# Patient Record
Sex: MALE | Race: WHITE | Employment: OTHER | URBAN - METROPOLITAN AREA
[De-identification: names, ages, dates, MRNs, and addresses within clinical notes are randomized per-mention and may not be internally consistent; named-entity substitution may affect disease eponyms.]

---

## 2017-01-09 ENCOUNTER — CARE COORDINATION (OUTPATIENT)
Dept: FAMILY MEDICINE CLINIC | Age: 72
End: 2017-01-09

## 2017-01-09 RX ORDER — SIMVASTATIN 40 MG
20 TABLET ORAL NIGHTLY
Qty: 45 TABLET | Refills: 3 | Status: SHIPPED | OUTPATIENT
Start: 2017-01-09 | End: 2018-01-04 | Stop reason: SDUPTHER

## 2017-01-16 ENCOUNTER — CARE COORDINATION (OUTPATIENT)
Dept: FAMILY MEDICINE CLINIC | Age: 72
End: 2017-01-16

## 2017-01-26 ENCOUNTER — CARE COORDINATION (OUTPATIENT)
Dept: FAMILY MEDICINE CLINIC | Age: 72
End: 2017-01-26

## 2017-02-01 ENCOUNTER — CARE COORDINATION (OUTPATIENT)
Dept: FAMILY MEDICINE CLINIC | Age: 72
End: 2017-02-01

## 2017-02-08 PROBLEM — I73.9 PAD (PERIPHERAL ARTERY DISEASE) (HCC): Status: ACTIVE | Noted: 2017-02-08

## 2017-05-22 DIAGNOSIS — Z87.39 HISTORY OF GOUT: ICD-10-CM

## 2017-05-22 DIAGNOSIS — I25.119 CORONARY ARTERY DISEASE WITH ANGINA PECTORIS, UNSPECIFIED VESSEL OR LESION TYPE, UNSPECIFIED WHETHER NATIVE OR TRANSPLANTED HEART (HCC): ICD-10-CM

## 2017-05-22 RX ORDER — ALLOPURINOL 300 MG/1
300 TABLET ORAL DAILY
Qty: 90 TABLET | Refills: 3 | Status: SHIPPED | OUTPATIENT
Start: 2017-05-22 | End: 2018-01-04 | Stop reason: SDUPTHER

## 2017-05-22 RX ORDER — METOPROLOL TARTRATE 50 MG/1
TABLET, FILM COATED ORAL
Qty: 90 TABLET | Refills: 3 | Status: SHIPPED | OUTPATIENT
Start: 2017-05-22 | End: 2018-01-04 | Stop reason: SDUPTHER

## 2017-07-11 RX ORDER — LISINOPRIL 10 MG/1
TABLET ORAL
Qty: 90 TABLET | Refills: 0 | Status: SHIPPED | OUTPATIENT
Start: 2017-07-11 | End: 2017-10-19 | Stop reason: SDUPTHER

## 2017-08-15 ENCOUNTER — HOSPITAL ENCOUNTER (OUTPATIENT)
Age: 72
Discharge: HOME | End: 2017-08-15
Payer: MEDICARE

## 2017-08-15 LAB
ALBUMIN SERPL-MCNC: 4.3 G/DL (ref 3.5–5.1)
ALP BLD-CCNC: 76 U/L (ref 38–126)
ALT SERPL-CCNC: 19 U/L (ref 11–66)
ANION GAP SERPL CALCULATED.3IONS-SCNC: 13 MEQ/L (ref 8–16)
AST SERPL-CCNC: 23 U/L (ref 5–40)
BILIRUB SERPL-MCNC: 2.4 MG/DL (ref 0.3–1.2)
BILIRUBIN DIRECT: 0.4 MG/DL (ref 0–0.3)
BUN BLDV-MCNC: 19 MG/DL (ref 7–22)
CALCIUM SERPL-MCNC: 9.2 MG/DL (ref 8.5–10.5)
CHLORIDE BLD-SCNC: 100 MEQ/L (ref 98–111)
CHOLESTEROL, TOTAL: 133 MG/DL (ref 100–199)
CO2: 26 MEQ/L (ref 23–33)
CREAT SERPL-MCNC: 1 MG/DL (ref 0.4–1.2)
GFR SERPL CREATININE-BSD FRML MDRD: 74 ML/MIN/1.73M2
GLUCOSE BLD-MCNC: 96 MG/DL (ref 70–108)
HDLC SERPL-MCNC: 34 MG/DL
LDL CHOLESTEROL CALCULATED: 76 MG/DL
POTASSIUM SERPL-SCNC: 4.5 MEQ/L (ref 3.5–5.2)
SODIUM BLD-SCNC: 139 MEQ/L (ref 135–145)
TOTAL PROTEIN: 6.9 G/DL (ref 6.1–8)
TRIGL SERPL-MCNC: 114 MG/DL (ref 0–199)

## 2017-08-15 PROCEDURE — 36415 COLL VENOUS BLD VENIPUNCTURE: CPT

## 2017-08-15 PROCEDURE — 80053 COMPREHEN METABOLIC PANEL: CPT

## 2017-08-15 PROCEDURE — 80061 LIPID PANEL: CPT

## 2017-08-15 PROCEDURE — 82248 BILIRUBIN DIRECT: CPT

## 2017-08-22 RX ORDER — PANTOPRAZOLE SODIUM 40 MG/1
40 TABLET, DELAYED RELEASE ORAL
Qty: 60 TABLET | Refills: 1 | Status: ON HOLD | OUTPATIENT
Start: 2017-08-22 | End: 2017-11-01 | Stop reason: ALTCHOICE

## 2017-09-18 ENCOUNTER — HOSPITAL ENCOUNTER (OUTPATIENT)
Dept: INTERVENTIONAL RADIOLOGY/VASCULAR | Age: 72
Discharge: HOME | End: 2017-09-18
Payer: MEDICARE

## 2017-09-18 DIAGNOSIS — I73.9 PVD (PERIPHERAL VASCULAR DISEASE) (HCC): ICD-10-CM

## 2017-09-18 PROCEDURE — 93922 UPR/L XTREMITY ART 2 LEVELS: CPT

## 2017-10-11 ENCOUNTER — OFFICE VISIT (OUTPATIENT)
Dept: FAMILY MEDICINE CLINIC | Age: 72
End: 2017-10-11
Payer: MEDICARE

## 2017-10-11 VITALS
RESPIRATION RATE: 12 BRPM | DIASTOLIC BLOOD PRESSURE: 58 MMHG | HEIGHT: 69 IN | SYSTOLIC BLOOD PRESSURE: 122 MMHG | HEART RATE: 52 BPM | WEIGHT: 189.3 LBS | TEMPERATURE: 98.2 F | BODY MASS INDEX: 28.04 KG/M2

## 2017-10-11 DIAGNOSIS — K76.0 HEPATIC STEATOSIS: ICD-10-CM

## 2017-10-11 DIAGNOSIS — J44.9 CHRONIC OBSTRUCTIVE PULMONARY DISEASE, UNSPECIFIED COPD TYPE (HCC): ICD-10-CM

## 2017-10-11 DIAGNOSIS — E78.6 LOW HDL (UNDER 40): ICD-10-CM

## 2017-10-11 DIAGNOSIS — N52.9 VASCULOGENIC ERECTILE DYSFUNCTION, UNSPECIFIED VASCULOGENIC ERECTILE DYSFUNCTION TYPE: ICD-10-CM

## 2017-10-11 DIAGNOSIS — R17 ELEVATED BILIRUBIN: ICD-10-CM

## 2017-10-11 DIAGNOSIS — I10 ESSENTIAL HYPERTENSION: Primary | ICD-10-CM

## 2017-10-11 PROCEDURE — G8419 CALC BMI OUT NRM PARAM NOF/U: HCPCS | Performed by: NURSE PRACTITIONER

## 2017-10-11 PROCEDURE — 3023F SPIROM DOC REV: CPT | Performed by: NURSE PRACTITIONER

## 2017-10-11 PROCEDURE — 4040F PNEUMOC VAC/ADMIN/RCVD: CPT | Performed by: NURSE PRACTITIONER

## 2017-10-11 PROCEDURE — G8926 SPIRO NO PERF OR DOC: HCPCS | Performed by: NURSE PRACTITIONER

## 2017-10-11 PROCEDURE — G8598 ASA/ANTIPLAT THER USED: HCPCS | Performed by: NURSE PRACTITIONER

## 2017-10-11 PROCEDURE — 3017F COLORECTAL CA SCREEN DOC REV: CPT | Performed by: NURSE PRACTITIONER

## 2017-10-11 PROCEDURE — 1036F TOBACCO NON-USER: CPT | Performed by: NURSE PRACTITIONER

## 2017-10-11 PROCEDURE — 99214 OFFICE O/P EST MOD 30 MIN: CPT | Performed by: NURSE PRACTITIONER

## 2017-10-11 PROCEDURE — 1123F ACP DISCUSS/DSCN MKR DOCD: CPT | Performed by: NURSE PRACTITIONER

## 2017-10-11 PROCEDURE — G8484 FLU IMMUNIZE NO ADMIN: HCPCS | Performed by: NURSE PRACTITIONER

## 2017-10-11 PROCEDURE — G8427 DOCREV CUR MEDS BY ELIG CLIN: HCPCS | Performed by: NURSE PRACTITIONER

## 2017-10-11 RX ORDER — SILDENAFIL 50 MG/1
TABLET, FILM COATED ORAL
Qty: 4 TABLET | Refills: 0 | COMMUNITY
Start: 2017-10-11 | End: 2020-12-09

## 2017-10-11 ASSESSMENT — ENCOUNTER SYMPTOMS
BLOOD IN STOOL: 0
ABDOMINAL DISTENTION: 0
COUGH: 0
VOMITING: 0
VOICE CHANGE: 0
NAUSEA: 0
ABDOMINAL PAIN: 0
APNEA: 0
TROUBLE SWALLOWING: 0
CONSTIPATION: 0
BACK PAIN: 1
WHEEZING: 0
ANAL BLEEDING: 0
PHOTOPHOBIA: 0
DIARRHEA: 1
SHORTNESS OF BREATH: 1

## 2017-10-11 NOTE — PROGRESS NOTES
of water daily. Use sunscreen when outdoors. Vaccines provided in the office today include: None. He declines a referral to Pulmonology. Call with any concerns. Return in about 6 months (around 4/11/2018).        Electronically signed by Renetta Alonso CNP on 10/11/2017 at 10:38 AM

## 2017-10-11 NOTE — PATIENT INSTRUCTIONS
You may receive a survey about your visit with us today. The feedback from our patients helps us identify what is working well and where the service to all patients can be enhanced. Thank you! Orders Placed:  Orders Placed This Encounter   Procedures    Bilirubin, Total     Standing Status:   Future     Standing Expiration Date:   10/11/2018    Bilirubin, Direct     Standing Status:   Future     Standing Expiration Date:   10/11/2018    HDL Cholesterol     Standing Status:   Future     Standing Expiration Date:   10/11/2018     Order Specific Question:   Is Patient Fasting? Answer:   Yes     Order Specific Question:   No of Hours? Answer:   12     Medications Prescribed:  Orders Placed This Encounter   Medications    sildenafil (VIAGRA) 50 MG tablet     Sig: Take 1 tablet 30 minutes-4 hours prior. FTM#:[id number]  Exp:10/2018     Dispense:  4 tablet     Refill:  0         Larayne Scientology Jeanne received counseling on the following healthy behaviors: nutrition, exercise, safety issues and immunizations. The patient has been advised to obtain lab tests as ordered. The patient was made aware of the need to fast for 12 hours prior to the blood draw. Next colonoscopy due in 2018 as advised by Dr. Marc Cervantes. Obtain at least 45 minutes of moderate exercise at least 3 times per week. Follow a low fat and low sodium diet. Take medications as prescribed. Wear eye protection (sunglasses) as needed to avoid unnecessary exposure to sunlight. Obtain annual eye exams as advised. An influenza vaccine was declined. Weight management is encouraged. Maintain safe habits, including wearing a seat belt, avoiding excessive speed and avoiding the use of a cell phone while driving. Avoid tobacco smoke, including second-hand smoke. Follow a well-balanced diet. \"Strive for [employer]" servings of fruits and vegetables daily. Obtain at least 2 servings of calcium-rich food daily.       Stay well have given you a prescription for antibiotics, which you can fill if you need it. ¨ Talk to your doctor if you have any problems with your medicine. And call your doctor if you have to use your antibiotic or steroid pills. Preventing an exacerbation  · Do not smoke. This is the most important step you can take to prevent more damage to your lungs and prevent problems. If you already smoke, it is never too late to stop. If you need help quitting, talk to your doctor about stop-smoking programs and medicines. These can increase your chances of quitting for good. · Take your daily medicines as prescribed. · Avoid colds and flu. ¨ Get a pneumococcal vaccine. ¨ Get a flu vaccine each year, as soon as it is available. Ask those you live or work with to do the same, so they will not get the flu and infect you. ¨ Try to stay away from people with colds or the flu. ¨ Wash your hands often. · Avoid secondhand smoke; air pollution; cold, dry air; hot, humid air; and high altitudes. Stay at home with your windows closed when air pollution is bad. · Learn breathing techniques for COPD, such as breathing through pursed lips. These techniques can help you breathe easier during an exacerbation. When should you call for help? Call 911 anytime you think you may need emergency care. For example, call if:  · You have severe trouble breathing. · You have severe chest pain. Call your doctor now or seek immediate medical care if:  · You have new or worse shortness of breath. · You develop new chest pain. · You are coughing more deeply or more often, especially if you notice more mucus or a change in the color of your mucus. · You cough up blood. · You have new or increased swelling in your legs or belly. · You have a fever. Watch closely for changes in your health, and be sure to contact your doctor if:  · You need to use your antibiotic or steroid pills. · Your symptoms are getting worse.   Where can you learn

## 2017-10-18 ENCOUNTER — HOSPITAL ENCOUNTER (OUTPATIENT)
Age: 72
Discharge: HOME | End: 2017-10-18
Payer: MEDICARE

## 2017-10-18 ENCOUNTER — TELEPHONE (OUTPATIENT)
Dept: FAMILY MEDICINE CLINIC | Age: 72
End: 2017-10-18

## 2017-10-18 DIAGNOSIS — E78.6 LOW HDL (UNDER 40): ICD-10-CM

## 2017-10-18 DIAGNOSIS — R17 ELEVATED BILIRUBIN: ICD-10-CM

## 2017-10-18 LAB
BILIRUB SERPL-MCNC: 2 MG/DL (ref 0.3–1.2)
BILIRUBIN DIRECT: 0.3 MG/DL (ref 0–0.3)
HDLC SERPL-MCNC: 33 MG/DL

## 2017-10-18 PROCEDURE — 83718 ASSAY OF LIPOPROTEIN: CPT

## 2017-10-18 PROCEDURE — 82247 BILIRUBIN TOTAL: CPT

## 2017-10-18 PROCEDURE — 82248 BILIRUBIN DIRECT: CPT

## 2017-10-18 PROCEDURE — 36415 COLL VENOUS BLD VENIPUNCTURE: CPT

## 2017-10-19 RX ORDER — LISINOPRIL 10 MG/1
TABLET ORAL
Qty: 90 TABLET | Refills: 3 | Status: SHIPPED | OUTPATIENT
Start: 2017-10-19 | End: 2018-01-04 | Stop reason: SDUPTHER

## 2017-10-19 NOTE — TELEPHONE ENCOUNTER
EP request received from [facility] for lisinopril 10mg  Last seen 10/11/17 and next appt 5/1/18  Order pending

## 2017-11-01 ENCOUNTER — HOSPITAL ENCOUNTER (OUTPATIENT)
Dept: INPATIENT UNIT | Age: 72
Discharge: HOME | End: 2017-11-01
Attending: INTERNAL MEDICINE | Admitting: INTERNAL MEDICINE
Payer: MEDICARE

## 2017-11-01 VITALS
RESPIRATION RATE: 16 BRPM | DIASTOLIC BLOOD PRESSURE: 64 MMHG | OXYGEN SATURATION: 100 % | SYSTOLIC BLOOD PRESSURE: 125 MMHG | HEART RATE: 50 BPM | HEIGHT: 70 IN | BODY MASS INDEX: 27.06 KG/M2 | WEIGHT: 189 LBS | TEMPERATURE: 98.5 F

## 2017-11-01 PROBLEM — I20.1 ANGINA PECTORIS, VARIANT (HCC): Status: ACTIVE | Noted: 2017-11-01

## 2017-11-01 PROBLEM — R94.39 ABNORMAL NUCLEAR STRESS TEST: Status: ACTIVE | Noted: 2017-11-01

## 2017-11-01 PROBLEM — I73.9 PVD (PERIPHERAL VASCULAR DISEASE) WITH CLAUDICATION (HCC): Status: ACTIVE | Noted: 2017-11-01

## 2017-11-01 LAB
ABO: NORMAL
ALBUMIN SERPL-MCNC: 4.4 G/DL (ref 3.5–5.1)
ALP BLD-CCNC: 87 U/L (ref 38–126)
ALT SERPL-CCNC: 18 U/L (ref 11–66)
ANION GAP SERPL CALCULATED.3IONS-SCNC: 10 MEQ/L (ref 8–16)
ANTIBODY SCREEN: NORMAL
AST SERPL-CCNC: 22 U/L (ref 5–40)
BILIRUB SERPL-MCNC: 2.1 MG/DL (ref 0.3–1.2)
BUN BLDV-MCNC: 16 MG/DL (ref 7–22)
CALCIUM SERPL-MCNC: 9.2 MG/DL (ref 8.5–10.5)
CHLORIDE BLD-SCNC: 101 MEQ/L (ref 98–111)
CHOLESTEROL, TOTAL: 126 MG/DL (ref 100–199)
CO2: 29 MEQ/L (ref 23–33)
CREAT SERPL-MCNC: 1 MG/DL (ref 0.4–1.2)
GFR SERPL CREATININE-BSD FRML MDRD: 74 ML/MIN/1.73M2
GLUCOSE BLD-MCNC: 103 MG/DL (ref 70–108)
HCT VFR BLD CALC: 41 % (ref 42–52)
HDLC SERPL-MCNC: 34 MG/DL
HEMOGLOBIN: 14.7 GM/DL (ref 14–18)
LDL CHOLESTEROL CALCULATED: 69 MG/DL
MCH RBC QN AUTO: 35 PG (ref 27–31)
MCHC RBC AUTO-ENTMCNC: 35.8 GM/DL (ref 33–37)
MCV RBC AUTO: 97.8 FL (ref 80–94)
PDW BLD-RTO: 13.2 % (ref 11.5–14.5)
PLATELET # BLD: 163 THOU/MM3 (ref 130–400)
PMV BLD AUTO: 8.2 MCM (ref 7.4–10.4)
POTASSIUM SERPL-SCNC: 4.8 MEQ/L (ref 3.5–5.2)
RBC # BLD: 4.2 MILL/MM3 (ref 4.7–6.1)
RH FACTOR: NORMAL
SODIUM BLD-SCNC: 140 MEQ/L (ref 135–145)
TOTAL PROTEIN: 6.8 G/DL (ref 6.1–8)
TRIGL SERPL-MCNC: 116 MG/DL (ref 0–199)
WBC # BLD: 5.9 THOU/MM3 (ref 4.8–10.8)

## 2017-11-01 PROCEDURE — 2720000010 HC SURG SUPPLY STERILE

## 2017-11-01 PROCEDURE — 80053 COMPREHEN METABOLIC PANEL: CPT

## 2017-11-01 PROCEDURE — C1894 INTRO/SHEATH, NON-LASER: HCPCS

## 2017-11-01 PROCEDURE — 93005 ELECTROCARDIOGRAM TRACING: CPT

## 2017-11-01 PROCEDURE — 36415 COLL VENOUS BLD VENIPUNCTURE: CPT

## 2017-11-01 PROCEDURE — C1769 GUIDE WIRE: HCPCS

## 2017-11-01 PROCEDURE — 93459 L HRT ART/GRFT ANGIO: CPT | Performed by: INTERNAL MEDICINE

## 2017-11-01 PROCEDURE — 2780000010 HC IMPLANT OTHER

## 2017-11-01 PROCEDURE — 75630 X-RAY AORTA LEG ARTERIES: CPT | Performed by: INTERNAL MEDICINE

## 2017-11-01 PROCEDURE — 80061 LIPID PANEL: CPT

## 2017-11-01 PROCEDURE — 6360000002 HC RX W HCPCS

## 2017-11-01 PROCEDURE — 86900 BLOOD TYPING SEROLOGIC ABO: CPT

## 2017-11-01 PROCEDURE — A6258 TRANSPARENT FILM >16<=48 IN: HCPCS

## 2017-11-01 PROCEDURE — 86901 BLOOD TYPING SEROLOGIC RH(D): CPT

## 2017-11-01 PROCEDURE — 2580000003 HC RX 258: Performed by: INTERNAL MEDICINE

## 2017-11-01 PROCEDURE — 86850 RBC ANTIBODY SCREEN: CPT

## 2017-11-01 PROCEDURE — 85027 COMPLETE CBC AUTOMATED: CPT

## 2017-11-01 PROCEDURE — 93567 NJX CAR CTH SPRVLV AORTGRPHY: CPT | Performed by: INTERNAL MEDICINE

## 2017-11-01 PROCEDURE — 2500000003 HC RX 250 WO HCPCS

## 2017-11-01 RX ORDER — SODIUM CHLORIDE 9 MG/ML
INJECTION, SOLUTION INTRAVENOUS CONTINUOUS
Status: DISCONTINUED | OUTPATIENT
Start: 2017-11-01 | End: 2017-11-01 | Stop reason: HOSPADM

## 2017-11-01 RX ORDER — ACETAMINOPHEN 325 MG/1
650 TABLET ORAL EVERY 4 HOURS PRN
Status: DISCONTINUED | OUTPATIENT
Start: 2017-11-01 | End: 2017-11-01 | Stop reason: HOSPADM

## 2017-11-01 RX ORDER — ATROPINE SULFATE 0.4 MG/ML
0.5 AMPUL (ML) INJECTION
Status: DISCONTINUED | OUTPATIENT
Start: 2017-11-01 | End: 2017-11-01 | Stop reason: HOSPADM

## 2017-11-01 RX ORDER — ASPIRIN 325 MG
325 TABLET ORAL ONCE
Status: DISCONTINUED | OUTPATIENT
Start: 2017-11-01 | End: 2017-11-01 | Stop reason: HOSPADM

## 2017-11-01 RX ORDER — SODIUM CHLORIDE 0.9 % (FLUSH) 0.9 %
10 SYRINGE (ML) INJECTION EVERY 12 HOURS SCHEDULED
Status: DISCONTINUED | OUTPATIENT
Start: 2017-11-01 | End: 2017-11-01 | Stop reason: HOSPADM

## 2017-11-01 RX ORDER — SODIUM CHLORIDE 9 MG/ML
100 INJECTION, SOLUTION INTRAVENOUS CONTINUOUS
Status: ACTIVE | OUTPATIENT
Start: 2017-11-01 | End: 2017-11-01

## 2017-11-01 RX ORDER — SODIUM CHLORIDE 0.9 % (FLUSH) 0.9 %
10 SYRINGE (ML) INJECTION PRN
Status: DISCONTINUED | OUTPATIENT
Start: 2017-11-01 | End: 2017-11-01 | Stop reason: HOSPADM

## 2017-11-01 RX ORDER — ONDANSETRON 2 MG/ML
4 INJECTION INTRAMUSCULAR; INTRAVENOUS EVERY 6 HOURS PRN
Status: DISCONTINUED | OUTPATIENT
Start: 2017-11-01 | End: 2017-11-01 | Stop reason: HOSPADM

## 2017-11-01 RX ADMIN — SODIUM CHLORIDE: 9 INJECTION, SOLUTION INTRAVENOUS at 05:53

## 2017-11-01 NOTE — H&P
[address] [address]   Sedation/Analgesia History and Physical    Pt Name: Efren Mo  MRN: [id number]  YOB: 1945  Provider Performing Procedure: Sanchez Berger MD  Primary Care Physician: Mela Calderón MD      Pre-Procedure: Chest pain, possible coronary artery disease, abnormal stress test    Consent: I have discussed with the patient and/or the patient representative the indication, alternatives, and the possible risks and/or complications of the planned procedure and the anesthesia methods. The patient and/or representative appear to understand and agree to proceed. Medical History:   has a past medical history of AAA (abdominal aortic aneurysm) ([facility] [address] COPD (chronic obstructive pulmonary disease) ([facility] [address] Coronary artery disease; Emphysema of lung ([facility] [address] Erectile dysfunction; GERD (gastroesophageal reflux disease); Gout; History of blood transfusion; Hyperlipidemia; Hypertension; Liver disease; and Osteoarthritis. .    Surgical History:     has a past surgical history that includes Foot surgery (2011); Coronary artery bypass graft (2006); Cardiac surgery (2006); Colonoscopy (12/18/13); Appendectomy (1955); Cardiac catheterization; Inguinal hernia repair (Right, 12/1/2014); Upper gastrointestinal endoscopy; Endoscopy, colon, diagnostic; and thromboendarterectomy (Right, 12/22/2016). Allergies:    Allergies as of 11/01/2017 - Review Complete 11/01/2017   Allergen Reaction Noted    Crestor [rosuvastatin calcium]  09/12/2011    Lipitor  09/12/2011    Tramadol Nausea Only 09/12/2011    Vicodin [hydrocodone-acetaminophen]  09/12/2011    Codeine Nausea And Vomiting 09/12/2011    Percocet [oxycodone-acetaminophen] Nausea And Vomiting 09/12/2011       Medications:   Coumadin use last 5 days:  No  Antiplatelet drug therapy use last 5 days:  Yes  Other anticoagulant use last 5 days:  No   sodium chloride flush  10 mL Intravenous 2 times per day    aspirin  325 mg Oral Once Prior to Admission medications    Medication Sig Start Date End Date Taking? Authorizing Provider   Pantoprazole Sodium (PROTONIX PO) Take 40 mg by mouth daily   Yes Historical Provider, MD   lisinopril (PRINIVIL;ZESTRIL) 10 MG tablet TAKE ONE TABLET BY MOUTH ONCE DAILY 10/19/17  Yes Lokesh Montgomery MD   sildenafil (VIAGRA) 50 MG tablet Take 1 tablet 30 minutes-4 hours prior. SIY#:[id number]  Exp:10/2018 10/11/17  Yes [place] ERIK Chi   clopidogrel (PLAVIX) 75 MG tablet TAKE 1 TABLET BY MOUTH DAILY 8/29/17  Yes Varun Hooper MD   metoprolol tartrate (LOPRESSOR) 50 MG tablet TAKE ONE-HALF TABLET BY MOUTH TWICE DAILY 5/22/17  Yes [place] ERIK Chi   allopurinol (ZYLOPRIM) 300 MG tablet Take 1 tablet by mouth daily 5/22/17  [facility]   simvastatin (ZOCOR) 40 MG tablet Take 0.5 tablets by mouth nightly 1/9/17  Yes Lokesh Montgomery MD   nitroGLYCERIN (NITROSTAT) 0.4 MG SL tablet Place 1 tablet under the tongue every 5 minutes as needed for Chest pain 5/7/16  Yes Keyonna Wilder MD   ferrous sulfate 325 (65 FE) MG tablet Take 325 mg by mouth daily    Yes Historical Provider, MD   acetaminophen (TYLENOL) 500 MG tablet Take 1,000 mg by mouth as needed for Pain. Yes Historical Provider, MD   aspirin 325 MG EC tablet Take 325 mg by mouth daily.      Yes Historical Provider, MD       Vital Signs  Vitals:    11/01/17 0515   BP: 127/64   Pulse: (!) 46   Resp: 12   Temp: 97.6 °F (36.4 °C)   SpO2: 100%       Physical:  Heart:  regular rate and rhythm  Lungs:  Clear  Abdomen:  Soft  Mental Status:  Alert and Oriented    Planned Procedure:  Left Heart Cath and Possible Percutaneous Coronary Intervention    Sedation/ Anesthesia Plan: Midazolam and Sublimaze    ASA Classification: Class 3 - A patient with severe systemic disease that limits activity but is not incapacitating    Mallampati Airway Classification: II (soft palate, uvula, fauces visible)    · Pre-procedure diagnostic studies complete and results available. · Previous sedation/anesthesia experiences assessed. · The patient is an appropriate candidate to undergo the planned procedure sedation and anesthesia. (Refer to nursing sedation/analgesia documentation record)  · Formulation and discussion of sedation/procedure plan, risks, and expectations with patient and/or responsible adult completed. · Patient examined immediately prior to the procedure.  (Refer to nursing sedation/analgesia documentation record)    Tanya Velasquez MD  Electronically signed 11/1/2017 at 7:03 AM  Patient Name: Nikunj Bishop Record Number: [id number]  Date: 11/1/2017   Time: 7:03 AM   Room/Bed: 2E-05/005-A

## 2017-11-01 NOTE — FLOWSHEET NOTE
Pt admitted to  2E ambulatory for cardiac cath. Pt NPO. Patient accompanied by his wife. Vital signs obtained. Assessment and data collection initiated. Oriented to room. Policies and procedures for 2E explained   All questions answered with no further questions at this time. Fall prevention and safety precautions discussed with patient.

## 2017-11-01 NOTE — PROCEDURES
occluded in its mid-course, with diffuse disease  proximally. The saphenous vein graft to the obtuse marginal was patent with about  50% narrowing at the area of the anastomosis, does not seem to be  flow-limiting at this point. The LIMA to the LAD was patent with a good distal runoff. Some haziness of the proximal portion of the LIMA was seen and it does  not seem to be flow-limiting. The saphenous vein graft to the RCA was  patent with a good distal runoff. The RCA is a dominant artery. Posterolateral were patent. The saphenous vein graft to the diagonal artery was patent with about  50% narrowing proximally and the diagonal artery is small caliber  artery. The left ventriculogram showed a preserved systolic function of the  left ventricle and ejection fraction around 55%. No mitral regurg and  no gradient across the aortic valve. The abdominal aortogram showed a  small abdominal aortic aneurysm just before the point of the  bifurcation. Diffuse disease of both iliac arteries. The SFA was patent with diffuse nonobstructive disease and distally  there is faint visualization of the distal runoff with reconstitution  at the foot area. The distal one-third of the leg has practically  very little main arteries visualization. Peroneal arteries are  faintly visualized. Tibial arteries are faintly visualized. The left  iliac artery was patent with diffuse nonobstructive disease of the  iliac artery. There is at least two-vessel runoff distally to the  foot. IMPRESSION:  1. Preserved systolic function. The left ventricular ejection  fraction was 55%. No mitral regurg. No gradient across the aortic  valve. 2.  Total occlusion of the PDA of the circumflex. 3.  Competitive flow of the LAD. 4.  LIMA to the LAD was patent with a good distal runoff. 5.  Saphenous vein graft to the diagonal artery was patent with about  50% narrowing proximally, small caliber diagonal artery.   6. Saphenous vein graft to the RCA was patent with a good distal  runoff. 7.  Total occlusion of the mid RCA. 8.  LIMA to the LAD was patent with a good distal runoff. 9.  A small abdominal aortic aneurysm above the point of bifurcation. 10.  Nonobstructive disease of the SFA and iliac artery on both sides. 11.  Two-vessel distal runoff on the left side. 12.  In the distal one-third of the right leg, no major arteries were  seen but reconstitution at the foot level. The patient has no acute complication from the procedure. At this  point, would continue with the aggressive medical treatment, risk  factor modification, periodic followup. The patient has no acute  complication from the procedure.         Lily Garcia M.D.    D: 11/01/2017 9:26:19       T: 11/01/2017 9:35:19     [email or web address]  Job#: [id number]     Doc#: [id number]

## 2017-11-01 NOTE — PROGRESS NOTES
Inpatient Cardiac Rehabilitation Consult    Received consult for Phase II Cardiac Rehabilitation. Post cath note states LHC, svg and lima visualization, abd aortogram and distal run off. Not a candidate for CR with these procedures.

## 2017-11-02 LAB
EKG ATRIAL RATE: 46 BPM
EKG P AXIS: 72 DEGREES
EKG P-R INTERVAL: 190 MS
EKG Q-T INTERVAL: 450 MS
EKG QRS DURATION: 96 MS
EKG QTC CALCULATION (BAZETT): 393 MS
EKG R AXIS: 20 DEGREES
EKG T AXIS: 22 DEGREES
EKG VENTRICULAR RATE: 46 BPM

## 2017-11-23 ENCOUNTER — HOSPITAL ENCOUNTER (EMERGENCY)
Age: 72
Discharge: HOME | End: 2017-11-23
Payer: MEDICARE

## 2017-11-23 ENCOUNTER — TELEPHONE (OUTPATIENT)
Dept: FAMILY MEDICINE CLINIC | Age: 72
End: 2017-11-23

## 2017-11-23 ENCOUNTER — APPOINTMENT (OUTPATIENT)
Dept: CT IMAGING | Age: 72
End: 2017-11-23
Payer: MEDICARE

## 2017-11-23 ENCOUNTER — APPOINTMENT (OUTPATIENT)
Dept: GENERAL RADIOLOGY | Age: 72
End: 2017-11-23
Payer: MEDICARE

## 2017-11-23 VITALS
HEIGHT: 71 IN | DIASTOLIC BLOOD PRESSURE: 67 MMHG | HEART RATE: 64 BPM | TEMPERATURE: 97.8 F | BODY MASS INDEX: 26.04 KG/M2 | OXYGEN SATURATION: 100 % | WEIGHT: 186 LBS | SYSTOLIC BLOOD PRESSURE: 102 MMHG | RESPIRATION RATE: 17 BRPM

## 2017-11-23 DIAGNOSIS — J44.1 COPD EXACERBATION (HCC): ICD-10-CM

## 2017-11-23 DIAGNOSIS — R91.1 PULMONARY NODULE, RIGHT: Primary | ICD-10-CM

## 2017-11-23 DIAGNOSIS — Z01.812 PRE-PROCEDURE LAB EXAM: ICD-10-CM

## 2017-11-23 DIAGNOSIS — J18.9 PNEUMONIA DUE TO ORGANISM: Primary | ICD-10-CM

## 2017-11-23 DIAGNOSIS — R91.1 PULMONARY NODULE: ICD-10-CM

## 2017-11-23 DIAGNOSIS — R59.0 MEDIASTINAL LYMPHADENOPATHY: ICD-10-CM

## 2017-11-23 LAB
ANION GAP SERPL CALCULATED.3IONS-SCNC: 14 MEQ/L (ref 8–16)
BASOPHILS # BLD: 0.4 %
BASOPHILS ABSOLUTE: 0 THOU/MM3 (ref 0–0.1)
BUN BLDV-MCNC: 16 MG/DL (ref 7–22)
CALCIUM SERPL-MCNC: 9.2 MG/DL (ref 8.5–10.5)
CHLORIDE BLD-SCNC: 95 MEQ/L (ref 98–111)
CO2: 24 MEQ/L (ref 23–33)
CREAT SERPL-MCNC: 1 MG/DL (ref 0.4–1.2)
EKG ATRIAL RATE: 55 BPM
EKG P AXIS: 71 DEGREES
EKG P-R INTERVAL: 172 MS
EKG Q-T INTERVAL: 438 MS
EKG QRS DURATION: 96 MS
EKG QTC CALCULATION (BAZETT): 419 MS
EKG R AXIS: 16 DEGREES
EKG T AXIS: 10 DEGREES
EKG VENTRICULAR RATE: 55 BPM
EOSINOPHIL # BLD: 1.7 %
EOSINOPHILS ABSOLUTE: 0.2 THOU/MM3 (ref 0–0.4)
GFR SERPL CREATININE-BSD FRML MDRD: 74 ML/MIN/1.73M2
GLUCOSE BLD-MCNC: 112 MG/DL (ref 70–108)
HCT VFR BLD CALC: 37.6 % (ref 42–52)
HEMOGLOBIN: 13.1 GM/DL (ref 14–18)
LYMPHOCYTES # BLD: 6.2 %
LYMPHOCYTES ABSOLUTE: 0.7 THOU/MM3 (ref 1–4.8)
MCH RBC QN AUTO: 33.4 PG (ref 27–31)
MCHC RBC AUTO-ENTMCNC: 34.8 GM/DL (ref 33–37)
MCV RBC AUTO: 96.1 FL (ref 80–94)
MONOCYTES # BLD: 6.3 %
MONOCYTES ABSOLUTE: 0.7 THOU/MM3 (ref 0.4–1.3)
NUCLEATED RED BLOOD CELLS: 0 /100 WBC
OSMOLALITY CALCULATION: 268.3 MOSMOL/KG (ref 275–300)
PDW BLD-RTO: 12.7 % (ref 11.5–14.5)
PLATELET # BLD: 253 THOU/MM3 (ref 130–400)
PMV BLD AUTO: 8 MCM (ref 7.4–10.4)
POTASSIUM SERPL-SCNC: 4.4 MEQ/L (ref 3.5–5.2)
RBC # BLD: 3.91 MILL/MM3 (ref 4.7–6.1)
SEG NEUTROPHILS: 85.4 %
SEGMENTED NEUTROPHILS ABSOLUTE COUNT: 9.6 THOU/MM3 (ref 1.8–7.7)
SODIUM BLD-SCNC: 133 MEQ/L (ref 135–145)
TROPONIN T: < 0.01 NG/ML
WBC # BLD: 11.2 THOU/MM3 (ref 4.8–10.8)

## 2017-11-23 PROCEDURE — 85025 COMPLETE CBC W/AUTO DIFF WBC: CPT

## 2017-11-23 PROCEDURE — 80048 BASIC METABOLIC PNL TOTAL CA: CPT

## 2017-11-23 PROCEDURE — 36415 COLL VENOUS BLD VENIPUNCTURE: CPT

## 2017-11-23 PROCEDURE — 84484 ASSAY OF TROPONIN QUANT: CPT

## 2017-11-23 PROCEDURE — 99284 EMERGENCY DEPT VISIT MOD MDM: CPT

## 2017-11-23 PROCEDURE — 71260 CT THORAX DX C+: CPT

## 2017-11-23 PROCEDURE — 93005 ELECTROCARDIOGRAM TRACING: CPT

## 2017-11-23 PROCEDURE — 6370000000 HC RX 637 (ALT 250 FOR IP): Performed by: NURSE PRACTITIONER

## 2017-11-23 PROCEDURE — 71020 XR CHEST STANDARD TWO VW: CPT

## 2017-11-23 PROCEDURE — 6360000004 HC RX CONTRAST MEDICATION: Performed by: NURSE PRACTITIONER

## 2017-11-23 RX ORDER — ALBUTEROL SULFATE 90 UG/1
2 AEROSOL, METERED RESPIRATORY (INHALATION) EVERY 6 HOURS PRN
Qty: 1 INHALER | Refills: 3 | Status: SHIPPED | OUTPATIENT
Start: 2017-11-23 | End: 2020-12-09

## 2017-11-23 RX ORDER — CETIRIZINE HYDROCHLORIDE 10 MG/1
10 TABLET ORAL DAILY
Qty: 30 TABLET | Refills: 0 | Status: SHIPPED | OUTPATIENT
Start: 2017-11-23 | End: 2017-12-06

## 2017-11-23 RX ORDER — BENZONATATE 100 MG/1
100 CAPSULE ORAL ONCE
Status: COMPLETED | OUTPATIENT
Start: 2017-11-23 | End: 2017-11-23

## 2017-11-23 RX ORDER — LEVOFLOXACIN 500 MG/1
750 TABLET, FILM COATED ORAL DAILY
Status: DISCONTINUED | OUTPATIENT
Start: 2017-11-23 | End: 2017-11-23 | Stop reason: HOSPADM

## 2017-11-23 RX ORDER — BENZONATATE 100 MG/1
100 CAPSULE ORAL 3 TIMES DAILY PRN
Qty: 30 CAPSULE | Refills: 0 | Status: SHIPPED | OUTPATIENT
Start: 2017-11-23 | End: 2017-11-30

## 2017-11-23 RX ORDER — CETIRIZINE HYDROCHLORIDE 10 MG/1
10 TABLET ORAL ONCE
Status: COMPLETED | OUTPATIENT
Start: 2017-11-23 | End: 2017-11-23

## 2017-11-23 RX ORDER — ALBUTEROL SULFATE 90 UG/1
2 AEROSOL, METERED RESPIRATORY (INHALATION) EVERY 6 HOURS PRN
Status: DISCONTINUED | OUTPATIENT
Start: 2017-11-23 | End: 2017-11-23 | Stop reason: HOSPADM

## 2017-11-23 RX ORDER — LEVOFLOXACIN 750 MG/1
750 TABLET ORAL DAILY
Qty: 7 TABLET | Refills: 0 | Status: SHIPPED | OUTPATIENT
Start: 2017-11-23 | End: 2017-11-30

## 2017-11-23 RX ADMIN — ALBUTEROL SULFATE 2 PUFF: 90 AEROSOL, METERED RESPIRATORY (INHALATION) at 09:15

## 2017-11-23 RX ADMIN — CETIRIZINE HYDROCHLORIDE 10 MG: 10 TABLET ORAL at 09:15

## 2017-11-23 RX ADMIN — Medication 5 ML: at 09:15

## 2017-11-23 RX ADMIN — LEVOFLOXACIN 750 MG: 500 TABLET, FILM COATED ORAL at 11:25

## 2017-11-23 RX ADMIN — IOPAMIDOL 85 ML: 755 INJECTION, SOLUTION INTRAVENOUS at 10:04

## 2017-11-23 RX ADMIN — BENZONATATE 100 MG: 100 CAPSULE ORAL at 09:15

## 2017-11-23 ASSESSMENT — ENCOUNTER SYMPTOMS
VOMITING: 0
NAUSEA: 0
CONSTIPATION: 0
SINUS PRESSURE: 0
PHOTOPHOBIA: 0
BACK PAIN: 1
COUGH: 1
RHINORRHEA: 1
VOICE CHANGE: 0
WHEEZING: 1
SHORTNESS OF BREATH: 0
BLOOD IN STOOL: 0
DIARRHEA: 0
EYE REDNESS: 0
COLOR CHANGE: 0
CHEST TIGHTNESS: 0
SORE THROAT: 0
ABDOMINAL DISTENTION: 0
ABDOMINAL PAIN: 0

## 2017-11-23 ASSESSMENT — PAIN DESCRIPTION - LOCATION: LOCATION: GENERALIZED

## 2017-11-23 ASSESSMENT — PAIN DESCRIPTION - DIRECTION: RADIATING_TOWARDS: ACHINESS

## 2017-11-23 NOTE — ED NOTES
Updated patient and wife on plan of care and that he will be having a CT of the chest to get a better look at his lungs.       Homero Mckeon RN  11/23/17 [id number]

## 2017-11-23 NOTE — TELEPHONE ENCOUNTER
Impression   1. 6 mm pulmonary nodule within the right upper lobe. This likely corresponds with chest radiograph findings from earlier same day. This is indeterminate. Recommend 3 month follow-up CT evaluation to document stability. Lung RADS Category 4A. 2. There are patchy ill-defined groundglass and confluent nodular opacities scattered throughout the right lower lobe which may represent an inflammatory process or infectious pneumonia. Recommend short-term follow-up CT evaluation following appropriate    antibiotic therapy to document resolution. 3. Focal areas of opacity abutting the pleura within the right upper lung as well as biapically. This may represent pleural parenchymal scarring. 4. Mild nonspecific mediastinal lymphadenopathy as detailed above. These may be reactive in nature. However, recommend continued follow-up to document stability.                       Final report electronically signed by Dr. Kassidy Brice on 11/23/2017 10:28 AM          Pt seen in ED for Pneumonia, COPD exacerbation    Recheck CT Chest with Contrast in 3 months as recommended per Radiology.   ES

## 2017-11-23 NOTE — ED NOTES
Patient presents to the emergency department with c/o cough and cold like symptoms that have been ongoing for the past week. Patient states that he has been taking OTC medications but has not been improving.       Madan Vallecillo RN  11/23/17 [id number]

## 2017-11-23 NOTE — ED PROVIDER NOTES
[facility] Emergency Department    CHIEF COMPLAINT       Chief Complaint   Patient presents with    Cough    URI       Nurses Notes reviewed and I agree except as noted in the HPI. HISTORY OF PRESENT ILLNESS    Blake West is a 81 y.o. male who presents to the ED for evaluation of cough. The patient states he has had this for a week, but has no pain. He has a productive cough. He reports associated rib and back pain with the cough, fevers, rhinorrhea, wheezing, and dizziness. He denies chills, sore throat, leg swelling, headache, or trouble urinating. The patient has COPD, but does not use inhalers or medications. He stopped smoking when he was diagnosed with CAD. The patient had open heart surgery in 2006. Pain description:  Onset: week ago  Location: cough  Duration: constant   Aggravating factors: denies  Radiation: rib and back pain with cough  Severity: moderate   Experienced previously: Yes    HPI was provided by the patient. REVIEW OF SYSTEMS     Review of Systems   Constitutional: Positive for fever. Negative for appetite change, chills, diaphoresis, fatigue and unexpected weight change. HENT: Positive for rhinorrhea. Negative for congestion, hearing loss, postnasal drip, sinus pressure, sore throat and voice change. Eyes: Negative for photophobia, redness and visual disturbance. Respiratory: Positive for cough (productive) and wheezing. Negative for chest tightness and shortness of breath. Cardiovascular: Negative for chest pain and palpitations. Gastrointestinal: Negative for abdominal distention, abdominal pain, blood in stool, constipation, diarrhea, nausea and vomiting. Endocrine: Negative for cold intolerance, heat intolerance, polydipsia, polyphagia and polyuria. Genitourinary: Negative for decreased urine volume, difficulty urinating, dysuria, flank pain and frequency. Musculoskeletal: Positive for arthralgias (rib pain with cough) and back pain (with cough). Soft. Normal appearance and bowel sounds are normal. He exhibits no distension. There is no tenderness. Musculoskeletal: Normal range of motion. Neurological: He is alert and oriented to person, place, and time. Skin: Skin is warm and dry. No rash noted. No erythema. No pallor. Psychiatric: His behavior is normal. Judgment and thought content normal.   Nursing note and vitals reviewed. DIFFERENTIAL DIAGNOSIS:   COPD exacerbation, bronchitis, pneumonia     DIAGNOSTIC RESULTS     EKG: All EKG's are interpreted by the Emergency Department Physician who either signs or Co-signs this chart in the absence of a cardiologist.    EKG read and interpreted by myself with comparison to 01-NOV-2017 gives impression of sinus bradycardia, incomplete right BBB with heart rate of 55; interval 172; QRS 96;QTc 419; axis 16. No STEMI. RADIOLOGY: non-plain film images(s) such as CT, Ultrasound and MRI are read by the radiologist.  Plain radiographic images are visualized and preliminarily interpreted by the emergency physician unless otherwise stated below. CT Chest W Contrast   Final Result   1. 6 mm pulmonary nodule within the right upper lobe. This likely corresponds with chest radiograph findings from earlier same day. This is indeterminate. Recommend 3 month follow-up CT evaluation to document stability. Lung RADS Category 4A. 2. There are patchy ill-defined groundglass and confluent nodular opacities scattered throughout the right lower lobe which may represent an inflammatory process or infectious pneumonia. Recommend short-term follow-up CT evaluation following appropriate    antibiotic therapy to document resolution. 3. Focal areas of opacity abutting the pleura within the right upper lung as well as biapically. This may represent pleural parenchymal scarring. 4. Mild nonspecific mediastinal lymphadenopathy as detailed above. These may be reactive in nature.  However, recommend continued follow-up to evaluation. There was wheezes in lower bases. Heart rate was regular no murmurs clicks rubs. Labs are obtained, there is a slight leukocytosis, slight hyponatremia, this appears to be chronic, x-ray of the chest reveals small nodular density overlying the anterolateral right fifth rib. CT scan was recommended, this was completed and shows a right lower lobe groundglass opacity consistent with pneumonia or inflammation. Also shows a small pulmonary nodule that is recommended for follow-up, also multiple mediastinal lymph nodes that are enlarged. Likely reactive associated with the pneumonia. He was treated with medications below, reassessment shows improved shortness of breath, and decreased cough. I discussed my findings my plan of care the patient was amenable with this plan of care, he was given instructions in regards to the CT images, he is given instructions to follow-up with his primary care provider. He is given a first dose of antibiotic while here in the emergency department. Medications   albuterol sulfate  (90 Base) MCG/ACT inhaler 2 puff (2 puffs Inhalation Given 11/23/17 0915)   levofloxacin (LEVAQUIN) tablet 750 mg (750 mg Oral Given 11/23/17 1125)   benzonatate (TESSALON) capsule 100 mg (100 mg Oral Given 11/23/17 0915)   Magic Mouthwash (Miracle Mouthwash) 5 mL (5 mLs Swish & Swallow Given 11/23/17 0915)   cetirizine (ZYRTEC) tablet 10 mg (10 mg Oral Given 11/23/17 0915)   iopamidol (ISOVUE-370) 76 % injection 85 mL (85 mLs Intravenous Given 11/23/17 1004)       Patient was seen independently by myself. The patient's final impression and disposition and plan was determined by myself. CRITICAL CARE:   None    CONSULTS:  None    PROCEDURES:  None    FINAL IMPRESSION      1. Pneumonia due to organism    2. COPD exacerbation (HCC)    3. Pulmonary nodule    4.  Mediastinal lymphadenopathy          DISPOSITION/PLAN   Patient discharged in stable condition    PATIENT REFERRED TO:  Manolo Vu

## 2017-11-24 ENCOUNTER — CARE COORDINATION (OUTPATIENT)
Dept: CARE COORDINATION | Age: 72
End: 2017-11-24

## 2017-11-24 NOTE — CARE COORDINATION
Ambulatory Care Coordination ED Follow up Call     Reason for ED Visit:  Pneumonia  Care Management Risk Score: CMRS 2  How are you feeling? :     not changed  Patient Reports the following:  none             Contact RNCC regarding any worsening symptoms from above. Did you call your PCP prior to going to the ED? No          Post Discharge Status:  What health concerns since you left the Emergency Room?  none    Do you have wounds that you are caring for at home? No    Do you have a follow up appt scheduled? yes    Review of Instructions:                                 Do you have any questions regarding your discharge instructions?:  No  Medications:    What questions do you have about your medications? N/A  Are you taking your medications as directed? If not - why? Yes   Can you afford your medications? yes  ADLS:  Do you need assistance of any kind at home? No   What assistance is needed? N/a    I spoke with wife Keyno Harrington re: ed visit. Patient was seen and treated for pneumonia. Patient admits to a productive cough, fever, rhinorrhea, wheezing, and dizziness. Instructed patient on importance of early symptom recognition to prevent hospitalization and ED visits. Patient is taking medication as prescribed with minimal relief at this time. Follow up appointment has been scheduled. I advised patient and Keyon Harrington to contact PCP office if needed. No further needs at this time. FU appts/Provider:    Future Appointments  Date Time Provider Eliot Preciado   11/30/2017 10:30 AM [facility] VASCULAR LAB ROOM 1 [facility] VAS LAB STR Radiolog   12/1/2017 7:45 AM Sachin Hannah CNP [facility] Medicine P - [place]   12/6/2017 12:00 PM Cary Trejo MD [facility]   5/1/2018 8:15 AM Marcella Gosselin, MD [facility] Maintenance Due   Topic Date Due    Diabetes screen  11/[birth year]1987     Patient advised to contact PCP office to have HM items/records faxed to PCP Office directly?   yes

## 2017-11-27 NOTE — TELEPHONE ENCOUNTER
Patient notified and is ok to proceed with f/u CT in 3 months. Scheduled @ [facility] on 2/26/18 @ 1100. Appt details mailed to patient along with order for pre-procedure creatinine.

## 2017-11-30 ENCOUNTER — HOSPITAL ENCOUNTER (OUTPATIENT)
Dept: INTERVENTIONAL RADIOLOGY/VASCULAR | Age: 72
Discharge: HOME | End: 2017-11-30
Payer: MEDICARE

## 2017-11-30 DIAGNOSIS — I73.9 PVD (PERIPHERAL VASCULAR DISEASE) (HCC): ICD-10-CM

## 2017-11-30 PROCEDURE — 93925 LOWER EXTREMITY STUDY: CPT

## 2017-12-05 ENCOUNTER — OFFICE VISIT (OUTPATIENT)
Dept: FAMILY MEDICINE CLINIC | Age: 72
End: 2017-12-05
Payer: MEDICARE

## 2017-12-05 VITALS
WEIGHT: 185.4 LBS | RESPIRATION RATE: 16 BRPM | HEART RATE: 52 BPM | TEMPERATURE: 97.9 F | DIASTOLIC BLOOD PRESSURE: 64 MMHG | SYSTOLIC BLOOD PRESSURE: 112 MMHG | BODY MASS INDEX: 25.86 KG/M2

## 2017-12-05 DIAGNOSIS — E87.1 HYPONATREMIA: ICD-10-CM

## 2017-12-05 DIAGNOSIS — J18.9 PNEUMONIA OF RIGHT LOWER LOBE DUE TO INFECTIOUS ORGANISM: Primary | ICD-10-CM

## 2017-12-05 DIAGNOSIS — R73.01 IFG (IMPAIRED FASTING GLUCOSE): ICD-10-CM

## 2017-12-05 DIAGNOSIS — D72.829 LEUKOCYTOSIS, UNSPECIFIED TYPE: ICD-10-CM

## 2017-12-05 PROCEDURE — 1123F ACP DISCUSS/DSCN MKR DOCD: CPT | Performed by: FAMILY MEDICINE

## 2017-12-05 PROCEDURE — 4040F PNEUMOC VAC/ADMIN/RCVD: CPT | Performed by: FAMILY MEDICINE

## 2017-12-05 PROCEDURE — G8484 FLU IMMUNIZE NO ADMIN: HCPCS | Performed by: FAMILY MEDICINE

## 2017-12-05 PROCEDURE — 1036F TOBACCO NON-USER: CPT | Performed by: FAMILY MEDICINE

## 2017-12-05 PROCEDURE — G8419 CALC BMI OUT NRM PARAM NOF/U: HCPCS | Performed by: FAMILY MEDICINE

## 2017-12-05 PROCEDURE — G8598 ASA/ANTIPLAT THER USED: HCPCS | Performed by: FAMILY MEDICINE

## 2017-12-05 PROCEDURE — 99214 OFFICE O/P EST MOD 30 MIN: CPT | Performed by: FAMILY MEDICINE

## 2017-12-05 PROCEDURE — G8427 DOCREV CUR MEDS BY ELIG CLIN: HCPCS | Performed by: FAMILY MEDICINE

## 2017-12-05 PROCEDURE — 3017F COLORECTAL CA SCREEN DOC REV: CPT | Performed by: FAMILY MEDICINE

## 2017-12-05 ASSESSMENT — ENCOUNTER SYMPTOMS
ABDOMINAL PAIN: 0
CONSTIPATION: 0
BLOOD IN STOOL: 0
VOMITING: 0
NAUSEA: 0
DIARRHEA: 0
ANAL BLEEDING: 0
SHORTNESS OF BREATH: 1
CHEST TIGHTNESS: 0

## 2017-12-05 ASSESSMENT — PATIENT HEALTH QUESTIONNAIRE - PHQ9
SUM OF ALL RESPONSES TO PHQ QUESTIONS 1-9: 0
SUM OF ALL RESPONSES TO PHQ9 QUESTIONS 1 & 2: 0
2. FEELING DOWN, DEPRESSED OR HOPELESS: 0
1. LITTLE INTEREST OR PLEASURE IN DOING THINGS: 0

## 2017-12-05 NOTE — PROGRESS NOTES
[facility]  [facility]  Dept: [phone]  Dept Fax: [phone]  MARILYN Gomez is a 81 y.o.male      Pt presents for follow up of recent [facility] Emergency Room visit-    Date Seen: 11/23/2017    Presenting complaint:     Cough    URI         Discharge Diagnosis:      1. Pneumonia due to organism    2. COPD exacerbation (HCC)    3. Pulmonary nodule    4. Mediastinal lymphadenopathy        Patient was seen and evaluated in the emergency department, patient appeared to be in no acute distress upon my initial evaluation. There was wheezes in lower bases. Heart rate was regular no murmurs clicks rubs. Labs are obtained, there is a slight leukocytosis, slight hyponatremia, this appears to be chronic, x-ray of the chest reveals small nodular density overlying the anterolateral right fifth rib. CT scan was recommended, this was completed and shows a right lower lobe groundglass opacity consistent with pneumonia or inflammation. Also shows a small pulmonary nodule that is recommended for follow-up, also multiple mediastinal lymph nodes that are enlarged. Likely reactive associated with the pneumonia. He was treated with medications below, reassessment shows improved shortness of breath, and decreased cough. I discussed my findings my plan of care the patient was amenable with this plan of care, he was given instructions in regards to the CT images, he is given instructions to follow-up with his primary care provider.   He is given a first dose of antibiotic while here in the emergency department    New Medications/Treatments:     New Prescriptions     ALBUTEROL SULFATE HFA (PROAIR HFA) 108 (90 BASE) MCG/ACT INHALER    Inhale 2 puffs into the lungs every 6 hours as needed for Wheezing     BENZONATATE (TESSALON PERLES) 100 MG CAPSULE    Take 1 capsule by mouth 3 times daily as needed for Cough     CETIRIZINE (ZYRTEC ALLERGY) 10 MG TABLET    Take 1 tablet by mouth daily     LEVOFLOXACIN (LEVAQUIN) 750 MG TABLET      Pt states he is currently feeling pretty good- pt complains of congestion in \"nose, ears, and head\" over past several days. Cough is \"a whole lot better\" pt completed both Levaquin and Tessalon Perles. Pt no longer needing Albuterol inhaler at this time. Review of Systems   Constitutional: Negative for chills, diaphoresis, fatigue, fever and unexpected weight change. Eyes: Negative for visual disturbance. Respiratory: Positive for shortness of breath (with exertion- chronic). Negative for chest tightness. Cardiovascular: Negative for chest pain, palpitations and leg swelling. Gastrointestinal: Negative for abdominal pain, anal bleeding, blood in stool, constipation, diarrhea, nausea and vomiting. Genitourinary: Negative for dysuria and hematuria. Musculoskeletal: Negative for neck pain. Neurological: Positive for light-headedness. Negative for dizziness and headaches. OBJECTIVE     /64 (Site: Left Arm, Position: Sitting, Cuff Size: Medium Adult)   Pulse 52   Temp 97.9 °F (36.6 °C) (Oral)   Resp 16   Wt 185 lb 6.4 oz (84.1 kg)   BMI 25.86 kg/m²         Physical Exam   Constitutional: He is oriented to person, place, and time. He appears well-developed and well-nourished. HENT:   Head: Normocephalic and atraumatic. Right Ear: External ear normal.   Left Ear: External ear normal.   Nose: Nose normal.   Mouth/Throat: Oropharynx is clear and moist.   Eyes: Conjunctivae and EOM are normal. Pupils are equal, round, and reactive to light. Neck: Normal range of motion. Neck supple. Cardiovascular: Normal rate, regular rhythm and intact distal pulses. Pulmonary/Chest: Effort normal and breath sounds normal.   Abdominal: Soft. Bowel sounds are normal.   Musculoskeletal: Normal range of motion. Neurological: He is alert and oriented to person, place, and time. He has normal reflexes. Skin: Skin is warm and dry. BUN 16 11/23/2017    CREATININE 1.0 11/23/2017    GLUCOSE 112 (H) 11/23/2017    CALCIUM 9.2 11/23/2017    PROT 6.8 11/01/2017    LABALBU 4.4 11/01/2017    BILITOT 2.1 (H) 11/01/2017    ALKPHOS 87 11/01/2017    AST 22 11/01/2017    ALT 18 11/01/2017    LABGLOM 74 (A) 11/23/2017       Future Appointments  Date Time Provider Eliot Preciado   12/6/2017 12:00 PM Geno Zarate MD [facility]   2/26/2018 11:00 AM STR CT IMAGING RM1  [facility] OUT EXP STR Radiolog   5/1/2018 8:15 AM Denia Zhou MD [facility]         ASSESSMENT      1. Pneumonia of right lower lobe due to infectious organism ([address]     2. IFG (impaired fasting glucose)  Hemoglobin A1C   3. Leukocytosis, unspecified type  CBC Auto Differential   4. Hyponatremia  Basic Metabolic Panel       PLAN      Pt declines FLU SHOT today. Follow up with Dr. Jules Byers re recommendations on recent Arterial Studies.    Recheck CT Chest  2/26/2018 as scheduled   Recheck HGA1C, CBC, and BMP in 3 months  Continue current medicines otherwise  Follow up in 3 months         Electronically signed by Denia Zhou MD on 12/5/2017 at 3:03 PM

## 2018-01-02 ENCOUNTER — TELEPHONE (OUTPATIENT)
Dept: FAMILY MEDICINE CLINIC | Age: 73
End: 2018-01-02

## 2018-01-04 DIAGNOSIS — I25.119 CORONARY ARTERY DISEASE WITH ANGINA PECTORIS, UNSPECIFIED VESSEL OR LESION TYPE, UNSPECIFIED WHETHER NATIVE OR TRANSPLANTED HEART (HCC): ICD-10-CM

## 2018-01-04 DIAGNOSIS — Z87.39 HISTORY OF GOUT: ICD-10-CM

## 2018-01-04 RX ORDER — SIMVASTATIN 40 MG
20 TABLET ORAL NIGHTLY
Qty: 45 TABLET | Refills: 3 | Status: SHIPPED | OUTPATIENT
Start: 2018-01-04 | End: 2018-10-16 | Stop reason: SDUPTHER

## 2018-01-04 RX ORDER — LISINOPRIL 10 MG/1
TABLET ORAL
Qty: 90 TABLET | Refills: 3 | Status: SHIPPED | OUTPATIENT
Start: 2018-01-04 | End: 2019-02-07 | Stop reason: SDUPTHER

## 2018-01-04 RX ORDER — ALLOPURINOL 300 MG/1
300 TABLET ORAL DAILY
Qty: 90 TABLET | Refills: 3 | Status: SHIPPED | OUTPATIENT
Start: 2018-01-04 | End: 2021-07-14 | Stop reason: SDUPTHER

## 2018-01-04 RX ORDER — METOPROLOL TARTRATE 50 MG/1
TABLET, FILM COATED ORAL
Qty: 90 TABLET | Refills: 3 | Status: SHIPPED | OUTPATIENT
Start: 2018-01-04 | End: 2019-05-20 | Stop reason: SDUPTHER

## 2018-01-04 RX ORDER — PANTOPRAZOLE SODIUM 40 MG/1
40 TABLET, DELAYED RELEASE ORAL DAILY
Qty: 90 TABLET | Refills: 3 | Status: SHIPPED | OUTPATIENT
Start: 2018-01-04 | End: 2018-10-16 | Stop reason: SDUPTHER

## 2018-01-06 ENCOUNTER — HOSPITAL ENCOUNTER (OUTPATIENT)
Age: 73
Discharge: HOME | End: 2018-01-06
Payer: MEDICARE

## 2018-01-06 LAB
ANION GAP SERPL CALCULATED.3IONS-SCNC: 9 MEQ/L (ref 8–16)
BUN BLDV-MCNC: 15 MG/DL (ref 7–22)
CALCIUM SERPL-MCNC: 9.1 MG/DL (ref 8.5–10.5)
CHLORIDE BLD-SCNC: 100 MEQ/L (ref 98–111)
CO2: 30 MEQ/L (ref 23–33)
CREAT SERPL-MCNC: 1 MG/DL (ref 0.4–1.2)
GFR SERPL CREATININE-BSD FRML MDRD: 74 ML/MIN/1.73M2
GLUCOSE BLD-MCNC: 107 MG/DL (ref 70–108)
POTASSIUM SERPL-SCNC: 4.4 MEQ/L (ref 3.5–5.2)
SODIUM BLD-SCNC: 139 MEQ/L (ref 135–145)
TSH SERPL DL<=0.05 MIU/L-ACNC: 3.81 UIU/ML (ref 0.4–4.2)
VITAMIN D 25-HYDROXY: 44 NG/ML (ref 30–100)

## 2018-01-06 PROCEDURE — 36415 COLL VENOUS BLD VENIPUNCTURE: CPT

## 2018-01-06 PROCEDURE — 84443 ASSAY THYROID STIM HORMONE: CPT

## 2018-01-06 PROCEDURE — 80048 BASIC METABOLIC PNL TOTAL CA: CPT

## 2018-01-06 PROCEDURE — 82306 VITAMIN D 25 HYDROXY: CPT

## 2018-02-26 ENCOUNTER — HOSPITAL ENCOUNTER (OUTPATIENT)
Age: 73
Discharge: HOME | End: 2018-02-26
Payer: MEDICARE

## 2018-02-26 ENCOUNTER — HOSPITAL ENCOUNTER (OUTPATIENT)
Dept: CT IMAGING | Age: 73
Discharge: HOME | End: 2018-02-26
Payer: MEDICARE

## 2018-02-26 DIAGNOSIS — R91.1 PULMONARY NODULE, RIGHT: ICD-10-CM

## 2018-02-26 LAB
CREAT SERPL-MCNC: 0.8 MG/DL (ref 0.4–1.2)
GFR SERPL CREATININE-BSD FRML MDRD: > 90 ML/MIN/1.73M2
POC CREATININE WHOLE BLOOD: 0.9 MG/DL (ref 0.5–1.2)

## 2018-02-26 PROCEDURE — 6360000004 HC RX CONTRAST MEDICATION: Performed by: FAMILY MEDICINE

## 2018-02-26 PROCEDURE — 82565 ASSAY OF CREATININE: CPT

## 2018-02-26 PROCEDURE — 71260 CT THORAX DX C+: CPT

## 2018-02-26 PROCEDURE — 36415 COLL VENOUS BLD VENIPUNCTURE: CPT

## 2018-02-26 RX ADMIN — IOPAMIDOL 85 ML: 755 INJECTION, SOLUTION INTRAVENOUS at 11:12

## 2018-03-16 ENCOUNTER — HOSPITAL ENCOUNTER (OUTPATIENT)
Age: 73
Discharge: HOME | End: 2018-03-16
Payer: MEDICARE

## 2018-03-16 DIAGNOSIS — D72.829 LEUKOCYTOSIS, UNSPECIFIED TYPE: ICD-10-CM

## 2018-03-16 DIAGNOSIS — E87.1 HYPONATREMIA: ICD-10-CM

## 2018-03-16 DIAGNOSIS — R73.01 IFG (IMPAIRED FASTING GLUCOSE): ICD-10-CM

## 2018-03-16 LAB
ANION GAP SERPL CALCULATED.3IONS-SCNC: 16 MEQ/L (ref 8–16)
AVERAGE GLUCOSE: 81 MG/DL (ref 70–126)
BASOPHILS # BLD: 0.6 %
BASOPHILS ABSOLUTE: 0 THOU/MM3 (ref 0–0.1)
BUN BLDV-MCNC: 18 MG/DL (ref 7–22)
CALCIUM SERPL-MCNC: 9.4 MG/DL (ref 8.5–10.5)
CHLORIDE BLD-SCNC: 100 MEQ/L (ref 98–111)
CO2: 26 MEQ/L (ref 23–33)
CREAT SERPL-MCNC: 0.9 MG/DL (ref 0.4–1.2)
EOSINOPHIL # BLD: 3.2 %
EOSINOPHILS ABSOLUTE: 0.2 THOU/MM3 (ref 0–0.4)
GFR SERPL CREATININE-BSD FRML MDRD: 83 ML/MIN/1.73M2
GLUCOSE BLD-MCNC: 104 MG/DL (ref 70–108)
HBA1C MFR BLD: 4.7 % (ref 4.4–6.4)
HCT VFR BLD CALC: 40.9 % (ref 42–52)
HEMOGLOBIN: 14.5 GM/DL (ref 14–18)
LYMPHOCYTES # BLD: 25.8 %
LYMPHOCYTES ABSOLUTE: 1.2 THOU/MM3 (ref 1–4.8)
MCH RBC QN AUTO: 33.9 PG (ref 27–31)
MCHC RBC AUTO-ENTMCNC: 35.3 GM/DL (ref 33–37)
MCV RBC AUTO: 96.1 FL (ref 80–94)
MONOCYTES # BLD: 7 %
MONOCYTES ABSOLUTE: 0.3 THOU/MM3 (ref 0.4–1.3)
NUCLEATED RED BLOOD CELLS: 0 /100 WBC
PDW BLD-RTO: 14 % (ref 11.5–14.5)
PLATELET # BLD: 163 THOU/MM3 (ref 130–400)
PMV BLD AUTO: 8.7 FL (ref 7.4–10.4)
POTASSIUM SERPL-SCNC: 4.7 MEQ/L (ref 3.5–5.2)
RBC # BLD: 4.26 MILL/MM3 (ref 4.7–6.1)
SEG NEUTROPHILS: 63.4 %
SEGMENTED NEUTROPHILS ABSOLUTE COUNT: 3 THOU/MM3 (ref 1.8–7.7)
SODIUM BLD-SCNC: 142 MEQ/L (ref 135–145)
WBC # BLD: 4.8 THOU/MM3 (ref 4.8–10.8)

## 2018-03-16 PROCEDURE — 83036 HEMOGLOBIN GLYCOSYLATED A1C: CPT

## 2018-03-16 PROCEDURE — 36415 COLL VENOUS BLD VENIPUNCTURE: CPT

## 2018-03-16 PROCEDURE — 80048 BASIC METABOLIC PNL TOTAL CA: CPT

## 2018-03-16 PROCEDURE — 85025 COMPLETE CBC W/AUTO DIFF WBC: CPT

## 2018-03-19 ENCOUNTER — OFFICE VISIT (OUTPATIENT)
Dept: FAMILY MEDICINE CLINIC | Age: 73
End: 2018-03-19
Payer: MEDICARE

## 2018-03-19 VITALS
DIASTOLIC BLOOD PRESSURE: 68 MMHG | SYSTOLIC BLOOD PRESSURE: 100 MMHG | RESPIRATION RATE: 16 BRPM | TEMPERATURE: 97.7 F | HEART RATE: 48 BPM | BODY MASS INDEX: 26.08 KG/M2 | WEIGHT: 187 LBS

## 2018-03-19 DIAGNOSIS — I10 ESSENTIAL HYPERTENSION: ICD-10-CM

## 2018-03-19 DIAGNOSIS — J44.9 CHRONIC OBSTRUCTIVE PULMONARY DISEASE, UNSPECIFIED COPD TYPE (HCC): ICD-10-CM

## 2018-03-19 DIAGNOSIS — Z12.5 SCREENING PSA (PROSTATE SPECIFIC ANTIGEN): Primary | ICD-10-CM

## 2018-03-19 DIAGNOSIS — Z01.812 PRE-PROCEDURE LAB EXAM: ICD-10-CM

## 2018-03-19 DIAGNOSIS — M10.9 GOUT OF LEFT FOOT, UNSPECIFIED CAUSE, UNSPECIFIED CHRONICITY: ICD-10-CM

## 2018-03-19 DIAGNOSIS — I25.810 CORONARY ARTERY DISEASE INVOLVING CORONARY BYPASS GRAFT OF NATIVE HEART WITHOUT ANGINA PECTORIS: ICD-10-CM

## 2018-03-19 DIAGNOSIS — E78.5 HYPERLIPIDEMIA, UNSPECIFIED HYPERLIPIDEMIA TYPE: ICD-10-CM

## 2018-03-19 DIAGNOSIS — R91.1 PULMONARY NODULE: ICD-10-CM

## 2018-03-19 DIAGNOSIS — E78.6 LOW HDL (UNDER 40): ICD-10-CM

## 2018-03-19 PROCEDURE — 99214 OFFICE O/P EST MOD 30 MIN: CPT | Performed by: FAMILY MEDICINE

## 2018-03-19 PROCEDURE — 3023F SPIROM DOC REV: CPT | Performed by: FAMILY MEDICINE

## 2018-03-19 PROCEDURE — G8427 DOCREV CUR MEDS BY ELIG CLIN: HCPCS | Performed by: FAMILY MEDICINE

## 2018-03-19 PROCEDURE — G8484 FLU IMMUNIZE NO ADMIN: HCPCS | Performed by: FAMILY MEDICINE

## 2018-03-19 PROCEDURE — 1123F ACP DISCUSS/DSCN MKR DOCD: CPT | Performed by: FAMILY MEDICINE

## 2018-03-19 PROCEDURE — 1036F TOBACCO NON-USER: CPT | Performed by: FAMILY MEDICINE

## 2018-03-19 PROCEDURE — G8419 CALC BMI OUT NRM PARAM NOF/U: HCPCS | Performed by: FAMILY MEDICINE

## 2018-03-19 PROCEDURE — G8926 SPIRO NO PERF OR DOC: HCPCS | Performed by: FAMILY MEDICINE

## 2018-03-19 PROCEDURE — G8598 ASA/ANTIPLAT THER USED: HCPCS | Performed by: FAMILY MEDICINE

## 2018-03-19 PROCEDURE — 4040F PNEUMOC VAC/ADMIN/RCVD: CPT | Performed by: FAMILY MEDICINE

## 2018-03-19 PROCEDURE — 3017F COLORECTAL CA SCREEN DOC REV: CPT | Performed by: FAMILY MEDICINE

## 2018-03-19 ASSESSMENT — ENCOUNTER SYMPTOMS
DIARRHEA: 0
CHEST TIGHTNESS: 0
BLOOD IN STOOL: 0
ANAL BLEEDING: 0
SHORTNESS OF BREATH: 1
ABDOMINAL PAIN: 0
CONSTIPATION: 0
NAUSEA: 0
VOMITING: 0

## 2018-03-19 NOTE — PROGRESS NOTES
thou/mm3 1.2    Monocytes #      0.4 - 1.3 thou/mm3 0.3 (L)    Eosinophils #      0.0 - 0.4 thou/mm3 0.2    Basophils #      0.0 - 0.1 thou/mm3 0.0    Sodium      135 - 145 meq/L 142    Potassium      3.5 - 5.2 meq/L 4.7    Chloride      98 - 111 meq/L 100    CO2      23 - 33 meq/L 26    Glucose      70 - 108 mg/dL 104    BUN      7 - 22 mg/dL 18    Creatinine      0.4 - 1.2 mg/dL 0.9    Calcium      8.5 - 10.5 mg/dL 9.4    Hemoglobin A1C      4.4 - 6.4 % 4.7    AVERAGE GLUCOSE      70 - 126 mg/dL 81    Vit D, 25-Hydroxy      30 - 100 ng/ml  44   Anion Gap      8.0 - 16.0 meq/L 16.0    Est, Glom Filt Rate      ml/min/1.73m2 83 (A)        Lab Results   Component Value Date    LABA1C 4.7 03/16/2018       Lab Results   Component Value Date    CHOL 126 11/01/2017    TRIG 116 11/01/2017    HDL 34 11/01/2017    LDLCALC 69 11/01/2017         Lab Results   Component Value Date     03/16/2018    K 4.7 03/16/2018     03/16/2018    CO2 26 03/16/2018    BUN 18 03/16/2018    CREATININE 0.9 03/16/2018    GLUCOSE 104 03/16/2018    CALCIUM 9.4 03/16/2018    PROT 6.8 11/01/2017    LABALBU 4.4 11/01/2017    BILITOT 2.1 (H) 11/01/2017    ALKPHOS 87 11/01/2017    AST 22 11/01/2017    ALT 18 11/01/2017    LABGLOM 83 (A) 03/16/2018       Lab Results   Component Value Date    TSH 3.810 01/06/2018       Lab Results   Component Value Date    WBC 4.8 03/16/2018    HGB 14.5 03/16/2018    HCT 40.9 (L) 03/16/2018    MCV 96.1 (H) 03/16/2018     03/16/2018       Lab Results   Component Value Date    PSA 0.53 10/26/2015    PSA 0.56 10/04/2013    PSA 0.49 03/27/2012       Immunization History   Administered Date(s) Administered    Influenza Virus Vaccine 11/01/2011, 10/24/2012, 10/01/2013, 10/06/2014, 09/28/2015    Pneumococcal 13-valent Conjugate ([id number]) 04/13/2015    Pneumococcal Polysaccharide ([id number]) 08/01/2005, 04/14/2014    Tdap (Boostrix, Adacel) 12/04/2013       Health Maintenance   Topic Date Due   

## 2018-03-19 NOTE — PATIENT INSTRUCTIONS
Pt missed [id number] FLU SHOT. Encouraged NEW SHINGLES SHOT [facility]) - check with insurance re coverage and location of administration. COLONOSCOPY done 12/18/201 per Dr. Pérez President- to do in 12/2018  OK to try Claritin 10 mg- 1 pill daily as needed for rhinorrhea. OK to use Gas-X as needed for flatus. Encouraged pt to start B Complex Multivitamin daily. Check PSA at this time  Check FLP, CMP, and CBC after 11/1/2018  Follow up with Dr. Bailee Saul re recommendations on Arterial Studies.    Recheck CT Chest  8/2018 as recommended per Radiology  Continue current medicines otherwise  No refills needed today  Follow up in 8 months

## 2018-03-22 ENCOUNTER — HOSPITAL ENCOUNTER (OUTPATIENT)
Age: 73
Discharge: HOME | End: 2018-03-22
Payer: MEDICARE

## 2018-03-22 DIAGNOSIS — Z12.5 SCREENING PSA (PROSTATE SPECIFIC ANTIGEN): ICD-10-CM

## 2018-03-22 LAB — PROSTATE SPECIFIC ANTIGEN: 0.48 NG/ML (ref 0–1)

## 2018-03-22 PROCEDURE — 36415 COLL VENOUS BLD VENIPUNCTURE: CPT

## 2018-03-22 PROCEDURE — G0103 PSA SCREENING: HCPCS

## 2018-08-27 ENCOUNTER — HOSPITAL ENCOUNTER (OUTPATIENT)
Dept: CT IMAGING | Age: 73
Discharge: HOME | End: 2018-08-27
Payer: MEDICARE

## 2018-08-27 DIAGNOSIS — R91.1 PULMONARY NODULE: ICD-10-CM

## 2018-08-27 LAB — POC CREATININE WHOLE BLOOD: 1 MG/DL (ref 0.5–1.2)

## 2018-08-27 PROCEDURE — 6360000004 HC RX CONTRAST MEDICATION: Performed by: FAMILY MEDICINE

## 2018-08-27 PROCEDURE — 71260 CT THORAX DX C+: CPT

## 2018-08-27 PROCEDURE — 82565 ASSAY OF CREATININE: CPT

## 2018-08-27 RX ADMIN — IOPAMIDOL 85 ML: 755 INJECTION, SOLUTION INTRAVENOUS at 08:40

## 2018-08-28 ENCOUNTER — TELEPHONE (OUTPATIENT)
Dept: FAMILY MEDICINE CLINIC | Age: 73
End: 2018-08-28

## 2018-08-28 DIAGNOSIS — R91.1 LUNG NODULE: Primary | ICD-10-CM

## 2018-08-28 NOTE — TELEPHONE ENCOUNTER
Discussed with patient. He is willing to proceed with PET scan. Prefers 9/10/18 or 9/11/18 if possible, otherwise, wants scheduled on any Monday or Tuesday. ES--would this be a whole body PET scan? Also, insurance may not cover a PET scan w/o a cancer diagnosis.

## 2018-08-28 NOTE — TELEPHONE ENCOUNTER
----- Message from Suzy Macario MD sent at 8/27/2018  5:05 PM EDT -----  Call pt- CT stable overall. Please check PET CT as recommended per radiology at this time. Will plan continued follow up of other areas as well. Follow up as scheduled.  ES

## 2018-09-08 ENCOUNTER — HOSPITAL ENCOUNTER (OUTPATIENT)
Age: 73
Discharge: HOME | End: 2018-09-08
Payer: MEDICARE

## 2018-09-08 LAB
ALBUMIN SERPL-MCNC: 4.7 G/DL (ref 3.5–5.1)
ALP BLD-CCNC: 69 U/L (ref 38–126)
ALT SERPL-CCNC: 17 U/L (ref 11–66)
ANION GAP SERPL CALCULATED.3IONS-SCNC: 9 MEQ/L (ref 8–16)
AST SERPL-CCNC: 21 U/L (ref 5–40)
BASOPHILS # BLD: 0.4 %
BASOPHILS ABSOLUTE: 0 THOU/MM3 (ref 0–0.1)
BILIRUB SERPL-MCNC: 1.8 MG/DL (ref 0.3–1.2)
BILIRUBIN DIRECT: 0.3 MG/DL (ref 0–0.3)
BUN BLDV-MCNC: 14 MG/DL (ref 7–22)
CALCIUM SERPL-MCNC: 9.2 MG/DL (ref 8.5–10.5)
CHLORIDE BLD-SCNC: 103 MEQ/L (ref 98–111)
CHOLESTEROL, TOTAL: 137 MG/DL (ref 100–199)
CO2: 28 MEQ/L (ref 23–33)
CREAT SERPL-MCNC: 0.9 MG/DL (ref 0.4–1.2)
EOSINOPHIL # BLD: 3.7 %
EOSINOPHILS ABSOLUTE: 0.2 THOU/MM3 (ref 0–0.4)
ERYTHROCYTE [DISTWIDTH] IN BLOOD BY AUTOMATED COUNT: 12.9 % (ref 11.5–14.5)
ERYTHROCYTE [DISTWIDTH] IN BLOOD BY AUTOMATED COUNT: 45.1 FL (ref 35–45)
GFR SERPL CREATININE-BSD FRML MDRD: 83 ML/MIN/1.73M2
GLUCOSE BLD-MCNC: 94 MG/DL (ref 70–108)
HCT VFR BLD CALC: 41.2 % (ref 42–52)
HDLC SERPL-MCNC: 37 MG/DL
HEMOGLOBIN: 14.1 GM/DL (ref 14–18)
IMMATURE GRANS (ABS): 0.01 THOU/MM3 (ref 0–0.07)
IMMATURE GRANULOCYTES: 0.2 %
LDL CHOLESTEROL CALCULATED: 74 MG/DL
LYMPHOCYTES # BLD: 24.4 %
LYMPHOCYTES ABSOLUTE: 1.2 THOU/MM3 (ref 1–4.8)
MCH RBC QN AUTO: 33.2 PG (ref 26–33)
MCHC RBC AUTO-ENTMCNC: 34.2 GM/DL (ref 32.2–35.5)
MCV RBC AUTO: 96.9 FL (ref 80–94)
MONOCYTES # BLD: 6.9 %
MONOCYTES ABSOLUTE: 0.3 THOU/MM3 (ref 0.4–1.3)
NUCLEATED RED BLOOD CELLS: 0 /100 WBC
PLATELET # BLD: 162 THOU/MM3 (ref 130–400)
PMV BLD AUTO: 10.5 FL (ref 9.4–12.4)
POTASSIUM SERPL-SCNC: 4.8 MEQ/L (ref 3.5–5.2)
RBC # BLD: 4.25 MILL/MM3 (ref 4.7–6.1)
SEG NEUTROPHILS: 64.4 %
SEGMENTED NEUTROPHILS ABSOLUTE COUNT: 3.2 THOU/MM3 (ref 1.8–7.7)
SODIUM BLD-SCNC: 140 MEQ/L (ref 135–145)
TOTAL PROTEIN: 7 G/DL (ref 6.1–8)
TRIGL SERPL-MCNC: 129 MG/DL (ref 0–199)
WBC # BLD: 4.9 THOU/MM3 (ref 4.8–10.8)

## 2018-09-08 PROCEDURE — 36415 COLL VENOUS BLD VENIPUNCTURE: CPT

## 2018-09-08 PROCEDURE — 80061 LIPID PANEL: CPT

## 2018-09-08 PROCEDURE — 82248 BILIRUBIN DIRECT: CPT

## 2018-09-08 PROCEDURE — 85025 COMPLETE CBC W/AUTO DIFF WBC: CPT

## 2018-09-08 PROCEDURE — 80053 COMPREHEN METABOLIC PANEL: CPT

## 2018-10-16 RX ORDER — PANTOPRAZOLE SODIUM 40 MG/1
TABLET, DELAYED RELEASE ORAL
Qty: 90 TABLET | Refills: 3 | Status: SHIPPED | OUTPATIENT
Start: 2018-10-16 | End: 2019-11-22

## 2018-10-16 RX ORDER — SIMVASTATIN 40 MG
TABLET ORAL
Qty: 45 TABLET | Refills: 3 | Status: SHIPPED | OUTPATIENT
Start: 2018-10-16 | End: 2019-11-22

## 2018-10-16 NOTE — TELEPHONE ENCOUNTER
Requests sent from [address] [place] [address]. Last seen 3/19/18, next appt 11/26/18. Lipids done 9/8/18. Medications verified. Orders pended and set to escribe.

## 2018-11-19 ENCOUNTER — HOSPITAL ENCOUNTER (OUTPATIENT)
Age: 73
Discharge: HOME | End: 2018-11-19
Payer: MEDICARE

## 2018-11-19 ENCOUNTER — TELEPHONE (OUTPATIENT)
Dept: FAMILY MEDICINE CLINIC | Age: 73
End: 2018-11-19

## 2018-11-19 DIAGNOSIS — Z02.6 ENCOUNTER FOR INSURANCE EXAMINATION: Primary | ICD-10-CM

## 2018-11-19 DIAGNOSIS — I10 ESSENTIAL HYPERTENSION: ICD-10-CM

## 2018-11-19 DIAGNOSIS — Z02.83 ENCOUNTER FOR DRUG SCREENING: ICD-10-CM

## 2018-11-19 DIAGNOSIS — E78.5 HYPERLIPIDEMIA, UNSPECIFIED HYPERLIPIDEMIA TYPE: ICD-10-CM

## 2018-11-19 DIAGNOSIS — Z02.6 ENCOUNTER FOR INSURANCE EXAMINATION: ICD-10-CM

## 2018-11-19 DIAGNOSIS — J44.9 CHRONIC OBSTRUCTIVE PULMONARY DISEASE, UNSPECIFIED COPD TYPE (HCC): ICD-10-CM

## 2018-11-19 LAB
ALBUMIN SERPL-MCNC: 4.4 G/DL (ref 3.5–5.1)
ALP BLD-CCNC: 74 U/L (ref 38–126)
ALT SERPL-CCNC: 17 U/L (ref 11–66)
ANION GAP SERPL CALCULATED.3IONS-SCNC: 12 MEQ/L (ref 8–16)
AST SERPL-CCNC: 21 U/L (ref 5–40)
BACTERIA: NORMAL
BASOPHILS # BLD: 0.6 %
BASOPHILS ABSOLUTE: 0 THOU/MM3 (ref 0–0.1)
BILIRUB SERPL-MCNC: 1.6 MG/DL (ref 0.3–1.2)
BILIRUBIN URINE: NEGATIVE
BLOOD, URINE: NEGATIVE
BUN BLDV-MCNC: 12 MG/DL (ref 7–22)
CALCIUM SERPL-MCNC: 9.2 MG/DL (ref 8.5–10.5)
CASTS: NORMAL /LPF
CASTS: NORMAL /LPF
CHARACTER, URINE: CLEAR
CHLORIDE BLD-SCNC: 100 MEQ/L (ref 98–111)
CHOLESTEROL, TOTAL: 123 MG/DL (ref 100–199)
CO2: 27 MEQ/L (ref 23–33)
COLOR: YELLOW
CREAT SERPL-MCNC: 0.8 MG/DL (ref 0.4–1.2)
CRYSTALS: NORMAL
EOSINOPHIL # BLD: 3.7 %
EOSINOPHILS ABSOLUTE: 0.2 THOU/MM3 (ref 0–0.4)
EPITHELIAL CELLS, UA: NORMAL /HPF
ERYTHROCYTE [DISTWIDTH] IN BLOOD BY AUTOMATED COUNT: 13.1 % (ref 11.5–14.5)
ERYTHROCYTE [DISTWIDTH] IN BLOOD BY AUTOMATED COUNT: 46.6 FL (ref 35–45)
GFR SERPL CREATININE-BSD FRML MDRD: > 90 ML/MIN/1.73M2
GLUCOSE BLD-MCNC: 96 MG/DL (ref 70–108)
GLUCOSE, URINE: NEGATIVE MG/DL
HCT VFR BLD CALC: 39 % (ref 42–52)
HDLC SERPL-MCNC: 36 MG/DL
HEMOGLOBIN: 13.4 GM/DL (ref 14–18)
IMMATURE GRANS (ABS): 0.02 THOU/MM3 (ref 0–0.07)
IMMATURE GRANULOCYTES: 0.4 %
KETONES, URINE: NEGATIVE
LDL CHOLESTEROL CALCULATED: 64 MG/DL
LEUKOCYTE ESTERASE, URINE: NEGATIVE
LYMPHOCYTES # BLD: 25.5 %
LYMPHOCYTES ABSOLUTE: 1.2 THOU/MM3 (ref 1–4.8)
MCH RBC QN AUTO: 33.5 PG (ref 26–33)
MCHC RBC AUTO-ENTMCNC: 34.4 GM/DL (ref 32.2–35.5)
MCV RBC AUTO: 97.5 FL (ref 80–94)
MISCELLANEOUS LAB TEST RESULT: NORMAL
MONOCYTES # BLD: 5.9 %
MONOCYTES ABSOLUTE: 0.3 THOU/MM3 (ref 0.4–1.3)
NITRITE, URINE: NEGATIVE
NUCLEATED RED BLOOD CELLS: 0 /100 WBC
PH UA: 7.5
PLATELET # BLD: 173 THOU/MM3 (ref 130–400)
PMV BLD AUTO: 10.1 FL (ref 9.4–12.4)
POTASSIUM SERPL-SCNC: 4.5 MEQ/L (ref 3.5–5.2)
PROTEIN UA: NEGATIVE MG/DL
RBC # BLD: 4 MILL/MM3 (ref 4.7–6.1)
RBC URINE: NORMAL /HPF
RENAL EPITHELIAL, UA: NORMAL
SEG NEUTROPHILS: 63.9 %
SEGMENTED NEUTROPHILS ABSOLUTE COUNT: 3.1 THOU/MM3 (ref 1.8–7.7)
SODIUM BLD-SCNC: 139 MEQ/L (ref 135–145)
SPECIFIC GRAVITY UA: 1.01 (ref 1–1.03)
TOTAL PROTEIN: 6.3 G/DL (ref 6.1–8)
TRIGL SERPL-MCNC: 117 MG/DL (ref 0–199)
UROBILINOGEN, URINE: 0.2 EU/DL
WBC # BLD: 4.9 THOU/MM3 (ref 4.8–10.8)
WBC UA: NORMAL /HPF
YEAST: NORMAL

## 2018-11-19 PROCEDURE — 80061 LIPID PANEL: CPT

## 2018-11-19 PROCEDURE — 81001 URINALYSIS AUTO W/SCOPE: CPT

## 2018-11-19 PROCEDURE — 36415 COLL VENOUS BLD VENIPUNCTURE: CPT

## 2018-11-19 PROCEDURE — 85025 COMPLETE CBC W/AUTO DIFF WBC: CPT

## 2018-11-19 PROCEDURE — G0480 DRUG TEST DEF 1-7 CLASSES: HCPCS

## 2018-11-19 PROCEDURE — 80053 COMPREHEN METABOLIC PANEL: CPT

## 2018-11-26 ENCOUNTER — OFFICE VISIT (OUTPATIENT)
Dept: FAMILY MEDICINE CLINIC | Age: 73
End: 2018-11-26
Payer: MEDICARE

## 2018-11-26 VITALS
HEART RATE: 56 BPM | TEMPERATURE: 97.9 F | DIASTOLIC BLOOD PRESSURE: 70 MMHG | RESPIRATION RATE: 16 BRPM | SYSTOLIC BLOOD PRESSURE: 116 MMHG | WEIGHT: 189.6 LBS | HEIGHT: 71 IN | BODY MASS INDEX: 26.54 KG/M2

## 2018-11-26 DIAGNOSIS — I10 ESSENTIAL HYPERTENSION: ICD-10-CM

## 2018-11-26 DIAGNOSIS — S03.00XA DISLOCATION OF TEMPOROMANDIBULAR JOINT, INITIAL ENCOUNTER: ICD-10-CM

## 2018-11-26 DIAGNOSIS — Z12.5 SCREENING PSA (PROSTATE SPECIFIC ANTIGEN): ICD-10-CM

## 2018-11-26 DIAGNOSIS — I73.9 PVD (PERIPHERAL VASCULAR DISEASE) WITH CLAUDICATION (HCC): Primary | ICD-10-CM

## 2018-11-26 DIAGNOSIS — K21.9 GASTROESOPHAGEAL REFLUX DISEASE, ESOPHAGITIS PRESENCE NOT SPECIFIED: ICD-10-CM

## 2018-11-26 LAB
COCAINE METABOLITE QUANTITATIVE URINE: NORMAL
NICOTINE AND METABOLITES: NORMAL

## 2018-11-26 PROCEDURE — 99214 OFFICE O/P EST MOD 30 MIN: CPT | Performed by: NURSE PRACTITIONER

## 2018-11-26 PROCEDURE — 1036F TOBACCO NON-USER: CPT | Performed by: NURSE PRACTITIONER

## 2018-11-26 PROCEDURE — G8598 ASA/ANTIPLAT THER USED: HCPCS | Performed by: NURSE PRACTITIONER

## 2018-11-26 PROCEDURE — 1101F PT FALLS ASSESS-DOCD LE1/YR: CPT | Performed by: NURSE PRACTITIONER

## 2018-11-26 PROCEDURE — G8482 FLU IMMUNIZE ORDER/ADMIN: HCPCS | Performed by: NURSE PRACTITIONER

## 2018-11-26 PROCEDURE — 3017F COLORECTAL CA SCREEN DOC REV: CPT | Performed by: NURSE PRACTITIONER

## 2018-11-26 PROCEDURE — G8419 CALC BMI OUT NRM PARAM NOF/U: HCPCS | Performed by: NURSE PRACTITIONER

## 2018-11-26 PROCEDURE — G8427 DOCREV CUR MEDS BY ELIG CLIN: HCPCS | Performed by: NURSE PRACTITIONER

## 2018-11-26 PROCEDURE — 1123F ACP DISCUSS/DSCN MKR DOCD: CPT | Performed by: NURSE PRACTITIONER

## 2018-11-26 PROCEDURE — 4040F PNEUMOC VAC/ADMIN/RCVD: CPT | Performed by: NURSE PRACTITIONER

## 2018-11-26 ASSESSMENT — ENCOUNTER SYMPTOMS
ABDOMINAL PAIN: 0
BACK PAIN: 0
BLOOD IN STOOL: 0
TROUBLE SWALLOWING: 0
FACIAL SWELLING: 0
SINUS PAIN: 0
EYE PAIN: 0
WHEEZING: 0
SORE THROAT: 0
COUGH: 0
COLOR CHANGE: 0
SHORTNESS OF BREATH: 0

## 2018-11-26 ASSESSMENT — PATIENT HEALTH QUESTIONNAIRE - PHQ9
1. LITTLE INTEREST OR PLEASURE IN DOING THINGS: 0
SUM OF ALL RESPONSES TO PHQ QUESTIONS 1-9: 0
SUM OF ALL RESPONSES TO PHQ QUESTIONS 1-9: 0
2. FEELING DOWN, DEPRESSED OR HOPELESS: 0
SUM OF ALL RESPONSES TO PHQ9 QUESTIONS 1 & 2: 0

## 2018-11-26 NOTE — PROGRESS NOTES
[address] [id number]  Dept: [phone]  Dept Fax: [phone]    MARILYN Amador is a 72 y.o.male    Chief Complaint   Patient presents with    Hyperlipidemia     Check up for htn and hyperlipids. Labs done, results in epic.  Jaw Pain     Right side jaw pain and popping. Pt presents for follow up of HTN, hyperlipidemia. Pt checks BP at home occasionally. Doing well. Diet- trying to maintain a low sodium. Trying to eat less and maintain weight    Wt Readings from Last 3 Encounters:   11/26/18 189 lb 9.6 oz (86 kg)   03/19/18 187 lb (84.8 kg)   12/06/17 183 lb 12.8 oz (83.4 kg)     Right jaw pain- Pt reports that this started about 1 month ago. Denies any trauma or hit to area. Pt does not grind his teeth. Pt reports that when he eat and yawns the pain is worse. Rest helps the pain. He has not tried anything for the pain. He has a \"popping\" when opening and closing his jaw.       Pt feeling ok since last visit- no new complaints, issues, or problems, except as noted below:     Patient Active Problem List   Diagnosis    Erectile dysfunction    Hepatic steatosis    Low HDL (under 40)    Essential hypertension    Gastroesophageal reflux disease    Hyperlipidemia    Primary osteoarthritis of left knee    Coronary artery disease involving coronary bypass graft of native heart without angina pectoris- Dr. Rob Adhikari    Chronic obstructive pulmonary disease [facility]    Gout of left foot    Abdominal aortic aneurysm without rupture ([facility]- Dr. Nikole Mcginnis PAD (peripheral artery disease) ([address]    Angina pectoris, variant ([address]    Abnormal nuclear stress test    PVD (peripheral vascular disease) with claudication (HCC)       Current Outpatient Prescriptions   Medication Sig Dispense Refill    simvastatin (ZOCOR) 40 MG tablet TAKE 1/2 TABLET BY MOUTH NIGHTLY 45 tablet 3    pantoprazole (PROTONIX) 40 MG tablet TAKE 1 TABLET EVERY DAY 90 tablet 3  lisinopril (PRINIVIL;ZESTRIL) 10 MG tablet TAKE ONE TABLET BY MOUTH ONCE DAILY 90 tablet 3    metoprolol tartrate (LOPRESSOR) 50 MG tablet TAKE ONE-HALF TABLET BY MOUTH TWICE DAILY (Patient taking differently: TAKE ONE-HALF TABLET BY MOUTH ONCE DAILY) 90 tablet 3    allopurinol (ZYLOPRIM) 300 MG tablet Take 1 tablet by mouth daily 90 tablet 3    clopidogrel (PLAVIX) 75 MG tablet Take 1 tablet by mouth daily 30 tablet 5    albuterol sulfate HFA (PROAIR HFA) 108 (90 Base) MCG/ACT inhaler Inhale 2 puffs into the lungs every 6 hours as needed for Wheezing 1 Inhaler 3    sildenafil (VIAGRA) 50 MG tablet Take 1 tablet 30 minutes-4 hours prior. Lot#:[id number]  Exp:10/2018 4 tablet 0    nitroGLYCERIN (NITROSTAT) 0.4 MG SL tablet Place 1 tablet under the tongue every 5 minutes as needed for Chest pain 25 tablet 3    ferrous sulfate 325 (65 FE) MG tablet Take 325 mg by mouth daily       acetaminophen (TYLENOL) 500 MG tablet Take 1,000 mg by mouth as needed for Pain.  aspirin 325 MG EC tablet Take 325 mg by mouth daily. No current facility-administered medications for this visit. Review of Systems   Constitutional: Negative for chills, fatigue and fever. HENT: Negative for congestion, facial swelling, sinus pain, sore throat and trouble swallowing. Right jaw pain   Eyes: Negative for pain and visual disturbance. Respiratory: Negative for cough, shortness of breath and wheezing. Cardiovascular: Negative for chest pain and palpitations. Gastrointestinal: Negative for abdominal pain and blood in stool. Genitourinary: Negative for difficulty urinating, dysuria, hematuria and urgency. Musculoskeletal: Positive for joint swelling (arthritis). Negative for back pain, gait problem and neck pain. Skin: Negative for color change and rash. Neurological: Negative for dizziness, weakness and headaches. Psychiatric/Behavioral: Negative for agitation and sleep disturbance.  The patient AAA ultrasound (Male, 65-75, smoked ever) indicated at this time? Done 2017  CT Lung Screen (55-80, 30 pk-yrs, smoking or quit <15 years) indicated at this time? Done- Quit smoking in 2005. Future Appointments  Date Time Provider Eliot Ilana   12/4/2018 10:30 AM STR VASCULAR LAB ROOM 1 STRZ VAS LAB STR Radiolog   12/4/2018 11:00 AM STR ULTRASOUND RM 2 STRZ US STR Radiolog   2/6/2019 12:40 PM STR CT IMAGING RM1  [facility] OUT EXP STR Radiolog   5/20/2019 8:45 AM Ayan Medina MD [facility] [address]       ASSESSMENT Crawford Filter was seen today for hyperlipidemia and jaw pain. Diagnoses and all orders for this visit:    PVD (peripheral vascular disease) with claudication ([address]    Essential hypertension    Gastroesophageal reflux disease, esophagitis presence not specified    Dislocation of temporomandibular joint, initial encounter    Screening PSA (prostate specific antigen)  -     PSA Screening; Future      Encouraged annual FLU VACCINE- Done 9/30/2018  Encouraged NEW SHINGLES SHOT [facility]) - check with insurance re coverage and location of administration. Colonoscopy due Dec 2018- to be scheduled per patient he has paperwork at home  Cardiology Dr Joseph Gonzales- Follow up as planned in 2019  Ibuprofen 600 mg every 6-8 hours as needed for TMJ pain. Call office in 1 month if not improving. PSA lab for next appointment  No refills needed today  Follow up in 6 months  Call office with any questions or concerns, or if symptoms are getting worse or changing    Patient given educational materials - see patient instructions. Discussed use, benefit, and side effects of prescribed medications. All patient questions answered. Pt voiced understanding. Reviewed healthmaintenance.

## 2018-12-04 ENCOUNTER — HOSPITAL ENCOUNTER (OUTPATIENT)
Dept: ULTRASOUND IMAGING | Age: 73
Discharge: HOME | End: 2018-12-04
Payer: MEDICARE

## 2018-12-04 ENCOUNTER — HOSPITAL ENCOUNTER (OUTPATIENT)
Dept: INTERVENTIONAL RADIOLOGY/VASCULAR | Age: 73
Discharge: HOME | End: 2018-12-04
Payer: MEDICARE

## 2018-12-04 DIAGNOSIS — I73.9 PVD (PERIPHERAL VASCULAR DISEASE) (HCC): ICD-10-CM

## 2018-12-04 DIAGNOSIS — I71.40 ABDOMINAL AORTIC ANEURYSM (AAA) WITHOUT RUPTURE: ICD-10-CM

## 2018-12-04 PROCEDURE — 76775 US EXAM ABDO BACK WALL LIM: CPT

## 2018-12-04 PROCEDURE — 93923 UPR/LXTR ART STDY 3+ LVLS: CPT

## 2019-02-06 ENCOUNTER — HOSPITAL ENCOUNTER (OUTPATIENT)
Dept: CT IMAGING | Age: 74
Discharge: HOME | End: 2019-02-06
Payer: MEDICARE

## 2019-02-06 ENCOUNTER — HOSPITAL ENCOUNTER (OUTPATIENT)
Age: 74
Discharge: HOME | End: 2019-02-06
Payer: MEDICARE

## 2019-02-06 DIAGNOSIS — R91.1 LUNG NODULE: ICD-10-CM

## 2019-02-06 DIAGNOSIS — Z12.5 SCREENING PSA (PROSTATE SPECIFIC ANTIGEN): ICD-10-CM

## 2019-02-06 LAB
POC CREATININE WHOLE BLOOD: 1 MG/DL (ref 0.5–1.2)
PROSTATE SPECIFIC ANTIGEN: 0.84 NG/ML (ref 0–1)

## 2019-02-06 PROCEDURE — 36415 COLL VENOUS BLD VENIPUNCTURE: CPT

## 2019-02-06 PROCEDURE — 82565 ASSAY OF CREATININE: CPT

## 2019-02-06 PROCEDURE — 6360000004 HC RX CONTRAST MEDICATION: Performed by: FAMILY MEDICINE

## 2019-02-06 PROCEDURE — G0103 PSA SCREENING: HCPCS

## 2019-02-06 PROCEDURE — 71260 CT THORAX DX C+: CPT

## 2019-02-06 RX ADMIN — IOPAMIDOL 85 ML: 755 INJECTION, SOLUTION INTRAVENOUS at 12:41

## 2019-02-07 RX ORDER — LISINOPRIL 10 MG/1
TABLET ORAL
Qty: 90 TABLET | Refills: 3 | Status: SHIPPED | OUTPATIENT
Start: 2019-02-07 | End: 2019-12-11 | Stop reason: SDUPTHER

## 2019-05-20 ENCOUNTER — OFFICE VISIT (OUTPATIENT)
Dept: FAMILY MEDICINE CLINIC | Age: 74
End: 2019-05-20
Payer: MEDICARE

## 2019-05-20 VITALS
BODY MASS INDEX: 27.35 KG/M2 | TEMPERATURE: 97.9 F | WEIGHT: 191 LBS | SYSTOLIC BLOOD PRESSURE: 130 MMHG | HEIGHT: 70 IN | RESPIRATION RATE: 16 BRPM | DIASTOLIC BLOOD PRESSURE: 66 MMHG | HEART RATE: 44 BPM

## 2019-05-20 DIAGNOSIS — K21.9 GASTROESOPHAGEAL REFLUX DISEASE, ESOPHAGITIS PRESENCE NOT SPECIFIED: ICD-10-CM

## 2019-05-20 DIAGNOSIS — D50.9 IRON DEFICIENCY ANEMIA, UNSPECIFIED IRON DEFICIENCY ANEMIA TYPE: ICD-10-CM

## 2019-05-20 DIAGNOSIS — J44.9 CHRONIC OBSTRUCTIVE PULMONARY DISEASE, UNSPECIFIED COPD TYPE (HCC): ICD-10-CM

## 2019-05-20 DIAGNOSIS — Z00.00 ROUTINE GENERAL MEDICAL EXAMINATION AT A HEALTH CARE FACILITY: ICD-10-CM

## 2019-05-20 DIAGNOSIS — E78.5 HYPERLIPIDEMIA, UNSPECIFIED HYPERLIPIDEMIA TYPE: ICD-10-CM

## 2019-05-20 DIAGNOSIS — I95.1 ORTHOSTASIS: ICD-10-CM

## 2019-05-20 DIAGNOSIS — M10.9 GOUT OF LEFT FOOT, UNSPECIFIED CAUSE, UNSPECIFIED CHRONICITY: ICD-10-CM

## 2019-05-20 DIAGNOSIS — N52.9 ERECTILE DYSFUNCTION, UNSPECIFIED ERECTILE DYSFUNCTION TYPE: ICD-10-CM

## 2019-05-20 DIAGNOSIS — I25.810 CORONARY ARTERY DISEASE INVOLVING CORONARY BYPASS GRAFT OF NATIVE HEART WITHOUT ANGINA PECTORIS: ICD-10-CM

## 2019-05-20 DIAGNOSIS — I10 ESSENTIAL HYPERTENSION: Primary | ICD-10-CM

## 2019-05-20 PROCEDURE — 1123F ACP DISCUSS/DSCN MKR DOCD: CPT | Performed by: FAMILY MEDICINE

## 2019-05-20 PROCEDURE — 1036F TOBACCO NON-USER: CPT | Performed by: FAMILY MEDICINE

## 2019-05-20 PROCEDURE — G8419 CALC BMI OUT NRM PARAM NOF/U: HCPCS | Performed by: FAMILY MEDICINE

## 2019-05-20 PROCEDURE — G0438 PPPS, INITIAL VISIT: HCPCS | Performed by: FAMILY MEDICINE

## 2019-05-20 PROCEDURE — G8427 DOCREV CUR MEDS BY ELIG CLIN: HCPCS | Performed by: FAMILY MEDICINE

## 2019-05-20 PROCEDURE — G8926 SPIRO NO PERF OR DOC: HCPCS | Performed by: FAMILY MEDICINE

## 2019-05-20 PROCEDURE — 99213 OFFICE O/P EST LOW 20 MIN: CPT | Performed by: FAMILY MEDICINE

## 2019-05-20 PROCEDURE — 4040F PNEUMOC VAC/ADMIN/RCVD: CPT | Performed by: FAMILY MEDICINE

## 2019-05-20 PROCEDURE — 3023F SPIROM DOC REV: CPT | Performed by: FAMILY MEDICINE

## 2019-05-20 PROCEDURE — 3017F COLORECTAL CA SCREEN DOC REV: CPT | Performed by: FAMILY MEDICINE

## 2019-05-20 PROCEDURE — G8598 ASA/ANTIPLAT THER USED: HCPCS | Performed by: FAMILY MEDICINE

## 2019-05-20 RX ORDER — NITROGLYCERIN 0.4 MG/1
0.4 TABLET SUBLINGUAL EVERY 5 MIN PRN
Qty: 25 TABLET | Refills: 3 | Status: SHIPPED | OUTPATIENT
Start: 2019-05-20

## 2019-05-20 RX ORDER — METOPROLOL TARTRATE 50 MG/1
TABLET, FILM COATED ORAL
Qty: 45 TABLET | Refills: 3 | Status: SHIPPED | OUTPATIENT
Start: 2019-05-20 | End: 2020-04-11

## 2019-05-20 ASSESSMENT — ENCOUNTER SYMPTOMS
BLOOD IN STOOL: 1
VOMITING: 0
CHEST TIGHTNESS: 0
ANAL BLEEDING: 0
ABDOMINAL PAIN: 0
CONSTIPATION: 0
DIARRHEA: 0
SHORTNESS OF BREATH: 1
NAUSEA: 0

## 2019-05-20 ASSESSMENT — LIFESTYLE VARIABLES: HOW OFTEN DO YOU HAVE A DRINK CONTAINING ALCOHOL: 0

## 2019-05-20 ASSESSMENT — PATIENT HEALTH QUESTIONNAIRE - PHQ9
SUM OF ALL RESPONSES TO PHQ QUESTIONS 1-9: 0
SUM OF ALL RESPONSES TO PHQ QUESTIONS 1-9: 0

## 2019-05-20 ASSESSMENT — ANXIETY QUESTIONNAIRES: GAD7 TOTAL SCORE: 0

## 2019-05-20 NOTE — PATIENT INSTRUCTIONS
recommended every 6 months. · Try to get at least 150 minutes of exercise per week or 10,000 steps per day on a pedometer . · Order or download the FREE \"Exercise & Physical Activity: Your Everyday Guide\" from [employer]. Call 2-410.122.5843 or search [employer] online. · You need 6552-6399 mg of calcium and 5274-0730 IU of vitamin D per day. It is possible to meet your calcium requirement with diet alone, but a vitamin D supplement is usually necessary to meet this goal.  · When exposed to the sun, use a sunscreen that protects against both UVA and UVB radiation with an SPF of 30 or greater. Reapply every 2 to 3 hours or after sweating, drying off with a towel, or swimming. · Always wear a seat belt when traveling in a car. Always wear a helmet when riding a bicycle or motorcycle.

## 2019-05-20 NOTE — PROGRESS NOTES
191 lb (86.6 kg)   11/26/18 189 lb 9.6 oz (86 kg)   03/19/18 187 lb (84.8 kg)     Vitals:    05/20/19 0910 05/20/19 0915   BP: (!) 140/60 130/66   Site: Left Upper Arm Right Upper Arm   Pulse: (!) 44    Resp: 16    Temp: 97.9 °F (36.6 °C)    TempSrc: Oral    Weight: 191 lb (86.6 kg)    Height: 5' 10\" (1.778 m)      Body mass index is 27.41 kg/m². Based upon direct observation of the patient, evaluation of cognition reveals recent and remote memory intact. General Appearance: alert and oriented to person, place and time, well developed and well- nourished, in no acute distress  Skin: warm and dry, no rash or erythema  Head: normocephalic and atraumatic  Eyes: pupils equal, round, and reactive to light, extraocular eye movements intact, conjunctivae normal  ENT: tympanic membrane, external ear and ear canal normal bilaterally, nose without deformity, nasal mucosa and turbinates normal without polyps  Neck: supple and non-tender without mass, no thyromegaly or thyroid nodules, no cervical lymphadenopathy  Pulmonary/Chest: clear to auscultation bilaterally- no wheezes, rales or rhonchi, normal air movement, no respiratory distress  Cardiovascular: normal rate, regular rhythm, normal S1 and S2, no murmurs, rubs, clicks, or gallops, distal pulses intact, no carotid bruits  Abdomen: soft, non-tender, non-distended, normal bowel sounds, no masses or organomegaly  Extremities: no cyanosis, clubbing or edema  Musculoskeletal: normal range of motion, no joint swelling, deformity or tenderness  Neurologic: reflexes normal and symmetric, no cranial nerve deficit, gait, coordination and speech normal    Patient's complete Health Risk Assessment and screening values have been reviewed and are found in Flowsheets. The following problems were reviewed today and where indicated follow up appointments were made and/or referrals ordered.     Positive Risk Factor Screenings with Interventions:     General Health:  General  In general, how would you say your health is?: Good  In the past 7 days, have you experienced any of the following? New or Increased Pain, New or Increased Fatigue, Loneliness, Social Isolation, Stress or Anger?: None of These  Do you get the social and emotional support that you need?: Yes  Do you have a Living Will?: (!) No  General Health Risk Interventions:  · No Living Will: pt would like to talk to wife and let us know. Health Habits/Nutrition:  Health Habits/Nutrition  Do you exercise for at least 20 minutes 2-3 times per week?: Yes  Have you lost any weight without trying in the past 3 months?: No  Do you eat fewer than 2 meals per day?: No  Have you seen a dentist within the past year?: (!) No  Body mass index is 27.41 kg/m².   Health Habits/Nutrition Interventions:  · Dental exam overdue:  patient declines dental evaluation   · Pt edentulous     Hearing/Vision:  Hearing/Vision  Do you or your family notice any trouble with your hearing?: No  Do you have difficulty driving, watching TV, or doing any of your daily activities because of your eyesight?: (!) Yes  Have you had an eye exam within the past year?: (!) No  Hearing/Vision Interventions:  · Vision concerns:  patient encouraged to make appointment with his/her eye specialist    Safety:  Safety  Do you have working smoke detectors?: Yes  Have all throw rugs been removed or fastened?: (!) No  Do you have non-slip mats in all bathtubs?: (!) No  Do all of your stairways have a railing or banister?: Yes  Are your doorways, halls and stairs free of clutter?: Yes  Do you always fasten your seatbelt when you are in a car?: Yes  Safety Interventions:  · Patient declines any further evaluation/treatment for this issue    Personalized Preventive Plan   Current Health Maintenance Status  Immunization History   Administered Date(s) Administered    Influenza Virus Vaccine 11/01/2011, 10/24/2012, 10/01/2013, 10/06/2014, 09/28/2015    Influenza, High Dose (Fluzone 72 yrs and older) 09/30/2018    Pneumococcal 13-valent Conjugate ([id number]) 04/13/2015    Pneumococcal Polysaccharide ([id number]) 08/01/2005, 04/14/2014    Tdap (Boostrix, Adacel) 12/04/2013        Health Maintenance   Topic Date Due    Shingles Vaccine (1 of 2) 11/16/1997    Potassium monitoring  11/19/2019    Creatinine monitoring  11/19/2019    Lipid screen  11/19/2023    DTaP/Tdap/Td vaccine (2 - Td) 12/04/2023    Colon cancer screen colonoscopy  02/13/2024    Flu vaccine  Completed    Pneumococcal 65+ years Vaccine  Completed    Hepatitis C screen  Addressed     Recommendations for Preventive Services Due: see orders and patient instructions/AVS.  .   Recommended screening schedule for the next 5-10 years is provided to the patient in written form: see Patient Instructions/AVS.

## 2019-05-20 NOTE — PROGRESS NOTES
allopurinol (ZYLOPRIM) 300 MG tablet Take 1 tablet by mouth daily 90 tablet 3    clopidogrel (PLAVIX) 75 MG tablet Take 1 tablet by mouth daily 30 tablet 5    albuterol sulfate HFA (PROAIR HFA) 108 (90 Base) MCG/ACT inhaler Inhale 2 puffs into the lungs every 6 hours as needed for Wheezing 1 Inhaler 3    sildenafil (VIAGRA) 50 MG tablet Take 1 tablet 30 minutes-4 hours prior. Lot#:[id number]  Exp:10/2018 4 tablet 0    ferrous sulfate 325 (65 FE) MG tablet Take 325 mg by mouth daily       acetaminophen (TYLENOL) 500 MG tablet Take 1,000 mg by mouth as needed for Pain.  aspirin 325 MG EC tablet Take 325 mg by mouth daily. No current facility-administered medications for this visit. Review of Systems   Constitutional: Negative for chills, diaphoresis, fatigue, fever and unexpected weight change. Eyes: Negative for visual disturbance. Respiratory: Positive for shortness of breath (with carrying items for a distance- chronic, stable ). Negative for chest tightness. Cardiovascular: Negative for chest pain, palpitations and leg swelling. Gastrointestinal: Positive for blood in stool (having Hemorrhoid treatments per Dr. Mare Nicolas. ). Negative for abdominal pain, anal bleeding, constipation, diarrhea, nausea and vomiting. Genitourinary: Negative for dysuria and hematuria. Musculoskeletal: Negative for neck pain. Neurological: Positive for light-headedness (with rapid position changes. ). Negative for dizziness and headaches. OBJECTIVE     /66 (Site: Right Upper Arm)   Pulse (!) 44   Temp 97.9 °F (36.6 °C) (Oral)   Resp 16   Ht 5' 10\" (1.778 m)   Wt 191 lb (86.6 kg)   BMI 27.41 kg/m²     Wt Readings from Last 3 Encounters:   05/20/19 191 lb (86.6 kg)   11/26/18 189 lb 9.6 oz (86 kg)   03/19/18 187 lb (84.8 kg)     Body mass index is 27.41 kg/m². Physical Exam   Constitutional: He is oriented to person, place, and time. He appears well-developed and well-nourished. HENT:   Head: Normocephalic and atraumatic. Right Ear: External ear normal.   Left Ear: External ear normal.   Nose: Nose normal.   Mouth/Throat: Oropharynx is clear and moist.   Eyes: Pupils are equal, round, and reactive to light. Conjunctivae and EOM are normal.   Neck: Normal range of motion. Neck supple. Cardiovascular: Normal rate, regular rhythm and intact distal pulses. Pulmonary/Chest: Effort normal and breath sounds normal.   Abdominal: Soft. Bowel sounds are normal.   Genitourinary:   Genitourinary Comments: HUGO done 2/13/2019 per Dr. Isai Fleming- no abnormality appreciated. ES   Musculoskeletal: Normal range of motion. Neurological: He is alert and oriented to person, place, and time. He has normal reflexes. Skin: Skin is warm and dry. Psychiatric: He has a normal mood and affect. His behavior is normal. Judgment and thought content normal.   Nursing note and vitals reviewed.     Lab Results   Component Value Date    CHOL 123 11/19/2018    TRIG 117 11/19/2018    HDL 36 11/19/2018    LDLCALC 64 11/19/2018         Lab Results   Component Value Date     11/19/2018    K 4.5 11/19/2018     11/19/2018    CO2 27 11/19/2018    BUN 12 11/19/2018    CREATININE 0.8 11/19/2018    GLUCOSE 96 11/19/2018    CALCIUM 9.2 11/19/2018    PROT 6.3 11/19/2018    LABALBU 4.4 11/19/2018    BILITOT 1.6 (H) 11/19/2018    ALKPHOS 74 11/19/2018    AST 21 11/19/2018    ALT 17 11/19/2018    LABGLOM >90 11/19/2018       Lab Results   Component Value Date    TSH 3.810 01/06/2018    T4FREE 1.22 09/12/2011       Lab Results   Component Value Date    WBC 4.9 11/19/2018    HGB 13.4 (L) 11/19/2018    HCT 39.0 (L) 11/19/2018    MCV 97.5 (H) 11/19/2018     11/19/2018     Component      Latest Ref Rng & Units 2/6/2019 3/22/2018 10/26/2015 10/4/2013          12:28 PM  8:15 AM  8:00 AM  8:23 AM   Prostatic Specific Ag      0.00 - 1.00 ng/ml 0.84 0.48 0.53 0.56     Component      Latest Ref Rng & Units 3/27/2012 deficiency anemia type  CBC Auto Differential    Ferritin    Iron and TIBC   6. Orthostasis     7. Gout of left foot, unspecified cause, unspecified chronicity     8. Gastroesophageal reflux disease, esophagitis presence not specified     9. Erectile dysfunction, unspecified erectile dysfunction type     10. Routine general medical examination at a health care facility         PLAN      Encouraged NEW SHINGLES [facility]) - check with your local pharmacy. COLONOSCOPY done 2/13/2019 per Dr. Montana Luke- to do in 2/2024 (updated 5/20/2019)  Encouraged increased water intake at > 80 ounces daily. Transition slowly when going from lying or seated position to standing up. If still lightheaded after above changes, please discuss with Dr. Daniel Ward as he may want to change your Metoprolol dose. Check FLP, CMP, CBC, Iron & TIBC, and FERRITIN after 11/19/2019  Plan recheck CT Chest with Contrast in 2/2020 for follow up of Pulmonary Nodules.    Surveillance of known AAA per Dr. Tyree Whelan- last completed 12/4/2018   Continue current medicines otherwise  Refills    Follow up in 6 months      Electronically signed by Kiko Marc MD on 5/20/2019 at 10:07 AM

## 2019-09-03 ENCOUNTER — HOSPITAL ENCOUNTER (OUTPATIENT)
Age: 74
Discharge: HOME | End: 2019-09-03
Payer: MEDICARE

## 2019-09-03 LAB
ALBUMIN SERPL-MCNC: 4.6 G/DL (ref 3.5–5.1)
ALP BLD-CCNC: 78 U/L (ref 38–126)
ALT SERPL-CCNC: 16 U/L (ref 11–66)
ANION GAP SERPL CALCULATED.3IONS-SCNC: 12 MEQ/L (ref 8–16)
AST SERPL-CCNC: 21 U/L (ref 5–40)
BASOPHILS # BLD: 0.5 %
BASOPHILS ABSOLUTE: 0 THOU/MM3 (ref 0–0.1)
BILIRUB SERPL-MCNC: 1.8 MG/DL (ref 0.3–1.2)
BILIRUBIN DIRECT: 0.3 MG/DL (ref 0–0.3)
BUN BLDV-MCNC: 11 MG/DL (ref 7–22)
CALCIUM SERPL-MCNC: 9.5 MG/DL (ref 8.5–10.5)
CHLORIDE BLD-SCNC: 99 MEQ/L (ref 98–111)
CHOLESTEROL, TOTAL: 132 MG/DL (ref 100–199)
CO2: 29 MEQ/L (ref 23–33)
CREAT SERPL-MCNC: 0.9 MG/DL (ref 0.4–1.2)
EOSINOPHIL # BLD: 3.4 %
EOSINOPHILS ABSOLUTE: 0.2 THOU/MM3 (ref 0–0.4)
ERYTHROCYTE [DISTWIDTH] IN BLOOD BY AUTOMATED COUNT: 13.2 % (ref 11.5–14.5)
ERYTHROCYTE [DISTWIDTH] IN BLOOD BY AUTOMATED COUNT: 48.1 FL (ref 35–45)
GFR SERPL CREATININE-BSD FRML MDRD: 83 ML/MIN/1.73M2
GLUCOSE BLD-MCNC: 99 MG/DL (ref 70–108)
HCT VFR BLD CALC: 40.8 % (ref 42–52)
HDLC SERPL-MCNC: 35 MG/DL
HEMOGLOBIN: 13.6 GM/DL (ref 14–18)
IMMATURE GRANS (ABS): 0.01 THOU/MM3 (ref 0–0.07)
IMMATURE GRANULOCYTES: 0 %
LDL CHOLESTEROL CALCULATED: 59 MG/DL
LYMPHOCYTES # BLD: 22.6 %
LYMPHOCYTES ABSOLUTE: 1.2 THOU/MM3 (ref 1–4.8)
MCH RBC QN AUTO: 33.3 PG (ref 26–33)
MCHC RBC AUTO-ENTMCNC: 33.3 GM/DL (ref 32.2–35.5)
MCV RBC AUTO: 99.8 FL (ref 80–94)
MONOCYTES # BLD: 7.1 %
MONOCYTES ABSOLUTE: 0.4 THOU/MM3 (ref 0.4–1.3)
NUCLEATED RED BLOOD CELLS: 0 /100 WBC
PLATELET # BLD: 168 THOU/MM3 (ref 130–400)
PMV BLD AUTO: 10.1 FL (ref 9.4–12.4)
POTASSIUM SERPL-SCNC: 4.2 MEQ/L (ref 3.5–5.2)
RBC # BLD: 4.09 MILL/MM3 (ref 4.7–6.1)
SEG NEUTROPHILS: 66.2 %
SEGMENTED NEUTROPHILS ABSOLUTE COUNT: 3.6 THOU/MM3 (ref 1.8–7.7)
SODIUM BLD-SCNC: 140 MEQ/L (ref 135–145)
TOTAL PROTEIN: 6.8 G/DL (ref 6.1–8)
TRIGL SERPL-MCNC: 189 MG/DL (ref 0–199)
WBC # BLD: 5.5 THOU/MM3 (ref 4.8–10.8)

## 2019-09-03 PROCEDURE — 36415 COLL VENOUS BLD VENIPUNCTURE: CPT

## 2019-09-03 PROCEDURE — 82248 BILIRUBIN DIRECT: CPT

## 2019-09-03 PROCEDURE — 85025 COMPLETE CBC W/AUTO DIFF WBC: CPT

## 2019-09-03 PROCEDURE — 80053 COMPREHEN METABOLIC PANEL: CPT

## 2019-09-03 PROCEDURE — 80061 LIPID PANEL: CPT

## 2019-09-23 ENCOUNTER — HOSPITAL ENCOUNTER (OUTPATIENT)
Dept: ULTRASOUND IMAGING | Age: 74
Discharge: HOME | End: 2019-09-23
Payer: MEDICARE

## 2019-09-23 DIAGNOSIS — R07.9 CHEST PAIN, UNSPECIFIED TYPE: ICD-10-CM

## 2019-09-23 DIAGNOSIS — I71.40 ABDOMINAL AORTIC ANEURYSM (AAA) WITHOUT RUPTURE: ICD-10-CM

## 2019-09-23 PROCEDURE — 76705 ECHO EXAM OF ABDOMEN: CPT

## 2019-09-23 PROCEDURE — 76775 US EXAM ABDO BACK WALL LIM: CPT

## 2019-11-22 ENCOUNTER — OFFICE VISIT (OUTPATIENT)
Dept: FAMILY MEDICINE CLINIC | Age: 74
End: 2019-11-22
Payer: MEDICARE

## 2019-11-22 ENCOUNTER — HOSPITAL ENCOUNTER (OUTPATIENT)
Age: 74
Discharge: HOME | End: 2019-11-22
Payer: MEDICARE

## 2019-11-22 VITALS
RESPIRATION RATE: 16 BRPM | HEART RATE: 74 BPM | SYSTOLIC BLOOD PRESSURE: 116 MMHG | DIASTOLIC BLOOD PRESSURE: 74 MMHG | BODY MASS INDEX: 27.32 KG/M2 | WEIGHT: 190.4 LBS | TEMPERATURE: 97.9 F

## 2019-11-22 DIAGNOSIS — I10 ESSENTIAL HYPERTENSION: ICD-10-CM

## 2019-11-22 DIAGNOSIS — E78.5 HYPERLIPIDEMIA, UNSPECIFIED HYPERLIPIDEMIA TYPE: ICD-10-CM

## 2019-11-22 DIAGNOSIS — Z12.5 SCREENING PSA (PROSTATE SPECIFIC ANTIGEN): ICD-10-CM

## 2019-11-22 DIAGNOSIS — I25.810 CORONARY ARTERY DISEASE INVOLVING CORONARY BYPASS GRAFT OF NATIVE HEART WITHOUT ANGINA PECTORIS: ICD-10-CM

## 2019-11-22 DIAGNOSIS — I10 ESSENTIAL HYPERTENSION: Primary | ICD-10-CM

## 2019-11-22 DIAGNOSIS — D50.9 IRON DEFICIENCY ANEMIA, UNSPECIFIED IRON DEFICIENCY ANEMIA TYPE: ICD-10-CM

## 2019-11-22 DIAGNOSIS — I71.40 ABDOMINAL AORTIC ANEURYSM WITHOUT RUPTURE: ICD-10-CM

## 2019-11-22 LAB
ALBUMIN SERPL-MCNC: 4.5 G/DL (ref 3.5–5.1)
ALP BLD-CCNC: 83 U/L (ref 38–126)
ALT SERPL-CCNC: 21 U/L (ref 11–66)
ANION GAP SERPL CALCULATED.3IONS-SCNC: 13 MEQ/L (ref 8–16)
AST SERPL-CCNC: 23 U/L (ref 5–40)
BASOPHILS # BLD: 0.4 %
BASOPHILS ABSOLUTE: 0 THOU/MM3 (ref 0–0.1)
BILIRUB SERPL-MCNC: 1.8 MG/DL (ref 0.3–1.2)
BUN BLDV-MCNC: 14 MG/DL (ref 7–22)
CALCIUM SERPL-MCNC: 9.6 MG/DL (ref 8.5–10.5)
CHLORIDE BLD-SCNC: 101 MEQ/L (ref 98–111)
CHOLESTEROL, TOTAL: 142 MG/DL (ref 100–199)
CO2: 27 MEQ/L (ref 23–33)
CREAT SERPL-MCNC: 1 MG/DL (ref 0.4–1.2)
EOSINOPHIL # BLD: 3.1 %
EOSINOPHILS ABSOLUTE: 0.2 THOU/MM3 (ref 0–0.4)
ERYTHROCYTE [DISTWIDTH] IN BLOOD BY AUTOMATED COUNT: 12.8 % (ref 11.5–14.5)
ERYTHROCYTE [DISTWIDTH] IN BLOOD BY AUTOMATED COUNT: 45.1 FL (ref 35–45)
FERRITIN: 73 NG/ML (ref 22–322)
GFR SERPL CREATININE-BSD FRML MDRD: 73 ML/MIN/1.73M2
GLUCOSE BLD-MCNC: 108 MG/DL (ref 70–108)
HCT VFR BLD CALC: 41.5 % (ref 42–52)
HDLC SERPL-MCNC: 37 MG/DL
HEMOGLOBIN: 14.2 GM/DL (ref 14–18)
IMMATURE GRANS (ABS): 0.02 THOU/MM3 (ref 0–0.07)
IMMATURE GRANULOCYTES: 0.3 %
IRON: 92 UG/DL (ref 65–195)
LDL CHOLESTEROL CALCULATED: 75 MG/DL
LYMPHOCYTES # BLD: 18 %
LYMPHOCYTES ABSOLUTE: 1.3 THOU/MM3 (ref 1–4.8)
MCH RBC QN AUTO: 33.1 PG (ref 26–33)
MCHC RBC AUTO-ENTMCNC: 34.2 GM/DL (ref 32.2–35.5)
MCV RBC AUTO: 96.7 FL (ref 80–94)
MONOCYTES # BLD: 6.9 %
MONOCYTES ABSOLUTE: 0.5 THOU/MM3 (ref 0.4–1.3)
NUCLEATED RED BLOOD CELLS: 0 /100 WBC
PLATELET # BLD: 166 THOU/MM3 (ref 130–400)
PMV BLD AUTO: 9.6 FL (ref 9.4–12.4)
POTASSIUM SERPL-SCNC: 4.4 MEQ/L (ref 3.5–5.2)
RBC # BLD: 4.29 MILL/MM3 (ref 4.7–6.1)
SEG NEUTROPHILS: 71.3 %
SEGMENTED NEUTROPHILS ABSOLUTE COUNT: 5.1 THOU/MM3 (ref 1.8–7.7)
SODIUM BLD-SCNC: 141 MEQ/L (ref 135–145)
TOTAL IRON BINDING CAPACITY: 323 UG/DL (ref 171–450)
TOTAL PROTEIN: 7.2 G/DL (ref 6.1–8)
TRIGL SERPL-MCNC: 152 MG/DL (ref 0–199)
WBC # BLD: 7.1 THOU/MM3 (ref 4.8–10.8)

## 2019-11-22 PROCEDURE — 4040F PNEUMOC VAC/ADMIN/RCVD: CPT | Performed by: NURSE PRACTITIONER

## 2019-11-22 PROCEDURE — 1123F ACP DISCUSS/DSCN MKR DOCD: CPT | Performed by: NURSE PRACTITIONER

## 2019-11-22 PROCEDURE — 36415 COLL VENOUS BLD VENIPUNCTURE: CPT

## 2019-11-22 PROCEDURE — 3017F COLORECTAL CA SCREEN DOC REV: CPT | Performed by: NURSE PRACTITIONER

## 2019-11-22 PROCEDURE — G8427 DOCREV CUR MEDS BY ELIG CLIN: HCPCS | Performed by: NURSE PRACTITIONER

## 2019-11-22 PROCEDURE — 83550 IRON BINDING TEST: CPT

## 2019-11-22 PROCEDURE — 1036F TOBACCO NON-USER: CPT | Performed by: NURSE PRACTITIONER

## 2019-11-22 PROCEDURE — 80053 COMPREHEN METABOLIC PANEL: CPT

## 2019-11-22 PROCEDURE — G8417 CALC BMI ABV UP PARAM F/U: HCPCS | Performed by: NURSE PRACTITIONER

## 2019-11-22 PROCEDURE — 85025 COMPLETE CBC W/AUTO DIFF WBC: CPT

## 2019-11-22 PROCEDURE — 82728 ASSAY OF FERRITIN: CPT

## 2019-11-22 PROCEDURE — 80061 LIPID PANEL: CPT

## 2019-11-22 PROCEDURE — 83540 ASSAY OF IRON: CPT

## 2019-11-22 PROCEDURE — G8482 FLU IMMUNIZE ORDER/ADMIN: HCPCS | Performed by: NURSE PRACTITIONER

## 2019-11-22 PROCEDURE — 99214 OFFICE O/P EST MOD 30 MIN: CPT | Performed by: NURSE PRACTITIONER

## 2019-11-22 PROCEDURE — G8598 ASA/ANTIPLAT THER USED: HCPCS | Performed by: NURSE PRACTITIONER

## 2019-11-22 RX ORDER — SIMVASTATIN 40 MG
TABLET ORAL
Qty: 45 TABLET | Refills: 0 | OUTPATIENT
Start: 2019-11-22

## 2019-11-22 RX ORDER — PANTOPRAZOLE SODIUM 40 MG/1
40 TABLET, DELAYED RELEASE ORAL
Qty: 90 TABLET | Refills: 3 | Status: SHIPPED | OUTPATIENT
Start: 2019-11-22 | End: 2020-11-20 | Stop reason: SDUPTHER

## 2019-11-22 RX ORDER — PANTOPRAZOLE SODIUM 40 MG/1
TABLET, DELAYED RELEASE ORAL
Qty: 90 TABLET | Refills: 0 | OUTPATIENT
Start: 2019-11-22

## 2019-11-22 RX ORDER — SIMVASTATIN 20 MG
20 TABLET ORAL NIGHTLY
Qty: 90 TABLET | Refills: 3 | Status: SHIPPED | OUTPATIENT
Start: 2019-11-22 | End: 2020-12-29

## 2019-11-22 ASSESSMENT — ENCOUNTER SYMPTOMS
BLOOD IN STOOL: 0
CHEST TIGHTNESS: 0
ABDOMINAL PAIN: 0
COUGH: 1
EYES NEGATIVE: 1
SHORTNESS OF BREATH: 0

## 2019-11-27 ENCOUNTER — TELEPHONE (OUTPATIENT)
Dept: FAMILY MEDICINE CLINIC | Age: 74
End: 2019-11-27

## 2019-11-27 RX ORDER — AZITHROMYCIN 250 MG/1
250 TABLET, FILM COATED ORAL SEE ADMIN INSTRUCTIONS
Qty: 6 TABLET | Refills: 0 | Status: SHIPPED | OUTPATIENT
Start: 2019-11-27 | End: 2020-11-19 | Stop reason: SDUPTHER

## 2019-11-27 RX ORDER — BENZONATATE 100 MG/1
100 CAPSULE ORAL 3 TIMES DAILY PRN
Qty: 30 CAPSULE | Refills: 0 | Status: SHIPPED | OUTPATIENT
Start: 2019-11-27 | End: 2019-12-07

## 2020-01-06 ENCOUNTER — HOSPITAL ENCOUNTER (OUTPATIENT)
Dept: INTERVENTIONAL RADIOLOGY/VASCULAR | Age: 75
Discharge: HOME | End: 2020-01-06
Payer: MEDICARE

## 2020-01-06 ENCOUNTER — HOSPITAL ENCOUNTER (OUTPATIENT)
Dept: CT IMAGING | Age: 75
Discharge: HOME | End: 2020-01-06
Payer: MEDICARE

## 2020-01-06 LAB — POC CREATININE WHOLE BLOOD: 1.1 MG/DL (ref 0.5–1.2)

## 2020-01-06 PROCEDURE — 82565 ASSAY OF CREATININE: CPT

## 2020-01-06 PROCEDURE — 71260 CT THORAX DX C+: CPT

## 2020-01-06 PROCEDURE — 93923 UPR/LXTR ART STDY 3+ LVLS: CPT

## 2020-01-06 PROCEDURE — 6360000004 HC RX CONTRAST MEDICATION: Performed by: THORACIC SURGERY (CARDIOTHORACIC VASCULAR SURGERY)

## 2020-01-06 RX ADMIN — IOPAMIDOL 85 ML: 755 INJECTION, SOLUTION INTRAVENOUS at 07:24

## 2020-04-12 RX ORDER — METOPROLOL TARTRATE 50 MG/1
TABLET, FILM COATED ORAL
Qty: 45 TABLET | Refills: 3 | Status: SHIPPED | OUTPATIENT
Start: 2020-04-12 | End: 2021-06-21

## 2020-08-10 ENCOUNTER — OFFICE VISIT (OUTPATIENT)
Dept: FAMILY MEDICINE CLINIC | Age: 75
End: 2020-08-10
Payer: MEDICARE

## 2020-08-10 VITALS
RESPIRATION RATE: 16 BRPM | BODY MASS INDEX: 26.18 KG/M2 | SYSTOLIC BLOOD PRESSURE: 122 MMHG | WEIGHT: 187 LBS | DIASTOLIC BLOOD PRESSURE: 90 MMHG | HEART RATE: 56 BPM | TEMPERATURE: 97.4 F | HEIGHT: 71 IN

## 2020-08-10 PROCEDURE — 1123F ACP DISCUSS/DSCN MKR DOCD: CPT | Performed by: NURSE PRACTITIONER

## 2020-08-10 PROCEDURE — 3017F COLORECTAL CA SCREEN DOC REV: CPT | Performed by: NURSE PRACTITIONER

## 2020-08-10 PROCEDURE — G0439 PPPS, SUBSEQ VISIT: HCPCS | Performed by: NURSE PRACTITIONER

## 2020-08-10 PROCEDURE — 4040F PNEUMOC VAC/ADMIN/RCVD: CPT | Performed by: NURSE PRACTITIONER

## 2020-08-10 ASSESSMENT — PATIENT HEALTH QUESTIONNAIRE - PHQ9
SUM OF ALL RESPONSES TO PHQ QUESTIONS 1-9: 1
SUM OF ALL RESPONSES TO PHQ QUESTIONS 1-9: 1

## 2020-08-10 ASSESSMENT — ENCOUNTER SYMPTOMS
ABDOMINAL PAIN: 0
EYES NEGATIVE: 1
BLOOD IN STOOL: 0
CHEST TIGHTNESS: 0
SHORTNESS OF BREATH: 0

## 2020-08-10 ASSESSMENT — LIFESTYLE VARIABLES: HOW OFTEN DO YOU HAVE A DRINK CONTAINING ALCOHOL: 0

## 2020-08-10 NOTE — PROGRESS NOTES
[facility]  [facility]  Dept: [phone]  Dept Fax: [phone]    SUBJECTIVE     Chief Complaint   Patient presents with    Medicare AWV       Luz Wayne is a 69 y.o.male    Pt presents for follow up of HTN and Hyperlipidemia    Pt feeling ok since last visit- no new complaints, issues, or problems, except as noted below:     Labs have not yet been completed, he does have the lab orders. Pt has RA and is having some joint pain. He has followed with Rheumatology in [facility], but it has been over a year. The home BP readings have been in the 115-140's / 60-70's range. Checking occasionally. CT Chest with Contrast for follow up of Pulmonary Nodules completed 1/6/20 by Dr Nahed Young. Nodule is stable in right upper lobe. Weight decreased 3# since last visit 6 months ago. Pt has a stationary bike and treadmill. He workouts 3 times weekly.     Patient Active Problem List   Diagnosis    Erectile dysfunction    Hepatic steatosis    Low HDL (under 40)    Essential hypertension    Gastroesophageal reflux disease    Hyperlipidemia    Primary osteoarthritis of left knee    Coronary artery disease involving coronary bypass graft of native heart without angina pectoris- Dr. Mabel Martínez    Chronic obstructive pulmonary disease ([address]    Gout of left foot    Abdominal aortic aneurysm without rupture ([address]- Dr. Peri Restrepo PAD (peripheral artery disease) [facility] [address]    Angina pectoris, variant (HCC)    Abnormal nuclear stress test    PVD (peripheral vascular disease) with claudication (HCC)    Iron deficiency anemia       Current Outpatient Medications   Medication Sig Dispense Refill    metoprolol tartrate (LOPRESSOR) 50 MG tablet TAKE 1/2 TABLET EVERY DAY 45 tablet 3    lisinopril (PRINIVIL;ZESTRIL) 10 MG tablet TAKE 1 TABLET EVERY DAY 90 tablet 3    simvastatin (ZOCOR) 20 MG tablet Take 1 tablet by mouth nightly 90 tablet 3    pantoprazole (PROTONIX) 40 MG tablet Take 187 lb (84.8 kg)   11/22/19 190 lb 6.4 oz (86.4 kg)   05/20/19 191 lb (86.6 kg)     Body mass index is 26.45 kg/m². Physical Exam  Vitals signs and nursing note reviewed. Constitutional:       Appearance: He is well-developed. HENT:      Head: Normocephalic and atraumatic. Right Ear: External ear normal.      Left Ear: External ear normal.      Nose: Nose normal.   Eyes:      Conjunctiva/sclera: Conjunctivae normal.      Pupils: Pupils are equal, round, and reactive to light. Neck:      Musculoskeletal: Normal range of motion and neck supple. Cardiovascular:      Rate and Rhythm: Normal rate and regular rhythm. Pulmonary:      Effort: Pulmonary effort is normal.      Breath sounds: Normal breath sounds. Abdominal:      General: Bowel sounds are normal.      Palpations: Abdomen is soft. Musculoskeletal: Normal range of motion. Left shoulder: He exhibits decreased strength. He exhibits no tenderness. Skin:     General: Skin is warm and dry. Neurological:      Mental Status: He is alert and oriented to person, place, and time. Deep Tendon Reflexes: Reflexes are normal and symmetric. Psychiatric:         Behavior: Behavior normal.         Thought Content:  Thought content normal.         Judgment: Judgment normal.       Component      Latest Ref Rng & Units 11/22/2019   WBC      4.8 - 10.8 thou/mm3 7.1   RBC      4.70 - 6.10 mill/mm3 4.29 (L)   Hemoglobin Quant      14.0 - 18.0 gm/dl 14.2   Hematocrit      42.0 - 52.0 % 41.5 (L)   MCV      80.0 - 94.0 fL 96.7 (H)   MCH      26.0 - 33.0 pg 33.1 (H)   MCHC      32.2 - 35.5 gm/dl 34.2   RDW-CV      11.5 - 14.5 % 12.8   RDW-SD      35.0 - 45.0 fL 45.1 (H)   Platelet Count      567 - 400 thou/mm3 166   MPV      9.4 - 12.4 fL 9.6   Seg Neutrophils      % 71.3   Lymphocytes      % 18.0   Monocytes      % 6.9   Eosinophils      % 3.1   Basophils      % 0.4   Immature Granulocytes      % 0.3   Segs Absolute      1.8 - 7.7 thou/mm3 5.1 Lymphocytes Absolute      1.0 - 4.8 thou/mm3 1.3   Monocytes Absolute      0.4 - 1.3 thou/mm3 0.5   Eosinophils Absolute      0.0 - 0.4 thou/mm3 0.2   Basophils Absolute      0.0 - 0.1 thou/mm3 0.0   Immature Grans (Abs)      0.00 - 0.07 thou/mm3 0.02   Nucleated Red Blood Cells      /100 wbc 0   Glucose      70 - 108 mg/dL 108   Creatinine      0.4 - 1.2 mg/dL 1.0   BUN      7 - 22 mg/dL 14   Sodium      135 - 145 meq/L 141   Potassium      3.5 - 5.2 meq/L 4.4   Chloride      98 - 111 meq/L 101   CO2      23 - 33 meq/L 27   Calcium      8.5 - 10.5 mg/dL 9.6   AST      5 - 40 U/L 23   Alk Phos      38 - 126 U/L 83   Total Protein      6.1 - 8.0 g/dL 7.2   Albumin      3.5 - 5.1 g/dL 4.5   Bilirubin      0.3 - 1.2 mg/dL 1.8 (H)   ALT      11 - 66 U/L 21   Cholesterol, Total      100 - 199 mg/dL 142   Triglycerides      0 - 199 mg/dL 152   HDL Cholesterol      mg/dL 37   LDL Calculated      mg/dL 75   Iron      65 - 195 ug/dL 92   TIBC      171 - 450 ug/dL 323   Ferritin      22 - 322 ng/mL 73   Anion Gap      8.0 - 16.0 meq/L 13.0   Est, Glom Filt Rate      ml/min/1.73m2 73 (A)     Narrative    PROCEDURE: CT CHEST W CONTRAST         CLINICAL INFORMATION: Lung nodule, Mediastinal lymphadenopathy .         COMPARISON: 2/6/2019         TECHNIQUE: 2-D multiplanar post contrast images of chest. Isovue-370 IV contrast.    All CT scans at this facility use dose modulation, iterative reconstruction, and/or weight-based dosing when appropriate to reduce radiation dose to as low as reasonably achievable.         FINDINGS: Thyroid gland is normal. Stable mediastinal lymphadenopathy with a pretracheal node measuring 11 mm short axis in a subcarinal 14 mm short axis node. These are entirely stable. Saber shaped trachea consistent with patient's COPD. Prior CABG. Normal heart size. Biapical marked pleural and parenchymal scarring. Severe centrilobular emphysematous changes. No infiltrates or effusions.     Fetus a ride 4 x 8 mm nodule and a posterior right upper lung abutting pleural surface is again identified image 14 series 2. It is stable in size. No new lung masses are seen. Upper abdomen    Is adrenals are normal. Gallstone is noted.              Bones    No suspicious bone lesions              Impression    1. Stable 4 x 8 mm nodule posterior right upper lung. 2. Stable mediastinal adenopathy. 3. Extensive centrilobular emphysematous changes with marked biapical pleural and parenchymal fibrotic scarring. 4. Cholelithiasis                   **This report has been created using voice recognition software.  It may contain minor errors which are inherent in voice recognition technology. **         Final report electronically signed by Dr. Alcon Etienne on 1/6/2020 8:29 AM      Narrative    SEGMENTAL PRESSURE STUDY:         CLINICAL INFORMATION: [facility])         COMPARISON: 12/4/2018         TECHNIQUE: Multiple pressure measurements were obtained in the upper thigh, lower thigh, upper calf and lower calf.  Doppler wave forms and pulse volume recordings were generated and ankle brachial indices were calculated.                FINDINGS:         There are triphasic Doppler waveforms noted bilaterally throughout the lower cavities.         The ankle-brachial indices are within normal limits bilaterally.         There is augmentation noted bilaterally.         There is a segmental pressure gradient demonstrated at the distal left posterior tibial and dorsalis pedis arteries and at the right dorsalis pedis artery distally. These findings may suggest underlying stenosis of near 50%.         TRAVON    RIGHT         MELODIE----->1.0    PTA----->1. 2              TRAVON    LEFT         MELODIE----->0.99    PTA----->0.84                        Impression    1. Normal ankle brachial indices bilaterally with normal augmentation noted bilaterally.  However, there is a segmental pressure gradient demonstrated at the distal left posterior tibial and dorsalis pedis arteries and at the right dorsalis pedis artery    distally. These findings may suggest underlying stenosis of near 50% at these distal runoff locations. Further characterization could be obtained with CT angiography if clinically indicated. This is new from the prior examination from 12/4/2018.                   **This report has been created using voice recognition software.  It may contain minor errors which are inherent in voice recognition technology. **         Final report electronically signed by Dr. Chris Gipson on 1/6/2020 10:35 AM          Immunization History   Administered Date(s) Administered    Influenza Virus Vaccine 11/01/2011, 10/24/2012, 10/01/2013, 10/06/2014, 09/28/2015    Influenza, High Dose (Fluzone 65 yrs and older) 09/30/2018, 11/09/2019    Pneumococcal Conjugate 13-valent ([id number]) 04/13/2015    Pneumococcal Polysaccharide ([id number]) 08/01/2005, 04/14/2014    Tdap (Boostrix, Adacel) 12/04/2013       Health Maintenance   Topic Date Due    Shingles Vaccine (1 of 2) 11/16/1997    Annual Wellness Visit (AWV)  05/29/2019    Flu vaccine (1) 09/01/2020    Lipid screen  11/22/2020    Potassium monitoring  11/22/2020    Creatinine monitoring  11/22/2020    DTaP/Tdap/Td vaccine (2 - Td) 12/04/2023    Colon cancer screen colonoscopy  02/13/2024    Pneumococcal 65+ years Vaccine  Completed    Hepatitis C screen  Addressed    Hepatitis A vaccine  Aged Out    Hepatitis B vaccine  Aged Out    Hib vaccine  Aged Out    Meningococcal (ACWY) vaccine  Aged Out     AAA ultrasound (Male, 65-75, smoked ever) indicated at this time? YES- management per Vascular Surgery  CT Lung Screen (55-80, 30 pk-yrs, smoking or quit <15 years) indicated at this time? Yes, 3/4 PPD x 40 years,  Stopped 14 years ago- completed 1/6/20- to follow up annually.   (updated 8/10/2020)    Future Appointments   Date Time Provider Eliot Preciado   2/11/2021  8:45 AM Makeda Krause [place], [address]       ASSESSMENT       Diagnosis Orders   1. Routine general medical examination at a health care facility     2. Abdominal aortic aneurysm without rupture ([facility]- Dr. Marcus Bowman     3. PVD (peripheral vascular disease) with claudication ([facility]     4. Essential hypertension     5. Rheumatoid arteritis ([facility] [facility]     6. Left shoulder pain, unspecified chronicity         PLAN      Encouraged annual FLU VACCINE after .    Encouraged NEW SHINGLES [facility]) - check with your local pharmacy. (updated 8/10/2020)  COLONOSCOPY done 2019 per Dr. Emil Batres- to do in 2024 (updated 8/10/2020)  Encouraged increased water intake at > 80 ounces daily. (updated 8/10/2020)  Check  CMP, CBC, PSA, due now  Surveillance of known AAA per Dr. Kev Fernandez annually. US Aorta completed 19 (updated 8/10/2020)  Pt declines PT for shoulder pain at this time  Pt to call and schedule with rheumatologist  Continue current medicines otherwise  Refills   Follow up in 6 months      Electronically signed by MERE Quiles CNP on 8/10/2020 at 9:22 AM    Medicare Annual Wellness Visit  Name: Manoj Anders Date: 8/10/2020   MRN: [id number] Sex: Male   Age: 69 y.o. Ethnicity: Non-/Non    : 1945 Race: Chely Trujillo is here for Medicare AWV    Screenings for behavioral, psychosocial and functional/safety risks, and cognitive dysfunction are all negative except as indicated below. These results, as well as other patient data from the [employer] form, are documented in Flowsheets linked to this Encounter. Allergies   Allergen Reactions    Crestor [Rosuvastatin Calcium]      Muscle aches.  Lipitor      Muscle aches.  Tramadol Nausea Only     Dizziness    Vicodin [Hydrocodone-Acetaminophen]      Dizziness.     Codeine Nausea And Vomiting     Dizziness    Percocet [Oxycodone-Acetaminophen] Nausea And Vomiting months?: No  Do you eat fewer than 2 meals per day?: No  Have you seen a dentist within the past year?: (!) No  Body mass index is 26.45 kg/m².   Health Habits/Nutrition Interventions:  · Dental exam overdue:  patient encouraged to make appointment with his/her dentist    Hearing/Vision:  No exam data present  Hearing/Vision  Do you or your family notice any trouble with your hearing?: (!) Yes  Do you have difficulty driving, watching TV, or doing any of your daily activities because of your eyesight?: No  Have you had an eye exam within the past year?: (!) No  Hearing/Vision Interventions:  · Hearing concerns:  recommend evaluation    Safety:  Safety  Do you have working smoke detectors?: (!) No  Have all throw rugs been removed or fastened?: (!) No  Do you have non-slip mats or surfaces in all bathtubs/showers?: Yes  Do all of your stairways have a railing or banister?: Yes  Are your doorways, halls and stairs free of clutter?: Yes  Do you always fasten your seatbelt when you are in a car?: Yes  Safety Interventions:  · Home safety tips provided    Personalized Preventive Plan   Current Health Maintenance Status  Immunization History   Administered Date(s) Administered    Influenza Virus Vaccine 11/01/2011, 10/24/2012, 10/01/2013, 10/06/2014, 09/28/2015    Influenza, High Dose (Fluzone 65 yrs and older) 09/30/2018, 11/09/2019    Pneumococcal Conjugate 13-valent ([id number]) 04/13/2015    Pneumococcal Polysaccharide ([id number]) 08/01/2005, 04/14/2014    Tdap (Boostrix, Adacel) 12/04/2013        Health Maintenance   Topic Date Due    Shingles Vaccine (1 of 2) 11/16/1997    Annual Wellness Visit (AWV)  05/29/2019    Flu vaccine (1) 09/01/2020    Lipid screen  11/22/2020    Potassium monitoring  11/22/2020    Creatinine monitoring  11/22/2020    DTaP/Tdap/Td vaccine (2 - Td) 12/04/2023    Colon cancer screen colonoscopy  02/13/2024    Pneumococcal 65+ years Vaccine  Completed    Hepatitis C screen Addressed    Hepatitis A vaccine  Aged Out    Hepatitis B vaccine  Aged Out    Hib vaccine  Aged Out    Meningococcal (ACWY) vaccine  Aged Out     Recommendations for Ramen Due: see orders and patient instructions/AVS.  . Recommended screening schedule for the next 5-10 years is provided to the patient in written form: see Patient Instructions/AVS.    Adrian Card was seen today for medicare aw.     Diagnoses and all orders for this visit:    Routine general medical examination at a health care facility    Abdominal aortic aneurysm without rupture ([facility] [address]- Dr. Kel Garner    PVD (peripheral vascular disease) with claudication [facility])    Essential hypertension    Rheumatoid arteritis ([facility] [address] [facility]    Left shoulder pain, unspecified chronicity

## 2020-11-03 PROBLEM — I73.9 PAD (PERIPHERAL ARTERY DISEASE) (HCC): Status: RESOLVED | Noted: 2017-02-08 | Resolved: 2020-11-03

## 2020-11-19 ENCOUNTER — VIRTUAL VISIT (OUTPATIENT)
Dept: FAMILY MEDICINE CLINIC | Age: 75
End: 2020-11-19
Payer: MEDICARE

## 2020-11-19 ENCOUNTER — HOSPITAL ENCOUNTER (OUTPATIENT)
Age: 75
Discharge: HOME | End: 2020-11-19
Payer: MEDICARE

## 2020-11-19 ENCOUNTER — TELEPHONE (OUTPATIENT)
Dept: FAMILY MEDICINE CLINIC | Age: 75
End: 2020-11-19

## 2020-11-19 DIAGNOSIS — R42 DIZZINESS: ICD-10-CM

## 2020-11-19 DIAGNOSIS — R51.9 NONINTRACTABLE HEADACHE, UNSPECIFIED CHRONICITY PATTERN, UNSPECIFIED HEADACHE TYPE: ICD-10-CM

## 2020-11-19 DIAGNOSIS — J34.89 SINUS PRESSURE: ICD-10-CM

## 2020-11-19 PROCEDURE — 99213 OFFICE O/P EST LOW 20 MIN: CPT | Performed by: NURSE PRACTITIONER

## 2020-11-19 PROCEDURE — 99211 OFF/OP EST MAY X REQ PHY/QHP: CPT

## 2020-11-19 PROCEDURE — G8427 DOCREV CUR MEDS BY ELIG CLIN: HCPCS | Performed by: NURSE PRACTITIONER

## 2020-11-19 PROCEDURE — 3017F COLORECTAL CA SCREEN DOC REV: CPT | Performed by: NURSE PRACTITIONER

## 2020-11-19 PROCEDURE — U0003 INFECTIOUS AGENT DETECTION BY NUCLEIC ACID (DNA OR RNA); SEVERE ACUTE RESPIRATORY SYNDROME CORONAVIRUS 2 (SARS-COV-2) (CORONAVIRUS DISEASE [COVID-19]), AMPLIFIED PROBE TECHNIQUE, MAKING USE OF HIGH THROUGHPUT TECHNOLOGIES AS DESCRIBED BY CMS-2020-01-R: HCPCS

## 2020-11-19 PROCEDURE — 1123F ACP DISCUSS/DSCN MKR DOCD: CPT | Performed by: NURSE PRACTITIONER

## 2020-11-19 PROCEDURE — 4040F PNEUMOC VAC/ADMIN/RCVD: CPT | Performed by: NURSE PRACTITIONER

## 2020-11-19 RX ORDER — AZITHROMYCIN 250 MG/1
250 TABLET, FILM COATED ORAL SEE ADMIN INSTRUCTIONS
Qty: 6 TABLET | Refills: 0 | Status: ON HOLD | OUTPATIENT
Start: 2020-11-19 | End: 2020-11-28 | Stop reason: HOSPADM

## 2020-11-19 ASSESSMENT — ENCOUNTER SYMPTOMS
ABDOMINAL PAIN: 0
COUGH: 1
EYES NEGATIVE: 1
BLOOD IN STOOL: 0
SINUS PRESSURE: 1
SHORTNESS OF BREATH: 0
CHEST TIGHTNESS: 1

## 2020-11-19 NOTE — PROGRESS NOTES
[facility]  [facility]  Dept: [phone]  Dept Fax: [phone]      TELEHEALTH EVALUATION -- Audio/Visual (During WEMYS-44 public health emergency)  This visit was performed via a synchronous telecommunication system. Pt location: Home  Provider location: Office ([address] [place] [address])  Pt notified that this was a virtual visit and that we will submit a claim for reimbursement to their insurance company. They were made aware that any copays, coinsurance amounts, or other amounts not covered will be their responsibility. Pt accepted? yes    Devika Jara is a 70 y.o. male    Pt presents for plugged head, headache on left side when coughing for the last month or so. He has tried allergy medication and sinus medications otc with minimal relief. Has some sinus pressure, sob, needs to breathing deeper, postnasal drainage. Pt felt really bad this morning, dizziness, was cold and clammy after he got out of bed. Feeling better now, but this morning scared him. Denies CP. Cough is mild, but feels tight in his chest.  Feels like a sinus infection. Some seasonal allergies. No real cough. Wife and granddaughter have been ill as well. Granddaughter did test positive for covid recently but pt's wife states she was told that since she was afebrile that she did not need to quarantine. They were never contacted by the Health Department.        Patient Active Problem List   Diagnosis    Erectile dysfunction    Hepatic steatosis    Low HDL (under 40)    Essential hypertension    Gastroesophageal reflux disease    Hyperlipidemia    Primary osteoarthritis of left knee    Coronary artery disease involving coronary bypass graft of native heart without angina pectoris- Dr. Carlito Frank    Chronic obstructive pulmonary disease ([facility] [address]    Gout of left foot    Abdominal aortic aneurysm without rupture ([address]- Dr. Darwin Park Angina pectoris, variant ([address]    Abnormal nuclear stress test    PVD (peripheral vascular disease) with claudication (HCC)    Iron deficiency anemia       Current Outpatient Medications   Medication Sig Dispense Refill    azithromycin (ZITHROMAX) 250 MG tablet Take 1 tablet by mouth See Admin Instructions for 5 days 500mg on day 1 followed by 250mg on days 2 - 5 6 tablet 0    metoprolol tartrate (LOPRESSOR) 50 MG tablet TAKE 1/2 TABLET EVERY DAY 45 tablet 3    lisinopril (PRINIVIL;ZESTRIL) 10 MG tablet TAKE 1 TABLET EVERY DAY 90 tablet 3    simvastatin (ZOCOR) 20 MG tablet Take 1 tablet by mouth nightly 90 tablet 3    pantoprazole (PROTONIX) 40 MG tablet Take 1 tablet by mouth every morning (before breakfast) 90 tablet 3    nitroGLYCERIN (NITROSTAT) 0.4 MG SL tablet Place 1 tablet under the tongue every 5 minutes as needed for Chest pain 25 tablet 3    allopurinol (ZYLOPRIM) 300 MG tablet Take 1 tablet by mouth daily 90 tablet 3    clopidogrel (PLAVIX) 75 MG tablet Take 1 tablet by mouth daily 30 tablet 5    albuterol sulfate HFA (PROAIR HFA) 108 (90 Base) MCG/ACT inhaler Inhale 2 puffs into the lungs every 6 hours as needed for Wheezing 1 Inhaler 3    sildenafil (VIAGRA) 50 MG tablet Take 1 tablet 30 minutes-4 hours prior. Lot#:[id number]  Exp:10/2018 (Patient not taking: Reported on 8/10/2020) 4 tablet 0    ferrous sulfate 325 (65 FE) MG tablet Take 325 mg by mouth daily       acetaminophen (TYLENOL) 500 MG tablet Take 1,000 mg by mouth as needed for Pain.  aspirin 325 MG EC tablet Take 325 mg by mouth daily. No current facility-administered medications for this visit. Review of Systems   Constitutional: Positive for activity change and diaphoresis (this morning). Negative for chills, fatigue, fever and unexpected weight change. HENT: Positive for congestion and sinus pressure. Eyes: Negative. Respiratory: Positive for cough and chest tightness. Negative for shortness of breath.     Cardiovascular: Negative for chest pain, palpitations and leg swelling. Gastrointestinal: Negative for abdominal pain and blood in stool. Genitourinary: Negative for dysuria. Musculoskeletal: Negative for joint swelling. Skin: Negative for rash. Neurological: Positive for dizziness and headaches. Psychiatric/Behavioral: Negative. All other systems reviewed and are negative. OBJECTIVE     Due to this being a TeleHealth encounter, evaluation of the following organ systems is limited: Vitals/Constitutional/EENT/Resp/CV/GI//MS/Neuro/Skin/Heme-Lymph-Imm. PHYSICAL EXAMINATION:  [ INSTRUCTIONS:  \"[x]\" Indicates a positive item  \"[]\" Indicates a negative item  -- DELETE ALL ITEMS NOT EXAMINED]  Vital Signs: (All Vital Signs are pt reported, unless otherwise noted)   There were no vitals taken for this visit. Constitutional: [x] Appears well-developed and well-nourished [x] No apparent distress      [] Abnormal-   Mental status  [x] Alert and awake  [x] Oriented to person/place/time [x]Able to follow commands      Eyes:  EOM    [x]  Normal  [] Abnormal-  Sclera  [x]  Normal  [] Abnormal -         Discharge [x]  None visible  [] Abnormal -    HENT:   [x] Normocephalic, atraumatic.   [] Abnormal   [x] Mouth/Throat: Mucous membranes are moist.     External Ears [x] Normal  [] Abnormal-     Neck: [x] No visualized mass     Pulmonary/Chest: [x] Respiratory effort normal.  [x] No visualized signs of difficulty breathing or respiratory distress        [] Abnormal-      Musculoskeletal:   [] Normal gait with no signs of ataxia         [x] Normal range of motion of neck        [] Abnormal-       Neurological:        [x] No Facial Asymmetry (Cranial nerve 7 motor function) (limited exam to video visit)          [x] No gaze palsy        [] Abnormal-         Skin:        [x] No significant exanthematous lesions or discoloration noted on facial skin         [] Abnormal-            Psychiatric:       [x] Normal Affect [x] No Hallucinations        [] Abnormal-       Lab Results   Component Value Date    LABA1C 4.7 03/16/2018     No results found for: EAG    Lab Results   Component Value Date    CHOL 142 11/22/2019    TRIG 152 11/22/2019    HDL 37 11/22/2019    LDLCALC 75 11/22/2019       Lab Results   Component Value Date     11/22/2019    K 4.4 11/22/2019     11/22/2019    CO2 27 11/22/2019    BUN 14 11/22/2019    CREATININE 1.0 11/22/2019    GLUCOSE 108 11/22/2019    CALCIUM 9.6 11/22/2019    PROT 7.2 11/22/2019    LABALBU 4.5 11/22/2019    BILITOT 1.8 (H) 11/22/2019    ALKPHOS 83 11/22/2019    AST 23 11/22/2019    ALT 21 11/22/2019    LABGLOM 73 (A) 11/22/2019       No results found for: LABMICR, [id number]    Lab Results   Component Value Date    TSH 3.810 01/06/2018    T4FREE 1.22 09/12/2011       Lab Results   Component Value Date    WBC 7.1 11/22/2019    HGB 14.2 11/22/2019    HCT 41.5 (L) 11/22/2019    MCV 96.7 (H) 11/22/2019     11/22/2019       Lab Results   Component Value Date    PSA 0.84 02/06/2019    PSA 0.48 03/22/2018    PSA 0.53 10/26/2015         Immunization History   Administered Date(s) Administered    Influenza Virus Vaccine 11/01/2011, 10/24/2012, 10/01/2013, 10/06/2014, 09/28/2015    Influenza, High Dose (Fluzone 65 yrs and older) 09/30/2018, 11/09/2019    Pneumococcal Conjugate 13-valent ([id number]) 04/13/2015    Pneumococcal Polysaccharide ([id number]) 08/01/2005, 04/14/2014    Tdap (Boostrix, Adacel) 12/04/2013       Health Maintenance   Topic Date Due    Shingles Vaccine (1 of 2) 11/16/1997    Flu vaccine (1) 09/01/2020    Lipid screen  11/22/2020    Potassium monitoring  11/22/2020    Creatinine monitoring  11/22/2020    Annual Wellness Visit (AWV)  08/11/2021    DTaP/Tdap/Td vaccine (2 - Td) 12/04/2023    Colon cancer screen colonoscopy  02/13/2024    Pneumococcal 65+ years Vaccine  Completed    Hepatitis C screen  Addressed    Hepatitis A vaccine  Aged Out    Hepatitis B vaccine  Aged Out    Hib

## 2020-11-19 NOTE — PATIENT INSTRUCTIONS
Patient Education        Learning About Coronavirus [phone])  Coronavirus [phone]): Overview  What is coronavirus (RYXKA-36)? The coronavirus disease (COVID-19) is caused by a virus. It is an illness that was first found in December 2019. It has since spread worldwide. The virus can cause fever, cough, and trouble breathing. In severe cases, it can cause pneumonia and make it hard to breathe without help. It can cause death. This virus spreads person-to-person through droplets from coughing and sneezing. It can also spread when you are close to someone who is infected. And it can spread when you touch something that has the virus on it, such as a doorknob or a tabletop. Coronaviruses are a large group of viruses. They cause the common cold. They also cause more serious illnesses like [place] East respiratory syndrome (MERS) and severe acute respiratory syndrome (SARS). COVID-19 is caused by a novel coronavirus. That means it's a new type that has not been seen in people before. How is COVID-19 treated? Mild illness can be treated at home, but more serious illness needs to be treated in the hospital. Treatment may include medicines to reduce symptoms, plus breathing support such as oxygen therapy or a ventilator. Other treatments, such as antiviral medicines, may help people who have COVID-19. What can you do to protect yourself from COVID-19? The best way to protect yourself from getting sick is to:  · Avoid areas where there is an outbreak. · Avoid contact with people who may be infected. · Avoid crowds and try to stay at least 6 feet away from other people. · Wash your hands often, especially after you cough or sneeze. Use soap and water, and scrub for at least 20 seconds. If soap and water aren't available, use an alcohol-based hand . · Avoid touching your mouth, nose, and eyes. What can you do to avoid spreading the virus to others?   To help avoid spreading the virus to others:  · [employer] your hands often with soap or alcohol-based hand sanitizers. · Cover your mouth with a tissue when you cough or sneeze. Then throw the tissue in the trash. · Use a disinfectant to clean things that you touch often. These include doorknobs, remote controls, phones, and handles on your refrigerator and microwave. And don't forget countertops, tabletops, bathrooms, and computer keyboards. · Wear a cloth face cover if you have to go to public areas. If you know or suspect that you have COVID-19:  · Stay home. Don't go to school, work, or public areas. And don't use public transportation, ride-shares, or taxis unless you have no choice. · Leave your home only if you need to get medical care or testing. But call the doctor's office first so they know you're coming. And wear a face cover. · Limit contact with people in your home. If possible, stay in a separate bedroom and use a separate bathroom. · Wear a face cover whenever you're around other people. It can help stop the spread of the virus when you cough or sneeze. · Clean and disinfect your home every day. Use household  and disinfectant wipes or sprays. Take special care to clean things that you grab with your hands. · Self-isolate until it's safe to be around others again. ? If you have symptoms, it's safe when you haven't had a fever for 3 days and your symptoms have improved and it's been at least 10 days since your symptoms started. ? If you were exposed to the virus but don't have symptoms, it's safe to be around others 14 days after exposure. ? Talk to your doctor about whether you also need testing, especially if you have a weakened immune system. When to call for help  Call 911 anytime you think you may need emergency care. For example, call if:  · You have severe trouble breathing. (You can't talk at all.)  · You have constant chest pain or pressure. · You are severely dizzy or lightheaded.   · You are confused or can't think

## 2020-11-19 NOTE — TELEPHONE ENCOUNTER
Pt needs a call back because he is not able to do a Virtual Visit and has the following symptoms:    Headache on left side  Coughing  Head congestion  Stuffy Nose  Dizzieness  Ear clogged    Please call Pt back at [phone]

## 2020-11-20 RX ORDER — PANTOPRAZOLE SODIUM 40 MG/1
40 TABLET, DELAYED RELEASE ORAL
Qty: 90 TABLET | Refills: 3 | Status: SHIPPED | OUTPATIENT
Start: 2020-11-20 | End: 2021-09-20

## 2020-11-20 NOTE — TELEPHONE ENCOUNTER
This medication refill is regarding a refill. Refill requested by [facility].     Requested Prescriptions     Pending Prescriptions Disp Refills    pantoprazole (PROTONIX) 40 MG tablet 90 tablet 3     Sig: Take 1 tablet by mouth every morning (before breakfast)       Date of last visit: 11/19/2020  Date of next visit: 2/11/2021  Date of last refill: 11/22/2019  Pharmacy Name: [facility]

## 2020-11-21 LAB — SARS-COV-2: NOT DETECTED

## 2020-11-24 ENCOUNTER — APPOINTMENT (OUTPATIENT)
Dept: GENERAL RADIOLOGY | Age: 75
DRG: 195 | End: 2020-11-24
Payer: MEDICARE

## 2020-11-24 ENCOUNTER — HOSPITAL ENCOUNTER (INPATIENT)
Age: 75
LOS: 4 days | Discharge: HOME | DRG: 195 | End: 2020-11-28
Attending: EMERGENCY MEDICINE | Admitting: INTERNAL MEDICINE
Payer: MEDICARE

## 2020-11-24 ENCOUNTER — TELEPHONE (OUTPATIENT)
Dept: FAMILY MEDICINE CLINIC | Age: 75
End: 2020-11-24

## 2020-11-24 PROBLEM — J18.9 PNEUMONIA: Status: ACTIVE | Noted: 2020-11-24

## 2020-11-24 LAB
ANION GAP SERPL CALCULATED.3IONS-SCNC: 11 MEQ/L (ref 8–16)
BASOPHILS # BLD: 0.2 %
BASOPHILS ABSOLUTE: 0 THOU/MM3 (ref 0–0.1)
BUN BLDV-MCNC: 22 MG/DL (ref 7–22)
CALCIUM SERPL-MCNC: 9.1 MG/DL (ref 8.5–10.5)
CHLORIDE BLD-SCNC: 95 MEQ/L (ref 98–111)
CO2: 26 MEQ/L (ref 23–33)
CREAT SERPL-MCNC: 1 MG/DL (ref 0.4–1.2)
EKG ATRIAL RATE: 67 BPM
EKG P AXIS: 75 DEGREES
EKG P-R INTERVAL: 180 MS
EKG Q-T INTERVAL: 396 MS
EKG QRS DURATION: 94 MS
EKG QTC CALCULATION (BAZETT): 418 MS
EKG R AXIS: -16 DEGREES
EKG T AXIS: 40 DEGREES
EKG VENTRICULAR RATE: 67 BPM
EOSINOPHIL # BLD: 0.2 %
EOSINOPHILS ABSOLUTE: 0 THOU/MM3 (ref 0–0.4)
ERYTHROCYTE [DISTWIDTH] IN BLOOD BY AUTOMATED COUNT: 12.6 % (ref 11.5–14.5)
ERYTHROCYTE [DISTWIDTH] IN BLOOD BY AUTOMATED COUNT: 43.7 FL (ref 35–45)
FLU A ANTIGEN: NEGATIVE
FLU B ANTIGEN: NEGATIVE
GFR SERPL CREATININE-BSD FRML MDRD: 73 ML/MIN/1.73M2
GLUCOSE BLD-MCNC: 102 MG/DL (ref 70–108)
HCT VFR BLD CALC: 38.1 % (ref 42–52)
HEMOGLOBIN: 13.4 GM/DL (ref 14–18)
IMMATURE GRANS (ABS): 0.01 THOU/MM3 (ref 0–0.07)
IMMATURE GRANULOCYTES: 0.2 %
LYMPHOCYTES # BLD: 13.4 %
LYMPHOCYTES ABSOLUTE: 0.7 THOU/MM3 (ref 1–4.8)
MCH RBC QN AUTO: 33.3 PG (ref 26–33)
MCHC RBC AUTO-ENTMCNC: 35.2 GM/DL (ref 32.2–35.5)
MCV RBC AUTO: 94.8 FL (ref 80–94)
MONOCYTES # BLD: 8.9 %
MONOCYTES ABSOLUTE: 0.5 THOU/MM3 (ref 0.4–1.3)
NUCLEATED RED BLOOD CELLS: 0 /100 WBC
OSMOLALITY CALCULATION: 268 MOSMOL/KG (ref 275–300)
PLATELET # BLD: 131 THOU/MM3 (ref 130–400)
PMV BLD AUTO: 9.8 FL (ref 9.4–12.4)
POTASSIUM REFLEX MAGNESIUM: 5 MEQ/L (ref 3.5–5.2)
RBC # BLD: 4.02 MILL/MM3 (ref 4.7–6.1)
SARS-COV-2, NAAT: NOT DETECTED
SARS-COV-2, PCR: NOT DETECTED
SEG NEUTROPHILS: 77.1 %
SEGMENTED NEUTROPHILS ABSOLUTE COUNT: 4.1 THOU/MM3 (ref 1.8–7.7)
SODIUM BLD-SCNC: 132 MEQ/L (ref 135–145)
TROPONIN T: < 0.01 NG/ML
WBC # BLD: 5.3 THOU/MM3 (ref 4.8–10.8)

## 2020-11-24 PROCEDURE — 87040 BLOOD CULTURE FOR BACTERIA: CPT

## 2020-11-24 PROCEDURE — 1200000003 HC TELEMETRY R&B

## 2020-11-24 PROCEDURE — 96365 THER/PROPH/DIAG IV INF INIT: CPT

## 2020-11-24 PROCEDURE — 84484 ASSAY OF TROPONIN QUANT: CPT

## 2020-11-24 PROCEDURE — U0002 COVID-19 LAB TEST NON-CDC: HCPCS

## 2020-11-24 PROCEDURE — 2580000003 HC RX 258: Performed by: EMERGENCY MEDICINE

## 2020-11-24 PROCEDURE — 71045 X-RAY EXAM CHEST 1 VIEW: CPT

## 2020-11-24 PROCEDURE — 80048 BASIC METABOLIC PNL TOTAL CA: CPT

## 2020-11-24 PROCEDURE — 6370000000 HC RX 637 (ALT 250 FOR IP)

## 2020-11-24 PROCEDURE — 36415 COLL VENOUS BLD VENIPUNCTURE: CPT

## 2020-11-24 PROCEDURE — 6360000002 HC RX W HCPCS: Performed by: EMERGENCY MEDICINE

## 2020-11-24 PROCEDURE — 93005 ELECTROCARDIOGRAM TRACING: CPT | Performed by: EMERGENCY MEDICINE

## 2020-11-24 PROCEDURE — 85025 COMPLETE CBC W/AUTO DIFF WBC: CPT

## 2020-11-24 PROCEDURE — 87804 INFLUENZA ASSAY W/OPTIC: CPT

## 2020-11-24 PROCEDURE — 99285 EMERGENCY DEPT VISIT HI MDM: CPT

## 2020-11-24 RX ORDER — NITROGLYCERIN 0.4 MG/1
0.4 TABLET SUBLINGUAL EVERY 5 MIN PRN
Status: DISCONTINUED | OUTPATIENT
Start: 2020-11-24 | End: 2020-11-28 | Stop reason: HOSPADM

## 2020-11-24 RX ORDER — ACETAMINOPHEN 500 MG
1000 TABLET ORAL PRN
Status: DISCONTINUED | OUTPATIENT
Start: 2020-11-24 | End: 2020-11-28 | Stop reason: HOSPADM

## 2020-11-24 RX ORDER — CLOPIDOGREL BISULFATE 75 MG/1
75 TABLET ORAL DAILY
Status: DISCONTINUED | OUTPATIENT
Start: 2020-11-25 | End: 2020-11-28 | Stop reason: HOSPADM

## 2020-11-24 RX ORDER — ALBUTEROL SULFATE 90 UG/1
2 AEROSOL, METERED RESPIRATORY (INHALATION) EVERY 6 HOURS PRN
Status: DISCONTINUED | OUTPATIENT
Start: 2020-11-24 | End: 2020-11-28 | Stop reason: HOSPADM

## 2020-11-24 RX ORDER — PANTOPRAZOLE SODIUM 40 MG/1
40 TABLET, DELAYED RELEASE ORAL
Status: DISCONTINUED | OUTPATIENT
Start: 2020-11-25 | End: 2020-11-28 | Stop reason: HOSPADM

## 2020-11-24 RX ORDER — ACETAMINOPHEN 325 MG/1
TABLET ORAL
Status: COMPLETED
Start: 2020-11-24 | End: 2020-11-24

## 2020-11-24 RX ORDER — SIMVASTATIN 20 MG
20 TABLET ORAL NIGHTLY
Status: DISCONTINUED | OUTPATIENT
Start: 2020-11-25 | End: 2020-11-25 | Stop reason: CLARIF

## 2020-11-24 RX ORDER — METOPROLOL TARTRATE 50 MG/1
50 TABLET, FILM COATED ORAL 2 TIMES DAILY
Status: DISCONTINUED | OUTPATIENT
Start: 2020-11-25 | End: 2020-11-28 | Stop reason: HOSPADM

## 2020-11-24 RX ORDER — FERROUS SULFATE 325(65) MG
325 TABLET ORAL
Status: DISCONTINUED | OUTPATIENT
Start: 2020-11-25 | End: 2020-11-28 | Stop reason: HOSPADM

## 2020-11-24 RX ORDER — AZITHROMYCIN 250 MG/1
250 TABLET, FILM COATED ORAL SEE ADMIN INSTRUCTIONS
Status: DISCONTINUED | OUTPATIENT
Start: 2020-11-24 | End: 2020-11-25

## 2020-11-24 RX ORDER — LISINOPRIL 10 MG/1
10 TABLET ORAL DAILY
Status: DISCONTINUED | OUTPATIENT
Start: 2020-11-25 | End: 2020-11-28 | Stop reason: HOSPADM

## 2020-11-24 RX ORDER — ACETAMINOPHEN 325 MG/1
650 TABLET ORAL ONCE
Status: COMPLETED | OUTPATIENT
Start: 2020-11-24 | End: 2020-11-24

## 2020-11-24 RX ORDER — ALLOPURINOL 300 MG/1
300 TABLET ORAL DAILY
Status: DISCONTINUED | OUTPATIENT
Start: 2020-11-25 | End: 2020-11-28 | Stop reason: HOSPADM

## 2020-11-24 RX ADMIN — ACETAMINOPHEN 650 MG: 325 TABLET ORAL at 20:05

## 2020-11-24 RX ADMIN — CEFTRIAXONE SODIUM 1 G: 1 INJECTION, POWDER, FOR SOLUTION INTRAMUSCULAR; INTRAVENOUS at 19:56

## 2020-11-24 RX ADMIN — AZITHROMYCIN MONOHYDRATE 500 MG: 500 INJECTION, POWDER, LYOPHILIZED, FOR SOLUTION INTRAVENOUS at 20:33

## 2020-11-24 NOTE — ED TRIAGE NOTES
Pt presents to the ED via EMS from home with complaints of fatigue. Pt has been feeling this way for the past few days. Pt granddaughter lives with them and states she testsed positive for COVID19 a month ago. Pt had [address] outpatient completed on Thusrsday which came back negative. Pt is fatigue and has been running ocasionial fevers at home. Temp on arrival was 99.8 orally.

## 2020-11-24 NOTE — ED PROVIDER NOTES
[facility] EMERGENCY DEPT      CHIEF COMPLAINT       Chief Complaint   Patient presents with    Fatigue       Nurses Notes reviewed and I agree except as noted in the HPI. HISTORY OF PRESENT ILLNESS    Deanna Gunderson is a 70 y.o. male who presents with complaint of fatigue, patient states that he has been tired since this past Thursday. Generalized weakness, with shortness of breath upon ambulation. Known history of COPD and CAD status post CABG in 2006. Patient has been exposed to family members who tested positive for Covid. However, he has no cough, he does report fevers at home. Patient has low-grade fever here today. Patient was given azithromycin by his primary care physician last week. REVIEW OF SYSTEMS      Review of Systems   Constitutional: Pos for fever, chills and fatigue. HENT: Negative for congestion, drooling, facial swelling and sore throat. Eyes: Negative for photophobia, pain and discharge. Respiratory: Negative for cough, pos shortness of breath, neg wheezing and stridor. Cardiovascular: Negative for chest pain, palpitations and leg swelling. Gastrointestinal: Negative for abdominal pain, blood in stool and abdominal distention. Genitourinary: Negative for dysuria, urgency, hematuria and difficulty urinating. Musculoskeletal: Negative for gait problem, neck pain and neck stiffness. Skin; No rash, No itching  Neurological: Negative for seizures, weakness and numbness. Hematological: Negative for adenopathy. Does not bruise/bleed easily. Psychiatric/Behavioral: Negative for hallucinations, confusion and agitation.      PAST MEDICAL HISTORY    has a past medical history of AAA (abdominal aortic aneurysm) ([facility] [address] COPD (chronic obstructive pulmonary disease) ([address] Coronary artery disease, Emphysema of lung ([facility] [address] Erectile dysfunction, GERD (gastroesophageal reflux disease), Gout, History of blood transfusion, Hyperlipidemia, Hypertension, Liver disease, and Osteoarthritis. SURGICAL HISTORY      has a past surgical history that includes Foot surgery (); Coronary artery bypass graft (); Cardiac surgery (); Colonoscopy (13); Appendectomy (); Cardiac catheterization; Inguinal hernia repair (Right, 2014); Upper gastrointestinal endoscopy; Endoscopy, colon, diagnostic; thromboendarterectomy (Right, 2016); and Hemorrhoid surgery (2019). CURRENT MEDICATIONS       Previous Medications    ACETAMINOPHEN (TYLENOL) 500 MG TABLET    Take 1,000 mg by mouth as needed for Pain. ALBUTEROL SULFATE HFA (PROAIR HFA) 108 (90 BASE) MCG/ACT INHALER    Inhale 2 puffs into the lungs every 6 hours as needed for Wheezing    ALLOPURINOL (ZYLOPRIM) 300 MG TABLET    Take 1 tablet by mouth daily    ASPIRIN 325 MG EC TABLET    Take 325 mg by mouth daily. AZITHROMYCIN (ZITHROMAX) 250 MG TABLET    Take 1 tablet by mouth See Admin Instructions for 5 days 500mg on day 1 followed by 250mg on days 2 - 5    CLOPIDOGREL (PLAVIX) 75 MG TABLET    Take 1 tablet by mouth daily    FERROUS SULFATE 325 (65 FE) MG TABLET    Take 325 mg by mouth daily     LISINOPRIL (PRINIVIL;ZESTRIL) 10 MG TABLET    TAKE 1 TABLET EVERY DAY    METOPROLOL TARTRATE (LOPRESSOR) 50 MG TABLET    TAKE 1/2 TABLET EVERY DAY    NITROGLYCERIN (NITROSTAT) 0.4 MG SL TABLET    Place 1 tablet under the tongue every 5 minutes as needed for Chest pain    PANTOPRAZOLE (PROTONIX) 40 MG TABLET    Take 1 tablet by mouth every morning (before breakfast)    SILDENAFIL (VIAGRA) 50 MG TABLET    Take 1 tablet 30 minutes-4 hours prior. Lot#:[id number]  Exp:10/2018    SIMVASTATIN (ZOCOR) 20 MG TABLET    Take 1 tablet by mouth nightly       ALLERGIES     is allergic to crestor [rosuvastatin calcium]; lipitor; tramadol; vicodin [hydrocodone-acetaminophen]; codeine; and percocet [oxycodone-acetaminophen]. FAMILY HISTORY     He indicated that his mother is . He indicated that his father is .  He indicated that his sister is . He indicated that both of his brothers are alive. family history includes Cancer in his sister; Diabetes in his mother; Heart Disease in his brother, father, mother, and sister; Other (age of onset: 77) in his sister. SOCIAL HISTORY      reports that he quit smoking about 14 years ago. He has a 15.00 pack-year smoking history. He has never used smokeless tobacco. He reports that he does not drink alcohol or use drugs. PHYSICAL EXAM     INITIAL VITALS:  oral temperature is 99.1 °F (37.3 °C). His blood pressure is 82/70 and his pulse is 77. His respiration is 18 and oxygen saturation is 95%. Physical Exam   Constitutional:  well-developed and well-nourished. HENT: Head: Normocephalic, atraumatic, Bilateral external ears normal, Oropharynx mosit, No oral exudates, Nose normal.   Eyes: PERRL, EOMI, Conjunctiva normal, No discharge. No scleral icterus  Neck: Normal range of motion, No tenderness, Supple  Cardiovascular: Normal rate, regular rhythm, S1 normal and S2 normal.  Exam reveals no gallop. Pulmonary/Chest: Effort normal and breath sounds normal. No accessory muscle usage or stridor. No respiratory distress. no wheezes. has no rales. exhibits no tenderness. Abdominal: Soft. Bowel sounds are normal.  exhibits no distension. There is no tenderness. There is no rebound and no guarding. Extremities: No edema, no tenderness, no cyanosis, no clubbing. Musculoskeletal: Good range of motion in major joints is observed. No major deformities noted. Neurological: Alert and oriented ×3, normal motor function, normal sensory function, no focal deficits. GCS 15  Skin: Skin is warm, dry and intact. No rash noted. No erythema.    Psychiatric: Affect normal, judgment normal, mood normal.  DIFFERENTIAL DIAGNOSIS:   CHF, CAD, Covid, pneumonia    DIAGNOSTIC RESULTS     EKG: All EKG's are interpreted by the Emergency Department Physician who either signs or Co-signs this chart in the absence of a cardiologist.    RADIOLOGY: non-plain film images(s) such as CT, Ultrasound and MRI are read by the radiologist.  Plain radiographic images are visualized and preliminarily interpreted by the emergency physician unless otherwise stated below. LABS:   Labs Reviewed   BASIC METABOLIC PANEL W/ REFLEX TO MG FOR LOW K - Abnormal; Notable for the following components:       Result Value    Sodium 132 (*)     Chloride 95 (*)     All other components within normal limits   CBC WITH AUTO DIFFERENTIAL - Abnormal; Notable for the following components:    RBC 4.02 (*)     Hemoglobin 13.4 (*)     Hematocrit 38.1 (*)     MCV 94.8 (*)     MCH 33.3 (*)     Lymphocytes Absolute 0.7 (*)     All other components within normal limits   GLOMERULAR FILTRATION RATE, ESTIMATED - Abnormal; Notable for the following components:    Est, Glom Filt Rate 73 (*)     All other components within normal limits   OSMOLALITY - Abnormal; Notable for the following components:    Osmolality Calc 268.0 (*)     All other components within normal limits   RAPID INFLUENZA A/B ANTIGENS   CULTURE, BLOOD 1   CULTURE, BLOOD 2   TROPONIN   COVID-19   ANION GAP       EMERGENCY DEPARTMENT COURSE:   Vitals:    Vitals:    11/24/20 1827 11/24/20 1922 11/24/20 1957 11/24/20 2115   BP: 99/61 108/60 105/62 82/70   Pulse: 72 74 74 77   Resp: 16 18 16 18   Temp:   100.9 °F (38.3 °C) 99.1 °F (37.3 °C)   TempSrc:   Oral Oral   SpO2: 96% 96% 94% 95%     Patient presenting with complaint of generalized fatigue for the past 6 days. CRITICAL CARE:       CONSULTS:  None    PROCEDURES:  None    FINAL IMPRESSION      1. Pneumonia due to organism          DISPOSITION/PLAN   Admitted    PATIENT REFERRED TO:  No follow-up provider specified.     DISCHARGE MEDICATIONS:  New Prescriptions    No medications on file       (Please note that portions of this note were completed with a voice recognition program.  Efforts were made to edit the dictations but occasionally words are mis-transcribed.)    DO Hazel Loredo DO  11/24/20 7840

## 2020-11-24 NOTE — TELEPHONE ENCOUNTER
Wife calls in stating that the pt has a fever of 101 x3 days. Some chest congestion and SOB. Has been treated with a zpak after a negative covid test but is still not better. After speaking with wife I advised her that ES would probably want him to report to the ED since he is dizzy and SOB with a fever.

## 2020-11-24 NOTE — ED NOTES
Swabs obtained and sent to lab. Wife at bedside updated on POC. Respirations even and unlabored.      Carli Zelaya RN  11/24/20 0646

## 2020-11-25 LAB — TSH SERPL DL<=0.05 MIU/L-ACNC: 2.76 UIU/ML (ref 0.4–4.2)

## 2020-11-25 PROCEDURE — 1200000003 HC TELEMETRY R&B

## 2020-11-25 PROCEDURE — 2580000003 HC RX 258: Performed by: INTERNAL MEDICINE

## 2020-11-25 PROCEDURE — 6370000000 HC RX 637 (ALT 250 FOR IP): Performed by: INTERNAL MEDICINE

## 2020-11-25 PROCEDURE — 93010 ELECTROCARDIOGRAM REPORT: CPT | Performed by: INTERNAL MEDICINE

## 2020-11-25 PROCEDURE — 36415 COLL VENOUS BLD VENIPUNCTURE: CPT

## 2020-11-25 PROCEDURE — 84443 ASSAY THYROID STIM HORMONE: CPT

## 2020-11-25 PROCEDURE — 6360000002 HC RX W HCPCS: Performed by: INTERNAL MEDICINE

## 2020-11-25 RX ORDER — LEVOFLOXACIN 750 MG/1
750 TABLET ORAL DAILY
Status: DISCONTINUED | OUTPATIENT
Start: 2020-11-25 | End: 2020-11-27

## 2020-11-25 RX ORDER — SODIUM CHLORIDE 9 MG/ML
INJECTION, SOLUTION INTRAVENOUS CONTINUOUS
Status: DISCONTINUED | OUTPATIENT
Start: 2020-11-25 | End: 2020-11-28 | Stop reason: HOSPADM

## 2020-11-25 RX ORDER — AZITHROMYCIN 250 MG/1
250 TABLET, FILM COATED ORAL NIGHTLY
Status: DISCONTINUED | OUTPATIENT
Start: 2020-11-25 | End: 2020-11-25

## 2020-11-25 RX ORDER — ATORVASTATIN CALCIUM 10 MG/1
10 TABLET, FILM COATED ORAL NIGHTLY
Status: DISCONTINUED | OUTPATIENT
Start: 2020-11-25 | End: 2020-11-28 | Stop reason: HOSPADM

## 2020-11-25 RX ADMIN — METOPROLOL TARTRATE 50 MG: 50 TABLET, FILM COATED ORAL at 21:11

## 2020-11-25 RX ADMIN — ACETAMINOPHEN 1000 MG: 500 TABLET ORAL at 03:37

## 2020-11-25 RX ADMIN — FERROUS SULFATE TAB 325 MG (65 MG ELEMENTAL FE) 325 MG: 325 (65 FE) TAB at 08:18

## 2020-11-25 RX ADMIN — ASPIRIN 325 MG: 325 TABLET, COATED ORAL at 08:18

## 2020-11-25 RX ADMIN — LEVOFLOXACIN 750 MG: 750 TABLET, FILM COATED ORAL at 12:50

## 2020-11-25 RX ADMIN — ALLOPURINOL 300 MG: 300 TABLET ORAL at 08:18

## 2020-11-25 RX ADMIN — SODIUM CHLORIDE: 9 INJECTION, SOLUTION INTRAVENOUS at 21:11

## 2020-11-25 RX ADMIN — ENOXAPARIN SODIUM 40 MG: 40 INJECTION SUBCUTANEOUS at 08:18

## 2020-11-25 RX ADMIN — CLOPIDOGREL BISULFATE 75 MG: 75 TABLET ORAL at 08:18

## 2020-11-25 RX ADMIN — PANTOPRAZOLE SODIUM 40 MG: 40 TABLET, DELAYED RELEASE ORAL at 05:44

## 2020-11-25 RX ADMIN — SODIUM CHLORIDE: 9 INJECTION, SOLUTION INTRAVENOUS at 12:50

## 2020-11-25 RX ADMIN — ATORVASTATIN CALCIUM 10 MG: 10 TABLET, FILM COATED ORAL at 03:44

## 2020-11-25 ASSESSMENT — PAIN SCALES - GENERAL: PAINLEVEL_OUTOF10: 0

## 2020-11-25 NOTE — CARE COORDINATION
DISASTER CHARTING    11/25/20, 1:04 PM EST    DISCHARGE ONGOING EVALUATION:     Bess Grubbs day: 1  Location: 620/020-A Reason for admit: Pneumonia [J18.9]   Barriers to Discharge: Tmax 102.2. IVF, po levaquin. PT/OT. PCP: Lorenzo Olguin MD  Patient Goals/Plan/Treatment Preferences: Spoke with Lexi Carver, he plans return home with his wife at discharge. Discussed HH for follow-up of pneumonia, he will consider. Will follow-up.

## 2020-11-25 NOTE — H&P
Dr. Aaron Menendez ( Internal Medicine Specialties )  H&P  11/25/2020  10:36 AM    Patient:  Manjula Sanchez  YOB: 1945    MRN: [id number]   Acct:  [id number]   [id number]  Primary Care Physician: Chele Freitas MD  Over 45 mins spent on this evaluation.   Dictated    Electronically signed by Dionicio Jean MD on 11/25/2020 at 10:36 AM         Copy: Primary Care Physician: Chele Freitas MD

## 2020-11-25 NOTE — H&P
Listed allergies to CRESTOR, LIPITOR, TRAMADOL, VICODIN,  PERCOCET. SOCIAL HISTORY:  He quit smoking about 14 years ago. He basically had a  15-pack-year history of smoking. Alcohol use. , with three  children, two sons and one daughter. The patient has since retired. FAMILY HISTORY:  Mother with heart disease and diabetes. Father with  heart disease as well had a sister with pulmonary embolism. REVIEW OF SYSTEMS:  Again, fairly decent appetite, just weakness mostly. No dysuria, urgency or hesitancy. Denies any blurred vision. He denies  any heat or cold intolerance. Denies any dermatological changes. PHYSICAL EXAMINATION:  GENERAL:  On exam, I found an elderly patient in bed this morning,  appears otherwise comfortable. HEENT:  Head:  Normocephalic. No icterus. No pallor. VITAL SIGNS:  Most recent vital signs show blood pressure 99/56, pulse  of about 70, respirations 16, and temperature of 98. NECK:  Supple. No thyromegaly. Trachea is central.  LUNGS:  Shows bilateral air movement and also diminished at the bases on  the right side, otherwise no crackles noted. CARDIOVASCULAR:  S1, S2 were heard. No gallop or rub. ABDOMEN:  Soft and nontender with positive bowel sounds. No  hepatosplenomegaly. NEUROLOGICAL:  The patient is alert, awake, and responsive to command  appropriately and able to move all extremities without any focal  neurological deficit. EXTREMITIES:  Shows no evidence of edema at this time. No digital  clubbing or cyanosis. AVAILABLE DIAGNOSTIC DATA:  Include the lab studies showing the  electrolytes has BUN of 22, creatinine is 1, sodium 132, potassium 5,  chloride is 95, CO2 of 23, glucose of 102. CBC:  White count is 5.3,  hemoglobin is 13.4, hematocrit is 38.1 with a platelet count of 303. Differential count is normal.  The blood cultures taken are pending. Flu A and B antigen negative. The COVID-19 also not detected.   The  chest x-ray shows bilateral air movement, some chronic changes with the  possibility of infiltration in the right lower lobe. EKG; sinus rhythm  with an incomplete right bundle. ASSESSMENT AND PLAN:  Pneumonia of community-acquired type. The patient  apparently had been given some Z-Donovan prior to admission with this  complaint by the family MD in the last week and that he had just  completed. At this time, clinically stable, does not even require  supplemental oxygen. We will continue antibiotics probably switch him  to oral Levaquin. We will carefully rehydrate him today and if he does  well, hopefully he can go home by tomorrow or Friday latest.  Other  issues include his coronary artery disease, on medications listed, will  continue. Hypertension, medications will be resumed as well. No other  pressing issues, expect a short stay in the hospital and will be  discharged tomorrow. [address] [place] [address]  Mansi Juarez M.D.    D: 11/25/2020 10:51:33       T: 11/25/2020 12:07:03     [email or web address]  Job#: [id number]     Doc#: [id number]    CC:

## 2020-11-25 NOTE — PROGRESS NOTES
Pt admitted to  6K20 from ED. Complaints: fatigue and SOB with exertion. IV site free of s/s of infection or infiltration. Vital signs obtained. Assessment and data collection initiated. Two nurse skin assessment performed by Susanna Stein and Chau Kelley RN. Oriented to room. Policies and procedures for 6[facility] explained. Michaelle Domingo RN discussed hourly rounding with patient addressing 5 P's. Fall prevention and safety brochure discussed with patient. Bed alarm on. Call light in reach.

## 2020-11-25 NOTE — ED NOTES
ED to inpatient nurses report    Chief Complaint   Patient presents with    Fatigue      Present to ED from home  LOC: alert and orientated to name, place, date  Vital signs   Vitals:    11/24/20 1827 11/24/20 1922 11/24/20 1957 11/24/20 2115   BP: 99/61 108/60 105/62 82/70   Pulse: 72 74 74 77   Resp: 16 18 16 18   Temp:   100.9 °F (38.3 °C) 99.1 °F (37.3 °C)   TempSrc:   Oral Oral   SpO2: 96% 96% 94% 95%      Oxygen Baseline room air     Current needs required 0L Bipap/Cpap No  LDAs:   Peripheral IV 11/24/20 Right Antecubital (Active)   Site Assessment Clean; Intact;Dry 11/24/20 2116   Line Status Infusing 11/24/20 2116   Dressing Status Clean;Dry; Intact 11/24/20 2116     Mobility: Independent  Pending ED orders: complete  Present condition: Stable  Person of contract Benmoncho Cardosojean, phone number [phone]  Our promise was given to family    Electronically signed by Amna Jaffe RN on 11/24/2020 at 9:16 PM       Amna Jaffe RN  11/24/20 2118

## 2020-11-26 ENCOUNTER — APPOINTMENT (OUTPATIENT)
Dept: GENERAL RADIOLOGY | Age: 75
DRG: 195 | End: 2020-11-26
Payer: MEDICARE

## 2020-11-26 PROCEDURE — 2580000003 HC RX 258: Performed by: INTERNAL MEDICINE

## 2020-11-26 PROCEDURE — 6360000002 HC RX W HCPCS: Performed by: INTERNAL MEDICINE

## 2020-11-26 PROCEDURE — 6370000000 HC RX 637 (ALT 250 FOR IP): Performed by: INTERNAL MEDICINE

## 2020-11-26 PROCEDURE — 71045 X-RAY EXAM CHEST 1 VIEW: CPT

## 2020-11-26 PROCEDURE — 87899 AGENT NOS ASSAY W/OPTIC: CPT

## 2020-11-26 PROCEDURE — 1200000003 HC TELEMETRY R&B

## 2020-11-26 PROCEDURE — 87449 NOS EACH ORGANISM AG IA: CPT

## 2020-11-26 RX ADMIN — ATORVASTATIN CALCIUM 10 MG: 10 TABLET, FILM COATED ORAL at 20:32

## 2020-11-26 RX ADMIN — PANTOPRAZOLE SODIUM 40 MG: 40 TABLET, DELAYED RELEASE ORAL at 05:44

## 2020-11-26 RX ADMIN — ACETAMINOPHEN 1000 MG: 500 TABLET ORAL at 00:11

## 2020-11-26 RX ADMIN — ASPIRIN 325 MG: 325 TABLET, COATED ORAL at 08:37

## 2020-11-26 RX ADMIN — SODIUM CHLORIDE: 9 INJECTION, SOLUTION INTRAVENOUS at 08:38

## 2020-11-26 RX ADMIN — FERROUS SULFATE TAB 325 MG (65 MG ELEMENTAL FE) 325 MG: 325 (65 FE) TAB at 08:37

## 2020-11-26 RX ADMIN — ALLOPURINOL 300 MG: 300 TABLET ORAL at 08:38

## 2020-11-26 RX ADMIN — LEVOFLOXACIN 750 MG: 750 TABLET, FILM COATED ORAL at 08:37

## 2020-11-26 RX ADMIN — ACETAMINOPHEN 1000 MG: 500 TABLET ORAL at 20:32

## 2020-11-26 RX ADMIN — SODIUM CHLORIDE: 9 INJECTION, SOLUTION INTRAVENOUS at 17:22

## 2020-11-26 RX ADMIN — CLOPIDOGREL BISULFATE 75 MG: 75 TABLET ORAL at 08:37

## 2020-11-26 RX ADMIN — CEFEPIME 2 G: 2 INJECTION, POWDER, FOR SOLUTION INTRAVENOUS at 17:19

## 2020-11-26 RX ADMIN — LISINOPRIL 10 MG: 10 TABLET ORAL at 08:37

## 2020-11-26 RX ADMIN — METOPROLOL TARTRATE 50 MG: 50 TABLET, FILM COATED ORAL at 20:32

## 2020-11-26 ASSESSMENT — PAIN SCALES - GENERAL
PAINLEVEL_OUTOF10: 0

## 2020-11-26 NOTE — PROGRESS NOTES
INTERNAL MEDICINE SPECIALTIES  Progress Note Dr Jonas Ferrell covering for Dr Santhosh Grubbs        Patient:  Coby Maddox  YOB: 1945  Date of Service: 11/26/2020  MRN: [id number]   Acct:  [de-identified]   Primary Care Physician: Zenaida Garland MD    SUBJECTIVE:pt a former smoker  2 PPD x 35 yrs, feels a bit better today    Home Medications:   No current facility-administered medications on file prior to encounter. Current Outpatient Medications on File Prior to Encounter   Medication Sig Dispense Refill    pantoprazole (PROTONIX) 40 MG tablet Take 1 tablet by mouth every morning (before breakfast) 90 tablet 3    metoprolol tartrate (LOPRESSOR) 50 MG tablet TAKE 1/2 TABLET EVERY DAY 45 tablet 3    lisinopril (PRINIVIL;ZESTRIL) 10 MG tablet TAKE 1 TABLET EVERY DAY 90 tablet 3    simvastatin (ZOCOR) 20 MG tablet Take 1 tablet by mouth nightly 90 tablet 3    nitroGLYCERIN (NITROSTAT) 0.4 MG SL tablet Place 1 tablet under the tongue every 5 minutes as needed for Chest pain 25 tablet 3    allopurinol (ZYLOPRIM) 300 MG tablet Take 1 tablet by mouth daily 90 tablet 3    clopidogrel (PLAVIX) 75 MG tablet Take 1 tablet by mouth daily 30 tablet 5    albuterol sulfate HFA (PROAIR HFA) 108 (90 Base) MCG/ACT inhaler Inhale 2 puffs into the lungs every 6 hours as needed for Wheezing 1 Inhaler 3    sildenafil (VIAGRA) 50 MG tablet Take 1 tablet 30 minutes-4 hours prior. Lot#:[id number]  Exp:10/2018 (Patient not taking: Reported on 8/10/2020) 4 tablet 0    ferrous sulfate 325 (65 FE) MG tablet Take 325 mg by mouth daily       acetaminophen (TYLENOL) 500 MG tablet Take 1,000 mg by mouth as needed for Pain.  aspirin 325 MG EC tablet Take 325 mg by mouth daily.              Scheduled Meds:   atorvastatin  10 mg Oral Nightly    levoFLOXacin  750 mg Oral Daily    allopurinol  300 mg Oral Daily    aspirin  325 mg Oral Daily    clopidogrel  75 mg Oral Daily    ferrous sulfate  325 mg Oral Daily with breakfast    lisinopril  10 mg Oral Daily    metoprolol tartrate  50 mg Oral BID    pantoprazole  40 mg Oral QAM AC    enoxaparin  40 mg Subcutaneous Daily     Continuous Infusions:   sodium chloride 100 mL/hr at 11/26/20 0838     PRN Meds:acetaminophen, albuterol sulfate HFA, nitroGLYCERIN        Allergies:  Crestor [rosuvastatin calcium]; Lipitor; Tramadol; Vicodin [hydrocodone-acetaminophen]; Codeine; and Percocet [oxycodone-acetaminophen]    OBJECTIVE:    Vitals:   Vitals:    11/26/20 1231   BP: (!) 110/58   Pulse: 63   Resp: 16   Temp: 98.4 °F (36.9 °C)   SpO2: 95%      BMI: Body mass index is 25.5 kg/m². PHYSICAL EXAMINATION:          GEN: Cooperative male , no distress  NECK:  Supple. No thyromegaly. Trachea is central.  LUNGS:  right basilar crackles  CARDIOVASCULAR:  S1, S2 were heard. No gallop or rub. ABDOMEN:  Soft and nontender with positive bowel sounds. No  hepatosplenomegaly. NEUROLOGICAL:  The patient is alert, awake, and responsive to command  appropriately and able to move all extremities without any focal  neurological deficit. EXTREMITIES:  Shows no evidence of edema at this time. No digital  clubbing or cyanosis. Review of Labs and Diagnostic Testing:    No results found for this or any previous visit (from the past 24 hour(s)). Radiology:     Xr Chest Portable    Result Date: 11/24/2020  PROCEDURE: XR CHEST PORTABLE CLINICAL INFORMATION: fatigue . COMPARISON: No prior study. TECHNIQUE: Portable upright FINDINGS: Heart size normal. Mediastinum is not widened. Extensive biapical parenchymal scarring. Retraction of the suzanne bilaterally. Hazy infiltrates in the right lower lobe laterally. No effusions are seen. Midline sternotomy from presumed prior CABG. EKG leads overlie the chest.     1. Acute infiltrates right lower lobe 2. Extensive chronic parenchymal scarring. **This report has been created using voice recognition software.   It may contain minor errors which are inherent in voice recognition technology. ** Final report electronically signed by Dr. Alesia Bingham on 11/24/2020 4:47 PM        ASSESMENT:      Active Problems:    Pneumonia  Resolved Problems:    * No resolved hospital problems.  *      PLAN:  Had fever through the night, start cefepime,  x rays better  Prn bronchodilators, O2, broad spectrum antibiotics to cover community acquired organisms and pseudomonas, legionella and strep pneumonia urinary antigens, rapid influenza screen was negative, f/u  blood cultures, sputum gram stain c/s, Incentive spirometry , acapella        DVT prophylaxis: [x] Lovenox                                 [] SCDs                                 [] SQ Heparin                                 [] Encourage ambulation, low risk for DVT, no chemical or mechanical prophylaxis necessary              [] Already on Anticoagulation                Anticipated Disposition upon discharge: [] Home                                                                         [] Home with Home Health                                                                         [] [facility]                                                                         [] [facility] [address]          Electronically signed by Myles Perez MD on 11/26/2020 at 1:08 PM

## 2020-11-27 LAB
LEGIONELLA PNEUMOPHILIA AG, URINE: NEGATIVE
STREP PNEUMO AG, UR: NEGATIVE

## 2020-11-27 PROCEDURE — 6370000000 HC RX 637 (ALT 250 FOR IP): Performed by: INTERNAL MEDICINE

## 2020-11-27 PROCEDURE — 2580000003 HC RX 258: Performed by: INTERNAL MEDICINE

## 2020-11-27 PROCEDURE — 6360000002 HC RX W HCPCS: Performed by: INTERNAL MEDICINE

## 2020-11-27 PROCEDURE — 1200000003 HC TELEMETRY R&B

## 2020-11-27 RX ADMIN — METOPROLOL TARTRATE 50 MG: 50 TABLET, FILM COATED ORAL at 08:16

## 2020-11-27 RX ADMIN — CEFEPIME 2 G: 2 INJECTION, POWDER, FOR SOLUTION INTRAVENOUS at 23:48

## 2020-11-27 RX ADMIN — ENOXAPARIN SODIUM 40 MG: 40 INJECTION SUBCUTANEOUS at 08:17

## 2020-11-27 RX ADMIN — LEVOFLOXACIN 750 MG: 750 TABLET, FILM COATED ORAL at 08:16

## 2020-11-27 RX ADMIN — ACETAMINOPHEN 1000 MG: 500 TABLET ORAL at 04:53

## 2020-11-27 RX ADMIN — CEFEPIME 2 G: 2 INJECTION, POWDER, FOR SOLUTION INTRAVENOUS at 00:06

## 2020-11-27 RX ADMIN — ACETAMINOPHEN 1000 MG: 500 TABLET ORAL at 20:18

## 2020-11-27 RX ADMIN — PANTOPRAZOLE SODIUM 40 MG: 40 TABLET, DELAYED RELEASE ORAL at 04:57

## 2020-11-27 RX ADMIN — ASPIRIN 325 MG: 325 TABLET, COATED ORAL at 08:16

## 2020-11-27 RX ADMIN — LISINOPRIL 10 MG: 10 TABLET ORAL at 08:16

## 2020-11-27 RX ADMIN — CEFEPIME 2 G: 2 INJECTION, POWDER, FOR SOLUTION INTRAVENOUS at 08:17

## 2020-11-27 RX ADMIN — METOPROLOL TARTRATE 50 MG: 50 TABLET, FILM COATED ORAL at 20:17

## 2020-11-27 RX ADMIN — FERROUS SULFATE TAB 325 MG (65 MG ELEMENTAL FE) 325 MG: 325 (65 FE) TAB at 08:16

## 2020-11-27 RX ADMIN — SODIUM CHLORIDE: 9 INJECTION, SOLUTION INTRAVENOUS at 15:58

## 2020-11-27 RX ADMIN — ALLOPURINOL 300 MG: 300 TABLET ORAL at 08:16

## 2020-11-27 RX ADMIN — CLOPIDOGREL BISULFATE 75 MG: 75 TABLET ORAL at 08:16

## 2020-11-27 RX ADMIN — ATORVASTATIN CALCIUM 10 MG: 10 TABLET, FILM COATED ORAL at 20:17

## 2020-11-27 RX ADMIN — SODIUM CHLORIDE: 9 INJECTION, SOLUTION INTRAVENOUS at 04:57

## 2020-11-27 RX ADMIN — CEFEPIME 2 G: 2 INJECTION, POWDER, FOR SOLUTION INTRAVENOUS at 15:05

## 2020-11-27 ASSESSMENT — PAIN SCALES - GENERAL
PAINLEVEL_OUTOF10: 0
PAINLEVEL_OUTOF10: 0
PAINLEVEL_OUTOF10: 5

## 2020-11-27 NOTE — CONSULTS
[address] [place] [address]                                  CONSULTATION    PATIENT NAME: Ramakrishna Davies                   :        1945  MED REC NO:   [id number]                           ROOM:       0020  ACCOUNT NO:   [de-identified]                           ADMIT DATE: 2020  PROVIDER:     Javy Zuniga. Claudia Kumari M.D.    Hailee Mcintosh:  2020    REASON FOR CONSULTATION:  He is a 82-year-old male patient admitted to  the hospital on 2020 due to generalized weakness and shortness of  breath. HISTORY OF PRESENT ILLNESS:  He is a 82-year-old male patient who has  not been feeling well for the last week. He called his family doctor  last Thursday and was given Z-Donovan. Despite the treatment, he still was  very weak, tired and had no energy and was very short of breath. For  which reason, he was advised to come to the hospital last Tuesday. The  patient was admitted, started on antibiotics for pneumonia. He has  history of coronary artery disease, had history of bypass surgery, has  history of emphysema. He did report of contact with sick grandchildren. He denies any running nose, sore throat or joint pain. His test for  COVID-19 was negative. When I saw him, he was feeling better, in fact  he was insisting to go home. PAST MEDICAL HISTORY:  Significant for history of abdominal aortic  aneurysm, COPD, GERD, hyperlipidemia, hypertension, osteoarthritis,  history of lung nodule. PAST SURGICAL HISTORY:  Includes appendectomy, cardiac catheterization,  CABG, hemorrhoidectomy, and inguinal hernia repair. CURRENT MEDICATIONS:  Include Tylenol, albuterol, allopurinol, aspirin,  cefepime, Plavix, Lovenox, iron sulfate, Levaquin, lisinopril,  metoprolol, nitroglycerin, and Protonix. REVIEW OF SYSTEMS:  As noted in the HPI. He has not any sore throat,  taste change, smell change. No chest pain.   No abdominal pain.  The  rest were noncontributory. PHYSICAL EXAMINATION:  GENERAL:  He looks comfortable. VITAL SIGNS:  Temperature 97.5, respirations 18, pulse 54, blood  pressure 104/56. HEENT:  He has slightly pale conjunctivae. Anicteric sclerae. CHEST:  Bilateral air entry. CARDIOVASCULAR SYSTEM:  Regular. ABDOMEN:  Soft. EXTREMITIES:  No cyanosis or clubbing. CNS:  Conscious; oriented to person, place and time. DIAGNOSTICS:  WBC 5.3, hemoglobin 13.4, hematocrit 38.1, platelets of  997, neutrophils of 77.1%. Sodium 132, potassium 5, chloride 95, bicarb  26, BUN 22, creatinine 1. Troponin was negative. TSH 2.760. He had  stable apical scarring and retraction, no new infiltrates. IMPRESSION:  He is a 70-year-old male patient admitted with generalized  weakness. He has mild COPD ? COPD exacerbation. The patient has history  of abdominal aortic aneurysm, coronary artery disease. RECOMMENDATIONS:  Antibiotics can be transitioned to oral Augmentin. Due to his history of abdominal aortic aneurysm, I would recommend to  stop fluoroquinolones as they are contraindicated. The patient has  recently received Zithromax. I doubt that he had typical infections. Levaquin can be stopped. The patient can be discharged with Augmentin  in the morning.         Taiwo Castillo M.D.    D: 11/27/2020 14:22:31       T: 11/27/2020 15:50:20     ANA [email or web address]  Job#: [id number]     Doc#: [id number]    CC:

## 2020-11-27 NOTE — PROGRESS NOTES
INTERNAL MEDICINE SPECIALTIES  Progress Note Dr Oliva Lerma covering for Dr Eliezer Scheuermann        Patient:  Heber Henning  YOB: 1945  Date of Service: 11/27/2020  MRN: [id number]   Acct:  [de-identified]   Primary Care Physician: Janey Cobos MD    SUBJECTIVE:  Today he feels better but has been low running low-grade fevers    pt a former smoker  2 PPD x 35 yrs      Home Medications:   No current facility-administered medications on file prior to encounter. Current Outpatient Medications on File Prior to Encounter   Medication Sig Dispense Refill    pantoprazole (PROTONIX) 40 MG tablet Take 1 tablet by mouth every morning (before breakfast) 90 tablet 3    metoprolol tartrate (LOPRESSOR) 50 MG tablet TAKE 1/2 TABLET EVERY DAY 45 tablet 3    lisinopril (PRINIVIL;ZESTRIL) 10 MG tablet TAKE 1 TABLET EVERY DAY 90 tablet 3    simvastatin (ZOCOR) 20 MG tablet Take 1 tablet by mouth nightly 90 tablet 3    nitroGLYCERIN (NITROSTAT) 0.4 MG SL tablet Place 1 tablet under the tongue every 5 minutes as needed for Chest pain 25 tablet 3    allopurinol (ZYLOPRIM) 300 MG tablet Take 1 tablet by mouth daily 90 tablet 3    clopidogrel (PLAVIX) 75 MG tablet Take 1 tablet by mouth daily 30 tablet 5    albuterol sulfate HFA (PROAIR HFA) 108 (90 Base) MCG/ACT inhaler Inhale 2 puffs into the lungs every 6 hours as needed for Wheezing 1 Inhaler 3    sildenafil (VIAGRA) 50 MG tablet Take 1 tablet 30 minutes-4 hours prior. Lot#:[id number]  Exp:10/2018 (Patient not taking: Reported on 8/10/2020) 4 tablet 0    ferrous sulfate 325 (65 FE) MG tablet Take 325 mg by mouth daily       acetaminophen (TYLENOL) 500 MG tablet Take 1,000 mg by mouth as needed for Pain.  aspirin 325 MG EC tablet Take 325 mg by mouth daily.              Scheduled Meds:   cefepime  2 g Intravenous Q8H    atorvastatin  10 mg Oral Nightly    levoFLOXacin  750 mg Oral Daily    allopurinol  300 mg Oral Daily    aspirin  325 mg Oral Daily    clopidogrel  75 mg Oral Daily    ferrous sulfate  325 mg Oral Daily with breakfast    lisinopril  10 mg Oral Daily    metoprolol tartrate  50 mg Oral BID    pantoprazole  40 mg Oral QAM AC    enoxaparin  40 mg Subcutaneous Daily     Continuous Infusions:   sodium chloride 100 mL/hr at 11/27/20 0457     PRN Meds:acetaminophen, albuterol sulfate HFA, nitroGLYCERIN        Allergies:  Crestor [rosuvastatin calcium]; Lipitor; Tramadol; Vicodin [hydrocodone-acetaminophen]; Codeine; and Percocet [oxycodone-acetaminophen]    OBJECTIVE:    Vitals:   Vitals:    11/27/20 0434   BP: (!) 104/51   Pulse: 66   Resp: 18   Temp: 100.2 °F (37.9 °C)   SpO2: 94%      BMI: Body mass index is 25.8 kg/m². PHYSICAL EXAMINATION:          GEN: Cooperative male , no distress  NECK:  Supple. No thyromegaly. Trachea is central.  LUNGS:  right basilar crackles  CARDIOVASCULAR:  S1, S2 were heard. No gallop or rub. ABDOMEN:  Soft and nontender with positive bowel sounds. No  hepatosplenomegaly. NEUROLOGICAL:  The patient is alert, awake, and responsive to command  appropriately and able to move all extremities without any focal  neurological deficit. EXTREMITIES:  Shows no evidence of edema at this time. No digital  clubbing or cyanosis. Review of Labs and Diagnostic Testing:    No results found for this or any previous visit (from the past 24 hour(s)). Radiology:     Xr Chest Portable    Result Date: 11/24/2020  PROCEDURE: XR CHEST PORTABLE CLINICAL INFORMATION: fatigue . COMPARISON: No prior study. TECHNIQUE: Portable upright FINDINGS: Heart size normal. Mediastinum is not widened. Extensive biapical parenchymal scarring. Retraction of the suzanne bilaterally. Hazy infiltrates in the right lower lobe laterally. No effusions are seen. Midline sternotomy from presumed prior CABG. EKG leads overlie the chest.     1. Acute infiltrates right lower lobe 2. Extensive chronic parenchymal scarring.  **This report has been created using voice recognition software. It may contain minor errors which are inherent in voice recognition technology. ** Final report electronically signed by Dr. Angy Key on 11/24/2020 4:47 PM        ASSESMENT:      Active Problems:    Pneumonia  Resolved Problems:    * No resolved hospital problems. *      PLAN:  Started cefepime yesterday,  overall cx ray better however still with the low-grade fever. Prn bronchodilators, O2, broad spectrum antibiotics to cover community acquired organisms and pseudomonas, follow-up legionella and strep pneumonia urinary antigens, rapid influenza screen was negative, f/u  blood cultures, sputum gram stain c/s, Incentive spirometry , acapella    Discussed with Dr. Sergei Peterson, patient has had a course of Zithromax and history of abdominal aortic aneurysm so the Levaquin will be stopped and the cefepime continued.   Patient will be observed till tomorrow and can be transitioned to Augmentin at discharge     Dr. Toshia Paredes resumes care of the patient in the morning    DVT prophylaxis: [x] Lovenox                                 [] SCDs                                 [] SQ Heparin                                 [] Encourage ambulation, low risk for DVT, no chemical or mechanical prophylaxis necessary              [] Already on Anticoagulation                Anticipated Disposition upon discharge: [] Home                                                                         [] Home with Home Health                                                                         [] [facility]                                                                         [] [address]          Electronically signed by Jose Miguel Jose MD on 11/27/2020 at 5:07 AM

## 2020-11-27 NOTE — PROGRESS NOTES
Patient seen and examined full consult dictated  Recommend: stop levaquin due to hx of AAA  Discharge with oral augmentin in the morning.

## 2020-11-27 NOTE — CARE COORDINATION
11/27/20, 1:17 PM EST    Anticipate discharge today. Conner plans return home with his wife and denies needs. In basket message sent to Dr. Emily West office to contact patient for f/u appt. Patient goals/plan/ treatment preferences discussed by  and . Patient goals/plan/ treatment preferences reviewed with patient/ family. Patient/ family verbalize understanding of discharge plan and are in agreement with goal/plan/treatment preferences. Understanding was demonstrated using the teach back method. AVS provided by RN at time of discharge, which includes all necessary medical information pertaining to the patients current course of illness, treatment, post-discharge goals of care, and treatment preferences.         IMM Letter  IMM Letter given to Patient/Family/Significant other/Guardian/POA/by[de-identified] Héctor Aguirre CM  IMM Letter date given[de-identified] 11/27/20  IMM Letter time given[de-identified] 4577

## 2020-11-28 VITALS
SYSTOLIC BLOOD PRESSURE: 110 MMHG | HEART RATE: 70 BPM | WEIGHT: 183.8 LBS | BODY MASS INDEX: 26 KG/M2 | OXYGEN SATURATION: 97 % | RESPIRATION RATE: 18 BRPM | DIASTOLIC BLOOD PRESSURE: 59 MMHG | TEMPERATURE: 98.4 F

## 2020-11-28 PROCEDURE — 2580000003 HC RX 258: Performed by: INTERNAL MEDICINE

## 2020-11-28 PROCEDURE — 6370000000 HC RX 637 (ALT 250 FOR IP): Performed by: INTERNAL MEDICINE

## 2020-11-28 PROCEDURE — 6360000002 HC RX W HCPCS: Performed by: INTERNAL MEDICINE

## 2020-11-28 RX ORDER — ONDANSETRON 2 MG/ML
4 INJECTION INTRAMUSCULAR; INTRAVENOUS EVERY 6 HOURS PRN
Status: DISCONTINUED | OUTPATIENT
Start: 2020-11-28 | End: 2020-11-28 | Stop reason: HOSPADM

## 2020-11-28 RX ORDER — FAMOTIDINE 20 MG/1
20 TABLET, FILM COATED ORAL 2 TIMES DAILY
Status: DISCONTINUED | OUTPATIENT
Start: 2020-11-28 | End: 2020-11-28 | Stop reason: HOSPADM

## 2020-11-28 RX ORDER — AMOXICILLIN AND CLAVULANATE POTASSIUM 875; 125 MG/1; MG/1
1 TABLET, FILM COATED ORAL 2 TIMES DAILY
Qty: 14 TABLET | Refills: 0 | Status: SHIPPED | OUTPATIENT
Start: 2020-11-28 | End: 2020-12-05

## 2020-11-28 RX ADMIN — FERROUS SULFATE TAB 325 MG (65 MG ELEMENTAL FE) 325 MG: 325 (65 FE) TAB at 08:41

## 2020-11-28 RX ADMIN — ALLOPURINOL 300 MG: 300 TABLET ORAL at 08:41

## 2020-11-28 RX ADMIN — CLOPIDOGREL BISULFATE 75 MG: 75 TABLET ORAL at 08:40

## 2020-11-28 RX ADMIN — METOPROLOL TARTRATE 50 MG: 50 TABLET, FILM COATED ORAL at 08:41

## 2020-11-28 RX ADMIN — FAMOTIDINE 20 MG: 20 TABLET, FILM COATED ORAL at 08:40

## 2020-11-28 RX ADMIN — ENOXAPARIN SODIUM 40 MG: 40 INJECTION SUBCUTANEOUS at 08:40

## 2020-11-28 RX ADMIN — PANTOPRAZOLE SODIUM 40 MG: 40 TABLET, DELAYED RELEASE ORAL at 06:41

## 2020-11-28 RX ADMIN — ONDANSETRON 4 MG: 2 INJECTION INTRAMUSCULAR; INTRAVENOUS at 08:49

## 2020-11-28 RX ADMIN — LISINOPRIL 10 MG: 10 TABLET ORAL at 08:41

## 2020-11-28 RX ADMIN — CEFEPIME 2 G: 2 INJECTION, POWDER, FOR SOLUTION INTRAVENOUS at 08:40

## 2020-11-28 RX ADMIN — SODIUM CHLORIDE: 9 INJECTION, SOLUTION INTRAVENOUS at 03:03

## 2020-11-28 RX ADMIN — FAMOTIDINE 20 MG: 20 TABLET, FILM COATED ORAL at 03:03

## 2020-11-28 RX ADMIN — ASPIRIN 325 MG: 325 TABLET, COATED ORAL at 08:40

## 2020-11-28 ASSESSMENT — PAIN SCALES - GENERAL: PAINLEVEL_OUTOF10: 0

## 2020-11-28 NOTE — DISCHARGE SUMMARY
Stable        Vince Malik MD     Copy: Primary Care Physician: Janey Cobos MD  Internal Medicine  Over 30 mins spent on this discharge.

## 2020-11-30 ENCOUNTER — TELEPHONE (OUTPATIENT)
Dept: FAMILY MEDICINE CLINIC | Age: 75
End: 2020-11-30

## 2020-11-30 LAB
BLOOD CULTURE, ROUTINE: NORMAL
BLOOD CULTURE, ROUTINE: NORMAL

## 2020-11-30 NOTE — TELEPHONE ENCOUNTER
Wife calling to schedule Hospital Follow up for pneumonia. Patient was released from [facility] on Saturday and advised to follow up with PCP in 1 week. Patient does not want to see the NP.  Please call the patient to schedule follow up     [phone]

## 2020-11-30 NOTE — TELEPHONE ENCOUNTER
LMTCB with patient. RYLEY had a cancellation on 12/9/20 so I went ahead and scheduled in that slot--patient needs notified and TYRONE call needs completed.

## 2020-12-01 NOTE — TELEPHONE ENCOUNTER
ES - spoke to wife, she states pt is very weak yet, she felt he was discharged too soon. Pt has no appetite, she is trying to encourage bites of food but he doesn't want anything. Encouraging fluids. Pt is also wheezing, wondering if he can use breathing machine (they has one at home from their grandson) but may need medication sent in if ok. Pt is still sob, he is sob just sitting still. Temps are . Wife was also asking about sample of Combivent, notified we do not have samples, she states the medication is very expensive. Pt was sent home on augmentin, she is trying to encourage him to eat something so he can take his medication. Family picked up some protein drinks to see if that will help him. Also has Gatorade for him. Pt c/o headache and ears feeling plugged for 1 month now. She is wondering if he can take a decongestant? Pt is just not doing well at all, he seems depressed and \"just wants to lay there and die\". Discussed with wife, encourage small bites of food periodically, can try protein drinks, push fluids to prevent dehydration.

## 2020-12-01 NOTE — TELEPHONE ENCOUNTER
Discussed with Halina Conrad. States that they already have nebs and machine so rx not phoned in. Patient does not want to go back to ED but Halina Conrad understands that if symptoms worsen, he may have no other choice. Will call back if anything further needed prior to appt.

## 2020-12-01 NOTE — TELEPHONE ENCOUNTER
If significantly ill at this time, to ED for evaluation. OK for Albuterol Nebulized- 1 aerosol every 4-6 hours as needed for wheezing or SOB. #1 box/ 1 refill  Would not recommend a decongestant at this time due to history of CAD and HTN.  ES

## 2020-12-01 NOTE — TELEPHONE ENCOUNTER
[facility] Trans[facility] Initial Follow Up Call    Outreach made within 2 business days of discharge: Yes    Patient: Heber Henning Patient : 1945   MRN: [id number]  Reason for Admission: pneumonia  Discharge Date: 20       Spoke with: wife    Discharge department/facility: [facility] Interactive Patient Contact:  Was patient able to fill all prescriptions: Yes  Was patient instructed to bring all medications to the follow-up visit: Yes  Is patient taking all medications as directed in the discharge summary?  Yes  Does patient understand their discharge instructions: Yes  Does patient have questions or concerns that need addressed prior to 7-14 day follow up office visit: yes - see next message    Scheduled appointment with PCP within 7-14 days    Follow Up  Future Appointments   Date Time Provider Eliot Preciado   2020  3:00 PM Janey Cobos MD [address]   2021  8:45 AM Janey Cobos MD [address], RN

## 2020-12-09 ENCOUNTER — OFFICE VISIT (OUTPATIENT)
Dept: FAMILY MEDICINE CLINIC | Age: 75
End: 2020-12-09
Payer: MEDICARE

## 2020-12-09 VITALS
WEIGHT: 176 LBS | SYSTOLIC BLOOD PRESSURE: 124 MMHG | BODY MASS INDEX: 24.9 KG/M2 | DIASTOLIC BLOOD PRESSURE: 72 MMHG | TEMPERATURE: 96.6 F | HEART RATE: 60 BPM | RESPIRATION RATE: 12 BRPM

## 2020-12-09 PROCEDURE — 1111F DSCHRG MED/CURRENT MED MERGE: CPT | Performed by: FAMILY MEDICINE

## 2020-12-09 PROCEDURE — 99495 TRANSJ CARE MGMT MOD F2F 14D: CPT | Performed by: FAMILY MEDICINE

## 2020-12-09 SDOH — ECONOMIC STABILITY: FOOD INSECURITY: WITHIN THE PAST 12 MONTHS, THE FOOD YOU BOUGHT JUST DIDN'T LAST AND YOU DIDN'T HAVE MONEY TO GET MORE.: NEVER TRUE

## 2020-12-09 SDOH — ECONOMIC STABILITY: TRANSPORTATION INSECURITY
IN THE PAST 12 MONTHS, HAS THE LACK OF TRANSPORTATION KEPT YOU FROM MEDICAL APPOINTMENTS OR FROM GETTING MEDICATIONS?: NO

## 2020-12-09 SDOH — ECONOMIC STABILITY: FOOD INSECURITY: WITHIN THE PAST 12 MONTHS, YOU WORRIED THAT YOUR FOOD WOULD RUN OUT BEFORE YOU GOT MONEY TO BUY MORE.: NEVER TRUE

## 2020-12-09 SDOH — ECONOMIC STABILITY: INCOME INSECURITY: HOW HARD IS IT FOR YOU TO PAY FOR THE VERY BASICS LIKE FOOD, HOUSING, MEDICAL CARE, AND HEATING?: NOT HARD AT ALL

## 2020-12-09 SDOH — ECONOMIC STABILITY: TRANSPORTATION INSECURITY
IN THE PAST 12 MONTHS, HAS LACK OF TRANSPORTATION KEPT YOU FROM MEETINGS, WORK, OR FROM GETTING THINGS NEEDED FOR DAILY LIVING?: NO

## 2020-12-09 ASSESSMENT — ENCOUNTER SYMPTOMS
ANAL BLEEDING: 0
ABDOMINAL PAIN: 0
NAUSEA: 0
CONSTIPATION: 0
CHEST TIGHTNESS: 0
BLOOD IN STOOL: 0
VOMITING: 0
SHORTNESS OF BREATH: 1
DIARRHEA: 0

## 2020-12-09 NOTE — PROGRESS NOTES
[facility]  [facility]  Dept: [phone]  Dept Fax: [phone]    MARILYN Duggan is a 70 y.o.male    Pt presents for follow up of recent hospitalization. Admit date:  11/24/2020                      Discharge date:  11/28/2020    Presenting Complaint:  Pneumonia    Discharge Diagnosis:   Discharge Diagnoses:        Patient Active Problem List   Diagnosis    Erectile dysfunction    Hepatic steatosis    Low HDL (under 40)    Essential hypertension    Gastroesophageal reflux disease    Hyperlipidemia    Primary osteoarthritis of left knee    Coronary artery disease involving coronary bypass graft of native heart without angina pectoris- Dr. Sergo Finney    Chronic obstructive pulmonary disease ([facility]    Gout of left foot    Abdominal aortic aneurysm without rupture ([facility]- Dr. Luciana Galvan Angina pectoris, variant ([facility] [facility]    Abnormal nuclear stress test    PVD (peripheral vascular disease) with claudication ([facility]    Iron deficiency anemia    Pneumonia       Discharge Summary:   Hospital Course: clinical course has improved, with the atb later switched to oral at d/c  He feels better and wants to go home. No other issue. Follow up Appointments:  Future Appointments   Date Time Provider Eliot Davidi   2/11/2021  8:45 AM Bisi Brumfield MD [address]     Currently, pt feeling \"a whole lot better\"- still with some SOB with exertion. Review of Systems   Constitutional: Negative for chills, diaphoresis, fatigue, fever and unexpected weight change. Eyes: Negative for visual disturbance. Respiratory: Positive for shortness of breath. Negative for chest tightness. Cardiovascular: Negative for chest pain, palpitations and leg swelling. Gastrointestinal: Negative for abdominal pain, anal bleeding, blood in stool, constipation, diarrhea, nausea and vomiting. Genitourinary: Negative for dysuria and hematuria.    Musculoskeletal: Negative for neck pain. Neurological: Negative for dizziness, light-headedness and headaches. OBJECTIVE     /72   Pulse 60   Temp 96.6 °F (35.9 °C) (Temporal)   Resp 12   Wt 176 lb (79.8 kg)   BMI 24.90 kg/m²       Physical Exam  Vitals signs and nursing note reviewed. Exam conducted with a chaperone present. Constitutional:       General: He is not in acute distress. Appearance: Normal appearance. He is not ill-appearing, toxic-appearing or diaphoretic. HENT:      Head: Normocephalic and atraumatic. Right Ear: External ear normal.      Left Ear: External ear normal.      Nose: Nose normal. No rhinorrhea. Mouth/Throat:      Mouth: Mucous membranes are moist.      Pharynx: Oropharynx is clear. Eyes:      General: No scleral icterus. Right eye: No discharge. Left eye: No discharge. Extraocular Movements: Extraocular movements intact. Conjunctiva/sclera: Conjunctivae normal.      Pupils: Pupils are equal, round, and reactive to light. Neck:      Musculoskeletal: Normal range of motion. Cardiovascular:      Rate and Rhythm: Normal rate and regular rhythm. Heart sounds: Normal heart sounds. No murmur. No friction rub. No gallop. Pulmonary:      Effort: Pulmonary effort is normal. No respiratory distress. Breath sounds: Normal breath sounds. No stridor. No wheezing, rhonchi or rales. Chest:      Chest wall: No tenderness. Abdominal:      General: Abdomen is flat. Bowel sounds are normal. There is no distension. Palpations: Abdomen is soft. There is no mass. Tenderness: There is no abdominal tenderness. There is no guarding or rebound. Hernia: No hernia is present. Musculoskeletal: Normal range of motion. General: No swelling, deformity or signs of injury. Skin:     General: Skin is warm and dry. Coloration: Skin is not jaundiced or pale. Findings: No bruising, erythema, lesion or rash.    Neurological: General: No focal deficit present. Mental Status: He is alert and oriented to person, place, and time. Mental status is at baseline. Motor: No weakness. Coordination: Coordination normal.      Gait: Gait normal.   Psychiatric:         Mood and Affect: Mood normal.         Behavior: Behavior normal.         Thought Content: Thought content normal.         Judgment: Judgment normal.       XR CHEST PORTABLE   Impression    Stable biapical scarring and retraction. No new abnormalities.                     **This report has been created using voice recognition software. It may contain minor errors which are inherent in voice recognition technology. **         Final report electronically signed by Dr. Kajal Garcia on 11/26/2020 11:32 AM          XR CHEST PORTABLE    Impression    1. Acute infiltrates right lower lobe    2. Extensive chronic parenchymal scarring.                   **This report has been created using voice recognition software.  It may contain minor errors which are inherent in voice recognition technology. **         Final report electronically signed by Dr. Yanira Liu on 11/24/2020 4:47 PM      Lab Results   Component Value Date    LABA1C 4.7 03/16/2018       Lab Results   Component Value Date    WBC 5.3 11/24/2020    HGB 13.4 (L) 11/24/2020    HCT 38.1 (L) 11/24/2020    MCV 94.8 (H) 11/24/2020     11/24/2020     Lab Results   Component Value Date    CHOL 142 11/22/2019    TRIG 152 11/22/2019    HDL 37 11/22/2019    LDLCALC 75 11/22/2019     Lab Results   Component Value Date     (L) 11/24/2020    K 5.0 11/24/2020    CL 95 (L) 11/24/2020    CO2 26 11/24/2020    BUN 22 11/24/2020    CREATININE 1.0 11/24/2020    GLUCOSE 102 11/24/2020    CALCIUM 9.1 11/24/2020    PROT 7.2 11/22/2019    LABALBU 4.5 11/22/2019    BILITOT 1.8 (H) 11/22/2019    ALKPHOS 83 11/22/2019    AST 23 11/22/2019    ALT 21 11/22/2019    LABGLOM 73 (A) 11/24/2020     Lab Results   Component Value Date TSH 2.760 11/25/2020       Component      Latest Ref Rng & Units 2/6/2019 3/22/2018 10/26/2015 10/4/2013   Prostatic Specific Ag      0.00 - 1.00 ng/ml 0.84 0.48 0.53 0.56     Component      Latest Ref Rng & Units 3/27/2012   Prostatic Specific Ag      0.00 - 1.00 ng/ml 0.49       Immunization History   Administered Date(s) Administered    Influenza Virus Vaccine 11/01/2011, 10/24/2012, 10/01/2013, 10/06/2014, 09/28/2015    Influenza, High Dose (Fluzone 65 yrs and older) 09/30/2018, 11/09/2019    Pneumococcal Conjugate 13-valent ([id number]) 04/13/2015    Pneumococcal Polysaccharide ([id number]) 08/01/2005, 04/14/2014    Tdap (Boostrix, Adacel) 12/04/2013     Health Maintenance   Topic Date Due    Shingles Vaccine (1 of 2) 11/16/1997    Flu vaccine (1) 09/01/2020    Lipid screen  11/22/2020    Annual Wellness Visit (AWV)  08/11/2021    Potassium monitoring  11/24/2021    Creatinine monitoring  11/24/2021    DTaP/Tdap/Td vaccine (2 - Td) 12/04/2023    Colon cancer screen colonoscopy  02/13/2024    Pneumococcal 65+ years Vaccine  Completed    Hepatitis C screen  Addressed    Hepatitis A vaccine  Aged Out    Hepatitis B vaccine  Aged Out    Hib vaccine  Aged Out    Meningococcal (ACWY) vaccine  Aged Out         AAA ultrasound (Male, 65-75, smoked ever) indicated at this time? YES- management per Vascular Surgery  CT Lung Screen (55-80, 30 pk-yrs, smoking or quit <15 years) indicated at this time? Yes, 3/4 PPD x 40 years,  Stopped 14 years ago- completed 1/6/2020- to follow up annually. ASSESSMENT       Diagnosis Orders   1. Pneumonia due to infectious organism, unspecified laterality, unspecified part of lung  MD DISCHARGE MEDS RECONCILED W/ CURRENT OUTPATIENT MED LIST   2. Essential hypertension  Comprehensive Metabolic Panel   3. Hyperlipidemia, unspecified hyperlipidemia type  Lipid Panel    Comprehensive Metabolic Panel   4.  Iron deficiency anemia, unspecified iron deficiency anemia type CBC Auto Differential   5. Coronary artery disease involving coronary bypass graft of native heart without angina pectoris- Dr. James Rosa     6. Rheumatoid arteritis ([address]     7. PVD (peripheral vascular disease) with claudication ([address]     8. Abdominal aortic aneurysm without rupture ([address]- Dr. Keri Khalil     9. Chronic obstructive pulmonary disease, unspecified COPD type ([address]     10. Screening PSA (prostate specific antigen)  Psa screening     PLAN     Continue warm, moist heat on superficial left forearm. Check FLP and PSA with recheck CBC and CMP in 2-3 weeks. Continue current medicines otherwise   Right ear Irrigation. Follow up asa scheduled in 2 months. Preventive Health Topics:  Encouraged annual FLU VACCINE- pt would like to hold today and get when feeling better. Encouraged NEW SHINGLES SHOT [facility]) - check with your local pharmacy.   COLONOSCOPY done 2/13/2019 per Dr. Omar Garcia- to do in 2/2024 (updated 12/9/2020)         Electronically signed by Lesia Aguilera MD on 12/9/2020 at 4:14 PM

## 2020-12-09 NOTE — PATIENT INSTRUCTIONS
Continue warm, moist heat on superficial left forearm. Check FLP and PSA with recheck CBC and CMP in 2-3 weeks. Continue current medicines otherwise   Right ear Irrigation. Follow up asa scheduled in 2 months. Preventive Health Topics:  Encouraged annual FLU VACCINE- pt would like to hold today and get when feeling better. Encouraged NEW SHINGLES SHOT [facility]) - check with your local pharmacy.   COLONOSCOPY done 2/13/2019 per Dr. Penny Hyde- to do in 2/2024 (updated 12/9/2020)

## 2020-12-28 ENCOUNTER — HOSPITAL ENCOUNTER (OUTPATIENT)
Age: 75
Discharge: HOME | End: 2020-12-28
Payer: MEDICARE

## 2020-12-28 DIAGNOSIS — I10 ESSENTIAL HYPERTENSION: ICD-10-CM

## 2020-12-28 DIAGNOSIS — Z12.5 SCREENING PSA (PROSTATE SPECIFIC ANTIGEN): ICD-10-CM

## 2020-12-28 DIAGNOSIS — D50.9 IRON DEFICIENCY ANEMIA, UNSPECIFIED IRON DEFICIENCY ANEMIA TYPE: ICD-10-CM

## 2020-12-28 DIAGNOSIS — E78.5 HYPERLIPIDEMIA, UNSPECIFIED HYPERLIPIDEMIA TYPE: ICD-10-CM

## 2020-12-28 LAB
ALBUMIN SERPL-MCNC: 4.2 G/DL (ref 3.5–5.1)
ALP BLD-CCNC: 84 U/L (ref 38–126)
ALT SERPL-CCNC: 13 U/L (ref 11–66)
ANION GAP SERPL CALCULATED.3IONS-SCNC: 11 MEQ/L (ref 8–16)
AST SERPL-CCNC: 20 U/L (ref 5–40)
BASOPHILS # BLD: 0.7 %
BASOPHILS ABSOLUTE: 0 THOU/MM3 (ref 0–0.1)
BILIRUB SERPL-MCNC: 1.5 MG/DL (ref 0.3–1.2)
BUN BLDV-MCNC: 13 MG/DL (ref 7–22)
CALCIUM SERPL-MCNC: 9.7 MG/DL (ref 8.5–10.5)
CHLORIDE BLD-SCNC: 99 MEQ/L (ref 98–111)
CHOLESTEROL, TOTAL: 144 MG/DL (ref 100–199)
CO2: 27 MEQ/L (ref 23–33)
CREAT SERPL-MCNC: 0.9 MG/DL (ref 0.4–1.2)
EOSINOPHIL # BLD: 4.6 %
EOSINOPHILS ABSOLUTE: 0.2 THOU/MM3 (ref 0–0.4)
ERYTHROCYTE [DISTWIDTH] IN BLOOD BY AUTOMATED COUNT: 13.9 % (ref 11.5–14.5)
ERYTHROCYTE [DISTWIDTH] IN BLOOD BY AUTOMATED COUNT: 50.3 FL (ref 35–45)
GFR SERPL CREATININE-BSD FRML MDRD: 83 ML/MIN/1.73M2
GLUCOSE BLD-MCNC: 97 MG/DL (ref 70–108)
HCT VFR BLD CALC: 38.9 % (ref 42–52)
HDLC SERPL-MCNC: 38 MG/DL
HEMOGLOBIN: 12.9 GM/DL (ref 14–18)
IMMATURE GRANS (ABS): 0.01 THOU/MM3 (ref 0–0.07)
IMMATURE GRANULOCYTES: 0.2 %
LDL CHOLESTEROL CALCULATED: 87 MG/DL
LYMPHOCYTES # BLD: 22.1 %
LYMPHOCYTES ABSOLUTE: 1.2 THOU/MM3 (ref 1–4.8)
MCH RBC QN AUTO: 32.7 PG (ref 26–33)
MCHC RBC AUTO-ENTMCNC: 33.2 GM/DL (ref 32.2–35.5)
MCV RBC AUTO: 98.5 FL (ref 80–94)
MONOCYTES # BLD: 6.4 %
MONOCYTES ABSOLUTE: 0.3 THOU/MM3 (ref 0.4–1.3)
NUCLEATED RED BLOOD CELLS: 0 /100 WBC
PLATELET # BLD: 209 THOU/MM3 (ref 130–400)
PMV BLD AUTO: 10 FL (ref 9.4–12.4)
POTASSIUM SERPL-SCNC: 4.7 MEQ/L (ref 3.5–5.2)
PROSTATE SPECIFIC ANTIGEN: 0.62 NG/ML (ref 0–1)
RBC # BLD: 3.95 MILL/MM3 (ref 4.7–6.1)
SEG NEUTROPHILS: 66 %
SEGMENTED NEUTROPHILS ABSOLUTE COUNT: 3.6 THOU/MM3 (ref 1.8–7.7)
SODIUM BLD-SCNC: 137 MEQ/L (ref 135–145)
TOTAL PROTEIN: 7.3 G/DL (ref 6.1–8)
TRIGL SERPL-MCNC: 97 MG/DL (ref 0–199)
WBC # BLD: 5.4 THOU/MM3 (ref 4.8–10.8)

## 2020-12-28 PROCEDURE — 36415 COLL VENOUS BLD VENIPUNCTURE: CPT

## 2020-12-28 PROCEDURE — 80053 COMPREHEN METABOLIC PANEL: CPT

## 2020-12-28 PROCEDURE — 80061 LIPID PANEL: CPT

## 2020-12-28 PROCEDURE — 85025 COMPLETE CBC W/AUTO DIFF WBC: CPT

## 2020-12-28 PROCEDURE — G0103 PSA SCREENING: HCPCS

## 2020-12-29 ENCOUNTER — TELEPHONE (OUTPATIENT)
Dept: FAMILY MEDICINE CLINIC | Age: 75
End: 2020-12-29

## 2020-12-29 RX ORDER — SIMVASTATIN 40 MG
40 TABLET ORAL NIGHTLY
Qty: 30 TABLET | Refills: 1 | Status: SHIPPED | OUTPATIENT
Start: 2020-12-29 | End: 2021-02-11 | Stop reason: SDUPTHER

## 2020-12-29 NOTE — TELEPHONE ENCOUNTER
Rx f or Simvastatin signed. Orders for HDL, D-LDL, AST, and ALT printed- please have pt do in 6 weeks. Orders for Anemia profile printed- please have pt complete at earliest convenience.   ES

## 2020-12-30 ENCOUNTER — HOSPITAL ENCOUNTER (OUTPATIENT)
Age: 75
Discharge: HOME | End: 2020-12-30
Payer: MEDICARE

## 2020-12-30 DIAGNOSIS — D50.9 IRON DEFICIENCY ANEMIA, UNSPECIFIED IRON DEFICIENCY ANEMIA TYPE: ICD-10-CM

## 2020-12-30 LAB
ABSOLUTE RETIC #: 112 THOU/MM3 (ref 20–115)
IMMATURE RETIC FRACT: 12.5 % (ref 2.3–13.4)
RETIC HEMOGLOBIN: 37.4 PG (ref 28.2–35.7)
RETICULOCYTE ABSOLUTE COUNT: 2.9 % (ref 0.5–2)

## 2020-12-30 PROCEDURE — 85046 RETICYTE/HGB CONCENTRATE: CPT

## 2020-12-30 PROCEDURE — 36415 COLL VENOUS BLD VENIPUNCTURE: CPT

## 2020-12-30 PROCEDURE — 83550 IRON BINDING TEST: CPT

## 2020-12-30 PROCEDURE — 83540 ASSAY OF IRON: CPT

## 2020-12-30 PROCEDURE — 82728 ASSAY OF FERRITIN: CPT

## 2020-12-30 PROCEDURE — 82746 ASSAY OF FOLIC ACID SERUM: CPT

## 2020-12-30 PROCEDURE — 84238 ASSAY NONENDOCRINE RECEPTOR: CPT

## 2020-12-30 PROCEDURE — 82607 VITAMIN B-12: CPT

## 2020-12-31 LAB
FERRITIN: 221 NG/ML (ref 22–322)
FOLATE: 18.8 NG/ML (ref 4.8–24.2)
IRON: 74 UG/DL (ref 65–195)
TOTAL IRON BINDING CAPACITY: 268 UG/DL (ref 171–450)
VITAMIN B-12: 416 PG/ML (ref 211–911)

## 2021-01-02 LAB — SOLUBLE TRANSFERRIN RECEPT: 4.1 MG/L (ref 2.2–5)

## 2021-01-04 ENCOUNTER — TELEPHONE (OUTPATIENT)
Dept: FAMILY MEDICINE CLINIC | Age: 76
End: 2021-01-04

## 2021-01-04 DIAGNOSIS — D50.9 IRON DEFICIENCY ANEMIA, UNSPECIFIED IRON DEFICIENCY ANEMIA TYPE: Primary | ICD-10-CM

## 2021-01-04 NOTE — TELEPHONE ENCOUNTER
----- Message from Thom Everett MD sent at 1/2/2021  9:43 PM EST -----  Notify pt-   Results reviewed. Anemia Profile normal, except elevated Retic (common in anemia)- consistent with Anemia of Chronic Disease- continue current dose of Ferrous Sulfate  Recheck H/H in 2-3 months.   ES

## 2021-01-07 LAB
HEMOCCULT STL QL: NEGATIVE

## 2021-01-13 ENCOUNTER — TELEPHONE (OUTPATIENT)
Dept: FAMILY MEDICINE CLINIC | Age: 76
End: 2021-01-13

## 2021-01-13 DIAGNOSIS — D50.9 IRON DEFICIENCY ANEMIA, UNSPECIFIED IRON DEFICIENCY ANEMIA TYPE: Primary | ICD-10-CM

## 2021-01-13 NOTE — TELEPHONE ENCOUNTER
----- Message from Grisel Corbin MD sent at 1/12/2021  4:16 PM EST -----  Notify pt-   Results reviewed. SFOBT negative x 3   Recheck H/H as planned in 2-3 months as previously mentioned.   ES

## 2021-02-07 NOTE — PROGRESS NOTES
[facility]  [facility]  Dept: [phone]  Dept Fax: [phone]    Vincent Tejada is a 70 y.o.male    Pt presents for follow up of HTN, hyperlipidemia, PVD, CAD, COPD, GERD, AAA, anemia, [place] syndrome, and gout. Pt feeling ok since last visit- interval history and any new issues noted below: At time of developing Pneumonia in late 11/2020 (11/18/2020), pt states he had episode of feeling weak (\"it hit me real hard\"), had to sit down on toilet, got up and went back to bed. Pt noted to be cold and clammy at that time per wife. Pt also felt weak on right side (right elbow and right knee)- no slurred speech, no confusion, no loss of bladder or bowel function. No unilateral weakness otherwise. Pt states symptoms lasted several days (while in hospital)- no evidence of CVA while in hospital at time of admission. Pt states all symptoms back to normal at this time. Glucometer readings at home are not needed. The home BP readings have been in the 120-130's /60-70's range with pulses 58-62. Checking \"now & then\"      Wt Readings from Last 3 Encounters:   02/11/21 186 lb (84.4 kg)   12/09/20 176 lb (79.8 kg)   11/28/20 183 lb 12.8 oz (83.4 kg)   Weight increased 10# since last visit 2 months ago.      Patient Active Problem List   Diagnosis    Erectile dysfunction    Hepatic steatosis    Low HDL (under 36)    Essential hypertension    Gastroesophageal reflux disease    Hyperlipidemia    Primary osteoarthritis of left knee    Coronary artery disease involving coronary bypass graft of native heart without angina pectoris- Dr. Vásquez Nearing    Chronic obstructive pulmonary disease ([address]    Gout of left foot    Abdominal aortic aneurysm without rupture ([facility]- Dr. Fabienne Fajardo Angina pectoris, variant ([address]    Abnormal nuclear stress test    PVD (peripheral vascular disease) with claudication (HCC)    Iron deficiency anemia    Pneumonia       Current Temp 96.4 °F (35.8 °C)   Resp 16   Wt 186 lb (84.4 kg)   BMI 26.31 kg/m²   Body mass index is 26.31 kg/m². BP Readings from Last 3 Encounters:   02/11/21 100/60   12/09/20 124/72   11/28/20 (!) 110/59         Physical Exam  Vitals signs and nursing note reviewed. Exam conducted with a chaperone present. Constitutional:       General: He is not in acute distress. Appearance: Normal appearance. He is not ill-appearing, toxic-appearing or diaphoretic. HENT:      Head: Normocephalic and atraumatic. Right Ear: External ear normal.      Left Ear: External ear normal.      Nose: Nose normal. No rhinorrhea. Mouth/Throat:      Mouth: Mucous membranes are moist.      Pharynx: Oropharynx is clear. Eyes:      General: No scleral icterus. Right eye: No discharge. Left eye: No discharge. Extraocular Movements: Extraocular movements intact. Conjunctiva/sclera: Conjunctivae normal.      Pupils: Pupils are equal, round, and reactive to light. Neck:      Musculoskeletal: Normal range of motion. Cardiovascular:      Rate and Rhythm: Normal rate and regular rhythm. Heart sounds: Normal heart sounds. No murmur. No friction rub. No gallop. Pulmonary:      Effort: Pulmonary effort is normal. No respiratory distress. Breath sounds: Normal breath sounds. No stridor. No wheezing, rhonchi or rales. Chest:      Chest wall: No tenderness. Abdominal:      General: Abdomen is flat. Bowel sounds are normal. There is no distension. Palpations: Abdomen is soft. There is no mass. Tenderness: There is no abdominal tenderness. There is no guarding or rebound. Hernia: No hernia is present. Genitourinary:     Comments: HUGO deferred today as pt currently asymptomatic. Pt denies dysuria, hematuria, frequency, urgency, difficulty starting/ stopping stream, frequent nocturia    Will reconsider annually. ES    Musculoskeletal: Normal range of motion.          General: No - 4.8 thou/mm3 1.2   Monocytes Absolute      0.4 - 1.3 thou/mm3 0.3 (L)   Eosinophils Absolute      0.0 - 0.4 thou/mm3 0.2   Basophils Absolute      0.0 - 0.1 thou/mm3 0.0   Immature Grans (Abs)      0.00 - 0.07 thou/mm3 0.01   Nucleated Red Blood Cells      /100 wbc 0   Glucose      70 - 108 mg/dL 97   Creatinine      0.4 - 1.2 mg/dL 0.9   BUN      7 - 22 mg/dL 13   Sodium      135 - 145 meq/L 137   Potassium      3.5 - 5.2 meq/L 4.7   Chloride      98 - 111 meq/L 99   CO2      23 - 33 meq/L 27   Calcium      8.5 - 10.5 mg/dL 9.7   AST      5 - 40 U/L 20   Alk Phos      38 - 126 U/L 84   Total Protein      6.1 - 8.0 g/dL 7.3   Albumin      3.5 - 5.1 g/dL 4.2   Bilirubin      0.3 - 1.2 mg/dL 1.5 (H)   ALT      11 - 66 U/L 13   Cholesterol, Total      100 - 199 mg/dL 144   Triglycerides      0 - 199 mg/dL 97   HDL Cholesterol      mg/dL 38   LDL Calculated      mg/dL 87   Prostatic Specific Ag      0.00 - 1.00 ng/ml 0.62   Anion Gap      8.0 - 16.0 meq/L 11.0           Lab Results   Component Value Date    LABA1C 4.7 03/16/2018       Lab Results   Component Value Date    CHOL 144 12/28/2020    TRIG 97 12/28/2020    HDL 39 02/08/2021    LDLCALC 87 12/28/2020    LDLDIRECT 72.81 02/08/2021       The 10-year ASCVD risk score (Michele Acevedo et al., 2013) is: 16.4%    Values used to calculate the score:      Age: 70 years      Sex: Male      Is Non- : No      Diabetic: No      Tobacco smoker: No      Systolic Blood Pressure: 806 mmHg      Is BP treated: Yes      HDL Cholesterol: 39 mg/dL      Total Cholesterol: 144 mg/dL    Lab Results   Component Value Date     12/28/2020    K 4.7 12/28/2020    CL 99 12/28/2020    CO2 27 12/28/2020    BUN 13 12/28/2020    CREATININE 0.9 12/28/2020    GLUCOSE 97 12/28/2020    CALCIUM 9.7 12/28/2020    PROT 7.3 12/28/2020    LABALBU 4.2 12/28/2020    BILITOT 1.5 (H) 12/28/2020    ALKPHOS 84 12/28/2020    AST 20 02/08/2021    ALT 12 02/08/2021    LABGLOM 83 (A) 12/28/2020     No results found for: Allison Lopez    Lab Results   Component Value Date    TSH 2.760 11/25/2020    T4FREE 1.22 09/12/2011       Lab Results   Component Value Date    WBC 5.4 12/28/2020    HGB 12.9 (L) 12/28/2020    HCT 38.9 (L) 12/28/2020    MCV 98.5 (H) 12/28/2020     12/28/2020       Lab Results   Component Value Date    PSA 0.62 12/28/2020    PSA 0.84 02/06/2019    PSA 0.48 03/22/2018       Immunization History   Administered Date(s) Administered    Influenza Virus Vaccine 11/01/2011, 10/24/2012, 10/01/2013, 10/06/2014, 09/28/2015    Influenza, High Dose (Fluzone 65 yrs and older) 09/30/2018, 11/09/2019    Pneumococcal Conjugate 13-valent ([id number]) 04/13/2015    Pneumococcal Polysaccharide ([id number]) 08/01/2005, 04/14/2014    Tdap (Boostrix, Adacel) 12/04/2013       Health Maintenance   Topic Date Due    Shingles Vaccine (1 of 2) 11/16/1997    Flu vaccine (1) 02/11/2022 (Originally 9/1/2020)    COVID-19 Vaccine (1 of 2) 01/01/2040 (Originally 11/16/1961)    Annual Wellness Visit (AWV)  08/11/2021    Potassium monitoring  12/28/2021    Creatinine monitoring  12/28/2021    Lipid screen  02/08/2022    DTaP/Tdap/Td vaccine (2 - Td) 12/04/2023    Colon cancer screen colonoscopy  02/13/2024    Pneumococcal 65+ years Vaccine  Completed    Hepatitis C screen  Addressed    Hepatitis A vaccine  Aged Out    Hepatitis B vaccine  Aged Out    Hib vaccine  Aged Out    Meningococcal (ACWY) vaccine  Aged Out       AAA ultrasound (Male, 65-75, smoked ever) indicated at this time? YES- management per Vascular Surgery  CT Lung Screen (55-80, 30 pk-yrs, smoking or quit <15 years) indicated at this time? Yes, 3/4 PPD x 40 years,  Stopped 14 years ago- completed 1/6/2020- to follow up annually.    Sleep Medicine referral indicated at this time (Obesity, Snoring, Daytime Somnolence, Apneic Episodes)? Pt denies symptoms, other than snoring. No future appointments.       ASSESSMENT Diagnosis Orders   1. Essential hypertension     2. Hyperlipidemia, unspecified hyperlipidemia type  LDL Cholesterol, Direct    HDL Cholesterol    simvastatin (ZOCOR) 40 MG tablet    Hepatic Function Panel   3. Coronary artery disease involving coronary bypass graft of native heart without angina pectoris- Dr. Waldemar Opitz     4. PVD (peripheral vascular disease) with claudication ([facility]     5. Abdominal aortic aneurysm without rupture ([facility]- Dr. Olga Lidia Ovalle     6. Chronic obstructive pulmonary disease, unspecified COPD type ([facility]     7. Iron deficiency anemia, unspecified iron deficiency anemia type  Hemoglobin and Hematocrit, Blood   8. Erectile dysfunction, unspecified erectile dysfunction type     9. Hepatic steatosis  Hepatic Function Panel   10. Gastroesophageal reflux disease, unspecified whether esophagitis present         PLAN      After discussion with pt, will hold on work-up for CVA/ TIA per pt preference and due to lack of confusion/ slurred speech/ etc at time of pneumonia. After discussion with patient, will continue max dose simvastatin at this time, as patient previously unable to tolerate both Lipitor and Crestor  Check HDL, D- LDL, HFP, and H/H in 6 months  Continue current medicines otherwise   Refills  Follow up in 6 months.          Preventive Health Topics:  Encouraged [address]- pt states \"I'll think about it\"  Encouraged annual FLU VACCINE- pt declines. (updated 2/11/2021)  Encouraged SHINGLES SHOT [facility])- check with your local pharmacy.  (updated 2/11/2021)  COLONOSCOPY done 2/13/2019 per Dr. Zahira Avendaño- to do in 2/2024 (updated 2/11/2021)         Electronically signed by Carlos Lim MD on 2/11/2021 at 9:47 AM

## 2021-02-08 ENCOUNTER — HOSPITAL ENCOUNTER (OUTPATIENT)
Age: 76
Discharge: HOME | End: 2021-02-08
Payer: MEDICARE

## 2021-02-08 DIAGNOSIS — E78.5 HYPERLIPIDEMIA, UNSPECIFIED HYPERLIPIDEMIA TYPE: ICD-10-CM

## 2021-02-08 LAB
ALT SERPL-CCNC: 12 U/L (ref 11–66)
AST SERPL-CCNC: 20 U/L (ref 5–40)
HDLC SERPL-MCNC: 39 MG/DL
LDL CHOLESTEROL DIRECT: 72.81 MG/DL

## 2021-02-08 PROCEDURE — 84450 TRANSFERASE (AST) (SGOT): CPT

## 2021-02-08 PROCEDURE — 83718 ASSAY OF LIPOPROTEIN: CPT

## 2021-02-08 PROCEDURE — 84460 ALANINE AMINO (ALT) (SGPT): CPT

## 2021-02-08 PROCEDURE — 83721 ASSAY OF BLOOD LIPOPROTEIN: CPT

## 2021-02-08 PROCEDURE — 36415 COLL VENOUS BLD VENIPUNCTURE: CPT

## 2021-02-11 ENCOUNTER — OFFICE VISIT (OUTPATIENT)
Dept: FAMILY MEDICINE CLINIC | Age: 76
End: 2021-02-11
Payer: MEDICARE

## 2021-02-11 VITALS
WEIGHT: 186 LBS | DIASTOLIC BLOOD PRESSURE: 60 MMHG | RESPIRATION RATE: 16 BRPM | HEART RATE: 52 BPM | SYSTOLIC BLOOD PRESSURE: 100 MMHG | BODY MASS INDEX: 26.31 KG/M2 | TEMPERATURE: 96.4 F

## 2021-02-11 DIAGNOSIS — J44.9 CHRONIC OBSTRUCTIVE PULMONARY DISEASE, UNSPECIFIED COPD TYPE (HCC): ICD-10-CM

## 2021-02-11 DIAGNOSIS — N52.9 ERECTILE DYSFUNCTION, UNSPECIFIED ERECTILE DYSFUNCTION TYPE: ICD-10-CM

## 2021-02-11 DIAGNOSIS — K76.0 HEPATIC STEATOSIS: ICD-10-CM

## 2021-02-11 DIAGNOSIS — I25.810 CORONARY ARTERY DISEASE INVOLVING CORONARY BYPASS GRAFT OF NATIVE HEART WITHOUT ANGINA PECTORIS: ICD-10-CM

## 2021-02-11 DIAGNOSIS — I10 ESSENTIAL HYPERTENSION: Primary | ICD-10-CM

## 2021-02-11 DIAGNOSIS — D50.9 IRON DEFICIENCY ANEMIA, UNSPECIFIED IRON DEFICIENCY ANEMIA TYPE: ICD-10-CM

## 2021-02-11 DIAGNOSIS — K21.9 GASTROESOPHAGEAL REFLUX DISEASE, UNSPECIFIED WHETHER ESOPHAGITIS PRESENT: ICD-10-CM

## 2021-02-11 DIAGNOSIS — I73.9 PVD (PERIPHERAL VASCULAR DISEASE) WITH CLAUDICATION (HCC): ICD-10-CM

## 2021-02-11 DIAGNOSIS — I71.40 ABDOMINAL AORTIC ANEURYSM WITHOUT RUPTURE: ICD-10-CM

## 2021-02-11 DIAGNOSIS — E78.5 HYPERLIPIDEMIA, UNSPECIFIED HYPERLIPIDEMIA TYPE: ICD-10-CM

## 2021-02-11 PROCEDURE — 1036F TOBACCO NON-USER: CPT | Performed by: FAMILY MEDICINE

## 2021-02-11 PROCEDURE — 4040F PNEUMOC VAC/ADMIN/RCVD: CPT | Performed by: FAMILY MEDICINE

## 2021-02-11 PROCEDURE — G8417 CALC BMI ABV UP PARAM F/U: HCPCS | Performed by: FAMILY MEDICINE

## 2021-02-11 PROCEDURE — 1123F ACP DISCUSS/DSCN MKR DOCD: CPT | Performed by: FAMILY MEDICINE

## 2021-02-11 PROCEDURE — 99214 OFFICE O/P EST MOD 30 MIN: CPT | Performed by: FAMILY MEDICINE

## 2021-02-11 PROCEDURE — 3017F COLORECTAL CA SCREEN DOC REV: CPT | Performed by: FAMILY MEDICINE

## 2021-02-11 PROCEDURE — 3023F SPIROM DOC REV: CPT | Performed by: FAMILY MEDICINE

## 2021-02-11 PROCEDURE — G8427 DOCREV CUR MEDS BY ELIG CLIN: HCPCS | Performed by: FAMILY MEDICINE

## 2021-02-11 PROCEDURE — G8484 FLU IMMUNIZE NO ADMIN: HCPCS | Performed by: FAMILY MEDICINE

## 2021-02-11 PROCEDURE — G8926 SPIRO NO PERF OR DOC: HCPCS | Performed by: FAMILY MEDICINE

## 2021-02-11 RX ORDER — SIMVASTATIN 40 MG
40 TABLET ORAL NIGHTLY
Qty: 90 TABLET | Refills: 3 | Status: SHIPPED | OUTPATIENT
Start: 2021-02-11 | End: 2022-02-15 | Stop reason: DRUGHIGH

## 2021-02-11 ASSESSMENT — ENCOUNTER SYMPTOMS
ABDOMINAL PAIN: 0
CHEST TIGHTNESS: 0
SHORTNESS OF BREATH: 1
ANAL BLEEDING: 0
VOMITING: 0
CONSTIPATION: 0
BLOOD IN STOOL: 0
NAUSEA: 0
DIARRHEA: 0

## 2021-02-11 ASSESSMENT — PATIENT HEALTH QUESTIONNAIRE - PHQ9
SUM OF ALL RESPONSES TO PHQ9 QUESTIONS 1 & 2: 0
SUM OF ALL RESPONSES TO PHQ QUESTIONS 1-9: 0
SUM OF ALL RESPONSES TO PHQ QUESTIONS 1-9: 0

## 2021-02-11 NOTE — PATIENT INSTRUCTIONS
After discussion with pt, will hold on work-up for CVA/ TIA per pt preference and due to lack of confusion/ slurred speech/ etc at time of pneumonia. After discussion with patient, will continue max dose simvastatin at this time, as patient previously unable to tolerate both Lipitor and Crestor  Check HDL, D- LDL, HFP, and H/H in 6 months  Continue current medicines otherwise   Refills  Follow up in 6 months.                Preventive Health Topics:  [facility] pt states \"I'll think about it\"  Encouraged annual FLU VACCINE- pt declines. (updated 2/11/2021)  Encouraged SHINGLES SHOT [facility])- check with your local pharmacy.  (updated 2/11/2021)  COLONOSCOPY done 2/13/2019 per Dr. Sunshine Brown- to do in 2/2024 (updated 2/11/2021)

## 2021-03-06 ENCOUNTER — HOSPITAL ENCOUNTER (OUTPATIENT)
Age: 76
Discharge: HOME | End: 2021-03-06
Payer: MEDICARE

## 2021-03-06 LAB
ALBUMIN SERPL-MCNC: 4.1 G/DL (ref 3.5–5.1)
ALP BLD-CCNC: 73 U/L (ref 38–126)
ALT SERPL-CCNC: 14 U/L (ref 11–66)
ANION GAP SERPL CALCULATED.3IONS-SCNC: 8 MEQ/L (ref 8–16)
AST SERPL-CCNC: 20 U/L (ref 5–40)
BASOPHILS # BLD: 0.6 %
BASOPHILS ABSOLUTE: 0 THOU/MM3 (ref 0–0.1)
BILIRUB SERPL-MCNC: 1.4 MG/DL (ref 0.3–1.2)
BILIRUBIN DIRECT: 0.3 MG/DL (ref 0–0.3)
BUN BLDV-MCNC: 16 MG/DL (ref 7–22)
CALCIUM SERPL-MCNC: 9.6 MG/DL (ref 8.5–10.5)
CHLORIDE BLD-SCNC: 104 MEQ/L (ref 98–111)
CHOLESTEROL, TOTAL: 140 MG/DL (ref 100–199)
CO2: 29 MEQ/L (ref 23–33)
CREAT SERPL-MCNC: 1 MG/DL (ref 0.4–1.2)
EOSINOPHIL # BLD: 6.7 %
EOSINOPHILS ABSOLUTE: 0.4 THOU/MM3 (ref 0–0.4)
ERYTHROCYTE [DISTWIDTH] IN BLOOD BY AUTOMATED COUNT: 13.5 % (ref 11.5–14.5)
ERYTHROCYTE [DISTWIDTH] IN BLOOD BY AUTOMATED COUNT: 48.6 FL (ref 35–45)
GFR SERPL CREATININE-BSD FRML MDRD: 73 ML/MIN/1.73M2
GLUCOSE BLD-MCNC: 101 MG/DL (ref 70–108)
HCT VFR BLD CALC: 43.8 % (ref 42–52)
HDLC SERPL-MCNC: 38 MG/DL
HEMOGLOBIN: 14.2 GM/DL (ref 14–18)
IMMATURE GRANS (ABS): 0.02 THOU/MM3 (ref 0–0.07)
IMMATURE GRANULOCYTES: 0.4 %
LDL CHOLESTEROL CALCULATED: 80 MG/DL
LYMPHOCYTES # BLD: 25.8 %
LYMPHOCYTES ABSOLUTE: 1.4 THOU/MM3 (ref 1–4.8)
MCH RBC QN AUTO: 31.8 PG (ref 26–33)
MCHC RBC AUTO-ENTMCNC: 32.4 GM/DL (ref 32.2–35.5)
MCV RBC AUTO: 98 FL (ref 80–94)
MONOCYTES # BLD: 7.1 %
MONOCYTES ABSOLUTE: 0.4 THOU/MM3 (ref 0.4–1.3)
NUCLEATED RED BLOOD CELLS: 0 /100 WBC
PLATELET # BLD: 175 THOU/MM3 (ref 130–400)
PMV BLD AUTO: 10.2 FL (ref 9.4–12.4)
POTASSIUM SERPL-SCNC: 4.9 MEQ/L (ref 3.5–5.2)
RBC # BLD: 4.47 MILL/MM3 (ref 4.7–6.1)
SEG NEUTROPHILS: 59.4 %
SEGMENTED NEUTROPHILS ABSOLUTE COUNT: 3.2 THOU/MM3 (ref 1.8–7.7)
SODIUM BLD-SCNC: 141 MEQ/L (ref 135–145)
TOTAL PROTEIN: 6.9 G/DL (ref 6.1–8)
TRIGL SERPL-MCNC: 109 MG/DL (ref 0–199)
WBC # BLD: 5.4 THOU/MM3 (ref 4.8–10.8)

## 2021-03-06 PROCEDURE — 80061 LIPID PANEL: CPT

## 2021-03-06 PROCEDURE — 36415 COLL VENOUS BLD VENIPUNCTURE: CPT

## 2021-03-06 PROCEDURE — 80053 COMPREHEN METABOLIC PANEL: CPT

## 2021-03-06 PROCEDURE — 85025 COMPLETE CBC W/AUTO DIFF WBC: CPT

## 2021-03-06 PROCEDURE — 82248 BILIRUBIN DIRECT: CPT

## 2021-03-15 RX ORDER — LISINOPRIL 10 MG/1
TABLET ORAL
Qty: 90 TABLET | Refills: 3 | Status: ON HOLD | OUTPATIENT
Start: 2021-03-15 | End: 2021-06-02 | Stop reason: HOSPADM

## 2021-03-15 NOTE — TELEPHONE ENCOUNTER
This medication refill is regarding a electronic request by [facility] [employer]. Requested Prescriptions     Pending Prescriptions Disp Refills    lisinopril (PRINIVIL;ZESTRIL) 10 MG tablet [Pharmacy Med Name: LISINOPRIL 10 MG Tablet] 90 tablet 3     Sig: TAKE 1 TABLET EVERY DAY       Date of last visit: 2/11/2021  Date of next visit: 8/11/2021  Date of last refill: 12/11/2019  90/3      Rx verified, ordered and set to EP.

## 2021-05-31 ENCOUNTER — APPOINTMENT (OUTPATIENT)
Dept: GENERAL RADIOLOGY | Age: 76
End: 2021-05-31
Payer: MEDICARE

## 2021-05-31 ENCOUNTER — HOSPITAL ENCOUNTER (OUTPATIENT)
Age: 76
Setting detail: OBSERVATION
Discharge: HOME | End: 2021-06-02
Attending: EMERGENCY MEDICINE | Admitting: SURGERY
Payer: MEDICARE

## 2021-05-31 ENCOUNTER — APPOINTMENT (OUTPATIENT)
Dept: CT IMAGING | Age: 76
End: 2021-05-31
Payer: MEDICARE

## 2021-05-31 DIAGNOSIS — R55 SYNCOPE AND COLLAPSE: ICD-10-CM
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
## 2021-05-31 DIAGNOSIS — S09.90XA CLOSED HEAD INJURY, INITIAL ENCOUNTER: ICD-10-CM

## 2021-05-31 DIAGNOSIS — S12.201A CLOSED NONDISPLACED FRACTURE OF THIRD CERVICAL VERTEBRA, UNSPECIFIED FRACTURE MORPHOLOGY, INITIAL ENCOUNTER (HCC): ICD-10-CM

## 2021-05-31 PROBLEM — R94.31 ABNORMAL EKG: Status: ACTIVE | Noted: 2021-05-31

## 2021-05-31 PROBLEM — S00.83XA FOREHEAD CONTUSION, INITIAL ENCOUNTER: Status: ACTIVE | Noted: 2021-05-31

## 2021-05-31 PROBLEM — Z95.1 HX OF CABG: Status: ACTIVE | Noted: 2021-05-31

## 2021-05-31 PROBLEM — E83.42 HYPOMAGNESEMIA: Status: ACTIVE | Noted: 2021-05-31

## 2021-05-31 LAB
ALBUMIN SERPL-MCNC: 4.2 G/DL (ref 3.5–5.1)
ALP BLD-CCNC: 74 U/L (ref 38–126)
ALT SERPL-CCNC: 14 U/L (ref 11–66)
AMPHETAMINE+METHAMPHETAMINE URINE SCREEN: NEGATIVE
ANION GAP SERPL CALCULATED.3IONS-SCNC: 13 MEQ/L (ref 8–16)
APTT: 25.8 SECONDS (ref 22–38)
AST SERPL-CCNC: 22 U/L (ref 5–40)
BARBITURATE QUANTITATIVE URINE: NEGATIVE
BASOPHILS # BLD: 0.4 %
BASOPHILS ABSOLUTE: 0 THOU/MM3 (ref 0–0.1)
BENZODIAZEPINE QUANTITATIVE URINE: NEGATIVE
BILIRUB SERPL-MCNC: 2 MG/DL (ref 0.3–1.2)
BILIRUBIN URINE: NEGATIVE
BLOOD, URINE: NEGATIVE
BUN BLDV-MCNC: 16 MG/DL (ref 7–22)
CALCIUM SERPL-MCNC: 9 MG/DL (ref 8.5–10.5)
CANNABINOID QUANTITATIVE URINE: NEGATIVE
CHARACTER, URINE: CLEAR
CHLORIDE BLD-SCNC: 98 MEQ/L (ref 98–111)
CO2: 24 MEQ/L (ref 23–33)
COCAINE METABOLITE QUANTITATIVE URINE: NEGATIVE
COLOR: YELLOW
CREAT SERPL-MCNC: 1 MG/DL (ref 0.4–1.2)
EKG ATRIAL RATE: 93 BPM
EKG P AXIS: 86 DEGREES
EKG P-R INTERVAL: 176 MS
EKG Q-T INTERVAL: 344 MS
EKG QRS DURATION: 84 MS
EKG QTC CALCULATION (BAZETT): 427 MS
EKG R AXIS: -33 DEGREES
EKG T AXIS: 76 DEGREES
EKG VENTRICULAR RATE: 93 BPM
EOSINOPHIL # BLD: 2.2 %
EOSINOPHILS ABSOLUTE: 0.1 THOU/MM3 (ref 0–0.4)
ERYTHROCYTE [DISTWIDTH] IN BLOOD BY AUTOMATED COUNT: 13.2 % (ref 11.5–14.5)
ERYTHROCYTE [DISTWIDTH] IN BLOOD BY AUTOMATED COUNT: 45.7 FL (ref 35–45)
ETHYL ALCOHOL, SERUM: < 0.01 %
GFR SERPL CREATININE-BSD FRML MDRD: 73 ML/MIN/1.73M2
GLUCOSE BLD-MCNC: 116 MG/DL (ref 70–108)
GLUCOSE URINE: NEGATIVE MG/DL
HCT VFR BLD CALC: 38.9 % (ref 42–52)
HEMOGLOBIN: 13.5 GM/DL (ref 14–18)
IMMATURE GRANS (ABS): 0.01 THOU/MM3 (ref 0–0.07)
IMMATURE GRANULOCYTES: 0.2 %
INR BLD: 1.19 (ref 0.85–1.13)
KETONES, URINE: NEGATIVE
LEUKOCYTE ESTERASE, URINE: NEGATIVE
LIPASE: 35.2 U/L (ref 5.6–51.3)
LV EF: 55 %
LVEF MODALITY: NORMAL
LYMPHOCYTES # BLD: 16.9 %
LYMPHOCYTES ABSOLUTE: 0.8 THOU/MM3 (ref 1–4.8)
MAGNESIUM: 1.5 MG/DL (ref 1.6–2.4)
MCH RBC QN AUTO: 33.1 PG (ref 26–33)
MCHC RBC AUTO-ENTMCNC: 34.7 GM/DL (ref 32.2–35.5)
MCV RBC AUTO: 95.3 FL (ref 80–94)
MONOCYTES # BLD: 9.6 %
MONOCYTES ABSOLUTE: 0.4 THOU/MM3 (ref 0.4–1.3)
NITRITE, URINE: NEGATIVE
NUCLEATED RED BLOOD CELLS: 0 /100 WBC
OPIATES, URINE: NEGATIVE
OSMOLALITY CALCULATION: 272.3 MOSMOL/KG (ref 275–300)
OXYCODONE: NEGATIVE
PH UA: 7 (ref 5–9)
PHENCYCLIDINE QUANTITATIVE URINE: NEGATIVE
PLATELET # BLD: 147 THOU/MM3 (ref 130–400)
PMV BLD AUTO: 9.7 FL (ref 9.4–12.4)
POTASSIUM SERPL-SCNC: 4.6 MEQ/L (ref 3.5–5.2)
PRO-BNP: 488.8 PG/ML (ref 0–900)
PROTEIN UA: NEGATIVE
RBC # BLD: 4.08 MILL/MM3 (ref 4.7–6.1)
SEG NEUTROPHILS: 70.7 %
SEGMENTED NEUTROPHILS ABSOLUTE COUNT: 3.2 THOU/MM3 (ref 1.8–7.7)
SODIUM BLD-SCNC: 135 MEQ/L (ref 135–145)
SPECIFIC GRAVITY, URINE: 1.02 (ref 1–1.03)
TOTAL PROTEIN: 6.4 G/DL (ref 6.1–8)
TROPONIN T: < 0.01 NG/ML
TSH SERPL DL<=0.05 MIU/L-ACNC: 3.37 UIU/ML (ref 0.4–4.2)
UROBILINOGEN, URINE: 1 EU/DL (ref 0–1)
WBC # BLD: 4.5 THOU/MM3 (ref 4.8–10.8)

## 2021-05-31 PROCEDURE — 83735 ASSAY OF MAGNESIUM: CPT

## 2021-05-31 PROCEDURE — 80307 DRUG TEST PRSMV CHEM ANLYZR: CPT

## 2021-05-31 PROCEDURE — 83690 ASSAY OF LIPASE: CPT

## 2021-05-31 PROCEDURE — 6360000004 HC RX CONTRAST MEDICATION: Performed by: PHYSICIAN ASSISTANT

## 2021-05-31 PROCEDURE — 93306 TTE W/DOPPLER COMPLETE: CPT

## 2021-05-31 PROCEDURE — 6370000000 HC RX 637 (ALT 250 FOR IP): Performed by: INTERNAL MEDICINE

## 2021-05-31 PROCEDURE — APPSS60 APP SPLIT SHARED TIME 46-60 MINUTES: Performed by: PHYSICIAN ASSISTANT

## 2021-05-31 PROCEDURE — 6820000002 HC L2 INJURY CALL ACTIVATION: Performed by: SURGERY

## 2021-05-31 PROCEDURE — G0378 HOSPITAL OBSERVATION PER HR: HCPCS

## 2021-05-31 PROCEDURE — 85025 COMPLETE CBC W/AUTO DIFF WBC: CPT

## 2021-05-31 PROCEDURE — 96376 TX/PRO/DX INJ SAME DRUG ADON: CPT

## 2021-05-31 PROCEDURE — 2580000003 HC RX 258: Performed by: PHYSICIAN ASSISTANT

## 2021-05-31 PROCEDURE — 96365 THER/PROPH/DIAG IV INF INIT: CPT

## 2021-05-31 PROCEDURE — APPSS180 APP SPLIT SHARED TIME > 60 MINUTES: Performed by: PHYSICIAN ASSISTANT

## 2021-05-31 PROCEDURE — 83880 ASSAY OF NATRIURETIC PEPTIDE: CPT

## 2021-05-31 PROCEDURE — 70498 CT ANGIOGRAPHY NECK: CPT

## 2021-05-31 PROCEDURE — 71045 X-RAY EXAM CHEST 1 VIEW: CPT

## 2021-05-31 PROCEDURE — 84484 ASSAY OF TROPONIN QUANT: CPT

## 2021-05-31 PROCEDURE — 85610 PROTHROMBIN TIME: CPT

## 2021-05-31 PROCEDURE — 96366 THER/PROPH/DIAG IV INF ADDON: CPT

## 2021-05-31 PROCEDURE — 96375 TX/PRO/DX INJ NEW DRUG ADDON: CPT

## 2021-05-31 PROCEDURE — 2580000003 HC RX 258: Performed by: EMERGENCY MEDICINE

## 2021-05-31 PROCEDURE — 82077 ASSAY SPEC XCP UR&BREATH IA: CPT

## 2021-05-31 PROCEDURE — 81003 URINALYSIS AUTO W/O SCOPE: CPT

## 2021-05-31 PROCEDURE — 70486 CT MAXILLOFACIAL W/O DYE: CPT

## 2021-05-31 PROCEDURE — 99285 EMERGENCY DEPT VISIT HI MDM: CPT

## 2021-05-31 PROCEDURE — 99223 1ST HOSP IP/OBS HIGH 75: CPT | Performed by: SURGERY

## 2021-05-31 PROCEDURE — 99223 1ST HOSP IP/OBS HIGH 75: CPT | Performed by: NUCLEAR MEDICINE

## 2021-05-31 PROCEDURE — 84443 ASSAY THYROID STIM HORMONE: CPT

## 2021-05-31 PROCEDURE — 72125 CT NECK SPINE W/O DYE: CPT

## 2021-05-31 PROCEDURE — 99223 1ST HOSP IP/OBS HIGH 75: CPT | Performed by: INTERNAL MEDICINE

## 2021-05-31 PROCEDURE — 6360000002 HC RX W HCPCS: Performed by: INTERNAL MEDICINE

## 2021-05-31 PROCEDURE — 80053 COMPREHEN METABOLIC PANEL: CPT

## 2021-05-31 PROCEDURE — 85730 THROMBOPLASTIN TIME PARTIAL: CPT

## 2021-05-31 PROCEDURE — 6360000002 HC RX W HCPCS: Performed by: PHYSICIAN ASSISTANT

## 2021-05-31 PROCEDURE — 70450 CT HEAD/BRAIN W/O DYE: CPT

## 2021-05-31 PROCEDURE — 72170 X-RAY EXAM OF PELVIS: CPT

## 2021-05-31 PROCEDURE — 2500000003 HC RX 250 WO HCPCS: Performed by: PHYSICIAN ASSISTANT

## 2021-05-31 PROCEDURE — 2060000000 HC ICU INTERMEDIATE R&B

## 2021-05-31 PROCEDURE — 93005 ELECTROCARDIOGRAM TRACING: CPT | Performed by: EMERGENCY MEDICINE

## 2021-05-31 PROCEDURE — 99223 1ST HOSP IP/OBS HIGH 75: CPT | Performed by: NEUROLOGICAL SURGERY

## 2021-05-31 PROCEDURE — 36415 COLL VENOUS BLD VENIPUNCTURE: CPT

## 2021-05-31 PROCEDURE — 70496 CT ANGIOGRAPHY HEAD: CPT

## 2021-05-31 RX ORDER — PROMETHAZINE HYDROCHLORIDE 25 MG/1
12.5 TABLET ORAL EVERY 6 HOURS PRN
Status: DISCONTINUED | OUTPATIENT
Start: 2021-05-31 | End: 2021-06-02 | Stop reason: HOSPADM

## 2021-05-31 RX ORDER — SODIUM CHLORIDE 9 MG/ML
INJECTION, SOLUTION INTRAVENOUS CONTINUOUS
Status: DISCONTINUED | OUTPATIENT
Start: 2021-05-31 | End: 2021-06-01

## 2021-05-31 RX ORDER — ONDANSETRON 2 MG/ML
4 INJECTION INTRAMUSCULAR; INTRAVENOUS EVERY 6 HOURS PRN
Status: DISCONTINUED | OUTPATIENT
Start: 2021-05-31 | End: 2021-06-02 | Stop reason: HOSPADM

## 2021-05-31 RX ORDER — ALLOPURINOL 300 MG/1
300 TABLET ORAL DAILY
Status: DISCONTINUED | OUTPATIENT
Start: 2021-05-31 | End: 2021-06-02 | Stop reason: HOSPADM

## 2021-05-31 RX ORDER — CLOPIDOGREL BISULFATE 75 MG/1
75 TABLET ORAL DAILY
Status: DISCONTINUED | OUTPATIENT
Start: 2021-05-31 | End: 2021-06-01

## 2021-05-31 RX ORDER — ACETAMINOPHEN 325 MG/1
650 TABLET ORAL EVERY 4 HOURS PRN
Status: DISCONTINUED | OUTPATIENT
Start: 2021-05-31 | End: 2021-06-02 | Stop reason: HOSPADM

## 2021-05-31 RX ORDER — SODIUM CHLORIDE 0.9 % (FLUSH) 0.9 %
5-40 SYRINGE (ML) INJECTION EVERY 12 HOURS SCHEDULED
Status: DISCONTINUED | OUTPATIENT
Start: 2021-05-31 | End: 2021-06-02 | Stop reason: HOSPADM

## 2021-05-31 RX ORDER — POLYETHYLENE GLYCOL 3350 17 G/17G
17 POWDER, FOR SOLUTION ORAL DAILY PRN
Status: DISCONTINUED | OUTPATIENT
Start: 2021-05-31 | End: 2021-06-02 | Stop reason: HOSPADM

## 2021-05-31 RX ORDER — MAGNESIUM SULFATE IN WATER 40 MG/ML
2000 INJECTION, SOLUTION INTRAVENOUS ONCE
Status: COMPLETED | OUTPATIENT
Start: 2021-05-31 | End: 2021-05-31

## 2021-05-31 RX ORDER — PANTOPRAZOLE SODIUM 40 MG/1
40 TABLET, DELAYED RELEASE ORAL
Status: DISCONTINUED | OUTPATIENT
Start: 2021-05-31 | End: 2021-06-02 | Stop reason: HOSPADM

## 2021-05-31 RX ORDER — ATORVASTATIN CALCIUM 40 MG/1
40 TABLET, FILM COATED ORAL DAILY
Refills: 3 | Status: DISCONTINUED | OUTPATIENT
Start: 2021-05-31 | End: 2021-06-02 | Stop reason: HOSPADM

## 2021-05-31 RX ORDER — 0.9 % SODIUM CHLORIDE 0.9 %
1000 INTRAVENOUS SOLUTION INTRAVENOUS ONCE
Status: COMPLETED | OUTPATIENT
Start: 2021-05-31 | End: 2021-05-31

## 2021-05-31 RX ORDER — FENTANYL CITRATE 50 UG/ML
50 INJECTION, SOLUTION INTRAMUSCULAR; INTRAVENOUS
Status: DISCONTINUED | OUTPATIENT
Start: 2021-05-31 | End: 2021-06-02 | Stop reason: HOSPADM

## 2021-05-31 RX ORDER — NITROGLYCERIN 0.4 MG/1
0.4 TABLET SUBLINGUAL EVERY 5 MIN PRN
Status: DISCONTINUED | OUTPATIENT
Start: 2021-05-31 | End: 2021-06-02 | Stop reason: HOSPADM

## 2021-05-31 RX ORDER — FENTANYL CITRATE 50 UG/ML
25 INJECTION, SOLUTION INTRAMUSCULAR; INTRAVENOUS
Status: DISCONTINUED | OUTPATIENT
Start: 2021-05-31 | End: 2021-06-02 | Stop reason: HOSPADM

## 2021-05-31 RX ORDER — SODIUM CHLORIDE 9 MG/ML
25 INJECTION, SOLUTION INTRAVENOUS PRN
Status: DISCONTINUED | OUTPATIENT
Start: 2021-05-31 | End: 2021-06-02 | Stop reason: HOSPADM

## 2021-05-31 RX ORDER — LISINOPRIL 10 MG/1
10 TABLET ORAL DAILY
Status: DISCONTINUED | OUTPATIENT
Start: 2021-05-31 | End: 2021-06-02

## 2021-05-31 RX ORDER — SODIUM CHLORIDE 0.9 % (FLUSH) 0.9 %
5-40 SYRINGE (ML) INJECTION PRN
Status: DISCONTINUED | OUTPATIENT
Start: 2021-05-31 | End: 2021-06-02 | Stop reason: HOSPADM

## 2021-05-31 RX ADMIN — SODIUM CHLORIDE 1000 ML: 9 INJECTION, SOLUTION INTRAVENOUS at 01:20

## 2021-05-31 RX ADMIN — SODIUM CHLORIDE: 9 INJECTION, SOLUTION INTRAVENOUS at 14:58

## 2021-05-31 RX ADMIN — FENTANYL CITRATE 50 MCG: 50 INJECTION, SOLUTION INTRAMUSCULAR; INTRAVENOUS at 10:29

## 2021-05-31 RX ADMIN — FAMOTIDINE 20 MG: 10 INJECTION, SOLUTION INTRAVENOUS at 08:50

## 2021-05-31 RX ADMIN — SODIUM CHLORIDE, PRESERVATIVE FREE 10 ML: 5 INJECTION INTRAVENOUS at 08:49

## 2021-05-31 RX ADMIN — SODIUM CHLORIDE: 9 INJECTION, SOLUTION INTRAVENOUS at 04:39

## 2021-05-31 RX ADMIN — ATORVASTATIN CALCIUM 40 MG: 40 TABLET, FILM COATED ORAL at 20:39

## 2021-05-31 RX ADMIN — MAGNESIUM SULFATE HEPTAHYDRATE 2000 MG: 40 INJECTION, SOLUTION INTRAVENOUS at 08:50

## 2021-05-31 RX ADMIN — METOPROLOL TARTRATE 25 MG: 25 TABLET, FILM COATED ORAL at 20:39

## 2021-05-31 RX ADMIN — IOPAMIDOL 80 ML: 755 INJECTION, SOLUTION INTRAVENOUS at 04:32

## 2021-05-31 RX ADMIN — FENTANYL CITRATE 50 MCG: 50 INJECTION, SOLUTION INTRAMUSCULAR; INTRAVENOUS at 06:55

## 2021-05-31 ASSESSMENT — ENCOUNTER SYMPTOMS
COUGH: 0
ANAL BLEEDING: 0
WHEEZING: 0
TROUBLE SWALLOWING: 0
RHINORRHEA: 0
BACK PAIN: 0
PHOTOPHOBIA: 0
RECTAL PAIN: 0
BLOOD IN STOOL: 0
CHEST TIGHTNESS: 0
NAUSEA: 0
SORE THROAT: 0
ABDOMINAL PAIN: 0
ABDOMINAL DISTENTION: 0
STRIDOR: 0
APNEA: 0
EYE DISCHARGE: 0
EYE ITCHING: 0
DIARRHEA: 0
CONSTIPATION: 0
SINUS PRESSURE: 0
VOMITING: 0
FACIAL SWELLING: 0
EYE REDNESS: 0
COLOR CHANGE: 0
SHORTNESS OF BREATH: 0
SINUS PAIN: 0
EYE PAIN: 0
VOICE CHANGE: 0
CHOKING: 0

## 2021-05-31 ASSESSMENT — PAIN SCALES - GENERAL
PAINLEVEL_OUTOF10: 10
PAINLEVEL_OUTOF10: 7
PAINLEVEL_OUTOF10: 10
PAINLEVEL_OUTOF10: 6
PAINLEVEL_OUTOF10: 4
PAINLEVEL_OUTOF10: 0
PAINLEVEL_OUTOF10: 4
PAINLEVEL_OUTOF10: 0
PAINLEVEL_OUTOF10: 10
PAINLEVEL_OUTOF10: 6
PAINLEVEL_OUTOF10: 0

## 2021-05-31 ASSESSMENT — PAIN DESCRIPTION - DESCRIPTORS
DESCRIPTORS: ACHING
DESCRIPTORS: DISCOMFORT;SHARP
DESCRIPTORS: ACHING;DISCOMFORT

## 2021-05-31 ASSESSMENT — PAIN DESCRIPTION - PROGRESSION
CLINICAL_PROGRESSION: GRADUALLY WORSENING

## 2021-05-31 ASSESSMENT — PAIN DESCRIPTION - ONSET
ONSET: ON-GOING
ONSET: AWAKENED FROM SLEEP
ONSET: ON-GOING

## 2021-05-31 ASSESSMENT — PAIN DESCRIPTION - FREQUENCY
FREQUENCY: INTERMITTENT
FREQUENCY: CONTINUOUS
FREQUENCY: CONTINUOUS

## 2021-05-31 ASSESSMENT — VISUAL ACUITY: OU: 1

## 2021-05-31 ASSESSMENT — PAIN DESCRIPTION - LOCATION
LOCATION: NECK
LOCATION: BACK
LOCATION: BACK

## 2021-05-31 ASSESSMENT — PAIN DESCRIPTION - PAIN TYPE
TYPE: ACUTE PAIN
TYPE: ACUTE PAIN;CHRONIC PAIN
TYPE: ACUTE PAIN
TYPE: ACUTE PAIN;CHRONIC PAIN

## 2021-05-31 ASSESSMENT — PAIN - FUNCTIONAL ASSESSMENT
PAIN_FUNCTIONAL_ASSESSMENT: PREVENTS OR INTERFERES SOME ACTIVE ACTIVITIES AND ADLS

## 2021-05-31 ASSESSMENT — PAIN DESCRIPTION - ORIENTATION
ORIENTATION: POSTERIOR
ORIENTATION: POSTERIOR
ORIENTATION: LOWER
ORIENTATION: LOWER
ORIENTATION: MID;UPPER

## 2021-05-31 NOTE — PROGRESS NOTES
Patient admitted to 4A Room 15 from ED  Complaint upon arrival to the room Fall  Vital signs obtained. Assessment and data collection initiated. Oriented to room. Policies and procedures for 4a explained All questions answered with no further questions at this time. Fall prevention and safety brochure discussed with patient. 2 person skin check completed.

## 2021-05-31 NOTE — H&P
Management   -Fentanyl    Prophylaxis: SCD's, Incentive Spirometry, GlycoLax, Pepcid, Zofran    N.p.o.    IVF Management  Regular Neurovascular Checks  Repeat Labs Tomorrow AM  PT/OT/SLP Eval and Treat  Bedrest    Planned Discharge discharge pending clinical course      Activation:    [] Level I (Trauma Alert)   [x] Level II (Injury Call)   [] Level III (Trauma Consult)   [] Downgraded (Time: )   Mode of Arrival: EMS transportation  Referring Facility:   Loss of Consciousness []No [x]Yes[]Unknown unknown duration(min)  Mechanism of Injury:  []Motor Vehicle crash   []Single Vehicle [] []Passenger []Scene Fatality []Front Seat  []Restrained   []Air Bag Deployed   []Ejected []Rollover []Pedestrian []Trapped   Type of vehicle:   Protective Devices:   []Motorcycle  Wearing Helmet []Yes []No  []Bicycle  Wearing Helmet []Yes []No  [x]Fall   Distance -2 steps   []Assault    Abuse Reported []Yes []No  []Gunshot  []Stabbing  []Work Related  []Burn: []Flame []Scald []Electrical []Chemical []Contact []Inhalation []House Fire  []Other:   Patient Active Problem List   Diagnosis    Erectile dysfunction    Hepatic steatosis    Low HDL (under 40)    Essential hypertension    Gastroesophageal reflux disease    Hyperlipidemia    Primary osteoarthritis of left knee    Coronary artery disease involving coronary bypass graft of native heart without angina pectoris- Dr. Diaz Show    Chronic obstructive pulmonary disease ([address]    Gout of left foot    Abdominal aortic aneurysm without rupture ([address]- Dr. Ivanna Kimball Angina pectoris, variant ([address]    Abnormal nuclear stress test    PVD (peripheral vascular disease) with claudication (HCC)    Iron deficiency anemia    Pneumonia    Closed nondisplaced fracture of third cervical vertebra ([address]     Subjective   Chief Complaint: neck pain s/p fall    History of Present Illness:    He is a 70 y.o. male presenting at [facility] by activation of level II trauma, brought by EMS following a fall down multiple steps  with positive LOC; past medical history COPD, HTN, HTN, CAD s/p CABG on ASA/Plavix. Per patient report he was walking down two steps to go to bed when he became dizzy and lost consciousness while simultaneously falling and striking head. Patient unable to differentiate whether LOC caused fall or caused by fall. Patient reports Head and neck pain. Patient denies chest pain, shortness of breath, cough,  dizziness, lightheadedness, numbness, paraesthesias, weakness, chills, fevers, abdominal pain, nausea, vomiting, diarrhea, blood in stool, n or back pain. Care in coordination with trauma surgeon Dr. Deep Huggins. Review of Systems:   Review of Systems   Constitutional: Negative for chills and fever. Respiratory: Negative for chest tightness and shortness of breath. Cardiovascular: Negative for chest pain and palpitations. Gastrointestinal: Negative for abdominal pain, nausea and vomiting. Genitourinary: Negative for hematuria. Musculoskeletal: Positive for neck pain. Negative for back pain. Skin: Negative for color change, pallor and wound. Neurological: Positive for syncope and headaches. Negative for dizziness, seizures, weakness and light-headedness. Psychiatric/Behavioral: Negative for agitation and confusion. The patient is not nervous/anxious. All systems were reviewed and negative other than HPI  Crestor [rosuvastatin calcium], Lipitor, Tramadol, Vicodin [hydrocodone-acetaminophen], Codeine, and Percocet [oxycodone-acetaminophen]  Past Surgical History:   Procedure Laterality Date   [address] [address] SURGERY  2006    COLONOSCOPY  12/18/13    Dr. Nusrat Delacruz  2006    Dr. Oskar Layne.  ENDOSCOPY, COLON, DIAGNOSTIC      FOOT SURGERY  2011    Left foot.     HEMORRHOID SURGERY  04/2019    Banding--Dr. Kirstin Sweet    INGUINAL HERNIA REPAIR Right 12/1/2014    THROMBOENDARTERECTOMY Right 12/22/2016    Right Femoral Endarterectomy and Right SFA Angioplasty    UPPER GASTROINTESTINAL ENDOSCOPY       Past Medical History:   Diagnosis Date    AAA (abdominal aortic aneurysm) (HCC)     watching for now    [facility]     Coronary artery disease     Emphysema of lung [facility]     Erectile dysfunction     GERD (gastroesophageal reflux disease)     Elevated liver enzymes    Gout     History of blood transfusion     Hyperlipidemia     Hypertension     Liver disease     Osteoarthritis      Past Surgical History:   Procedure Laterality Date    APPENDECTOMY  1953   [address]      [address] SURGERY  2006    COLONOSCOPY  12/18/13    Dr. Dallin Rojas  2006    Dr. Emma Buck.  ENDOSCOPY, COLON, DIAGNOSTIC      FOOT SURGERY  2011    Left foot.     HEMORRHOID SURGERY  04/2019    Banding--Dr. Ryland Mccarty    INGUINAL HERNIA REPAIR Right 12/1/2014    THROMBOENDARTERECTOMY Right 12/22/2016    Right Femoral Endarterectomy and Right SFA Angioplasty    UPPER GASTROINTESTINAL ENDOSCOPY       Social History     Socioeconomic History    Marital status:      Spouse name: None    Number of children: None    Years of education: None    Highest education level: None   Occupational History    None   Tobacco Use    Smoking status: Former Smoker     Packs/day: 1.00     Years: 15.00     Pack years: 15.00     Quit date: 12/30/2005     Years since quitting: 15.4    Smokeless tobacco: Never Used   Vaping Use    Vaping Use: Never used   Substance and Sexual Activity    Alcohol use: No    Drug use: No    Sexual activity: Not Currently     Partners: Female     Comment:    Other Topics Concern    None   Social History Narrative    None     Social Determinants of Health     Financial Resource Strain: Low Risk     Difficulty of Paying Living Expenses: Not hard at all   Food Insecurity: No Food Insecurity    Worried About Running Out of Food in the Last Year: Never true    Ran Out of Food in the Last Year: Never true   Transportation Needs: No Transportation Needs    Lack of Transportation (Medical): No    Lack of Transportation (Non-Medical):  No   Physical Activity:     Days of Exercise per Week:     Minutes of Exercise per Session:    Stress:     Feeling of Stress :    Social Connections:     Frequency of Communication with Friends and Family:     Frequency of Social Gatherings with Friends and Family:     Attends Jew Services:     Active Member of Clubs or Organizations:     Attends Club or Organization Meetings:     Marital Status:    Intimate Partner Violence:     Fear of Current or Ex-Partner:     Emotionally Abused:     Physically Abused:     Sexually Abused:      Family History   Problem Relation Age of Onset    Heart Disease Mother     Diabetes Mother     Heart Disease Father     Other Sister 77        P.E.    Heart Disease Sister     Cancer Sister     Heart Disease Brother        Home medications:    Current Discharge Medication List      CONTINUE these medications which have NOT CHANGED    Details   lisinopril (PRINIVIL;ZESTRIL) 10 MG tablet TAKE 1 TABLET EVERY DAY  Qty: 90 tablet, Refills: 3      simvastatin (ZOCOR) 40 MG tablet Take 1 tablet by mouth nightly  Qty: 90 tablet, Refills: 3    Associated Diagnoses: Hyperlipidemia, unspecified hyperlipidemia type      Ascorbic Acid (VITAMIN C PO) Take by mouth daily      ZINC PO Take by mouth daily       Probiotic Product (PROBIOTIC PO) Take by mouth daily       pantoprazole (PROTONIX) 40 MG tablet Take 1 tablet by mouth every morning (before breakfast)  Qty: 90 tablet, Refills: 3      metoprolol tartrate (LOPRESSOR) 50 MG tablet TAKE 1/2 TABLET EVERY DAY  Qty: 45 tablet, Refills: 3    Associated Diagnoses: Essential hypertension; Coronary artery disease involving coronary bypass graft of native heart without angina pectoris      allopurinol (ZYLOPRIM) 300 MG tablet Take 1 tablet by mouth daily  Qty: 90 tablet, Refills: 3    Associated Diagnoses: History of gout      clopidogrel (PLAVIX) 75 MG tablet Take 1 tablet by mouth daily  Qty: 30 tablet, Refills: 5      ferrous sulfate 325 (65 FE) MG tablet Take 325 mg by mouth daily       aspirin 325 MG EC tablet Take 325 mg by mouth daily. Cholecalciferol (D3 VITAMIN PO) Take by mouth daily      nitroGLYCERIN (NITROSTAT) 0.4 MG SL tablet Place 1 tablet under the tongue every 5 minutes as needed for Chest pain  Qty: 25 tablet, Refills: 3    Associated Diagnoses: Coronary artery disease involving coronary bypass graft of native heart without angina pectoris      acetaminophen (TYLENOL) 500 MG tablet Take 1,000 mg by mouth as needed for Pain.              Hospital medications:  Scheduled Meds:  Continuous Infusions:  PRN Meds:  Objective   ED TRIAGE VITALS  BP: 121/66, Temp: 99.5 °F (37.5 °C), Pulse: 84, Resp: 18, SpO2: 96 %  Hussein Coma Scale  Eye Opening: Spontaneous  Best Verbal Response: Oriented  Best Motor Response: Obeys commands  [place] Coma Scale Score: 15  Results for orders placed or performed during the hospital encounter of 05/31/21   Comprehensive Metabolic Panel   Result Value Ref Range    Glucose 116 (H) 70 - 108 mg/dL    CREATININE 1.0 0.4 - 1.2 mg/dL    BUN 16 7 - 22 mg/dL    Sodium 135 135 - 145 meq/L    Potassium 4.6 3.5 - 5.2 meq/L    Chloride 98 98 - 111 meq/L    CO2 24 23 - 33 meq/L    Calcium 9.0 8.5 - 10.5 mg/dL    AST 22 5 - 40 U/L    Alkaline Phosphatase 74 38 - 126 U/L    Total Protein 6.4 6.1 - 8.0 g/dL    Albumin 4.2 3.5 - 5.1 g/dL    Total Bilirubin 2.0 (H) 0.3 - 1.2 mg/dL    ALT 14 11 - 66 U/L   CBC Auto Differential   Result Value Ref Range    WBC 4.5 (L) 4.8 - 10.8 thou/mm3    RBC 4.08 (L) 4.70 - 6.10 mill/mm3    Hemoglobin 13.5 (L) 14.0 - 18.0 gm/dl    Hematocrit 38.9 (L) 42.0 - 52.0 %    MCV 95.3 (H) 80.0 - 94.0 fL    MCH 33.1 (H) 26.0 - 33.0 pg    MCHC 34.7 32.2 - 35.5 gm/dl    RDW-CV 13.2 11.5 - 14.5 %    RDW-SD 45.7 (H) 35.0 - 45.0 fL    Platelets 434 605 - 049 thou/mm3    MPV 9.7 9.4 - 12.4 fL    Seg Neutrophils 70.7 %    Lymphocytes 16.9 %    Monocytes 9.6 %    Eosinophils 2.2 %    Basophils 0.4 %    Immature Granulocytes 0.2 %    Segs Absolute 3.2 1 - 7 thou/mm3    Lymphocytes Absolute 0.8 (L) 1.0 - 4.8 thou/mm3    Monocytes Absolute 0.4 0.4 - 1.3 thou/mm3    Eosinophils Absolute 0.1 0.0 - 0.4 thou/mm3    Basophils Absolute 0.0 0.0 - 0.1 thou/mm3    Immature Grans (Abs) 0.01 0.00 - 0.07 thou/mm3    nRBC 0 /100 wbc   Ethanol   Result Value Ref Range    ETHYL ALCOHOL, SERUM < 0.01 0.00 %   Protime-INR   Result Value Ref Range    INR 1.19 (H) 0.85 - 1.13   Brain Natriuretic Peptide   Result Value Ref Range    Pro-.8 0.0 - 900.0 pg/mL   APTT   Result Value Ref Range    aPTT 25.8 22.0 - 38.0 seconds   Lipase   Result Value Ref Range    Lipase 35.2 5.6 - 51.3 U/L   Troponin   Result Value Ref Range    Troponin T < 0.010 ng/ml   Magnesium   Result Value Ref Range    Magnesium 1.5 (L) 1.6 - 2.4 mg/dL   TSH without Reflex   Result Value Ref Range    TSH 3.370 0.400 - 4.200 uIU/mL   Anion Gap   Result Value Ref Range    Anion Gap 13.0 8.0 - 16.0 meq/L   Glomerular Filtration Rate, Estimated   Result Value Ref Range    Est, Glom Filt Rate 73 (A) ml/min/1.73m2   Osmolality   Result Value Ref Range    Osmolality Calc 272.3 (L) 275.0 - 300.0 mOsmol/kg       Physical Exam:  Patient Vitals for the past 24 hrs:   BP Temp Temp src Pulse Resp SpO2 Height Weight   05/31/21 0330 121/66 99.5 °F (37.5 °C) Oral 84 18 96 % 5' 11\" (1.803 m) 187 lb 1.6 oz (84.9 kg)   05/31/21 0240 (!) 93/57 -- -- 79 18 96 % -- --   05/31/21 0144 106/62 -- -- 80 16 96 % -- --   05/31/21 0131 (!) 97/55 -- -- 86 20 95 % -- --   05/31/21 0116 111/66 -- -- 93 19 96 % -- --   05/31/21 0103 112/71 100.4 °F (38 °C) Oral 97 14 95 % 5' 11\" (1.803 m) 186 lb (84.4 kg) techniques and iterative    reconstructions, and/or weight-based dosing   when appropriate to reduce radiation to a low as reasonably achievable. XR PELVIS (1-2 VIEWS)   Final Result   Impression:   1. No acute fracture or dislocation. This document has been electronically signed by: Josette Gonsalves MD on    05/31/2021 01:55 AM      XR CHEST PORTABLE   Final Result   Impression:   Bilateral upper lobe fibrosis and volume loss with bilateral lower lobe    emphysematous changes. No superimposed consolidation, pleural effusion,    or pneumothorax. This document has been electronically signed by: Josette Gonsalves MD on    05/31/2021 01:45 AM      CTA [id number]    (Results Pending)   CTA HEAD W WO CONTRAST    (Results Pending)   MRI CERVICAL SPINE WO CONTRAST    (Results Pending)     Fast Exam: Yes    FAST EXAM:  A limited, bedside FAST exam was performed. The medical necessity was to evaluate for the presence or absence of intraperitoneal or pericardial fluid. The structures studied were the hepatorenal space, splenorenal space, pericardium, and bladder. FINDINGS:  negative for free intra-abdominal fluid. The study was technically adequate.   Performed by myself at bedside    Electronically signed by MARIE Mann on 5/31/2021 at 4:00 AM  Level 2 for fall down 2 steps on aspirin and Plavix  Patient does recall feeling lightheaded and dizzy before he actually fell   Had evidence of facial trauma on arrival.  Airway intact  CT scans in emergency department revealed a small anterior C3 fracture   Patient being mid for further evaluation and consult with neurosurgery and the hospitalist for evaluation of a possible syncopal spell  Cervical spine CT scan looks nonsurgical to me  Evaluated in conjunction with [place] physician assistant

## 2021-05-31 NOTE — ED NOTES
Patient in CT scanner at this time. Patient tolerating scans well. VSS.      Jean-Paul Akers RN  05/31/21 [id number]

## 2021-05-31 NOTE — ED PROVIDER NOTES
[facility]  eMERGENCY dEPARTMENT eNCOUnter          CHIEF COMPLAINT       Chief Complaint   Patient presents with    Fall       Nurses Notes reviewed and I agree except as noted in the HPI. HISTORY OF PRESENT ILLNESS    Michelle Flanagan is a 70 y.o. male who presents after fall. Apparently the patient was walking downstairs to go into his bedroom and that his last thing he remembered. He woke up at the bottom of the stairs. Wife was there with him. They called the ambulance and had him brought in. He does not remember passing out although he cannot say that he did not. Patient states that he did not have any chest pain prior to this. Currently the patient is resting on the cot complaining of neck pain and some facial pain. He is not having any chest pain or abdominal pain. He is able to move all his extremities without any difficulty. Patient is awake alert and oriented x4. No other physical complaints at this time. REVIEW OF SYSTEMS     Review of Systems   Constitutional: Negative for activity change, appetite change, diaphoresis, fatigue and unexpected weight change. HENT: Negative for congestion, ear discharge, ear pain, hearing loss, mouth sores, nosebleeds, postnasal drip, rhinorrhea, sinus pressure, sore throat, trouble swallowing and voice change. Minor abrasions to nose and forehead   Eyes: Negative for photophobia, pain, discharge, redness and itching. Respiratory: Negative for cough, choking, chest tightness, shortness of breath and wheezing. Cardiovascular: Negative for chest pain, palpitations and leg swelling. Gastrointestinal: Negative for abdominal distention, abdominal pain, blood in stool, constipation, diarrhea, nausea and vomiting. Endocrine: Negative for polydipsia, polyphagia and polyuria. Genitourinary: Negative for decreased urine volume, difficulty urinating, dysuria, enuresis, frequency, hematuria and urgency.    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 Cholecalciferol (D3 VITAMIN PO) Take by mouth daily      nitroGLYCERIN (NITROSTAT) 0.4 MG SL tablet Place 1 tablet under the tongue every 5 minutes as needed for Chest pain  Qty: 25 tablet, Refills: 3    Associated Diagnoses: Coronary artery disease involving coronary bypass graft of native heart without angina pectoris      acetaminophen (TYLENOL) 500 MG tablet Take 1,000 mg by mouth as needed for Pain. ALLERGIES     is allergic to crestor [rosuvastatin calcium], lipitor, tramadol, vicodin [hydrocodone-acetaminophen], codeine, and percocet [oxycodone-acetaminophen]. FAMILY HISTORY     He indicated that his mother is . He indicated that his father is . He indicated that his sister is . He indicated that both of his brothers are alive. family history includes Cancer in his sister; Diabetes in his mother; Heart Disease in his brother, father, mother, and sister; Other (age of onset: 77) in his sister. SOCIAL HISTORY      reports that he quit smoking about 15 years ago. He has a 15.00 pack-year smoking history. He has never used smokeless tobacco. He reports that he does not drink alcohol and does not use drugs. PHYSICAL EXAM     INITIAL VITALS:  height is 5' 11\" (1.803 m) and weight is 192 lb 4.8 oz (87.2 kg).  His oral temperature is 98.3 °F (36.8 °C). His blood pressure is 111/55 (abnormal) and his pulse is 53. His respiration is 16 and oxygen saturation is 96%. Physical Exam  Vitals and nursing note reviewed. Constitutional:       General: He is not in acute distress. Appearance: He is well-developed. He is not diaphoretic. HENT:      Head: Normocephalic. Abrasion and contusion present. No raccoon eyes, Jennings's sign, masses, right periorbital erythema or left periorbital erythema. Hair is normal.      Jaw: There is normal jaw occlusion. Right Ear: Hearing, tympanic membrane, ear canal and external ear normal. No hemotympanum. Left Ear: Hearing, tympanic membrane, ear canal and external ear normal. No hemotympanum. Nose: Nose normal. No nasal tenderness or mucosal edema. Right Nostril: No epistaxis or septal hematoma. Left Nostril: No epistaxis or septal hematoma. Eyes:      General: Lids are normal. Vision grossly intact. No scleral icterus. Right eye: No discharge. Left eye: No discharge. Extraocular Movements:      Right eye: Normal extraocular motion and no nystagmus. Left eye: Normal extraocular motion and no nystagmus. Conjunctiva/sclera: Conjunctivae normal.      Right eye: No chemosis or exudate. Left eye: No chemosis or exudate. Pupils: Pupils are equal, round, and reactive to light. Funduscopic exam:     Right eye: No hemorrhage, exudate or papilledema. Left eye: No hemorrhage, exudate or papilledema. Neck:      Thyroid: No thyroid mass, thyromegaly or thyroid tenderness. Vascular: No JVD. Trachea: Trachea normal. No tracheal deviation. Comments: No step-off deformity in the cervical, thoracic, or lumbar spine  Cardiovascular:      Rate and Rhythm: Normal rate and regular rhythm. Pulses: Normal pulses. Heart sounds: Normal heart sounds, S1 normal and S2 normal. No murmur heard. No friction rub.  No gallop. Pulmonary:      Effort: Pulmonary effort is normal. No respiratory distress. Breath sounds: Normal breath sounds. No stridor. No wheezing or rales. Chest:      Chest wall: No tenderness. Abdominal:      General: Bowel sounds are normal. There is no distension. Palpations: Abdomen is soft. There is no mass. Tenderness: There is no abdominal tenderness. There is no guarding or rebound. Musculoskeletal:         General: No tenderness. Normal range of motion. Cervical back: Normal range of motion and neck supple. No edema, erythema, signs of trauma, rigidity, torticollis or crepitus. Pain with movement and muscular tenderness present. No spinous process tenderness. Normal range of motion. Lymphadenopathy:      Cervical: No cervical adenopathy. Skin:     General: Skin is warm and dry. Capillary Refill: Capillary refill takes less than 2 seconds. Findings: No bruising, ecchymosis, lesion or rash. Neurological:      General: No focal deficit present. Mental Status: He is alert and oriented to person, place, and time. Mental status is at baseline. Cranial Nerves: No cranial nerve deficit. Sensory: No sensory deficit. Motor: No weakness. Coordination: Coordination normal.      Gait: Gait normal.      Deep Tendon Reflexes: Reflexes are normal and symmetric. Psychiatric:         Speech: Speech normal.         Behavior: Behavior normal.         Thought Content:  Thought content normal.         Judgment: Judgment normal.           DIFFERENTIAL DIAGNOSIS:   Mechanical fall, syncope, facial bone fracture, skull fracture, intracranial injury, cervical fracture, rib fracture, thoracic injury, pelvic fracture    DIAGNOSTIC RESULTS     EKG: All EKG's are interpreted by the Emergency Department Physician who either signs or Co-signs this chart in the absence of a cardiologist.  EKG reveals normal sinus rhythm, left axis deviation, ventricular rate of 93 LA interval 176 QRS duration of 84 QT interval 344 QTC of 427. RADIOLOGY: non-plain film images(s) such as CT, Ultrasound and MRI are read by the radiologist.  MRI CERVICAL SPINE WO CONTRAST   Final Result       1. Prominent retropharyngeal effusion along the anterior cervical spine without evidence of ligamentous injury. 2. Multilevel degenerative changes of cervical spine without significant spinal canal stenosis at any level and multiple areas of up to moderate neural foraminal stenosis. **This report has been created using voice recognition software. It may contain minor errors which are inherent in voice recognition technology. **      Final report electronically signed by Dr. Carlos Chauhan MD on 6/1/2021 8:08 AM      CTA NECK W WO CONTRAST   Final Result   Mild carotid bulb and cavernous ICA calcifications, without significant    vessel stenosis or occlusion. Vertebral arteries are grossly unremarkable. This document has been electronically signed by: Elli Reece MD    on 05/31/2021 06:02 AM      All CTs at this facility use dose modulation techniques and iterative    reconstructions, and/or weight-based dosing   when appropriate to reduce radiation to a low as reasonably achievable. Carotid stenosis and measurements are in accordance with NASCET criteria. CTA HEAD W WO CONTRAST   Final Result   Mild carotid bulb and cavernous ICA calcifications, without significant    vessel stenosis or occlusion. Vertebral arteries are grossly unremarkable. This document has been electronically signed by: Elli Reece MD    on 05/31/2021 05:46 AM      All CTs at this facility use dose modulation techniques and iterative    reconstructions, and/or weight-based dosing   when appropriate to reduce radiation to a low as reasonably achievable. Carotid stenosis and measurements are in accordance with NASCET criteria.       CT HEAD WO CONTRAST   Final Result Impression:   1. No acute intracranial abnormality. Age-appropriate exam.   2.  Stable intracranial exam.      This document has been electronically signed by: Josette Gonsalves MD on    05/31/2021 02:38 AM      All CTs at this facility use dose modulation techniques and iterative    reconstructions, and/or weight-based dosing   when appropriate to reduce radiation to a low as reasonably achievable. CT CERVICAL SPINE WO CONTRAST   Final Result   Impression:   1. Anterior inferior C3 acute corner fracture consistent with extension    type fracture/injury. Relative widening of the anterior disc space at    this level consistent with tear of the anterior longitudinal ligament. 2.  Multilevel severe spondylosis. 3.  Bilateral lung apical consolidations. This document has been electronically signed by: Josette Gonsalves MD on    05/31/2021 02:35 AM      All CTs at this facility use dose modulation techniques and iterative    reconstructions, and/or weight-based dosing   when appropriate to reduce radiation to a low as reasonably achievable. CT FACIAL BONES WO CONTRAST   Final Result   Impression:   No acute facial fracture. Right maxillary sinus disease. Bilateral mastoiditis with middle ear    involvement. This document has been electronically signed by: Josette Gonsalves MD on    05/31/2021 02:34 AM      All CTs at this facility use dose modulation techniques and iterative    reconstructions, and/or weight-based dosing   when appropriate to reduce radiation to a low as reasonably achievable. XR PELVIS (1-2 VIEWS)   Final Result   Impression:   1. No acute fracture or dislocation. This document has been electronically signed by: Josette Gonsalves MD on    05/31/2021 01:55 AM      XR CHEST PORTABLE   Final Result   Impression:   Bilateral upper lobe fibrosis and volume loss with bilateral lower lobe    emphysematous changes. No superimposed consolidation, pleural effusion,    or pneumothorax. This document has been electronically signed by: Evangelina Herndon MD on    05/31/2021 01:45 AM            LABS:   Labs Reviewed   COMPREHENSIVE METABOLIC PANEL - Abnormal; Notable for the following components:       Result Value    Glucose 116 (*)     Total Bilirubin 2.0 (*)     All other components within normal limits   CBC WITH AUTO DIFFERENTIAL - Abnormal; Notable for the following components:    WBC 4.5 (*)     RBC 4.08 (*)     Hemoglobin 13.5 (*)     Hematocrit 38.9 (*)     MCV 95.3 (*)     MCH 33.1 (*)     RDW-SD 45.7 (*)     Lymphocytes Absolute 0.8 (*)     All other components within normal limits   PROTIME-INR - Abnormal; Notable for the following components:    INR 1.19 (*)     All other components within normal limits   MAGNESIUM - Abnormal; Notable for the following components:    Magnesium 1.5 (*)     All other components within normal limits   GLOMERULAR FILTRATION RATE, ESTIMATED - Abnormal; Notable for the following components:    Est, Glom Filt Rate 73 (*)     All other components within normal limits   OSMOLALITY - Abnormal; Notable for the following components:    Osmolality Calc 272.3 (*)     All other components within normal limits   COMPREHENSIVE METABOLIC PANEL W/ REFLEX TO MG FOR LOW K - Abnormal; Notable for the following components:     Total Protein 5.9 (*)     Total Bilirubin 1.6 (*)     All other components within normal limits   CBC WITH AUTO DIFFERENTIAL - Abnormal; Notable for the following components:    WBC 3.9 (*)     RBC 3.74 (*)     Hemoglobin 12.3 (*)     Hematocrit 36.5 (*)     MCV 97.6 (*)     RDW-SD 47.6 (*)     Platelets 178 (*)     All other components within normal limits   ANION GAP - Abnormal; Notable for the following components:    Anion Gap 7.0 (*)     All other components within normal limits   GLOMERULAR FILTRATION RATE, ESTIMATED - Abnormal; Notable for the following components:    Est, Glom Filt Rate 83 (*)     All other components within normal limits ETHANOL   BRAIN NATRIURETIC PEPTIDE   APTT   LIPASE   TROPONIN   TSH WITHOUT REFLEX   ANION GAP   URINE RT REFLEX TO CULTURE   URINE DRUG SCREEN   CBC   BASIC METABOLIC PANEL       EMERGENCY DEPARTMENT COURSE:   Vitals:    Vitals:    06/01/21 1600 06/01/21 2021 06/01/21 2323 06/02/21 0315   BP:  135/68 (!) 121/58 (!) 111/55   Pulse:  53 57 53   Resp: 16 16 16 16   Temp:  98.2 °F (36.8 °C) 99 °F (37.2 °C) 98.3 °F (36.8 °C)   TempSrc:  Oral Oral Oral   SpO2:  99% 97% 96%   Weight:       Height:         Patient was assessed at bedside appropriate labs and imaging were ordered. [facility] was at bedside for trauma. Level 2 trauma had been called. Today the patient is awake alert and oriented. He may have had a syncopal episode in any event the work-up continued. Patient was able to move all his extremities. Here today labs were within normal limits. CT exam of the neck showed a C3 fracture. At this point I feel the patient needs to be admitted. We called trauma back. They graciously accepted the admission. Patient is admitted in stable condition pending further evaluation and treatment. CRITICAL CARE:   None    CONSULTS:  Trauma service    PROCEDURES:  US ED fast: See note by [facility]    FINAL IMPRESSION      1. Fall, initial encounter    2. Closed head injury, initial encounter    3. Closed nondisplaced fracture of third cervical vertebra, unspecified fracture morphology, initial encounter ([facility] [address]          DISPOSITION/PLAN   Admission    PATIENT REFERRED TO:  No follow-up provider specified.     DISCHARGE MEDICATIONS:  Current Discharge Medication List          (Please note that portions of this note were completed with a voice recognition program.  Efforts were made to edit the dictations but occasionally words are mis-transcribed.)    Serina Lazaro, [address], DO  06/02/21 [id number]

## 2021-05-31 NOTE — PLAN OF CARE
Problem: Pain:  Goal: Pain level will decrease  Description: Pain level will decrease  5/31/2021 1020 by Jessie Landin RN  Outcome: Ongoing  Note: Patient reports pain as a 10 on a 0-10 scale this shift. Patient's stated pain goal is no pain. Pain is acute/chronic and located in the lower back described as aching/discomfort. Non-pharmacological pain interventions include rest and repositioning. Pharmacological pain interventions on board per physician's order. Problem: Falls - Risk of:  Goal: Will remain free from falls  Description: Will remain free from falls  5/31/2021 1020 by Jessie Landin RN  Outcome: Ongoing  Note: Patient remains free from falls this shift. Fall precautions in place with bed/chair exit alarmed. Fall sign posted and fall armband in place. Nonskid footwear used with transferring. Educated patient to use call light when in need of staff assistance with transferring, ambulating, and other activities of daily living. Patient appropriately uses call light this shift. Problem: Skin Integrity:  Goal: Will show no infection signs and symptoms  Description: Will show no infection signs and symptoms  5/31/2021 1020 by Jessie Landin RN  Outcome: Ongoing  Note: Patient shows no new signs of infection this shift. Patient afebrile. Problem: Skin Integrity:  Goal: Absence of new skin breakdown  Description: Absence of new skin breakdown  Outcome: Ongoing  Note: Patient exhibits no new skin breakdown this shift. Patient repositined Q2H and as needed with staff assistance. All skin integrity issuse charted in Flowsheets. Will continue to monitor. Problem: Discharge Planning:  Goal: Discharged to appropriate level of care  Description: Discharged to appropriate level of care  5/31/2021 1020 by Jessie Landin RN  Outcome: Ongoing  Note: Patient educated that discharge plan is still in progress. Problem:  Activity:  Goal: Ability to perform activities at highest level will improve  Description: Ability to perform activities at highest level will improve  5/31/2021 1020 by Cherylene Kos, RN  Outcome: Ongoing  Note: Patient remains on bedrest with c-collar in place pending MRI results. Problem: Coping:  Goal: Ability to identify and develop effective coping behavior will improve  Description: Ability to identify and develop effective coping behavior will improve  Outcome: Ongoing     Problem: Safety:  Goal: Ability to maintain safety and efficiency of swallowing without signs of aspiration will improve  Description: Ability to maintain safety and efficiency of swallowing without signs of aspiration will improve  Outcome: Ongoing  Note: Patient NPO at this time. Care plan reviewed with patient and family. Patient and family verbalize understanding of the plan of care and contribute to goal setting.

## 2021-05-31 NOTE — FLOWSHEET NOTE
05/31/21 6207   Provider Notification   Reason for Communication Evaluate  (Consult)   Provider Name Dr. Chely Mcconnell   Provider Notification Physician   Method of Communication Secure Message   Response No new orders   Notification Time 3072     Contacted Dr. Chely Mcconnell regarding consult for syncope and collapse w/ h/o CAD and CABG - evaluate for inpatient cardiac stress test.

## 2021-05-31 NOTE — ED TRIAGE NOTES
Patient presents to the ED from home via [facility] c/c fall around 0000. Upon arrival to ED, patient is alert and oriented. Patient has C-collar applied and has facial abrasions. Patient states he got his first dose of Marceil Aureliano yesterday and began not feeling well. Patient states he fell asleep in his recliner chair when he woke up and was going to bed when he missed the two steps down to the room and fell down them. Patient reports pain in his neck, rating the pain 6/10. Patient is unsure if he lost consciousness. Patient is on Plavix and Aspirin. EKG complete. IV access established. Labs collected. Xray at bedside. Dr. Grover Dueñas and MARIE Villarreal at bedside.

## 2021-05-31 NOTE — PLAN OF CARE
Problem: Pain:  Goal: Pain level will decrease  Description: Pain level will decrease  Outcome: Ongoing  Note: Patient able to use 0-10 pain scale. Denies pain at this time. Agreeable to take PRN pain medications. Problem: Falls - Risk of:  Goal: Will remain free from falls  Description: Will remain free from falls  Outcome: Ongoing  Note: Patient remained free from falls this shift. Bed is in low position with alarm on and siderails up x2. Education given on use of call light and patient voiced understanding. Call light and beside table within reach. Arm band and falling star in place. Hourly rounds completed. Will continue to monitor. Problem: Skin Integrity:  Goal: Will show no infection signs and symptoms  Description: Will show no infection signs and symptoms  Outcome: Ongoing  Note: No signs of skin breakdown. Skin warm, dry, and intact. Mucous membranes pink and moist.  Assistance with turns/ambulation provided PRN. Will continue to monitor. Problem: Discharge Planning:  Goal: Discharged to appropriate level of care  Description: Discharged to appropriate level of care  Outcome: Ongoing  Note: Discharge planning in process and discussed with patient/family. Social work consulted for any additional needs. Care manager aware of discharge needs. Problem: Activity:  Goal: Ability to perform activities at highest level will improve  Description: Ability to perform activities at highest level will improve  Outcome: Ongoing  Note: Patient currently in [address] [place]. Patient on bedrest. Will continue to monitor. Problem: Sensory:  Goal: Pain level will decrease  Description: Pain level will decrease  Outcome: Ongoing  Note: Patient able to use 0-10 pain scale. Denies pain at this time. Agreeable to take PRN pain medications. Care plan reviewed with patient. Patient verbalizes understanding of the plan of care and contributed to goal setting.

## 2021-05-31 NOTE — FLOWSHEET NOTE
05/31/21 1100   Provider Notification   Reason for Communication Evaluate  (diet order)   Provider Name Allika [address]   Provider Notification Advance Practice Clinician (CNS, NP, CNM, CRNA, PA)   Method of Communication Secure Message   Response See orders   Notification Time 2000 [facility] PA in regards to Dr. Reid Arreola stating that patient is able to eat. See orders for a General diet.

## 2021-05-31 NOTE — CONSULTS
[address] [id number]                                  CONSULTATION    PATIENT NAME: Noemi Chavez                   :        1945  MED REC NO:   [id number]                           ROOM:       [id number]  ACCOUNT NO:   [de-identified]                           ADMIT DATE: 2021  PROVIDER:     SONAL Craig Kaur:  2021    CARDIOLOGY CONSULTATION    REASON FOR THE CONSULTATION:  Syncope. REQUESTING PROVIDER:  Hospitalist Service. HISTORY OF PRESENT ILLNESS:  A pleasant 79-year-old gentleman who  usually follows with Dr. Edgar Santiago, has a history of coronary artery  disease, bypass surgery in , history of hypertension,  hyperlipidemia, who has been in his usual state of health and apparently  went to the bathroom last night, on his way out, blacked out for brief  moment, ended up falling and dealing with a potential C-spine fracture. However, have some bruises on his face as well. We are consulted to  evaluate for his syncope. The patient denies any previous similar  episodes. Denies any dizziness, lightheadedness and/or loss of  conscious at any time. Denies any active chest pain, has been fairly  stable from the cardiac standpoint. Denies any recent chest pain or  shortness of breath. Initial evaluation has not revealed any obvious  arrhythmia. Blood pressure has been relatively stable, too low. REVIEW OF SYSTEMS:  No obvious fever or chills. No hematuria or  dysuria. No obvious abdominal pain, nausea, vomiting, or diarrhea. No  obvious active psych problems or suicidal ideation. No skin rashes. No  obvious dizziness, lightheadedness or loss of consciousness. No recent  trauma except what is mentioned above. No bleeding problem. No  HEENT-related problems. PAST MEDICAL HISTORY:  1. Coronary artery disease. 2.  Hypertension. 3.  Hyperlipidemia.     ALLERGIES:  CRESTOR, LIPITOR, TRAMADOL, VICODIN, and CODEINE. CURRENT MEDICATIONS:  Lipitor 40 a day, Plavix 75 a day, Prinivil 10 a  day, metoprolol 25 twice a day. PHYSICAL EXAMINATION:  VITAL SIGNS:  Showed a blood pressure of 110/70, heart rate of 70. GENERAL APPEARANCE:  Pleasant gentleman, in no obvious acute distress. Does have bruises on his forehead and sinus trauma with a collar in his  neck. LUNGS:  Decreased air entry. No crackles. No wheezes. HEART:  Normal S1 and S2. Systolic murmur grade 2/6. ABDOMEN:  Soft, nontender. Positive bowel sounds. No organomegaly. EXTREMITIES:  No significant edema. NEUROLOGIC:  Grossly intact. Awake, alert. No focal deficits. PSYCHIATRIC:  No evidence of active psychosis. SKIN:  No rashes. LABORATORY DATA:  Showed sodium 135, potassium 4.6, BUN 16, creatinine  1. White count 4.5, hemoglobin 13.5, hematocrit 38.9, platelets 726. IMPRESSION:  This is a gentleman who comes in with the above  presentation, syncope which is most likely related to an acute drop in  the blood pressure with orthostasis. This happened about two days after  he received a second shot of COVID vaccine which could have played a  role in that as well. RECOMMENDATIONS:  My recommendation is as follows:  1. Treat the primary problem which is his trauma. 2.  The patient will need evaluation for arrhythmia with an event  monitor as well as consideration of a tilt table test once he is able to  do that. 3.  I am going to hold his lisinopril temporarily, monitor the blood  pressure. 4. If Dr. Andres Saravia is back in town by tomorrow, will return the care  back to him. Otherwise, we will continue to monitor the patient and go  from there. Thank you for allowing us to participate in the care of this patient.         Bolivar Lewis M.D.    D: 05/31/2021 9:34:01       T: 05/31/2021 9:37:26     [email or web address]  Job#: [id number]     Doc#: [id number]    CC:

## 2021-05-31 NOTE — CONSULTS
Trauma H&P     Patient:  Maurizio Finch  Admit date: 5/31/2021   YOB: 1945 Date of Evaluation: 5/31/2021  MRN: [id number]  Acct: [de-identified]    Injury Date:5/30/2021  Injury time:Evening  PCP: Mikey Arroyo MD   Referring physician: Dr. Sadia Curiel    Time of Trauma Surgeon Notification:  [id number] May 30th, 2021  Time of WILLARD Arrival:0102 May 31  Time of Trauma Surgeon Arrival:      Assessment:      Patient Active Problem List   Diagnosis    Erectile dysfunction    Hepatic steatosis    Low HDL (under 36)    Essential hypertension    Gastroesophageal reflux disease    Hyperlipidemia    Primary osteoarthritis of left knee    Coronary artery disease involving coronary bypass graft of native heart without angina pectoris- Dr. Rafa Winchester    Chronic obstructive pulmonary disease ([address]    Gout of left foot    Abdominal aortic aneurysm without rupture ([address]- Dr. Crystal Ivory Angina pectoris, variant ([address]    Abnormal nuclear stress test    PVD (peripheral vascular disease) with claudication ([address]    Iron deficiency anemia    Pneumonia    Closed nondisplaced fracture of third cervical vertebra ([address]       Plan:    Patient admitted under Trauma Services 4A    Anterior-inferior C3 acute corner fracture   -Bedrest and n.p.o. until spine specialist evaluates    -Neurosurgery spine consulted for further management   -PT/OT when appropriate   -Pain control   -Place [place] collar.   C-spine precautions at all times   -CTA head/neck and MRI cervical spine ordered    Closed head injury              - SLP cog eval    - Limited stimulation brain injury guidelines              - Monitor for postconcussive symptoms              - Neuro checks   - Anti emetics and pain control    Possible syncopal episode   - Consult hospitalist for further workup    Consults neurosurgery, hospitalist    Pain Management   -Fentanyl    Prophylaxis: SCD's, Incentive Spirometry, GlycoLax, Pepcid, Zofran    N.p.o.    IVF Management  Regular Neurovascular Checks  Repeat Labs Tomorrow AM  PT/OT/SLP Eval and Treat  Bedrest    Planned Discharge discharge pending clinical course      Activation:    [] Level I (Trauma Alert)   [x] Level II (Injury Call)   [] Level III (Trauma Consult)   [] Downgraded (Time: )   Mode of Arrival: EMS transportation  Referring Facility:   Loss of Consciousness []No [x]Yes[]Unknown unknown duration(min)  Mechanism of Injury:  []Motor Vehicle crash   []Single Vehicle [] []Passenger []Scene Fatality []Front Seat  []Restrained   []Air Bag Deployed   []Ejected []Rollover []Pedestrian []Trapped   Type of vehicle:   Protective Devices:   []Motorcycle  Wearing Helmet []Yes []No  []Bicycle  Wearing Helmet []Yes []No  [x]Fall   Distance -2 steps   []Assault    Abuse Reported []Yes []No  []Gunshot  []Stabbing  []Work Related  []Burn: []Flame []Scald []Electrical []Chemical []Contact []Inhalation []House Fire  []Other:   Patient Active Problem List   Diagnosis    Erectile dysfunction    Hepatic steatosis    Low HDL (under 40)    Essential hypertension    Gastroesophageal reflux disease    Hyperlipidemia    Primary osteoarthritis of left knee    Coronary artery disease involving coronary bypass graft of native heart without angina pectoris- Dr. Andres Saravia    Chronic obstructive pulmonary disease [facility]    Gout of left foot    Abdominal aortic aneurysm without rupture ([facility]- Dr. Nina Reina Angina pectoris, variant ([address]    Abnormal nuclear stress test    PVD (peripheral vascular disease) with claudication (HCC)    Iron deficiency anemia    Pneumonia    Closed nondisplaced fracture of third cervical vertebra ([address]     Subjective   Chief Complaint: neck pain s/p fall    History of Present Illness:    He is a 70 y.o. male presenting at [facility] by activation of level II trauma, brought by EMS following a fall down multiple steps  with positive LOC; past medical history COPD, HTN, HTN, CAD s/p CABG on ASA/Plavix. Per patient report he was walking down two steps to go to bed when he became dizzy and lost consciousness while simultaneously falling and striking head. Patient unable to differentiate whether LOC caused fall or caused by fall. Patient reports Head and neck pain. Patient denies chest pain, shortness of breath, cough,  dizziness, lightheadedness, numbness, paraesthesias, weakness, chills, fevers, abdominal pain, nausea, vomiting, diarrhea, blood in stool, n or back pain. Care in coordination with trauma surgeon Dr. Kole Dennis. Review of Systems:   Review of Systems   Constitutional: Negative for chills and fever. Respiratory: Negative for chest tightness and shortness of breath. Cardiovascular: Negative for chest pain and palpitations. Gastrointestinal: Negative for abdominal pain, nausea and vomiting. Genitourinary: Negative for hematuria. Musculoskeletal: Positive for neck pain. Negative for back pain. Skin: Negative for color change, pallor and wound. Neurological: Positive for syncope and headaches. Negative for dizziness, seizures, weakness and light-headedness. Psychiatric/Behavioral: Negative for agitation and confusion. The patient is not nervous/anxious. All systems were reviewed and negative other than HPI  Crestor [rosuvastatin calcium], Lipitor, Tramadol, Vicodin [hydrocodone-acetaminophen], Codeine, and Percocet [oxycodone-acetaminophen]  Past Surgical History:   Procedure Laterality Date   [address] [address] SURGERY  2006    COLONOSCOPY  12/18/13    Dr. [facility]  2006    Dr. Armin Baeza.  ENDOSCOPY, COLON, DIAGNOSTIC      FOOT SURGERY  2011    Left foot.     HEMORRHOID SURGERY  04/2019    Banding--Dr. Kolton Nance    INGUINAL HERNIA REPAIR Right 12/1/2014    THROMBOENDARTERECTOMY Right 12/22/2016    Right Femoral Endarterectomy and Right SFA Angioplasty    UPPER GASTROINTESTINAL ENDOSCOPY       Past Medical History:   Diagnosis Date    AAA (abdominal aortic aneurysm) (HCC)     watching for now    COPD [facility] disease) [facility]     Coronary artery disease     Emphysema of lung ([facility]     Erectile dysfunction     GERD (gastroesophageal reflux disease)     Elevated liver enzymes    Gout     History of blood transfusion     Hyperlipidemia     Hypertension     Liver disease     Osteoarthritis      Past Surgical History:   Procedure Laterality Date    APPENDECTOMY  1955    CARDIAC CATHETERIZATION      [address] SURGERY  2006    COLONOSCOPY  12/18/13    Dr. Pily Montague  2006    Dr. Cata Little  ENDOSCOPY, COLON, DIAGNOSTIC      FOOT SURGERY  2011    Left foot.     HEMORRHOID SURGERY  04/2019    Banding--Dr. Sadia Augustine    INGUINAL HERNIA REPAIR Right 12/1/2014    THROMBOENDARTERECTOMY Right 12/22/2016    Right Femoral Endarterectomy and Right SFA Angioplasty    UPPER GASTROINTESTINAL ENDOSCOPY       Social History     Socioeconomic History    Marital status:      Spouse name: None    Number of children: None    Years of education: None    Highest education level: None   Occupational History    None   Tobacco Use    Smoking status: Former Smoker     Packs/day: 1.00     Years: 15.00     Pack years: 15.00     Quit date: 12/30/2005     Years since quitting: 15.4    Smokeless tobacco: Never Used   Vaping Use    Vaping Use: Never used   Substance and Sexual Activity    Alcohol use: No    Drug use: No    Sexual activity: Not Currently     Partners: Female     Comment:    Other Topics Concern    None   Social History Narrative    None     Social Determinants of Health     Financial Resource Strain: Low Risk     Difficulty of Paying Living Expenses: Not hard at all   Food Insecurity: No Food Insecurity    Worried About Running Out of Food in the Last Year: Never true    Jeremie of Food in the Last Year: Never true   Transportation Needs: No Transportation Needs    Lack of Transportation (Medical): No    Lack of Transportation (Non-Medical):  No   Physical Activity:     Days of Exercise per Week:     Minutes of Exercise per Session:    Stress:     Feeling of Stress :    Social Connections:     Frequency of Communication with Friends and Family:     Frequency of Social Gatherings with Friends and Family:     Attends [employer] Services:     Active Member of Clubs or Organizations:     Attends Club or Organization Meetings:     Marital Status:    Intimate Partner Violence:     Fear of Current or Ex-Partner:     Emotionally Abused:     Physically Abused:     Sexually Abused:      Family History   Problem Relation Age of Onset    Heart Disease Mother     Diabetes Mother     Heart Disease Father     Other Sister 77        P.E.    Heart Disease Sister     Cancer Sister     Heart Disease Brother        Home medications:    Current Discharge Medication List      CONTINUE these medications which have NOT CHANGED    Details   lisinopril (PRINIVIL;ZESTRIL) 10 MG tablet TAKE 1 TABLET EVERY DAY  Qty: 90 tablet, Refills: 3      simvastatin (ZOCOR) 40 MG tablet Take 1 tablet by mouth nightly  Qty: 90 tablet, Refills: 3    Associated Diagnoses: Hyperlipidemia, unspecified hyperlipidemia type      Ascorbic Acid (VITAMIN C PO) Take by mouth daily      ZINC PO Take by mouth daily       Probiotic Product (PROBIOTIC PO) Take by mouth daily       pantoprazole (PROTONIX) 40 MG tablet Take 1 tablet by mouth every morning (before breakfast)  Qty: 90 tablet, Refills: 3      metoprolol tartrate (LOPRESSOR) 50 MG tablet TAKE 1/2 TABLET EVERY DAY  Qty: 45 tablet, Refills: 3    Associated Diagnoses: Essential hypertension; Coronary artery disease involving coronary bypass graft of native heart without angina pectoris      allopurinol (ZYLOPRIM) 300 MG tablet Take 1 tablet by mouth daily  Qty: 90 tablet, Refills: 3 Associated Diagnoses: History of gout      clopidogrel (PLAVIX) 75 MG tablet Take 1 tablet by mouth daily  Qty: 30 tablet, Refills: 5      ferrous sulfate 325 (65 FE) MG tablet Take 325 mg by mouth daily       aspirin 325 MG EC tablet Take 325 mg by mouth daily. Cholecalciferol (D3 VITAMIN PO) Take by mouth daily      nitroGLYCERIN (NITROSTAT) 0.4 MG SL tablet Place 1 tablet under the tongue every 5 minutes as needed for Chest pain  Qty: 25 tablet, Refills: 3    Associated Diagnoses: Coronary artery disease involving coronary bypass graft of native heart without angina pectoris      acetaminophen (TYLENOL) 500 MG tablet Take 1,000 mg by mouth as needed for Pain.              Hospital medications:  Scheduled Meds:  Continuous Infusions:  PRN Meds:  Objective   ED TRIAGE VITALS  BP: 121/66, Temp: 99.5 °F (37.5 °C), Pulse: 84, Resp: 18, SpO2: 96 %  Hussein Coma Scale  Eye Opening: Spontaneous  Best Verbal Response: Oriented  Best Motor Response: Obeys commands  [place] Coma Scale Score: 15  Results for orders placed or performed during the hospital encounter of 05/31/21   Comprehensive Metabolic Panel   Result Value Ref Range    Glucose 116 (H) 70 - 108 mg/dL    CREATININE 1.0 0.4 - 1.2 mg/dL    BUN 16 7 - 22 mg/dL    Sodium 135 135 - 145 meq/L    Potassium 4.6 3.5 - 5.2 meq/L    Chloride 98 98 - 111 meq/L    CO2 24 23 - 33 meq/L    Calcium 9.0 8.5 - 10.5 mg/dL    AST 22 5 - 40 U/L    Alkaline Phosphatase 74 38 - 126 U/L    Total Protein 6.4 6.1 - 8.0 g/dL    Albumin 4.2 3.5 - 5.1 g/dL    Total Bilirubin 2.0 (H) 0.3 - 1.2 mg/dL    ALT 14 11 - 66 U/L   CBC Auto Differential   Result Value Ref Range    WBC 4.5 (L) 4.8 - 10.8 thou/mm3    RBC 4.08 (L) 4.70 - 6.10 mill/mm3    Hemoglobin 13.5 (L) 14.0 - 18.0 gm/dl    Hematocrit 38.9 (L) 42.0 - 52.0 %    MCV 95.3 (H) 80.0 - 94.0 fL    MCH 33.1 (H) 26.0 - 33.0 pg    MCHC 34.7 32.2 - 35.5 gm/dl    RDW-CV 13.2 11.5 - 14.5 %    RDW-SD 45.7 (H) 35.0 - 45.0 fL    Platelets equal bilaterally, spontaneous, and unlabored  Circulation: Hemodynamically stable, 2+  peripheral pulses. Disability: PICKENS x 4, following commands. GCS =15    Secondary Assessment:  GENERAL: Presents sitting upright in bed unassisted, A&Ox4 to person, place, time, and events; In no acute distress and well nourished  HEAD: Abrasion and ecchymosis noted to forhead. No tenderness to palpation. No raccoon eyes, walker signs or visible CSF leakage. EYES: No apparent trauma, discharge, or hematoma bilaterally. PERRL at 3mm  EARS: Set evenly on head. No apparent external trauma. TM grey and translucent, with anterior/inferior position of cone of light, no fluid lines, bubbles, or hemotympanum. NECK: C-spine maintained by C-collar. Neck is atraumatic, trachea midline, no visible lacerations, step off deformity, expanding swelling or midline tenderness. CARDIO: No visible chest wall deformity. No heaves or lifts. Strong/regular S1/S2 rate and rhythm. No rubs murmurs, or gallops. 2+ radial, posterior tibial, and dorsalis pedal pulses. Capillary refill <2 sec. No extremity edema noted. PULMONARY:  Trachea midline, no audible wheezing, increased respiratory effort, accessory muscle use, or asymmetrical chest wall movement. Lung sounds are clear and audible to ascultation in superior and inferior fields. ABDOMEN: Abdomen is non distended without lacerations. Abdomen soft and nontender to palpation in all quadrants. MSK: Moving all extremities without pain. No other deformity, contusion, or bleeding.  strength 5/5 and equal bilaterally. No tenderness to palpation at the midline of cervical, thoracic, and lumbar spine. NEURO: Follows commands, reasoning intact, recalls recent events. PMS intact, moves limbs freely. No focal neurological deficits  SKIN: Appropriate for ethnicity, warm and dry. Medical Decision Making: On arrival patient vital signs stable.  Patient sent for CT head, neck, and facial bones showing anterior-inferior acute corner fracture of C3 with some widened joint space suggestive of ligamentous injury. Neurosurgery consulted for management of cervical fracture, service recommendations pending. CTA head/neck as well as MRI cervical spine ordered. Patient to be mated to neuro stepdown. Will continue to monitor overnight. Radiology:     CT HEAD WO CONTRAST   Final Result   Impression:   1. No acute intracranial abnormality. Age-appropriate exam.   2.  Stable intracranial exam.      This document has been electronically signed by: Wen Arguello MD on    05/31/2021 02:38 AM      All CTs at this facility use dose modulation techniques and iterative    reconstructions, and/or weight-based dosing   when appropriate to reduce radiation to a low as reasonably achievable. CT CERVICAL SPINE WO CONTRAST   Final Result   Impression:   1. Anterior inferior C3 acute corner fracture consistent with extension    type fracture/injury. Relative widening of the anterior disc space at    this level consistent with tear of the anterior longitudinal ligament. 2.  Multilevel severe spondylosis. 3.  Bilateral lung apical consolidations. This document has been electronically signed by: Wen Arguello MD on    05/31/2021 02:35 AM      All CTs at this facility use dose modulation techniques and iterative    reconstructions, and/or weight-based dosing   when appropriate to reduce radiation to a low as reasonably achievable. CT FACIAL BONES WO CONTRAST   Final Result   Impression:   No acute facial fracture. Right maxillary sinus disease. Bilateral mastoiditis with middle ear    involvement. This document has been electronically signed by: Wen Arguello MD on    05/31/2021 02:34 AM      All CTs at this facility use dose modulation techniques and iterative    reconstructions, and/or weight-based dosing   when appropriate to reduce radiation to a low as reasonably achievable.       XR PELVIS (1-2 VIEWS)   Final Result   Impression:   1. No acute fracture or dislocation. This document has been electronically signed by: Josette Gonsalves MD on    05/31/2021 01:55 AM      XR CHEST PORTABLE   Final Result   Impression:   Bilateral upper lobe fibrosis and volume loss with bilateral lower lobe    emphysematous changes. No superimposed consolidation, pleural effusion,    or pneumothorax. This document has been electronically signed by: Josette Gonsalves MD on    05/31/2021 01:45 AM      CTA [id number]    (Results Pending)   CTA HEAD W WO CONTRAST    (Results Pending)   MRI CERVICAL SPINE WO CONTRAST    (Results Pending)     Fast Exam: Yes    FAST EXAM:  A limited, bedside FAST exam was performed. The medical necessity was to evaluate for the presence or absence of intraperitoneal or pericardial fluid. The structures studied were the hepatorenal space, splenorenal space, pericardium, and bladder. FINDINGS:  negative for free intra-abdominal fluid. The study was technically adequate.   Performed by myself at bedside    Electronically signed by MARIE Mann on 5/31/2021 at 4:00 AM

## 2021-05-31 NOTE — CONSULTS
Hospitalist Consult Note  [facility]sciences 4A    Inpatient consult to Hospitalist  Consult performed by: Robi Holder MD  Consult ordered by: MARIE Mcnamara      Patient: Michelle Flanagan    Unit/Bed: 4A-15/015-A    YOB: 1945    MRN: [id number]    Acct: [de-identified]     Admitting Diagnosis: Closed nondisplaced fracture of third cervical vertebra, unspecified fracture morphology, sequela [S12.201S]    Admit Date:  5/31/2021    Hospital Day: 0    Reason for consult. Medical evaluation for syncope and collapse. Subjective:    Acute closed nondisplaced C3 vertebral fracture following a syncope and collapse episode at home. HPI   79-year-old male patient admitted on the neurosurgical service for an acute closed nondisplaced fracture of the third cervical vertebra following a fall at home . Patient fell at home in the night when coming from the bathroom to his bedroom. Prior to the fall he felt dizzy, collapsed and hit his face and sustained a closed nondisplaced C3 vertebral fracture. Current neck pain is 6/10 intensity. Denies upper or lower extremity weakness. Currently in a cervical collar. Patient denies any chest pain, palpitations, nausea and vomiting prior to the fall. Patient had not been feeling well following a Covid vaccine shot a day prior to the fall. He has been feeling feverish with chills and general body aches. Lately he has had increased shortness of breath with exertion. However, at baseline he has shortness of breath due to COPD and emphysema. History of CAD with CABG 2006. Admission vital signs temperature 38.0 °C, respirate of 19, heart rate 97, blood pressure 112/71, oxygen saturation 95% on room air. Admission labs sodium 135, potassium 4.6, CO2 24, creatinine 1.0, magnesium 1.5, blood sugar 116, calcium 9.0, serum osmolality 272. 3.    proBNP 488.8, troponin first set less than 0.010.     Albumin 4.2, total protein 6.4, alkaline phosphate 74, ALT is 14, AST 22, total bilirubin 2.0.    TSH 3.370, serum alcohol levels less than 0.01.    CBC WBC 4.5, hemoglobin 13.5, MCV 95.3, platelets 988 and INR 1. 19.    A 12-lead EKG which reviewed shows normal sinus rhythm at 93 bpm.  QTC of 427 ms. Mild T wave inversion V1, V2 and V3. No ST segment elevation or depression. Chest x-ray which reviewed is negative for infiltrate. Positive COPD changes. Patient Seen, Chart, Labs, Radiology studies, and Consults reviewed. Past Medical History:        Diagnosis Date    AAA (abdominal aortic aneurysm) (HCC)     watching for now    [facility] disease) [facility] [address]     Coronary artery disease     Emphysema of lung ([facility] [address]     Erectile dysfunction     GERD (gastroesophageal reflux disease)     Elevated liver enzymes    Gout     History of blood transfusion     Hyperlipidemia     Hypertension     Liver disease     Osteoarthritis        Past Surgical History:        Procedure Laterality Date    APPENDECTOMY  1955    CARDIAC CATHETERIZATION      [address] SURGERY  2006    COLONOSCOPY  12/18/13    Dr. Germán Boogie  2006    Dr. Johnny Montana.  ENDOSCOPY, COLON, DIAGNOSTIC      FOOT SURGERY  2011    Left foot.  HEMORRHOID SURGERY  04/2019    Banding--Dr. Michelle Grayson    INGUINAL HERNIA REPAIR Right 12/1/2014    THROMBOENDARTERECTOMY Right 12/22/2016    Right Femoral Endarterectomy and Right SFA Angioplasty    UPPER GASTROINTESTINAL ENDOSCOPY         Medications Prior to Admission:    Prior to Admission medications    Medication Sig Start Date End Date Taking?  Authorizing Provider   lisinopril (PRINIVIL;ZESTRIL) 10 MG tablet TAKE 1 TABLET EVERY DAY 3/15/21  Yes Amira Booker MD   simvastatin (ZOCOR) 40 MG tablet Take 1 tablet by mouth nightly 2/11/21  Yes Amira Booker MD   Ascorbic Acid (VITAMIN C PO) Take by mouth daily   Yes Historical Provider, MD   ZINC PO Take by mouth daily Yes Historical Provider, MD   Probiotic Product (PROBIOTIC PO) Take by mouth daily    Yes Historical Provider, MD   pantoprazole (PROTONIX) 40 MG tablet Take 1 tablet by mouth every morning (before breakfast) 11/20/20  Yes MERE Tilley CNP   metoprolol tartrate (LOPRESSOR) 50 MG tablet TAKE 1/2 TABLET EVERY DAY 4/12/20  Yes Enrrique Martinez MD   allopurinol (ZYLOPRIM) 300 MG tablet Take 1 tablet by mouth daily 1/4/18  Yes Enrrique Martinez MD   clopidogrel (PLAVIX) 75 MG tablet Take 1 tablet by mouth daily 12/27/17  Yes Janet Navarro MD   ferrous sulfate 325 (65 FE) MG tablet Take 325 mg by mouth daily    Yes Historical Provider, MD   aspirin 325 MG EC tablet Take 325 mg by mouth daily. Yes Historical Provider, MD   Cholecalciferol (D3 VITAMIN PO) Take by mouth daily    Historical Provider, MD   nitroGLYCERIN (NITROSTAT) 0.4 MG SL tablet Place 1 tablet under the tongue every 5 minutes as needed for Chest pain 5/20/19   Enrrique Martinez MD   acetaminophen (TYLENOL) 500 MG tablet Take 1,000 mg by mouth as needed for Pain. Historical Provider, MD       Allergies:    Crestor [rosuvastatin calcium], Lipitor, Tramadol, Vicodin [hydrocodone-acetaminophen], Codeine, and Percocet [oxycodone-acetaminophen]    Social History:    TOBACCO:   reports that he quit smoking about 15 years ago. He has a 15.00 pack-year smoking history. He has never used smokeless tobacco.    ETOH:   reports no history of alcohol use.     Family History:        Problem Relation Age of Onset    Heart Disease Mother     Diabetes Mother     Heart Disease Father     Other Sister 77        P.E.    Heart Disease Sister     Cancer Sister     Heart Disease Brother        Objective:   /66   Pulse 84   Temp 99.5 °F (37.5 °C) (Oral)   Resp 18   Ht 5' 11\" (1.803 m)   Wt 187 lb 1.6 oz (84.9 kg)   SpO2 96%   BMI 26.10 kg/m²   No intake or output data in the 24 hours ending 05/31/21 0803    Review of Systems Constitutional: Positive for activity change. Negative for appetite change, chills, diaphoresis, fatigue, fever and unexpected weight change. HENT: Negative for congestion, dental problem, drooling, ear discharge, ear pain, facial swelling, hearing loss, mouth sores, nosebleeds, postnasal drip, rhinorrhea, sinus pressure, sinus pain, sneezing, sore throat, tinnitus, trouble swallowing and voice change. Eyes: Negative for photophobia, pain, discharge, redness, itching and visual disturbance. Respiratory: Negative for apnea, cough, choking, chest tightness, shortness of breath, wheezing and stridor. Cardiovascular: Negative for chest pain, palpitations and leg swelling. Gastrointestinal: Negative for abdominal distention, abdominal pain, anal bleeding, blood in stool, constipation, diarrhea, nausea, rectal pain and vomiting. Endocrine: Negative for cold intolerance, heat intolerance, polydipsia, polyphagia and polyuria. Genitourinary: Negative for decreased urine volume, difficulty urinating, discharge, dysuria, enuresis, flank pain, frequency, genital sores, hematuria, penile pain, penile swelling, scrotal swelling, testicular pain and urgency. Musculoskeletal: Positive for neck pain. Negative for arthralgias, back pain, gait problem, joint swelling, myalgias and neck stiffness. Skin: Negative for color change, pallor, rash and wound. Allergic/Immunologic: Negative for food allergies. Neurological: Positive for dizziness and syncope. Negative for tremors, seizures, facial asymmetry, speech difficulty, weakness, light-headedness, numbness and headaches. Hematological: Negative for adenopathy. Does not bruise/bleed easily. Psychiatric/Behavioral: Negative for agitation, behavioral problems, confusion, decreased concentration, dysphoric mood, hallucinations, self-injury, sleep disturbance and suicidal ideas. The patient is not nervous/anxious and is not hyperactive.         Physical Exam  Vitals and nursing note reviewed. Constitutional:       General: He is not in acute distress. Appearance: Normal appearance. He is normal weight. He is not ill-appearing, toxic-appearing or diaphoretic. HENT:      Head: Normocephalic and atraumatic. Comments: Facial bruises mostly involving the forehead. Right Ear: External ear normal.      Left Ear: External ear normal.      Nose: Nose normal. No congestion or rhinorrhea. Mouth/Throat:      Mouth: Mucous membranes are moist.      Pharynx: Oropharynx is clear. No oropharyngeal exudate or posterior oropharyngeal erythema. Eyes:      General: No scleral icterus. Right eye: No discharge. Left eye: No discharge. Extraocular Movements: Extraocular movements intact. Conjunctiva/sclera: Conjunctivae normal.      Pupils: Pupils are equal, round, and reactive to light. Neck:      Vascular: No carotid bruit. Comments: Cervical collar  Cardiovascular:      Rate and Rhythm: Normal rate and regular rhythm. Pulses: Normal pulses. Heart sounds: Normal heart sounds. No murmur heard. No friction rub. No gallop. Pulmonary:      Effort: Pulmonary effort is normal. No respiratory distress. Breath sounds: Normal breath sounds. No stridor. No wheezing, rhonchi or rales. Chest:      Chest wall: No tenderness. Abdominal:      General: Abdomen is flat. Bowel sounds are normal. There is no distension. Palpations: Abdomen is soft. There is no mass. Tenderness: There is no abdominal tenderness. There is no right CVA tenderness, left CVA tenderness, guarding or rebound. Hernia: No hernia is present. Musculoskeletal:         General: No swelling, tenderness, deformity or signs of injury. Normal range of motion. Cervical back: Normal range of motion. No rigidity or tenderness. Right lower leg: No edema. Lymphadenopathy:      Cervical: No cervical adenopathy.    Skin:     General: Skin is warm and dry. Coloration: Skin is not jaundiced or pale. Findings: No bruising, erythema, lesion or rash. Neurological:      General: No focal deficit present. Mental Status: He is alert and oriented to person, place, and time. Mental status is at baseline. Cranial Nerves: No cranial nerve deficit. Sensory: No sensory deficit. Motor: No weakness. Coordination: Coordination normal.      Gait: Gait normal.      Deep Tendon Reflexes: Reflexes normal.   Psychiatric:         Mood and Affect: Mood normal.         Behavior: Behavior normal.         Thought Content: Thought content normal.         Judgment: Judgment normal.     Medications:    sodium chloride flush  5-40 mL Intravenous 2 times per day    famotidine (PEPCID) injection  20 mg Intravenous BID    magnesium sulfate  2,000 mg Intravenous Once    allopurinol  300 mg Oral Daily    aspirin  325 mg Oral Daily    clopidogrel  75 mg Oral Daily    lisinopril  10 mg Oral Daily    metoprolol tartrate  25 mg Oral BID    pantoprazole  40 mg Oral QAM AC    atorvastatin  40 mg Oral Daily       Continuous Infusions:   sodium chloride      sodium chloride 100 mL/hr at 05/31/21 0439       PRN Meds:sodium chloride flush, sodium chloride, acetaminophen, promethazine **OR** ondansetron, polyethylene glycol, fentanNYL **OR** fentanNYL, nitroGLYCERIN    DVTProphylaxis:regimen to be chosen by surgical team and Encourage ambulation, low risk for DVT, no chemical or mechanical prophylaxis necessary    Data:    CBC:  Recent Labs     05/31/21 0058   WBC 4.5*   RBC 4.08*   HGB 13.5*   HCT 38.9*   MCV 95.3*          BMP:  Recent Labs     05/31/21 0058      K 4.6   CL 98   CO2 24   BUN 16   CREATININE 1.0       BNP: No results for input(s): BNP in the last 72 hours.     PT/INR:   Recent Labs     05/31/21 0058   INR 1.19*       FASTING LIPID PANEL:  Lab Results   Component Value Date    CHOL 140 03/06/2021    HDL 38 03/06/2021 TRIG 109 03/06/2021       ABGs:   Lab Results   Component Value Date    PH 7.0 08/01/2011     URINALYSIS. None. SEROLOGY  None. TUMOR MARKERS. None. MICROBIOLOGY   None. HISTOPATHOLOGY. None. TOXICOLOGY. Results for Noemi Chavez (MRN [id number]) as of 5/31/2021 07:45   Ref. Range 5/31/2021 00:58   ETHYL ALCOHOL, SERUM Latest Ref Range: 0.00 % < 0.01       ENDOSCOPE STUDIES. None. PROCEDURES. None. RADIOLOGY. CTA Head and neck w/ and w/o contrast-05/31/2021. FINDINGS:  CTA NECK:   Lung apices are unremarkable.     Aortic arch unremarkable. Major vessel origins are patent. The common, internal and external carotid arteries are symmetric and remarkable only for  mild atherosclerotic changes. .    Vertebral arteries are patent, the right is slightly dominant. Vessel origins are grossly unremarkable. The anterior inferior avulsion of the C3 vertebral body is again noted, without change in   alignment of the cervical spine. No soft tissue lesion of the neck is appreciated. Chronic pleural parenchymal changes of both apices and upper mediastinal adenopathy again   noted.     CTA HEAD:   Major cerebral arterial structures are patent. The internal carotid arteries are patent through the carotid termini bilaterally. Atherosclerotic ossification of both cavernous ICAs. Both anterior and both middle cerebral arteries are patent, without evidence of vessel   occlusion or significant stenosis.     [place] appears to be intact. Vertebrobasilar arteries are patent and normal appearing, with symmetric appearance of   posterior cerebral arteries.     No aneurysm or vascular malformation is appreciated.     IMPRESSION:  Mild carotid bulb and cavernous ICA calcifications, without significant vessel stenosis or   occlusion.       Vertebral arteries are grossly unremarkable.     This document has been electronically signed by: Nubia Richard MD   on 05/31/2021 06:02 AM.    CT head w/o contrast - 05/31/2021. FINDINGS:  Age-appropriate sulcation. No intracranial mass, midline shift, hydrocephalus, acute hemorrhage or infarct.     Unremarkable orbits. Visualized paranasal sinuses are clear. Mastoid air cells are clear. No skull fracture. No significant scalp soft tissue swelling.     IMPRESSION:  1. No acute intracranial abnormality. Age-appropriate exam.    2.  Stable intracranial exam.    CT facial bones without contrast-05/31/2020  FINDINGS:  No significant facial soft tissue swelling. Preserved orbits and globes. No acute midface fracture. Air-fluid level within the right maxillary sinus. Preserved mandible. Moderate osteoarthritis of the right TMJ.     Partial opacification of the mastoid air cells with middle ear cavity involvement.     IMPRESSION:  No acute facial fracture. Right maxillary sinus disease. Bilateral mastoiditis with middle ear involvement. CT C-spine w/o contrast-05/31/2021. FINDINGS:  Vertebral body heights are preserved. Acute anterior inferior corner fracture of C3, series 4 image 29. Fracture is minimally displaced. Mild widening of the anterior disc space. No significant overlying soft tissue swelling. No significant prevertebral soft tissue swelling. Multilevel moderate to severe degenerative disc and facet disease. Craniocervical junction and C1-C2 articulations are without abnormal alignment/asymmetry. Dens is intact. Severe degenerative narrowing of the atlantodens interval.     Normal limited view of the intracranial contents. Neck soft tissues are unremarkable for age. Lung apices demonstrate consolidations.     IMPRESSION:  1. Anterior inferior C3 acute corner fracture consistent with extension type fracture/injury.           Relative widening of the anterior disc space at this level consistent with tear of the anterior longitudinal ligament. 2.  Multilevel severe spondylosis. 3.  Bilateral lung apical consolidations.     Chest x-ray-05/31/2021. FINDINGS:  Chronic bilateral upper lobe scarring/fibrosis with volume loss. Lower lobe emphysematous changes without consolidation. Median sternotomy wires and mediastinal clips again noted, without cardiac enlargement.     No acute fracture.     IMPRESSION:  Bilateral upper lobe fibrosis and volume loss with bilateral lower lobe emphysematous changes. No superimposed consolidation, pleural effusion, or pneumothorax. Echocardiogram-11/01/2017. IMPRESSION:  1. Preserved systolic function. The left ventricular ejection fraction was 55%. No mitral regurg. No gradient across the aortic valve. 2.Total occlusion of the PDA of the circumflex. 3.Competitive flow of the LAD. 4.LIMA to the LAD was patent with a good distal runoff. 5.Saphenous vein graft to the diagonal artery was patent with about 50% narrowing proximally,      small caliber diagonal artery. 6.Saphenous vein graft to the RCA was patent with a good distal runoff. 7.Total occlusion of the mid RCA. 8.LIMA to the LAD was patent with a good distal runoff. 9.A small abdominal aortic aneurysm above the point of bifurcation. 10.Nonobstructive disease of the SFA and iliac artery on both sides. 11.Two-vessel distal runoff on the left side. 12.In the distal one-third of the right leg, no major arteries were seen but reconstitution at the        foot level.          The patient has no acute complication from the procedure. At this point, would continue with the aggressive medical treatment, risk factor modification, periodic       followup. The patient has no acute complication from the procedure.  Verna Mayers M.D.    Mariana Paredes. 1.NEUROVASCULAR. Dizziness leading to syncope and collapse.      Closed head injury with facial /forehead contusion-neurochecks. 2.PULMONARY. COPD and emphysema w/o exacerbation-as needed nebs       3. CARDIOVASCULAR. CAD without angina. Progressive RODRIGUEZ     H/o CABG-2006. Abnormal EKG w/ TWI in the LAD territory. Stable AAA     Echocardiogram for LV function evaluation. Cardiology consult for inpatient cardiac stress test.      4.GI.     H/o liver disease-unclear type. 5.RENAL AND ELECTROLYTES. Electrolyte imbalance w/ hypomagnesemia-serum mag 1.5-replete    6. ID. UA to rule out UTI. 7.ENDO. TSH 3.370 and A1c check. 8.MUSCULOSKELETAL. Acute closed C3 fracture-stable     Neurosurgery following.     Electronically signed by Faby Adame MD on 5/31/2021 at 8:03 AM

## 2021-05-31 NOTE — CONSULTS
[address], [place], [address]                                          NEUROSURGICAL CONSULTATION NOTE       Wendelin Kocher   YOB: 1945  Account Number: [de-identified]   Date of Examination: 5/31/2021    ASSESSMENT:  Closed nondisplaced fracture of the third cervical vertebrae. PLAN:  Patient is seen and evaluated on the floor with evaluation exam findings reviewed and discussed with Dr. Sergo Hartley and with nursing. No acute neurosurgical intervention is indicated at this time. Neurosurgery recommends that the patient remain in the [facility] cervical collar. An MRI of the cervical spine is recommended, has been ordered and is pending review. Neurosurgery to follow    HISTORY OF PRESENT ILLNESS:  Wendelin Kocher is a pleasant 70 y.o. male, a former smoker tobacco with a quit date of 12/30/2005 following a pack to a 15-year history who denies current alcohol use and has a medical history significant for gout, coronary artery disease and hyperlipidemia, hypertension, GERD, liver disease, history for blood transfusion and abdominal aortic aneurysm who was admitted through the emergency department on :5/31/2021 12:59 AM for evaluation and treatment of injuries sustained in a mechanical fall while walking downstairs at his home. SPECT with loss of consciousness with transport via ambulance for evaluation. Patient presents with complaints of neck pain but was otherwise alert and oriented with no focal neurological deficits noted on exam.  CTA of the head neck are reviewed and are unremarkable. A CT scan of the cervical spine reveals a closed nondisplaced flexion injury fracture of the third cervical vertebrae. ROS:    Review of Systems  Constitutional: Negative for activity change, appetite change, diaphoresis, fatigue and unexpected weight change.    HENT: Negative for congestion, ear discharge, ear pain, hearing loss, mouth sores, nosebleeds, postnasal drip, rhinorrhea, sinus pressure, sore throat, trouble swallowing and voice change. Minor abrasions to nose and forehead   Eyes: Negative for photophobia, pain, discharge, redness and itching. Respiratory: Negative for cough, choking, chest tightness, shortness of breath and wheezing. Cardiovascular: Negative for chest pain, palpitations and leg swelling. Gastrointestinal: Negative for abdominal distention, abdominal pain, blood in stool, constipation, diarrhea, nausea and vomiting. Endocrine: Negative for polydipsia, polyphagia and polyuria. Genitourinary: Negative for decreased urine volume, difficulty urinating, dysuria, enuresis, frequency, hematuria and urgency. Musculoskeletal: Positive for neck pain. Negative for arthralgias, back pain, gait problem, myalgias and neck stiffness. Skin: Negative for pallor and rash. Allergic/Immunologic: Negative for immunocompromised state. Neurological: Positive for headaches. Negative for dizziness, tremors, seizures, syncope, facial asymmetry, weakness, light-headedness and numbness. Hematological: Negative for adenopathy. Does not bruise/bleed easily. Psychiatric/Behavioral: Negative for agitation, hallucinations and suicidal ideas. The patient is not nervous/anxious.       PROBLEM LIST:  Patient Active Problem List   Diagnosis    Erectile dysfunction    Hepatic steatosis    Low HDL (under 40)    Essential hypertension    Gastroesophageal reflux disease    Hyperlipidemia    Primary osteoarthritis of left knee    Coronary artery disease involving coronary bypass graft of native heart without angina pectoris- Dr. Ayala Right    Chronic obstructive pulmonary disease ([facility] [address]    Gout of left foot    Abdominal aortic aneurysm without rupture ([facility] [address]- Dr. Medina Powers Angina pectoris, variant ([address]    Abnormal nuclear stress test    PVD (peripheral vascular disease) with claudication (HCC)    Iron deficiency anemia    Pneumonia    Closed nondisplaced fracture of third cervical vertebra ([facility] [address]    Fall    Closed head injury    Syncope       MEDICATIONS:   Prior to Admission medications    Medication Sig Start Date End Date Taking? Authorizing Provider   lisinopril (PRINIVIL;ZESTRIL) 10 MG tablet TAKE 1 TABLET EVERY DAY 3/15/21  Yes Xi Elena MD   simvastatin (ZOCOR) 40 MG tablet Take 1 tablet by mouth nightly 2/11/21  Yes Xi Elena MD   Ascorbic Acid (VITAMIN C PO) Take by mouth daily   Yes Historical Provider, MD   ZINC PO Take by mouth daily    Yes Historical Provider, MD   Probiotic Product (PROBIOTIC PO) Take by mouth daily    Yes Historical Provider, MD   pantoprazole (PROTONIX) 40 MG tablet Take 1 tablet by mouth every morning (before breakfast) 11/20/20  Yes MERE Lozano CNP   metoprolol tartrate (LOPRESSOR) 50 MG tablet TAKE 1/2 TABLET EVERY DAY 4/12/20  Yes Xi Elena MD   allopurinol (ZYLOPRIM) 300 MG tablet Take 1 tablet by mouth daily 1/4/18  Yes Xi Elena MD   clopidogrel (PLAVIX) 75 MG tablet Take 1 tablet by mouth daily 12/27/17  Yes Grazyna Waite MD   ferrous sulfate 325 (65 FE) MG tablet Take 325 mg by mouth daily    Yes Historical Provider, MD   aspirin 325 MG EC tablet Take 325 mg by mouth daily. Yes Historical Provider, MD   Cholecalciferol (D3 VITAMIN PO) Take by mouth daily    Historical Provider, MD   nitroGLYCERIN (NITROSTAT) 0.4 MG SL tablet Place 1 tablet under the tongue every 5 minutes as needed for Chest pain 5/20/19   Xi Elena MD   acetaminophen (TYLENOL) 500 MG tablet Take 1,000 mg by mouth as needed for Pain.     Historical Provider, MD       Current Facility-Administered Medications   Medication Dose Route Frequency Provider Last Rate Last Admin    sodium chloride flush 0.9 % injection 5-40 mL  5-40 mL Intravenous 2 times per day MARIE Rodriguez        sodium chloride flush 0.9 % injection 5-40 mL  5-40 mL Intravenous PRN MARIE Correa        0.9 % sodium chloride infusion 25 mL Intravenous PRN MARIE Mann        acetaminophen (TYLENOL) tablet 650 mg  650 mg Oral Q4H PRN MARIE Correa        promethazine (PHENERGAN) tablet 12.5 mg  12.5 mg Oral Q6H PRN MARIE Correa        Or    ondansetron (ZOFRAN) injection 4 mg  4 mg Intravenous Q6H PRN MARIE Correa        polyethylene glycol (GLYCOLAX) packet 17 g  17 g Oral Daily PRN MARIE Correa        0.9 % sodium chloride infusion   Intravenous Continuous MARIE Correa 100 mL/hr at 05/31/21 0439 New Bag at 05/31/21 0439    fentaNYL (SUBLIMAZE) injection 25 mcg  25 mcg Intravenous Q1H PRN MARIE Correa        Or    fentaNYL (SUBLIMAZE) injection 50 mcg  50 mcg Intravenous Q1H PRN MARIE Correa        famotidine (PEPCID) injection 20 mg  20 mg Intravenous BID MARIE Correa            sodium chloride flush, 5-40 mL, PRN  sodium chloride, 25 mL, PRN  acetaminophen, 650 mg, Q4H PRN  promethazine, 12.5 mg, Q6H PRN   Or  ondansetron, 4 mg, Q6H PRN  polyethylene glycol, 17 g, Daily PRN  fentanNYL, 25 mcg, Q1H PRN   Or  fentanNYL, 50 mcg, Q1H PRN         sodium chloride      sodium chloride 100 mL/hr at 05/31/21 0439         ALLERGIES:   Crestor [rosuvastatin calcium], Lipitor, Tramadol, Vicodin [hydrocodone-acetaminophen], Codeine, and Percocet [oxycodone-acetaminophen]    PAST MEDICAL  HISTORY:    has a past medical history of AAA (abdominal aortic aneurysm) ([facility] [address] COPD (chronic obstructive pulmonary disease) ([facility] [address] Coronary artery disease, Emphysema of lung ([facility] [address] Erectile dysfunction, GERD (gastroesophageal reflux disease), Gout, History of blood transfusion, Hyperlipidemia, Hypertension, Liver disease, and Osteoarthritis. PAST SURGICAL  HISTORY:    has a past surgical history that includes Foot surgery (2011); Coronary artery bypass graft (2006); Cardiac surgery (2006); Colonoscopy (12/18/13); Appendectomy (1955); Cardiac catheterization; Inguinal hernia repair (Right, 12/1/2014);  Upper gastrointestinal endoscopy; Endoscopy, colon, diagnostic; thromboendarterectomy (Right, 12/22/2016); and Hemorrhoid surgery (04/2019). SOCIAL HISTORY:   Social History     Tobacco Use    Smoking status: Former Smoker     Packs/day: 1.00     Years: 15.00     Pack years: 15.00     Quit date: 12/30/2005     Years since quitting: 15.4    Smokeless tobacco: Never Used   Substance Use Topics    Alcohol use: No       FAMILY HISTORY:  Family History   Problem Relation Age of Onset    Heart Disease Mother     Diabetes Mother     Heart Disease Father     Other Sister 77        P.E.    Heart Disease Sister     Cancer Sister     Heart Disease Brother        LABS  None    RADIOLOGY:  Pertinent images have been reviewed. MRI cervical spine pending. CTA head neck reviewed unremarkable for injury, stenosis or occlusions. CT scan cervical spine imaged on admission is reviewed to reveal anterior inferior C3 acute corner fracture consistent with extension type injury. Severe multilevel spondylosis is also noted. EXAMINATION:  Physical Exam  No change from admission. See HPI. Vitals and nursing note reviewed. Constitutional:       General: He is not in acute distress. Appearance: He is well-developed. He is not diaphoretic. HENT:      Head: Normocephalic. Abrasion and contusion present. No raccoon eyes, Jennings's sign, masses, right periorbital erythema or left periorbital erythema. Hair is normal.      Jaw: There is normal jaw occlusion. Right Ear: Hearing, tympanic membrane, ear canal and external ear normal. No hemotympanum. Left Ear: Hearing, tympanic membrane, ear canal and external ear normal. No hemotympanum. Nose: Nose normal. No nasal tenderness or mucosal edema. Right Nostril: No epistaxis or septal hematoma. Left Nostril: No epistaxis or septal hematoma. Eyes:      General: Lids are normal. Vision grossly intact. No scleral icterus. Right eye: No discharge. Left eye: No discharge. Extraocular Movements:      Right eye: Normal extraocular motion and no nystagmus. Left eye: Normal extraocular motion and no nystagmus. Conjunctiva/sclera: Conjunctivae normal.      Right eye: No chemosis or exudate. Left eye: No chemosis or exudate. Pupils: Pupils are equal, round, and reactive to light. Funduscopic exam:     Right eye: No hemorrhage, exudate or papilledema. Left eye: No hemorrhage, exudate or papilledema. Neck:      Thyroid: No thyroid mass, thyromegaly or thyroid tenderness. Vascular: No JVD. Trachea: Trachea normal. No tracheal deviation. Comments: No step-off deformity in the cervical, thoracic, or lumbar spine  Cardiovascular:      Rate and Rhythm: Normal rate and regular rhythm. Pulses: Normal pulses. Heart sounds: Normal heart sounds, S1 normal and S2 normal. No murmur heard. No friction rub. No gallop. Pulmonary:      Effort: Pulmonary effort is normal. No respiratory distress. Breath sounds: Normal breath sounds. No stridor. No wheezing or rales. Chest:      Chest wall: No tenderness. Abdominal:      General: Bowel sounds are normal. There is no distension. Palpations: Abdomen is soft. There is no mass. Tenderness: There is no abdominal tenderness. There is no guarding or rebound. Musculoskeletal:         General: No tenderness. Normal range of motion. Cervical back: Normal range of motion and neck supple. No edema, erythema, signs of trauma, rigidity, torticollis or crepitus. Pain with movement and muscular tenderness present. No spinous process tenderness. Normal range of motion. Lymphadenopathy:      Cervical: No cervical adenopathy. Skin:     General: Skin is warm and dry. Capillary Refill: Capillary refill takes less than 2 seconds. Findings: No bruising, ecchymosis, lesion or rash. Neurological:      General: No focal deficit present.

## 2021-06-01 ENCOUNTER — APPOINTMENT (OUTPATIENT)
Dept: MRI IMAGING | Age: 76
End: 2021-06-01
Payer: MEDICARE

## 2021-06-01 LAB
ALBUMIN SERPL-MCNC: 3.6 G/DL (ref 3.5–5.1)
ALP BLD-CCNC: 62 U/L (ref 38–126)
ALT SERPL-CCNC: 13 U/L (ref 11–66)
ANION GAP SERPL CALCULATED.3IONS-SCNC: 7 MEQ/L (ref 8–16)
AST SERPL-CCNC: 24 U/L (ref 5–40)
BASOPHILS # BLD: 0.8 %
BASOPHILS ABSOLUTE: 0 THOU/MM3 (ref 0–0.1)
BILIRUB SERPL-MCNC: 1.6 MG/DL (ref 0.3–1.2)
BUN BLDV-MCNC: 11 MG/DL (ref 7–22)
CALCIUM SERPL-MCNC: 8.6 MG/DL (ref 8.5–10.5)
CHLORIDE BLD-SCNC: 104 MEQ/L (ref 98–111)
CO2: 26 MEQ/L (ref 23–33)
CREAT SERPL-MCNC: 0.9 MG/DL (ref 0.4–1.2)
EOSINOPHIL # BLD: 4.9 %
EOSINOPHILS ABSOLUTE: 0.2 THOU/MM3 (ref 0–0.4)
ERYTHROCYTE [DISTWIDTH] IN BLOOD BY AUTOMATED COUNT: 13.3 % (ref 11.5–14.5)
ERYTHROCYTE [DISTWIDTH] IN BLOOD BY AUTOMATED COUNT: 47.6 FL (ref 35–45)
GFR SERPL CREATININE-BSD FRML MDRD: 83 ML/MIN/1.73M2
GLUCOSE BLD-MCNC: 99 MG/DL (ref 70–108)
HCT VFR BLD CALC: 36.5 % (ref 42–52)
HEMOGLOBIN: 12.3 GM/DL (ref 14–18)
IMMATURE GRANS (ABS): 0.02 THOU/MM3 (ref 0–0.07)
IMMATURE GRANULOCYTES: 0.5 %
LYMPHOCYTES # BLD: 24.9 %
LYMPHOCYTES ABSOLUTE: 1 THOU/MM3 (ref 1–4.8)
MCH RBC QN AUTO: 32.9 PG (ref 26–33)
MCHC RBC AUTO-ENTMCNC: 33.7 GM/DL (ref 32.2–35.5)
MCV RBC AUTO: 97.6 FL (ref 80–94)
MONOCYTES # BLD: 12.2 %
MONOCYTES ABSOLUTE: 0.5 THOU/MM3 (ref 0.4–1.3)
NUCLEATED RED BLOOD CELLS: 0 /100 WBC
PLATELET # BLD: 121 THOU/MM3 (ref 130–400)
PMV BLD AUTO: 10 FL (ref 9.4–12.4)
POTASSIUM REFLEX MAGNESIUM: 4.3 MEQ/L (ref 3.5–5.2)
RBC # BLD: 3.74 MILL/MM3 (ref 4.7–6.1)
SEG NEUTROPHILS: 56.7 %
SEGMENTED NEUTROPHILS ABSOLUTE COUNT: 2.2 THOU/MM3 (ref 1.8–7.7)
SODIUM BLD-SCNC: 137 MEQ/L (ref 135–145)
TOTAL PROTEIN: 5.9 G/DL (ref 6.1–8)
WBC # BLD: 3.9 THOU/MM3 (ref 4.8–10.8)

## 2021-06-01 PROCEDURE — 72141 MRI NECK SPINE W/O DYE: CPT

## 2021-06-01 PROCEDURE — 99232 SBSQ HOSP IP/OBS MODERATE 35: CPT | Performed by: SURGERY

## 2021-06-01 PROCEDURE — 2580000003 HC RX 258: Performed by: PHYSICIAN ASSISTANT

## 2021-06-01 PROCEDURE — 6370000000 HC RX 637 (ALT 250 FOR IP): Performed by: INTERNAL MEDICINE

## 2021-06-01 PROCEDURE — 6360000002 HC RX W HCPCS: Performed by: INTERNAL MEDICINE

## 2021-06-01 PROCEDURE — 99232 SBSQ HOSP IP/OBS MODERATE 35: CPT | Performed by: PHYSICIAN ASSISTANT

## 2021-06-01 PROCEDURE — 97112 NEUROMUSCULAR REEDUCATION: CPT

## 2021-06-01 PROCEDURE — 92523 SPEECH SOUND LANG COMPREHEN: CPT

## 2021-06-01 PROCEDURE — 96372 THER/PROPH/DIAG INJ SC/IM: CPT

## 2021-06-01 PROCEDURE — G0378 HOSPITAL OBSERVATION PER HR: HCPCS

## 2021-06-01 PROCEDURE — 97166 OT EVAL MOD COMPLEX 45 MIN: CPT

## 2021-06-01 PROCEDURE — 96376 TX/PRO/DX INJ SAME DRUG ADON: CPT

## 2021-06-01 PROCEDURE — 80053 COMPREHEN METABOLIC PANEL: CPT

## 2021-06-01 PROCEDURE — 97161 PT EVAL LOW COMPLEX 20 MIN: CPT

## 2021-06-01 PROCEDURE — APPSS30 APP SPLIT SHARED TIME 16-30 MINUTES: Performed by: PHYSICIAN ASSISTANT

## 2021-06-01 PROCEDURE — 2060000000 HC ICU INTERMEDIATE R&B

## 2021-06-01 PROCEDURE — 99233 SBSQ HOSP IP/OBS HIGH 50: CPT | Performed by: NEUROLOGICAL SURGERY

## 2021-06-01 PROCEDURE — 85025 COMPLETE CBC W/AUTO DIFF WBC: CPT

## 2021-06-01 PROCEDURE — APPSS60 APP SPLIT SHARED TIME 46-60 MINUTES: Performed by: NURSE PRACTITIONER

## 2021-06-01 PROCEDURE — 99233 SBSQ HOSP IP/OBS HIGH 50: CPT | Performed by: INTERNAL MEDICINE

## 2021-06-01 PROCEDURE — 97535 SELF CARE MNGMENT TRAINING: CPT

## 2021-06-01 PROCEDURE — 97530 THERAPEUTIC ACTIVITIES: CPT

## 2021-06-01 PROCEDURE — 36415 COLL VENOUS BLD VENIPUNCTURE: CPT

## 2021-06-01 PROCEDURE — 2500000003 HC RX 250 WO HCPCS: Performed by: PHYSICIAN ASSISTANT

## 2021-06-01 RX ORDER — CLOPIDOGREL BISULFATE 75 MG/1
75 TABLET ORAL DAILY
Status: DISCONTINUED | OUTPATIENT
Start: 2021-06-01 | End: 2021-06-02 | Stop reason: HOSPADM

## 2021-06-01 RX ADMIN — PANTOPRAZOLE SODIUM 40 MG: 40 TABLET, DELAYED RELEASE ORAL at 06:00

## 2021-06-01 RX ADMIN — ASPIRIN 325 MG: 325 TABLET, COATED ORAL at 15:54

## 2021-06-01 RX ADMIN — SODIUM CHLORIDE: 9 INJECTION, SOLUTION INTRAVENOUS at 00:28

## 2021-06-01 RX ADMIN — METOPROLOL TARTRATE 25 MG: 25 TABLET, FILM COATED ORAL at 08:45

## 2021-06-01 RX ADMIN — ATORVASTATIN CALCIUM 40 MG: 40 TABLET, FILM COATED ORAL at 20:28

## 2021-06-01 RX ADMIN — ALLOPURINOL 300 MG: 300 TABLET ORAL at 08:45

## 2021-06-01 RX ADMIN — CLOPIDOGREL BISULFATE 75 MG: 75 TABLET ORAL at 15:54

## 2021-06-01 RX ADMIN — FAMOTIDINE 20 MG: 10 INJECTION, SOLUTION INTRAVENOUS at 08:47

## 2021-06-01 RX ADMIN — FAMOTIDINE 20 MG: 10 INJECTION, SOLUTION INTRAVENOUS at 20:29

## 2021-06-01 RX ADMIN — ENOXAPARIN SODIUM 40 MG: 40 INJECTION, SOLUTION INTRAVENOUS; SUBCUTANEOUS at 20:29

## 2021-06-01 RX ADMIN — SODIUM CHLORIDE, PRESERVATIVE FREE 10 ML: 5 INJECTION INTRAVENOUS at 20:29

## 2021-06-01 RX ADMIN — SODIUM CHLORIDE, PRESERVATIVE FREE 10 ML: 5 INJECTION INTRAVENOUS at 08:45

## 2021-06-01 ASSESSMENT — PAIN SCALES - GENERAL
PAINLEVEL_OUTOF10: 0
PAINLEVEL_OUTOF10: 4

## 2021-06-01 ASSESSMENT — PAIN DESCRIPTION - LOCATION: LOCATION: NECK

## 2021-06-01 ASSESSMENT — PAIN DESCRIPTION - DESCRIPTORS: DESCRIPTORS: ACHING;DISCOMFORT

## 2021-06-01 ASSESSMENT — PAIN DESCRIPTION - ORIENTATION: ORIENTATION: LOWER

## 2021-06-01 ASSESSMENT — PAIN DESCRIPTION - PAIN TYPE: TYPE: ACUTE PAIN

## 2021-06-01 ASSESSMENT — PAIN DESCRIPTION - FREQUENCY: FREQUENCY: CONTINUOUS

## 2021-06-01 ASSESSMENT — PAIN DESCRIPTION - ONSET: ONSET: ON-GOING

## 2021-06-01 ASSESSMENT — PAIN DESCRIPTION - PROGRESSION: CLINICAL_PROGRESSION: NOT CHANGED

## 2021-06-01 NOTE — PROGRESS NOTES
Dr. Carol Mcclellan gave orders to stop IV fluids and to resume aspirin and plavix. And order lovenox.  completed.

## 2021-06-01 NOTE — PROGRESS NOTES
as warranted  Up with assistance     Planned Discharge discharge pending clinical course   - Likely home in next 24 hours            SUBJECTIVE  Pt doing well. He states that his neck feels stiff after the [place] collar has been removed. He denies any headache at this time. He is tolerating a general diet without any nausea or vomiting. He is passing flatus but has not had a bowel movement yet. Denies any chest pain or shortness of breath. No complaints of dizziness or lightheadedness. Case discussed with Dr. Pastor Reyna. Wt Readings from Last 3 Encounters:   06/01/21 192 lb 4.8 oz (87.2 kg)   02/11/21 186 lb (84.4 kg)   12/09/20 176 lb (79.8 kg)     Temp Readings from Last 3 Encounters:   06/01/21 98.2 °F (36.8 °C) (Oral)   02/11/21 96.4 °F (35.8 °C)   12/09/20 96.6 °F (35.9 °C) (Temporal)     BP Readings from Last 3 Encounters:   06/01/21 123/72   02/11/21 100/60   12/09/20 124/72     Pulse Readings from Last 3 Encounters:   06/01/21 62   02/11/21 52   12/09/20 60       24 HR INTAKE/OUTPUT :     Intake/Output Summary (Last 24 hours) at 6/1/2021 0732  Last data filed at 6/1/2021 0600  Gross per 24 hour   Intake 2805 ml   Output 2750 ml   Net 55 ml     DIET GENERAL;    OBJECTIVE  CURRENT VITALS /72   Pulse 62   Temp 98.2 °F (36.8 °C) (Oral)   Resp 16   Ht 5' 11\" (1.803 m)   Wt 192 lb 4.8 oz (87.2 kg)   SpO2 96%   BMI 26.82 kg/m²   GENERAL: alert, cooperative, no distress  NEURO: awake, alert and oriented. PERRL.   Conversing fluently and appropriately  LUNGS: clear to auscultation bilaterally- no wheezes, rales or rhonchi, normal air movement, no respiratory distress  HEART: normal rate, normal S1 and S2, no gallops, intact distal pulses and no carotid bruits  ABDOMEN: soft, non-tender, non-distended, normal bowel sounds, no masses or organomegaly  WOUNDS: Abrasions to forehead with no significant drainage, no significant erythema  EXTREMITY: no cyanosis, no clubbing and no edema      LABS  CBC : Recent Labs     05/31/21 0058 06/01/21 0404   WBC 4.5* 3.9*   HGB 13.5* 12.3*   HCT 38.9* 36.5*   MCV 95.3* 97.6*    121*     BMP:   Recent Labs     05/31/21 0058 06/01/21 0404    137   K 4.6 4.3   CL 98 104   CO2 24 26   BUN 16 11   CREATININE 1.0 0.9     COAGS:   Recent Labs     05/31/21 0058 06/01/21 0404   APTT 25.8  --    PROT 6.4 5.9*   INR 1.19*  --      Pancreas/HFP:    Recent Labs     05/31/21 0058   LIPASE 35.2     Recent Labs     05/31/21 0058 06/01/21 0404   AST 22 24   ALT 14 13   BILITOT 2.0* 1.6*   ALKPHOS 74 62     RADIOLOGY:  Narrative   PROCEDURE: MRI CERVICAL SPINE WO CONTRAST       CLINICAL INFORMATION: Cervical fracture with suspected ligament injury . Persistent neck pain after fall 3 days ago.       COMPARISON: CT cervical spine dated 5/31/2021.       TECHNIQUE: Sagittal T1, T2 and STIR sequences were obtained through the cervical spine. Axial fast and echo and gradient echo T2-weighted images were obtained.       FINDINGS:   There is minimal, grade 1, retrolisthesis of C3 relative to C4 on the basis of degenerative change, stable compared to prior CT. There is otherwise anatomic vertebral body height and alignment. Marrow signal is within normal limits. There is a prominent    retropharyngeal effusion extending from the craniocervical junction inferiorly to the thoracic inlet. There is no evidence of ligamentous injury. Specifically, the anterior longitudinal ligament appears intact. No abnormal signal in the posterior    elements is present. The craniocervical junction appears intact. The cervical spinal cord is normal in caliber without focal area of abnormal T2 signal. Paraspinal soft tissues are otherwise unremarkable. At C2-3 there is no significant spinal canal or neuroforaminal stenosis. At C3-4 there is minimal partial uncovering of the disc without significant spinal canal stenosis.  There is mild left and moderate right neural foraminal stenosis in association with uncovertebral joint degenerative change and facet hypertrophy. At C4-5 there is no significant spinal canal stenosis. There is moderate bilateral foraminal stenosis in association with uncovertebral joint degenerative change and facet hypertrophy. At C5-6 there is a minimal disc bulge without significant spinal canal stenosis. There is moderate bilateral foraminal stenosis in association with uncovertebral joint degenerative change and facet hypertrophy. At C6-7 there is a shallow disc bulge without significant spinal canal stenosis and moderate bilateral foraminal stenosis in association with uncovertebral joint degenerative change and facet hypertrophy. At C7-T1 there is no significant spinal canal or neuroforaminal stenosis.         Impression       1. Prominent retropharyngeal effusion along the anterior cervical spine without evidence of ligamentous injury. 2. Multilevel degenerative changes of cervical spine without significant spinal canal stenosis at any level and multiple areas of up to moderate neural foraminal stenosis.                 **This report has been created using voice recognition software. It may contain minor errors which are inherent in voice recognition technology. **       Final report electronically signed by Dr. Sarai Huang MD on 6/1/2021 8:08 AM         Narrative   EXAM: HEAD AND NECK CT ANGIOGRAM:       COMPARISON:    CT,[facility]  - CT CERVICAL SPINE WO CONTRAST  - 05/31/2021 01:26 AM EDT    CT,[facility]  - CT CHEST W CONTRAST  - 08/27/2018 08:46 AM EDT       TECHNIQUE:   Contiguous axial images from upper mediastinum through the    vertex of head during uneventful intravenous administration of iodinated    contrast using CT angiogram technique. Coronal and sagittal reformatted    images were also reviewed.       FINDINGS:       CTA NECK:       Lung apices are unremarkable.       Aortic arch unremarkable.  Major vessel origins are patent.    The common, internal and external carotid arteries are symmetric and    remarkable only for mild atherosclerotic changes. .   Vertebral arteries are patent, the right is slightly dominant.  Vessel    origins are grossly unremarkable. The anterior inferior avulsion of the C3 vertebral body is again noted,    without change in alignment of the cervical spine. No soft tissue lesion of the neck is appreciated. Chronic pleural parenchymal changes of both apices and upper mediastinal    adenopathy again noted.       CTA HEAD:       Major cerebral arterial structures are patent. The internal carotid arteries are patent through the carotid termini    bilaterally.  Atherosclerotic ossification of both cavernous ICAs. Both anterior and both middle cerebral arteries are patent, without    evidence of vessel occlusion or significant stenosis.       [place] appears to be intact. Vertebrobasilar arteries are patent and normal appearing, with symmetric    appearance of posterior cerebral arteries.       No aneurysm or vascular malformation is appreciated.           Impression   Mild carotid bulb and cavernous ICA calcifications, without significant    vessel stenosis or occlusion.  Vertebral arteries are grossly unremarkable.       This document has been electronically signed by: Cristina Rivera MD    on 05/31/2021 06:02 AM       All CTs at this facility use dose modulation techniques and iterative    reconstructions, and/or weight-based dosing   when appropriate to reduce radiation to a low as reasonably achievable.       Carotid stenosis and measurements are in accordance with NASCET criteria. Electronically signed by MERE Rdz CNP on 6/1/2021 at 7:32 AM  Patient seen and examined independently by me 6/1/2021    Vitals and Graphics reviewed       Labs, cultures, and radiographs where available were reviewed.       I discussed patient concerns with the patient's nurse and instructions were

## 2021-06-01 NOTE — PROGRESS NOTES
Cardiology Progress Note      Patient:  Aide Rich  YOB: 1945  MRN: [id number]   Acct: [de-identified]  Admit Date:  5/31/2021  Primary Cardiologist: Edmond Bal MD    Note per dr Moon Araujo:  Syncope.     REQUESTING PROVIDER:  Hospitalist Service.     HISTORY OF PRESENT ILLNESS:  A pleasant 61-year-old gentleman who  usually follows with Dr. Priyanka Lock, has a history of coronary artery  disease, bypass surgery in 2006, history of hypertension,  hyperlipidemia, who has been in his usual state of health and apparently  went to the bathroom last night, on his way out, blacked out for brief  moment, ended up falling and dealing with a potential C-spine fracture. However, have some bruises on his face as well. We are consulted to  evaluate for his syncope. The patient denies any previous similar  episodes. Denies any dizziness, lightheadedness and/or loss of  conscious at any time. Denies any active chest pain, has been fairly  stable from the cardiac standpoint. Denies any recent chest pain or  shortness of breath. Initial evaluation has not revealed any obvious  arrhythmia. Blood pressure has been relatively stable, too low. \"    Subjective (Events in last 24 hours): pt awake and alert. NAD. No cp or sob. No lightheadedness or syncope. On RA.   Has neck collar      Objective:   /72   Pulse 62   Temp 98.2 °F (36.8 °C) (Oral)   Resp 16   Ht 5' 11\" (1.803 m)   Wt 192 lb 4.8 oz (87.2 kg)   SpO2 96%   BMI 26.82 kg/m²        TELEMETRY: off tele currently    Physical Exam:  General Appearance: alert and oriented to person, place and time, in no acute distress  Cardiovascular: normal rate, regular rhythm, normal S1 and S2, no murmurs, rubs, clicks, or gallops, distal pulses intact, no carotid bruits, no JVD  Pulmonary/Chest: clear to auscultation bilaterally- no wheezes, rales or rhonchi, normal air movement, no respiratory distress  Abdomen: soft, non-tender, non-distended, normal bowel sounds, no masses Extremities: no cyanosis, clubbing or edema, pulse   Skin: warm and dry  Head: normocephalic and atraumatic  Eyes: pupils equal, round, and reactive to light  Neck: supple and non-tender without mass, no thyromegaly   Neurological: alert, oriented, normal speech, no focal findings or movement disorder noted    Medications:    sodium chloride flush  5-40 mL Intravenous 2 times per day    famotidine (PEPCID) injection  20 mg Intravenous BID    allopurinol  300 mg Oral Daily    aspirin  325 mg Oral Daily    clopidogrel  75 mg Oral Daily    [Held by provider] lisinopril  10 mg Oral Daily    metoprolol tartrate  25 mg Oral BID    pantoprazole  40 mg Oral QAM AC    atorvastatin  40 mg Oral Daily      sodium chloride      sodium chloride 100 mL/hr at 06/01/21 0028     sodium chloride flush, 5-40 mL, PRN  sodium chloride, 25 mL, PRN  acetaminophen, 650 mg, Q4H PRN  promethazine, 12.5 mg, Q6H PRN   Or  ondansetron, 4 mg, Q6H PRN  polyethylene glycol, 17 g, Daily PRN  fentanNYL, 25 mcg, Q1H PRN   Or  fentanNYL, 50 mcg, Q1H PRN  nitroGLYCERIN, 0.4 mg, Q5 Min PRN        Diagnostics:  TTE 5/31/21  Summary   Ejection fraction is visually estimated at 55%. Overall left ventricular function is normal.   Aortic valve appears tricuspid. Aortic valve leaflets are somewhat thickened. Aortic valve leaflets are Mildly calcified. Trivial aortic regurgitation is noted. Signature      ----------------------------------------------------------------   Electronically signed by Anabela Colon MD (Interpreting   physician) on 05/31/2021 at 02:06 PM    Lab Data:    Cardiac Enzymes:  No results for input(s): CKTOTAL, CKMB, CKMBINDEX, TROPONINI in the last 72 hours.     CBC:   Lab Results   Component Value Date    WBC 3.9 06/01/2021    RBC 3.74 06/01/2021    RBC 4.57 03/27/2012    HGB 12.3 06/01/2021    HCT 36.5 06/01/2021     06/01/2021       CMP:    Lab Results Component Value Date     06/01/2021    K 4.3 06/01/2021     06/01/2021    CO2 26 06/01/2021    BUN 11 06/01/2021    CREATININE 0.9 06/01/2021    LABGLOM 83 06/01/2021    GLUCOSE 99 06/01/2021    GLUCOSE 105 10/01/2011    CALCIUM 8.6 06/01/2021       Hepatic Function Panel:    Lab Results   Component Value Date    ALKPHOS 62 06/01/2021    ALT 13 06/01/2021    AST 24 06/01/2021    PROT 5.9 06/01/2021    BILITOT 1.6 06/01/2021    BILIDIR 0.3 03/06/2021    LABALBU 3.6 06/01/2021    LABALBU 4.2 09/12/2011       Magnesium:    Lab Results   Component Value Date    MG 1.5 05/31/2021       PT/INR:    Lab Results   Component Value Date    INR 1.19 05/31/2021       HgBA1c:    Lab Results   Component Value Date    LABA1C 4.7 03/16/2018       FLP:    Lab Results   Component Value Date    TRIG 109 03/06/2021    HDL 38 03/06/2021    LDLCALC 80 03/06/2021    LDLDIRECT 72.81 02/08/2021       TSH:    Lab Results   Component Value Date    TSH 3.370 05/31/2021         Assessment:    Syncope with fall  Acute closed C3 fracture - neurosurgery following  CAD - s/p CABG x 4 2006  Ef 55 per TTE 5/31/21  HTN  Dyslipidemia  Recent covid vaccination       Plan:    Asa/plavix on hold per neurosurgery - restart once ok with them  Cont statin/BB  Cont to hold ace for now  Event monitor upon discharge  Check orthostatics  Consider tilt table test as outpt  F/up dr Queenie Penaloza 5 weeks  Will see prn  Call with any questions or concerns         Electronically signed by June Hart PA-C on 6/1/2021 at 7:54 AM

## 2021-06-01 NOTE — PROGRESS NOTES
Hospitalist Progress Note  [facility] 4A       Patient: Ronald Carrillo  Unit/Bed: 9N-19/515-V  YOB: 1945  MRN: [id number]  Acct: [de-identified]   AdmittingDiagnosis: Closed nondisplaced fracture of third cervical vertebra, unspecified fracture morphology, sequela [S12.201S]  Admit Date: 5/31/2021  Hospital Day: 1    Subjective:    Does complain of mild discomfort in his neck otherwise no new or acute symptoms    Objective:   /65   Pulse 54   Temp 97.3 °F (36.3 °C) (Axillary)   Resp 18   Ht 5' 11\" (1.803 m)   Wt 192 lb 4.8 oz (87.2 kg)   SpO2 96%   BMI 26.82 kg/m²       Intake/Output Summary (Last 24 hours) at 6/1/2021 1208  Last data filed at 6/1/2021 0840  Gross per 24 hour   Intake 2795 ml   Output 3050 ml   Net -255 ml     Physical Exam  Constitutional:       Appearance: He is obese. HENT:      Head: Normocephalic and atraumatic. Right Ear: External ear normal.      Left Ear: External ear normal.      Nose: Nose normal.      Mouth/Throat:      Mouth: Mucous membranes are moist.      Pharynx: Oropharynx is clear. Eyes:      Conjunctiva/sclera: Conjunctivae normal.      Pupils: Pupils are equal, round, and reactive to light. Neck:      Comments: In the cervical collar  Cardiovascular:      Rate and Rhythm: Normal rate. Pulses: Normal pulses. Pulmonary:      Effort: Pulmonary effort is normal.   Abdominal:      Palpations: Abdomen is soft. Musculoskeletal:         General: Normal range of motion. Skin:     General: Skin is warm. Capillary Refill: Capillary refill takes less than 2 seconds. Neurological:      General: No focal deficit present. Mental Status: He is alert.    Psychiatric:         Mood and Affect: Mood normal.       DVT Prophylaxis: Lovenox    Data:  CBC:   Lab Results   Component Value Date    WBC 3.9 06/01/2021    HGB 12.3 06/01/2021    HCT 36.5 06/01/2021    MCV 97.6 06/01/2021     06/01/2021     BMP:   Lab Results Component Value Date     06/01/2021    K 4.3 06/01/2021     06/01/2021    CO2 26 06/01/2021    BUN 11 06/01/2021    CREATININE 0.9 06/01/2021    CALCIUM 8.6 06/01/2021     ABGs:   Lab Results   Component Value Date    PH 7.0 08/01/2011     Troponin: No results found for: TROPONINI   LFTs   Lab Results   Component Value Date    AST 24 06/01/2021    ALT 13 06/01/2021    BILITOT 1.6 06/01/2021    ALKPHOS 62 06/01/2021          Imaging   ECHO Complete 2D W Doppler W Color    Result Date: 5/31/2021  Transthoracic Echocardiography Report (TTE)  Demographics   Patient Name    Pavel Martinez  Gender               Male                  R   MR #            [id number]       Race                                                    Ethnicity   Account #       [de-identified]       Room Number          0015   Accession       [id number]      Date of Study        05/31/2021  Number   Date of Birth   1945      Referring Physician  Guanako Lacy MD   Age             70 year(s)      [facility]                                   Interpreting         [place] Patient MD                                  Physician  Procedure Type of Study   TTE procedure:ECHOCARDIOGRAM COMPLETE 2D W DOPPLER W COLOR. Procedure Date Date: 05/31/2021 Start: 08:27 AM Study Location: Bedside Technical Quality: Adequate visualization Indications:Recurrent falls. Additional Medical History:CABG, Hypertension, Hyperlipidemia Patient Status: Routine Height: 71 inches Weight: 187 pounds BSA: 2.05 m^2 BMI: 26.08 kg/m^2 BP: 121/66 mmHg  Conclusions   Summary  Ejection fraction is visually estimated at 55%. Overall left ventricular function is normal.  Aortic valve appears tricuspid. Aortic valve leaflets are somewhat thickened. Aortic valve leaflets are Mildly calcified. Trivial aortic regurgitation is noted.    Signature ----------------------------------------------------------------  Electronically signed by Cindy Ortiz MD (Interpreting  physician) on 05/31/2021 at 02:06 PM  ----------------------------------------------------------------   Findings   Mitral Valve  The mitral valve structure was normal with normal leaflet separation. DOPPLER: The transmitral velocity was within the normal range with no  evidence for mitral stenosis. There was no evidence of mitral  regurgitation. Aortic Valve  Aortic valve appears tricuspid. Aortic valve leaflets are somewhat thickened. Aortic valve leaflets are Mildly calcified. Trivial aortic regurgitation is noted. Tricuspid Valve  The tricuspid valve structure was normal with normal leaflet separation. DOPPLER: There was no evidence of tricuspid stenosis. There was no  evidence of tricuspid regurgitation. Pulmonic Valve  The pulmonic valve was not well visualized . Trivial pulmonic regurgitation visualized. Left Atrium  Left atrial size was normal.   Left Ventricle  Ejection fraction is visually estimated at 55%. Overall left ventricular function is normal.   Right Atrium  Right atrial size was normal.   Right Ventricle  The right ventricular size was normal with normal systolic function and  wall thickness. Pericardial Effusion  The pericardium was normal in appearance with no evidence of a pericardial  effusion. Pleural Effusion  No evidence of pleural effusion. Aorta / Great Vessels  -Aortic root dimension within normal limits.  -The Pulmonary artery is within normal limits. -IVC size is within normal limits with normal respiratory phasic changes.   M-Mode/2D Measurements & Calculations   LV Diastolic   LV Systolic Dimension:    AV Cusp Separation: 1.8 cmLA  Dimension: 5.5 3.5 cm                    Dimension: 3.9 cmAO Root  cm             LV Volume Diastolic: 392  Dimension: 3.8 cmLA Area: 13.8  LV FS:36.4 %   ml                        cm^2  LV PW          LV Volume Systolic: 51.0  Diastolic: 0.9 ml  cm             LV EDV/LV EDV Index: 147  Septum         ml/72 m^2LV ESV/LV ESV    RV Diastolic Dimension: 1.8 cm  Diastolic: 0.9 Index: 30.8 ml/25 m^2  cm             EF Calculated: 65.4 %     LA/Aorta: 1.03                                            LA volume/Index: 28.2 ml /14m^2  Doppler Measurements & Calculations   MV Peak E-Wave: 67.7 cm/s  AV Peak Velocity: 148  LVOT Peak Velocity: 107  MV Peak A-Wave: 97.7 cm/s  cm/s                   cm/s  MV E/A Ratio: 0.69         AV Peak Gradient: 8.76 LVOT Peak Gradient: 5  MV Peak Gradient: 1.83     mmHg                   mmHg  mmHg   MV Deceleration Time: 194  msec                                              TR Velocity:176 cm/s  MV P1/2t: 57 msec                                 TR Gradient:12.39 mmHg  MVA by PHT:3.86 cm^2                              PV Peak Velocity: 80.6                                                    cm/s  MV E' Septal Velocity: 8.2 AV DVI (Vmax):0.72     PV Peak Gradient: 2.6  cm/s                                              mmHg  MV A' Septal Velocity:  12.2 cm/s  MV E' Lateral Velocity: 12  cm/s  MV A' Lateral Velocity:  8.5 cm/s  E/E' septal: 8.26  E/E' lateral: 5.64  MR Velocity: 455 cm/s  [email or web address] [id number]    CTA HEAD W WO CONTRAST    Result Date: 5/31/2021  EXAM: HEAD AND NECK CT ANGIOGRAM: COMPARISON:  CT,SR  - CT HEAD WO CONTRAST  - 05/31/2021 01:26 AM EDT  CT,SR  - CT CERVICAL SPINE WO CONTRAST  - 05/31/2021 01:26 AM EDT  CT,SR  - CT CHEST W CONTRAST  - 01/06/2020 07:25 AM EST TECHNIQUE:   Contiguous axial images from upper mediastinum through the vertex of head during uneventful intravenous administration of iodinated contrast using CT angiogram technique. Coronal and sagittal reformatted images were also reviewed. FINDINGS: CTA NECK: Lung apices are unremarkable.  Aortic arch medial proximal thigh. Extensive mural calcifications of the iliac and femoral arteries. Impression: 1. No acute fracture or dislocation. This document has been electronically signed by: Brayan Collazo MD on 05/31/2021 01:55 AM    CT HEAD WO CONTRAST    Result Date: 5/31/2021  CT head without: Comparison: CT  - CT HEAD WO CONTR  - 01/04/2011 05:23 PM EST Findings: Age-appropriate sulcation. No intracranial mass, midline shift, hydrocephalus, acute hemorrhage or infarct. Unremarkable orbits. Visualized paranasal sinuses are clear. Mastoid air cells are clear. No skull fracture. No significant scalp soft tissue swelling. Impression: 1. No acute intracranial abnormality. Age-appropriate exam. 2.  Stable intracranial exam. This document has been electronically signed by: Brayan Collazo MD on 05/31/2021 02:38 AM All CTs at this facility use dose modulation techniques and iterative reconstructions, and/or weight-based dosing when appropriate to reduce radiation to a low as reasonably achievable. CT FACIAL BONES WO CONTRAST    Result Date: 5/31/2021  CT maxillofacial without contrast Comparison: None Findings: No significant facial soft tissue swelling. Preserved orbits and globes. No acute midface fracture. Air-fluid level within the right maxillary sinus. Preserved mandible. Moderate osteoarthritis of the right TMJ. Partial opacification of the mastoid air cells with middle ear cavity involvement. Impression: No acute facial fracture. Right maxillary sinus disease. Bilateral mastoiditis with middle ear involvement. This document has been electronically signed by: Brayan Collazo MD on 05/31/2021 02:34 AM All CTs at this facility use dose modulation techniques and iterative reconstructions, and/or weight-based dosing when appropriate to reduce radiation to a low as reasonably achievable.     CTA NECK W WO CONTRAST    Result Date: 5/31/2021  EXAM: HEAD AND NECK CT ANGIOGRAM: COMPARISON:  CT,SR  - CT CERVICAL SPINE WO CONTRAST  - 05/31/2021 01:26 AM EDT  CT,KOSR  - CT CHEST W CONTRAST  - 08/27/2018 08:46 AM EDT TECHNIQUE:   Contiguous axial images from upper mediastinum through the vertex of head during uneventful intravenous administration of iodinated contrast using CT angiogram technique. Coronal and sagittal reformatted images were also reviewed. FINDINGS: CTA NECK: Lung apices are unremarkable. Aortic arch unremarkable. Major vessel origins are patent. The common, internal and external carotid arteries are symmetric and remarkable only for mild atherosclerotic changes. . Vertebral arteries are patent, the right is slightly dominant. Vessel origins are grossly unremarkable. The anterior inferior avulsion of the C3 vertebral body is again noted, without change in alignment of the cervical spine. No soft tissue lesion of the neck is appreciated. Chronic pleural parenchymal changes of both apices and upper mediastinal adenopathy again noted. CTA HEAD: Major cerebral arterial structures are patent. The internal carotid arteries are patent through the carotid termini bilaterally. Atherosclerotic ossification of both cavernous ICAs. Both anterior and both middle cerebral arteries are patent, without evidence of vessel occlusion or significant stenosis. [place] appears to be intact. Vertebrobasilar arteries are patent and normal appearing, with symmetric appearance of posterior cerebral arteries. No aneurysm or vascular malformation is appreciated. Mild carotid bulb and cavernous ICA calcifications, without significant vessel stenosis or occlusion. Vertebral arteries are grossly unremarkable. This document has been electronically signed by: Stiven Da Silva MD on 05/31/2021 06:02 AM All CTs at this facility use dose modulation techniques and iterative reconstructions, and/or weight-based dosing when appropriate to reduce radiation to a low as reasonably achievable.  Carotid stenosis and measurements are in accordance with NASCET criteria. CT CERVICAL SPINE WO CONTRAST    Result Date: 5/31/2021  It CT cervical spine without contrast: Comparison: None Findings: Vertebral body heights are preserved. Acute anterior inferior corner fracture of C3, series 4 image 29. Fracture is minimally displaced. Mild widening of the anterior disc space. No significant overlying soft tissue swelling. No significant prevertebral soft tissue swelling. Multilevel moderate to severe degenerative disc and facet disease. Craniocervical junction and C1-C2 articulations are without abnormal alignment/asymmetry. Dens is intact. Severe degenerative narrowing of the atlantodens interval. Normal limited view of the intracranial contents. Neck soft tissues are unremarkable for age. Lung apices demonstrate consolidations. Impression: 1. Anterior inferior C3 acute corner fracture consistent with extension type fracture/injury. Relative widening of the anterior disc space at this level consistent with tear of the anterior longitudinal ligament. 2.  Multilevel severe spondylosis. 3.  Bilateral lung apical consolidations. This document has been electronically signed by: Tisha Monroe MD on 05/31/2021 02:35 AM All CTs at this facility use dose modulation techniques and iterative reconstructions, and/or weight-based dosing when appropriate to reduce radiation to a low as reasonably achievable. MRI CERVICAL SPINE WO CONTRAST    Result Date: 6/1/2021  PROCEDURE: MRI CERVICAL SPINE WO CONTRAST CLINICAL INFORMATION: Cervical fracture with suspected ligament injury . Persistent neck pain after fall 3 days ago. COMPARISON: CT cervical spine dated 5/31/2021. TECHNIQUE: Sagittal T1, T2 and STIR sequences were obtained through the cervical spine. Axial fast and echo and gradient echo T2-weighted images were obtained.  FINDINGS: There is minimal, grade 1, retrolisthesis of C3 relative to C4 on the basis of degenerative change, stable compared to prior CT. There is otherwise anatomic vertebral body height and alignment. Marrow signal is within normal limits. There is a prominent retropharyngeal effusion extending from the craniocervical junction inferiorly to the thoracic inlet. There is no evidence of ligamentous injury. Specifically, the anterior longitudinal ligament appears intact. No abnormal signal in the posterior elements is present. The craniocervical junction appears intact. The cervical spinal cord is normal in caliber without focal area of abnormal T2 signal. Paraspinal soft tissues are otherwise unremarkable. At C2-3 there is no significant spinal canal or neuroforaminal stenosis. At C3-4 there is minimal partial uncovering of the disc without significant spinal canal stenosis. There is mild left and moderate right neural foraminal stenosis in association with uncovertebral joint degenerative change and facet hypertrophy. At C4-5 there is no significant spinal canal stenosis. There is moderate bilateral foraminal stenosis in association with uncovertebral joint degenerative change and facet hypertrophy. At C5-6 there is a minimal disc bulge without significant spinal canal stenosis. There is moderate bilateral foraminal stenosis in association with uncovertebral joint degenerative change and facet hypertrophy. At C6-7 there is a shallow disc bulge without significant spinal canal stenosis and moderate bilateral foraminal stenosis in association with uncovertebral joint degenerative change and facet hypertrophy. At C7-T1 there is no significant spinal canal or neuroforaminal stenosis. 1. Prominent retropharyngeal effusion along the anterior cervical spine without evidence of ligamentous injury. 2. Multilevel degenerative changes of cervical spine without significant spinal canal stenosis at any level and multiple areas of up to moderate neural foraminal stenosis.  **This report has been created using voice recognition software. It may contain minor errors which are inherent in voice recognition technology. ** Final report electronically signed by Dr. Kael Hassan MD on 6/1/2021 8:08 AM    XR CHEST PORTABLE    Result Date: 5/31/2021  1 view chest x-ray. Comparison: None Findings: Chronic bilateral upper lobe scarring/fibrosis with volume loss. Lower lobe emphysematous changes without consolidation. Median sternotomy wires and mediastinal clips again noted, without cardiac enlargement. No acute fracture. Impression: Bilateral upper lobe fibrosis and volume loss with bilateral lower lobe emphysematous changes. No superimposed consolidation, pleural effusion, or pneumothorax. This document has been electronically signed by: Mitul Cristobal MD on 05/31/2021 01:45 AM      Assessment/Plan:  1. History of syncopal episode etiology unclear have to rule out cardiac arrhythmia versus seizure disorder, appreciate cardiology input so far no arrhythmia noted on telemetry monitor will discharge on a Zio patch  Pending evaluation by neurologist doubt this is seizures  Patient has been suffering from upper respiratory tract infection after he he received Covid vaccine and has been using over-the-counter medications like NyQuil etc. which may have caused him to be a bit groggy  Nevertheless will schedule an outpatient tilt table test as per the recommendation from cardiology  2. Closed head injury stable   3. CT scan suggestive of a C3 at the anterior inferior border however MRI appears to be negative except for retropharyngeal effusion/blood with no ligamentous injury, defer to neurosurgery for treatment  4.   History of COPD continue nebs as needed        Electronically signed by Grant Carr MD on 6/1/2021 at 12:08 PM    Rounding Hospitalist

## 2021-06-01 NOTE — PROGRESS NOTES
Addendum by Dr. Rubi Kinney MD:  I have seen and examined the patient independently. Face to face evaluation and examination was performed. The below evaluation and note have been reviewed. Labs and radiographs were reviewed. I Have discussed with Neurosurgery PA about this patient in detail. The below assessment and plan have been reviewed. Please see my modifications mentioned below.              My additional comments and modifications:     There is no acute event over the night. There is no change in patient surgical exam compared with yesterday. Patient today cervical spine MRI did not show evidence of ligamentous injury or disc rupture. Patient recommendation treatment plan from neurosurgical perspective:    · No acute neurosurgical intervention is indicated at this time. · Patient c-collar can be D/C'd at this time (patient does not complain from significant neck pain and there is no significant restriction in patient with all directions). · Patient can be discharged from neurosurgical perspective after he is cleared by other services. He needs to follow-up with neurosurgery outpatient clinic after 2 weeks with a new flexion-extension cervical spine x-ray. · I reviewed the case in detail with the trauma team.  Also I reviewed the case and the treatment plan with patient and his nurse. All questions and concerns were addressed and answered. · From this time on neurosurgery team will see this patient only as needed as long as he is in the hospital.  These call me if you have any further questions or concern regarding this patient. Rubi Kinney MD    Neurosurgery Progress Note    Patient:  Karthik Silva      Unit/Bed:4A-15/015-A    YOB: 1945    MRN: [id number]     Acct: [de-identified]     Admit date: 5/31/2021    Chief Complaint   Patient presents with   Tivis Mccreedy Fall       Patient Seen, Chart, Physician notes, Labs, Radiology studies reviewed.     Subjective: Patient is seen and Last 3 weights: Wt Readings from Last 3 Encounters:   06/01/21 192 lb 4.8 oz (87.2 kg)   02/11/21 186 lb (84.4 kg)   12/09/20 176 lb (79.8 kg)         CBC:   Recent Labs     05/31/21  0058 06/01/21  0404   WBC 4.5* 3.9*   HGB 13.5* 12.3*    121*     BMP:    Recent Labs     05/31/21 0058 06/01/21  0404    137   K 4.6 4.3   CL 98 104   CO2 24 26   BUN 16 11   CREATININE 1.0 0.9   GLUCOSE 116* 99     Calcium:  Recent Labs     06/01/21  0404   CALCIUM 8.6     Magnesium:  Recent Labs     05/31/21  0058   MG 1.5*     Glucose:No results for input(s): POCGLU in the last 72 hours. HgbA1C: No results for input(s): LABA1C in the last 72 hours. Lipids: No results for input(s): CHOL, TRIG, HDL, LDLCALC in the last 72 hours. Invalid input(s): LDL    Radiology reports as per the Radiologist  Radiology: ECHO Complete 2D W Doppler W Color    Result Date: 5/31/2021  Transthoracic Echocardiography Report (TTE)  Demographics   Patient Name    Ruby Allen  Gender               Male                  R   MR #            [id number]       Race                                                    Ethnicity   Account #       [de-identified]       Room Number          0015   Accession       [id number]      Date of Study        05/31/2021  Number   Date of Birth   1945      Referring Physician  Krystyna Nobles MD   Age             70 year(s)      Jennifer Sneed, [facility]                                   Interpreting         Keiko Mayse MD                                  Physician  Procedure Type of Study   TTE procedure:ECHOCARDIOGRAM COMPLETE 2D W DOPPLER W COLOR. Procedure Date Date: 05/31/2021 Start: 08:27 AM Study Location: Bedside Technical Quality: Adequate visualization Indications:Recurrent falls.  Additional Medical History:CABG, Hypertension, Hyperlipidemia Patient Status: Routine Height: 71 inches Weight: 187 pounds BSA: 2.05 m^2 BMI: 26.08 kg/m^2 BP: 121/66 mmHg  Conclusions   Summary  Ejection fraction is visually estimated at 55%. Overall left ventricular function is normal.  Aortic valve appears tricuspid. Aortic valve leaflets are somewhat thickened. Aortic valve leaflets are Mildly calcified. Trivial aortic regurgitation is noted. Signature   ----------------------------------------------------------------  Electronically signed by Martell Castro MD (Interpreting  physician) on 05/31/2021 at 02:06 PM  ----------------------------------------------------------------   Findings   Mitral Valve  The mitral valve structure was normal with normal leaflet separation. DOPPLER: The transmitral velocity was within the normal range with no  evidence for mitral stenosis. There was no evidence of mitral  regurgitation. Aortic Valve  Aortic valve appears tricuspid. Aortic valve leaflets are somewhat thickened. Aortic valve leaflets are Mildly calcified. Trivial aortic regurgitation is noted. Tricuspid Valve  The tricuspid valve structure was normal with normal leaflet separation. DOPPLER: There was no evidence of tricuspid stenosis. There was no  evidence of tricuspid regurgitation. Pulmonic Valve  The pulmonic valve was not well visualized . Trivial pulmonic regurgitation visualized. Left Atrium  Left atrial size was normal.   Left Ventricle  Ejection fraction is visually estimated at 55%. Overall left ventricular function is normal.   Right Atrium  Right atrial size was normal.   Right Ventricle  The right ventricular size was normal with normal systolic function and  wall thickness. Pericardial Effusion  The pericardium was normal in appearance with no evidence of a pericardial  effusion. Pleural Effusion  No evidence of pleural effusion. Aorta / Great Vessels  -Aortic root dimension within normal limits.  -The Pulmonary artery is within normal limits.   -IVC size is within normal limits with normal respiratory phasic changes. M-Mode/2D Measurements & Calculations   LV Diastolic   LV Systolic Dimension:    AV Cusp Separation: 1.8 cmLA  Dimension: 5.5 3.5 cm                    Dimension: 3.9 cmAO Root  cm             LV Volume Diastolic: 124  Dimension: 3.8 cmLA Area: 13.8  LV FS:36.4 %   ml                        cm^2  LV PW          LV Volume Systolic: 02.5  Diastolic: 0.9 ml  cm             LV EDV/LV EDV Index: 147  Septum         ml/72 m^2LV ESV/LV ESV    RV Diastolic Dimension: 1.8 cm  Diastolic: 0.9 Index: 63.4 ml/25 m^2  cm             EF Calculated: 65.4 %     LA/Aorta: 1.03                                            LA volume/Index: 28.2 ml /14m^2  Doppler Measurements & Calculations   MV Peak E-Wave: 67.7 cm/s  AV Peak Velocity: 148  LVOT Peak Velocity: 107  MV Peak A-Wave: 97.7 cm/s  cm/s                   cm/s  MV E/A Ratio: 0.69         AV Peak Gradient: 8.76 LVOT Peak Gradient: 5  MV Peak Gradient: 1.83     mmHg                   mmHg  mmHg   MV Deceleration Time: 194  msec                                              TR Velocity:176 cm/s  MV P1/2t: 57 msec                                 TR Gradient:12.39 mmHg  MVA by PHT:3.86 cm^2                              PV Peak Velocity: 80.6                                                    cm/s  MV E' Septal Velocity: 8.2 AV DVI (Vmax):0.72     PV Peak Gradient: 2.6  cm/s                                              mmHg  MV A' Septal Velocity:  12.2 cm/s  MV E' Lateral Velocity: 12  cm/s  MV A' Lateral Velocity:  8.5 cm/s  E/E' septal: 8.26  E/E' lateral: 5.64  MR Velocity: 455 cm/s  [email or web address] [id number]    CTA HEAD W WO CONTRAST    Result Date: 5/31/2021  EXAM: HEAD AND NECK CT ANGIOGRAM: COMPARISON:  CT,SR  - CT HEAD WO CONTRAST  - 05/31/2021 01:26 AM EDT  CT,SR  - CT CERVICAL SPINE WO CONTRAST  - 05/31/2021 01:26 AM EDT CT,SR  - CT CHEST W CONTRAST  - 01/06/2020 07:25 AM EST TECHNIQUE:   Contiguous axial images from upper mediastinum through the vertex of head during uneventful intravenous administration of iodinated contrast using CT angiogram technique. Coronal and sagittal reformatted images were also reviewed. FINDINGS: CTA NECK: Lung apices are unremarkable. Aortic arch unremarkable. Major vessel origins are patent. The common, internal and external carotid arteries are symmetric and remarkable only for mild atherosclerotic changes. . Vertebral arteries are patent, the right is slightly dominant. Vessel origins are grossly unremarkable. The anterior inferior avulsion of the C3 vertebral body is again noted, without change in alignment of the cervical spine. No soft tissue lesion of the neck is appreciated. Chronic pleural parenchymal changes of both apices and upper mediastinal adenopathy again noted. CTA HEAD: Major cerebral arterial structures are patent. The internal carotid arteries are patent through the carotid termini bilaterally. Atherosclerotic ossification of both cavernous ICAs. Both anterior and both middle cerebral arteries are patent, without evidence of vessel occlusion or significant stenosis. Ambrose of [place] appears to be intact. Vertebrobasilar arteries are patent and normal appearing, with symmetric appearance of posterior cerebral arteries. No aneurysm or vascular malformation is appreciated. Mild carotid bulb and cavernous ICA calcifications, without significant vessel stenosis or occlusion. Vertebral arteries are grossly unremarkable. This document has been electronically signed by: Rhonda Alicea MD on 05/31/2021 05:46 AM All CTs at this facility use dose modulation techniques and iterative reconstructions, and/or weight-based dosing when appropriate to reduce radiation to a low as reasonably achievable. Carotid stenosis and measurements are in accordance with NASCET criteria.     XR PELVIS (1-2 VIEWS)    Result Date: 5/31/2021  AP pelvis Comparison: None Findings: No fractures or dislocations. Preserved hip joints. No acetabular dysplasia. (The lateral aspect of the left greater trochanter is not included) Normal stool volume. No abnormal calcifications. No radiopaque foreign body. Multiple surgical clips project over the right groin and medial proximal thigh. Extensive mural calcifications of the iliac and femoral arteries. Impression: 1. No acute fracture or dislocation. This document has been electronically signed by: Brayan Collazo MD on 05/31/2021 01:55 AM    CT HEAD WO CONTRAST    Result Date: 5/31/2021  CT head without: Comparison: CT  - CT HEAD WO CONTR  - 01/04/2011 05:23 PM EST Findings: Age-appropriate sulcation. No intracranial mass, midline shift, hydrocephalus, acute hemorrhage or infarct. Unremarkable orbits. Visualized paranasal sinuses are clear. Mastoid air cells are clear. No skull fracture. No significant scalp soft tissue swelling. Impression: 1. No acute intracranial abnormality. Age-appropriate exam. 2.  Stable intracranial exam. This document has been electronically signed by: Brayan Collazo MD on 05/31/2021 02:38 AM All CTs at this facility use dose modulation techniques and iterative reconstructions, and/or weight-based dosing when appropriate to reduce radiation to a low as reasonably achievable. CT FACIAL BONES WO CONTRAST    Result Date: 5/31/2021  CT maxillofacial without contrast Comparison: None Findings: No significant facial soft tissue swelling. Preserved orbits and globes. No acute midface fracture. Air-fluid level within the right maxillary sinus. Preserved mandible. Moderate osteoarthritis of the right TMJ. Partial opacification of the mastoid air cells with middle ear cavity involvement. Impression: No acute facial fracture. Right maxillary sinus disease. Bilateral mastoiditis with middle ear involvement.  This document has been Persistent neck pain after fall 3 days ago. COMPARISON: CT cervical spine dated 5/31/2021. TECHNIQUE: Sagittal T1, T2 and STIR sequences were obtained through the cervical spine. Axial fast and echo and gradient echo T2-weighted images were obtained. FINDINGS: There is minimal, grade 1, retrolisthesis of C3 relative to C4 on the basis of degenerative change, stable compared to prior CT. There is otherwise anatomic vertebral body height and alignment. Marrow signal is within normal limits. There is a prominent retropharyngeal effusion extending from the craniocervical junction inferiorly to the thoracic inlet. There is no evidence of ligamentous injury. Specifically, the anterior longitudinal ligament appears intact. No abnormal signal in the posterior elements is present. The craniocervical junction appears intact. The cervical spinal cord is normal in caliber without focal area of abnormal T2 signal. Paraspinal soft tissues are otherwise unremarkable. At C2-3 there is no significant spinal canal or neuroforaminal stenosis. At C3-4 there is minimal partial uncovering of the disc without significant spinal canal stenosis. There is mild left and moderate right neural foraminal stenosis in association with uncovertebral joint degenerative change and facet hypertrophy. At C4-5 there is no significant spinal canal stenosis. There is moderate bilateral foraminal stenosis in association with uncovertebral joint degenerative change and facet hypertrophy. At C5-6 there is a minimal disc bulge without significant spinal canal stenosis. There is moderate bilateral foraminal stenosis in association with uncovertebral joint degenerative change and facet hypertrophy. At C6-7 there is a shallow disc bulge without significant spinal canal stenosis and moderate bilateral foraminal stenosis in association with uncovertebral joint degenerative change and facet hypertrophy.  At C7-T1 there is no significant spinal canal or neuroforaminal stenosis. 1. Prominent retropharyngeal effusion along the anterior cervical spine without evidence of ligamentous injury. 2. Multilevel degenerative changes of cervical spine without significant spinal canal stenosis at any level and multiple areas of up to moderate neural foraminal stenosis. **This report has been created using voice recognition software. It may contain minor errors which are inherent in voice recognition technology. ** Final report electronically signed by Dr. Jose Pond MD on 6/1/2021 8:08 AM    XR CHEST PORTABLE    Result Date: 5/31/2021  1 view chest x-ray. Comparison: None Findings: Chronic bilateral upper lobe scarring/fibrosis with volume loss. Lower lobe emphysematous changes without consolidation. Median sternotomy wires and mediastinal clips again noted, without cardiac enlargement. No acute fracture. Impression: Bilateral upper lobe fibrosis and volume loss with bilateral lower lobe emphysematous changes. No superimposed consolidation, pleural effusion, or pneumothorax. This document has been electronically signed by: Chantelle Gregg MD on 05/31/2021 01:45 AM    A/P: S/P patient is seen and evaluated on the floor with evaluation exam findings reviewed and discussed with Dr. Ranulfo Joseph and with nursing. He is resting comfortably with pain well-controlled and [place] collar intact. He remained stable and intact to his baseline on exam with no significant changes noted the patient chart overnight. CT scan of the cervical spine imaged yesterday was reviewed and reveals anterior-inferior C3 acute corner fracture consistent with extension injury. CTA of the head and neck was unremarkable for significant vessel stenosis or occlusion or injury. An MRI of the cervical spine imaged today is reviewed and was absent ligamentous injury with some prominent retropharyngeal effusion along the anterior cervical spine noted.   Aspirin and Plavix was stopped yesterday with recommendations for Lovenox for DVT prophylaxis. Cardiology and neurology consults were placed.   Neurosurgery to follow    Electronically signed by En Lyles PA-C on 6/1/2021 at 8:30 AM

## 2021-06-01 NOTE — PROGRESS NOTES
[facility]  INPATIENT PHYSICAL THERAPY  EVALUATION  [facility] 4A - 4A-15/015-A    Time In: 1330  Time Out: 1354  Timed Code Treatment Minutes: 9 Minutes  Minutes: 24          Date: 2021  Patient Name: Shana Ackerman,  Gender:  male        MRN: [id number]  : 1945  (70 y.o.)      Referring Practitioner: MARIE Walker  Diagnosis: closed nondisplaced fracture of third cervical vertebra, unspecified fracture morphology, sequela  Additional Pertinent Hx: Per HPI: \"He is a 70 y.o. male presenting at [facility] by activation of level II trauma, brought by EMS following a fall down multiple steps  with positive LOC; past medical history COPD, HTN, HTN, CAD s/p CABG on ASA/Plavix. Per patient report he was walking down two steps to go to bed when he became dizzy and lost consciousness while simultaneously falling and striking head. Patient unable to differentiate whether LOC caused fall or caused by fall. Patient reports Head and neck pain. Patient denies chest pain, shortness of breath, cough,  dizziness, lightheadedness, numbness, paraesthesias, weakness, chills, fevers, abdominal pain, nausea, vomiting, diarrhea, blood in stool, n or back pain. Care in coordination with trauma surgeon Dr. Darrin Walters. \" Per neurosurgery: \"CT scan of the cervical spine imaged yesterday was reviewed and reveals anterior-inferior C3 acute corner fracture consistent with extension injury. CTA of the head and neck was unremarkable for significant vessel stenosis or occlusion or injury. An MRI of the cervical spine imaged today is reviewed and was absent ligamentous injury with some prominent retropharyngeal effusion along the anterior cervical spine noted. \"     Restrictions/Precautions:  Restrictions/Precautions: General Precautions, Fall Risk    Subjective:  Chart Reviewed: Yes  Patient assessed for rehabilitation services?: Yes  Family / Caregiver Present: No  Subjective: RN approved PT evaluation, stating pt did very well with OT earlier today. Pt was agreeable to therapy, pleasant and joking throughout session. He states he feels good and ready to go home. He states he has minor neck pain, but otherwise denies problems. General:  Follows Commands: Within Functional Limits    Vision: Impaired  Vision Exceptions: Wears glasses for reading    Hearing: Within functional limits    Pain: pt reports \"minor neck pain\"--did not quantify    Vitals: Vitals not assessed per clinical judgement, see nursing flowsheet    Social/Functional History:    Lives With: Spouse  Type of Home: House  Home Layout: One level  Home Access: Stairs to enter without rails  Entrance Stairs - Number of Steps: 2-3 steps down to get to bedroom  Home Equipment:  (none)     Bathroom Shower/Tub: Tub/Shower unit  Bathroom Toilet: Standard  Bathroom Equipment:  (none)       ADL Assistance: Independent  Homemaking Assistance: Independent (shares IADL tasks with wife)  Ambulation Assistance: Independent  Transfer Assistance: Independent    Active : Yes  Occupation: Part time employment  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 He goes for walks in the neighborhood and recently had routine of walking on the treadmill 1/2 mile and riding stationary bike for 2 miles.     OBJECTIVE:  Range of Motion:  Bilateral Lower Extremity: WFL    Strength:  Bilateral Lower Extremity: WFL    Balance:  Static Sitting Balance:  Independent  Dynamic Sitting Balance: Independent  Static Standing Balance: Independent  Dynamic Standing Balance: Independent   Tandem stance & SLS: able to attain and hold position with supervision, held >10'  *up to CGA for dynamic standing balance as precaution, but pt did not have LOB and was very steady    Bed Mobility:  Not Tested    Transfers:  Sit to Stand: Independent  Stand to Sit:Independent    Ambulation:  Supervision, PT managing IV pole--a few verbal cues provided for direction to turn to not get tangled in IV line  Distance: 600'+, various smaller distances in therapy gym  Surface: Level Tile  Device:No Device  Gait Deviations:  None    Stairs:  Ascent/descent of 3 steps without HR with reciprocal gait pattern and SBA--no LOB    Neuro Facilitation:  Stepping over hurdles forward and lateral x 6 reps each direction: CGA, no LOB, very steady  Pod steps x 10 reps: 9/10 correctly with good balance and SBA/CGA, did correct mistake on incorrect one    Functional Outcome Measures: Completed  Dynamic Gait Total Score: 24/24  AM-PAC Inpatient Mobility Raw Score : 24  AM-PAC Inpatient T-Scale Score : 61.14    ASSESSMENT:  Activity Tolerance:  Patient tolerance of  treatment: good. Treatment Initiated: Treatment and education initiated within context of evaluation. Evaluation time included review of current medical information, gathering information related to past medical, social and functional history, completion of standardized testing, formal and informal observation of tasks, assessment of data and development of plan of care and goals. Treatment time included skilled education and facilitation of tasks to increase safety and independence with functional mobility for improved independence and quality of life. Assessment:  Assessment: Pt is at/near PLOF on date of evaluation. He demonstrated ability to safely transfer and to ambulate over 600' with supervision (would have been safe independently). He also performed high level balance activities with CGA (as precaution) with no LOB. He does not require physical therapy at this time and is safe to return home.   Prognosis: Excellent    REQUIRES PT FOLLOW UP: No    Discharge Recommendations:  Discharge Recommendations: Home independently    Patient Education:  PT Education: Goals, PT Role, Plan of Care    Equipment Recommendations:  Equipment Needed: No    Plan:  Times per week: N/A    Goals:  Patient goals :

## 2021-06-01 NOTE — CARE COORDINATION
6/1/21, 12:31 PM EDT  DISCHARGE PLANNING EVALUATION:    Guillermina Nguyen       Admitted: 5/31/2021/ [address] [facility] [place] day: 1   Location: [facility]/015-A Reason for admit: Closed nondisplaced fracture of third cervical vertebra, unspecified fracture morphology, sequela [S12.201S]   PMH:  has a past medical history of AAA (abdominal aortic aneurysm) ([facility] [address] COPD (chronic obstructive pulmonary disease) ([facility] [address] Coronary artery disease, Emphysema of lung ([facility] [address] Erectile dysfunction, GERD (gastroesophageal reflux disease), Gout, History of blood transfusion, Hyperlipidemia, Hypertension, Liver disease, and Osteoarthritis. Procedure:   MRI Cervical Spine WO Contrast    Impression:           1. Prominent retropharyngeal effusion along the anterior cervical spine without evidence of ligamentous injury. 2. Multilevel degenerative changes of cervical spine without significant spinal canal stenosis at any level and multiple areas of up to moderate neural foraminal stenosis. ECHO EF 55%    Barriers to Discharge:  From ED, PT/OT/ST, cervical collar, neuro checks, Cardiology consult, Hospitalist consult, Neurosurgery consult, telemetry, pain control, IV Pepcid. PCP: Tino Muñiz MD  Readmission Risk Score: 14%    Patient Goals/Plan/Treatment Preferences: Met with Germanмария Bettencourtcatherine. He currently resides at home with his wife. Plan is to return home at discharge. Will follow clinical course for potential discharge needs. Transportation/Food Security/Housekeeping Addressed:  No issues identified.

## 2021-06-01 NOTE — PROGRESS NOTES
Elba Farley 62  INPATIENT OCCUPATIONAL THERAPY  [facility] NEUROSCIENCES 4A  EVALUATION    Time:   Time In:   Time Out: 1117  Timed Code Treatment Minutes: 23 Minutes  Minutes: 33          Date: 2021  Patient Name: Clement Bee,   Gender: male      MRN: [id number]  : 1945  (70 y.o.)  Referring Practitioner: MARIE Grossman  Diagnosis: Closed nondisplaced fracture of third cervical vertebra  Additional Pertinent Hx: Per H&P:He is a 70 y.o. male presenting at [facility] by activation of level II trauma, brought by EMS following a fall down multiple steps  with positive LOC; past medical history COPD, HTN, HTN, CAD s/p CABG on ASA/Plavix. Per patient report he was walking down two steps to go to bed when he became dizzy and lost consciousness while simultaneously falling and striking head. Pt found to haveAnterior-inferior C3 acute corner fracture. CTA of the head and neck was unremarkable for significant vessel stenosis or occlusion or injury. An MRI of the cervical spine was absent ligamentous injury with some prominent retropharyngeal effusion along the anterior cervical spine noted. Restrictions/Precautions:  Restrictions/Precautions: General Precautions, Fall Risk    Subjective  Chart Reviewed: Yes, Orders, Progress Notes, History and Physical, Imaging  Patient assessed for rehabilitation services?: Yes  Family / Caregiver Present: No    Subjective: Pt seated in bed upon arrival, agreeable to OT session. Comments: RN ok'd session, reporting neurosx ok'd activity, no intervention planned, and had removed c-collar. RN reported would discontinue strict bedrest order.     Pain:  Pain Assessment  Patient Currently in Pain: Denies    Vitals: Vitals not assessed per clinical judgement, see nursing flowsheet    Social/Functional History:  Lives With: Spouse  Type of Home: House  Home Layout: One level  Home Access: Stairs to enter without rails  Entrance Stairs - Number of Steps: 2-3 steps down to get to bedroom  Home Equipment:  (none)   Bathroom Shower/Tub: Tub/Shower unit  Bathroom Toilet: Standard  Bathroom Equipment:  (none)       ADL Assistance: Independent  Homemaking Assistance: Independent (shares IADL tasks with wife)  Ambulation Assistance: Independent  Transfer Assistance: Independent    Active : Yes  Occupation: Part time employment  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 VISION:WFL    HEARING:  WFL, although occasionally hard of hearing    COGNITION: Impulsive and Decreased Safety Awareness  Pt requires cues to wait for therapist before completing bed mobility, transfers, etc    RANGE OF MOTION:  Bilateral Upper Extremity:  WFL    STRENGTH:  Bilateral Upper Extremity:  WFL    ADL:   Feeding: Independent. Grooming: Supervision. standing at sink to wash/dry hands  Lower Extremity Dressing: with set-up.  while seated at EOB to mary socks  Toileting: Supervision. voiding while standing at sink. BALANCE:  Sitting Balance:  Independent. Standing Balance: Supervision, Stand By Assistance. Standing ~3 minutes to complete ADL tasks and additional ~2 minutes in prep for mobility (as initially c/o slight dizziness)    BED MOBILITY:  Supine to Sit: Modified Independent    Scooting: Independent      TRANSFERS:  Sit to Stand:  Supervision. Stand to Sit: Supervision. Completed X2 trials    FUNCTIONAL MOBILITY:  Assistive Device: None  Assist Level:  Stand By Assistance. Distance: To and from bathroom and and around 4A/4B nursing units  Steady although slower pace. Pt c/o feeling \"slightly off\" although attributes to no mobility since admission. Functional mobility completed to increase overall activity tolerance needed for ADL tasks. Activity Tolerance:  Patient tolerance of  treatment: good.         Assessment:  Assessment: Pt presents s/p fall at home, essentially able to complete ADLs and mobility without assistance although does c/o not feeling quite at baseline yet. . Pt will continue to benefit from OT services to improve independence with ADL/IADLs to facilitate safe return to home and PLOF. Performance deficits / Impairments: Decreased functional mobility , Decreased high-level IADLs, Decreased ADL status  Prognosis: Good  REQUIRES OT FOLLOW UP: Yes  Decision Making: Medium Complexity    Treatment Initiated: Treatment and education initiated within context of evaluation. Evaluation time included review of current medical information, gathering information related to past medical, social and functional history, completion of standardized testing, formal and informal observation of tasks, assessment of data and development of plan of care and goals. Treatment time included skilled education and facilitation of tasks to increase safety and independence with ADL's for improved functional independence and quality of life. Discharge Recommendations:  Home with assist PRN    Patient Education:  OT Education: OT Role, Plan of Care    Equipment Recommendations:  Equipment Needed: No    Plan:  Times per week: 5x  Current Treatment Recommendations: Strengthening, Functional Mobility Training, Safety Education & Training, Patient/Caregiver Education & Training, Self-Care / ADL, Home Management Training. See long-term goal time frame for expected duration of plan of care. If no long-term goals established, a short length of stay is anticipated. Goals:     Short term goals  Time Frame for Short term goals: by discharge  Short term goal 1: Pt will increase activity tolerance for functional mobility household distances with MI in prep for ADL/IADL completion. Short term goal 2: Pt will complete showering tasks with MI while standing to increase independence with self care tasks. Short term goal 3: Pt will complete IADL tasks with MI in prep for homemaking tasks.          Following session,

## 2021-06-01 NOTE — PROGRESS NOTES
Liver disease     Osteoarthritis        Pain: No pain reported. Subjective:  Patient seen sitting upright in recliner upon ST arrival with wife present. Patient alert, pleasant, and cooperative with ST throughout evaluation. SOCIAL HISTORY:   Living Arrangements: Patient lives at home with wife in [place]  Work History: Retired  Education Level: 9th Grade  Driving Status: Active   Finance Management: Assistance Required; Patient's wife completes finance management  Medication Management: Independent; does not utilize pill box  ADL's: Independent. Patient assist with dishes and laundry as well as completed outdoor yardwork  Hobbies: Fixing automobiles  Vision Status: Glasses  Hearing: WFL  Type of Home: House  Home Layout: One level  Home Access: Stairs to enter without rails  Entrance Stairs - Number of Steps: 2-3 steps down to get to bedroom  Home Equipment:  (none)    ORAL MOTOR:  Facial / Labial WFL    Lingual WFL    Dentition WFL    Velum Not Tested    Vocal Quality WFL    Sensation Not Tested    Cough Not Tested      SPEECH / VOICE:  Speech and Voice appear to be grossly intact for basic and complex daily communication    LANGUAGE:  Receptive:  Receptive language skills appear to be grossly intact for basic and complex daily communication. Expressive:  Expressive language skills appear to be grossly intact for basic and complex daily communication. COGNITION:  Rew Cognitive Assessment [facility]) version 7.3 completed. Pt scored 21/30. Normal is greater than or equal to 26/30.   Inclusion of +1 point given highest level of education achieved less than/equal to 12th grade or GED with limited-0 post-secondary schooling   Orientation: 5/6 independent, 1/6 with logical cuing  Immediate Recall: 2/5 independent  Short-Term Recall: 2/5 independent, 2/5 with category cue, 1/5 with multiple choice cue  Divergent Namin members/60 seconds (Target=11 members/ 60 seconds)  Problem Solving: mildly impaired  Reasonin/2 independent  Sequencing: Mildly impaired  Thought Organization: Mildly Impaired  Insight: Fair  Attention: 4/6 independent   Math Computation: 1/3 independent  Executive Functionin/5 independent    SWALLOWING:  Current Diet: Regular diet with thin liquids  WFL     RECOMMENDATIONS/ASSESSMENT:  DIAGNOSTIC IMPRESSIONS:  Patient presents with mild cognitive impairment as evidenced by evaluation results outlined above. Patient presents with difficulty in the following cognitive domains: working, immediate, and delayed recall, problem solving, verbal/visual reasoning, sequencing, thought organization, attention, math computation, and overall executive functioning given more complex cognitive tasks (e.g., medications, finances, etc.) with fair insight into deficits. Patient presents with speech and voice WFL, receptive and expressive language grossly intact, and swallowing functioning with no concerns per patient and RN Gregory Rosa report. Patient would highly benefit from ST services to address cognitive impairment while in the acute setting with continuation of services in OP ST services to address cognitive domains in order to allow for safe return to home environment with spouse and continued independent completion of ADLs and IADLs. Recommend clearance from physician or driving evaluation prior to return to driving in order to ensure patient safety due to cognitive impairment outlined above. Patient, spouse, and RN in agreement with POC. Rehabilitation Potential: good    EDUCATION:  Learner: Patient and Significant Other  Education:  Reviewed results and recommendations of this evaluation, Reviewed ST goals and Plan of Care and Reviewed recommendations for follow-up  Evaluation of Education: Verbalizes understanding    PLAN:  Skilled SLP intervention on acute care 3-5 x per week or until goals met and/or pt plateaus in function.   Specific interventions for next session may include: Immediate/Delayed Recall and Verbal/Visual Reasoning. PATIENT GOAL:    Return to prior level of function. SHORT TERM GOALS:  Short-term Goals  Timeframe for Short-term Goals: 2 weeks  Goal 1: Patient will complete verbal/visual reasoning tasks with moderate cuing with 80% accuracy in order to improve overall congitive functioning and return to IADL tasks. Goal 2: Patient will complete working, immediate, and delayed recall tasks with 80% accuracy and moderate cuing in order to improve retention of novel information. Goal 3: Patient will complete executive functioning tasks (e.g., meds, finances, etc.) with 80% accuracy and moderate cuing in order to improve completion of IADLs with familial assistance. LONG TERM GOALS:  No LTGs established due to short ELOS.     Meng Arora M.S., [facility]

## 2021-06-01 NOTE — PLAN OF CARE
Problem: Pain:  Goal: Pain level will decrease  Description: Pain level will decrease  Outcome: Ongoing  Goal: Control of acute pain  Description: Control of acute pain  Outcome: Ongoing  Goal: Control of chronic pain  Description: Control of chronic pain  Outcome: Ongoing     Problem: Falls - Risk of:  Goal: Will remain free from falls  Description: Will remain free from falls  Outcome: Ongoing  Goal: Absence of physical injury  Description: Absence of physical injury  Outcome: Ongoing     Problem: Skin Integrity:  Goal: Will show no infection signs and symptoms  Description: Will show no infection signs and symptoms  Outcome: Ongoing  Goal: Absence of new skin breakdown  Description: Absence of new skin breakdown  Outcome: Ongoing  Goal: Skin integrity will be maintained  Description: Skin integrity will be maintained  Outcome: Ongoing     Problem: Discharge Planning:  Goal: Discharged to appropriate level of care  Description: Discharged to appropriate level of care  Outcome: Ongoing     Problem: Activity:  Goal: Ability to perform activities at highest level will improve  Description: Ability to perform activities at highest level will improve  Outcome: Ongoing  Goal: Mobility will improve  Description: Mobility will improve  Outcome: Ongoing  Goal: Muscle strength will improve  Description: Muscle strength will improve  Outcome: Ongoing  Goal: Range of joint motion will improve  Description: Range of joint motion will improve  Outcome: Ongoing     Problem:  Bowel/Gastric:  Goal: Ability to demonstrate the techniques of an individualized bowel program will improve  Description: Ability to demonstrate the techniques of an individualized bowel program will improve  Outcome: Ongoing     Problem: Coping:  Goal: Ability to identify and develop effective coping behavior will improve  Description: Ability to identify and develop effective coping behavior will improve  Outcome: Ongoing  Goal: Ability to verbalize feelings will improve  Description: Ability to verbalize feelings will improve  Outcome: Ongoing     Problem: Fluid Volume:  Goal: Will maintain adequate fluid volume  Description: Will maintain adequate fluid volume  Outcome: Ongoing     Problem: Health Behavior:  Goal: Identification of resources available to assist in meeting health care needs will improve  Description: Identification of resources available to assist in meeting health care needs will improve  Outcome: Ongoing     Problem: Physical Regulation:  Goal: Complications related to the disease process, condition or treatment will be avoided or minimized  Description: Complications related to the disease process, condition or treatment will be avoided or minimized  Outcome: Ongoing  Goal: Will remain free from infection  Description: Will remain free from infection  Outcome: Ongoing  Goal: Will not experience autonomic dysreflexia  Description: Will not experience autonomic dysreflexia  Outcome: Ongoing     Problem: Respiratory:  Goal: Ability to maintain a clear airway will improve  Description: Ability to maintain a clear airway will improve  Outcome: Ongoing  Goal: Ability to maintain adequate ventilation will improve  Description: Ability to maintain adequate ventilation will improve  Outcome: Ongoing  Goal: Levels of oxygenation will improve  Description: Levels of oxygenation will improve  Outcome: Ongoing     Problem: Role Relationship:  Goal: Ability to identify and alter barriers to satisfying sexual function will improve  Description: Ability to identify and alter barriers to satisfying sexual function will improve  Outcome: Ongoing     Problem: Safety:  Goal: Ability to maintain safety and efficiency of swallowing without signs of aspiration will improve  Description: Ability to maintain safety and efficiency of swallowing without signs of aspiration will improve  Outcome: Ongoing  Goal: Ability to remain free from injury will improve  Description: Ability to remain free from injury will improve  Outcome: Ongoing     Problem: Self-Care:  Goal: Ability to participate in self-care as condition permits will improve  Description: Ability to participate in self-care as condition permits will improve  Outcome: Ongoing  Goal: Ability to make decisions on own behalf will improve  Description: Ability to make decisions on own behalf will improve  Outcome: Ongoing     Problem: Self-Concept:  Goal: Level of anxiety will decrease  Description: Level of anxiety will decrease  Outcome: Ongoing  Goal: Ability to verbalize positive feelings about self will improve  Description: Ability to verbalize positive feelings about self will improve  Outcome: Ongoing     Problem: Sensory:  Goal: Pain level will decrease  Description: Pain level will decrease  Outcome: Ongoing  Goal: General experience of comfort will improve  Description: General experience of comfort will improve  Outcome: Ongoing

## 2021-06-02 VITALS
TEMPERATURE: 97.9 F | SYSTOLIC BLOOD PRESSURE: 115 MMHG | WEIGHT: 192.3 LBS | DIASTOLIC BLOOD PRESSURE: 74 MMHG | HEIGHT: 71 IN | HEART RATE: 47 BPM | BODY MASS INDEX: 26.92 KG/M2 | OXYGEN SATURATION: 98 % | RESPIRATION RATE: 16 BRPM

## 2021-06-02 LAB
ANION GAP SERPL CALCULATED.3IONS-SCNC: 9 MEQ/L (ref 8–16)
BUN BLDV-MCNC: 13 MG/DL (ref 7–22)
CALCIUM SERPL-MCNC: 8.8 MG/DL (ref 8.5–10.5)
CHLORIDE BLD-SCNC: 101 MEQ/L (ref 98–111)
CO2: 26 MEQ/L (ref 23–33)
CREAT SERPL-MCNC: 1 MG/DL (ref 0.4–1.2)
ERYTHROCYTE [DISTWIDTH] IN BLOOD BY AUTOMATED COUNT: 13.2 % (ref 11.5–14.5)
ERYTHROCYTE [DISTWIDTH] IN BLOOD BY AUTOMATED COUNT: 46.7 FL (ref 35–45)
GFR SERPL CREATININE-BSD FRML MDRD: 73 ML/MIN/1.73M2
GLUCOSE BLD-MCNC: 103 MG/DL (ref 70–108)
HCT VFR BLD CALC: 35 % (ref 42–52)
HEMOGLOBIN: 12 GM/DL (ref 14–18)
MCH RBC QN AUTO: 33.2 PG (ref 26–33)
MCHC RBC AUTO-ENTMCNC: 34.3 GM/DL (ref 32.2–35.5)
MCV RBC AUTO: 97 FL (ref 80–94)
PLATELET # BLD: 133 THOU/MM3 (ref 130–400)
PMV BLD AUTO: 10.2 FL (ref 9.4–12.4)
POTASSIUM SERPL-SCNC: 4.3 MEQ/L (ref 3.5–5.2)
RBC # BLD: 3.61 MILL/MM3 (ref 4.7–6.1)
SODIUM BLD-SCNC: 136 MEQ/L (ref 135–145)
WBC # BLD: 4.7 THOU/MM3 (ref 4.8–10.8)

## 2021-06-02 PROCEDURE — 93270 REMOTE 30 DAY ECG REV/REPORT: CPT

## 2021-06-02 PROCEDURE — 36415 COLL VENOUS BLD VENIPUNCTURE: CPT

## 2021-06-02 PROCEDURE — 99239 HOSP IP/OBS DSCHRG MGMT >30: CPT | Performed by: SURGERY

## 2021-06-02 PROCEDURE — G0378 HOSPITAL OBSERVATION PER HR: HCPCS

## 2021-06-02 PROCEDURE — 6370000000 HC RX 637 (ALT 250 FOR IP): Performed by: INTERNAL MEDICINE

## 2021-06-02 PROCEDURE — 85027 COMPLETE CBC AUTOMATED: CPT

## 2021-06-02 PROCEDURE — 2580000003 HC RX 258: Performed by: PHYSICIAN ASSISTANT

## 2021-06-02 PROCEDURE — 80048 BASIC METABOLIC PNL TOTAL CA: CPT

## 2021-06-02 PROCEDURE — 99225 PR SBSQ OBSERVATION CARE/DAY 25 MINUTES: CPT | Performed by: INTERNAL MEDICINE

## 2021-06-02 PROCEDURE — APPSS45 APP SPLIT SHARED TIME 31-45 MINUTES: Performed by: PHYSICIAN ASSISTANT

## 2021-06-02 RX ORDER — FAMOTIDINE 20 MG/1
20 TABLET, FILM COATED ORAL 2 TIMES DAILY
Status: DISCONTINUED | OUTPATIENT
Start: 2021-06-02 | End: 2021-06-02

## 2021-06-02 RX ADMIN — SODIUM CHLORIDE, PRESERVATIVE FREE 10 ML: 5 INJECTION INTRAVENOUS at 09:35

## 2021-06-02 RX ADMIN — CLOPIDOGREL BISULFATE 75 MG: 75 TABLET ORAL at 09:35

## 2021-06-02 RX ADMIN — METOPROLOL TARTRATE 25 MG: 25 TABLET, FILM COATED ORAL at 09:35

## 2021-06-02 RX ADMIN — ASPIRIN 325 MG: 325 TABLET, COATED ORAL at 09:35

## 2021-06-02 RX ADMIN — ALLOPURINOL 300 MG: 300 TABLET ORAL at 09:35

## 2021-06-02 RX ADMIN — PANTOPRAZOLE SODIUM 40 MG: 40 TABLET, DELAYED RELEASE ORAL at 06:06

## 2021-06-02 ASSESSMENT — PAIN SCALES - GENERAL
PAINLEVEL_OUTOF10: 0
PAINLEVEL_OUTOF10: 0

## 2021-06-02 NOTE — PLAN OF CARE
Problem: Pain:  Goal: Control of acute pain  Description: Control of acute pain  6/2/2021 1020 by Yvette Ramachandran RN  Outcome: Met This Shift  Note: Denies pain at this time. Pain goal is no pain. Able to use 0-10 pain rating scale. Problem: Pain:  Goal: Control of chronic pain  Description: Control of chronic pain  6/2/2021 1020 by Yvette Ramachandran RN  Outcome: Met This Shift  Note: Denies chronic pain. Able to use 0-10 pain rating scale. Pain goal is no pain. Problem: Falls - Risk of:  Goal: Will remain free from falls  Description: Will remain free from falls  6/2/2021 1020 by Yvette Ramachandran RN  Outcome: Met This Shift  Note: Free from falls this shift, at this time. Call light and bedside table within reach. Bed alarm on. Bed wheels locked and bed in lowest position. Pt oriented to own abilities and is encouraged to use call light for needs. Problem: Falls - Risk of:  Goal: Absence of physical injury  Description: Absence of physical injury  6/2/2021 1020 by Yvette Ramachandran RN  Outcome: Met This Shift  Note: Free from physical injury this shift. Problem: Discharge Planning:  Goal: Discharged to appropriate level of care  Description: Discharged to appropriate level of care  6/2/2021 1020 by Yvette Ramachandran RN  Outcome: Ongoing  Note: To be discharged home with wife. No orders for d/c at this time. Problem: Respiratory:  Goal: Ability to maintain a clear airway will improve  Description: Ability to maintain a clear airway will improve  6/2/2021 1020 by Yvette Ramachandran RN  Outcome: Completed  Note: Able to maintain clear airway. Problem: Respiratory:  Goal: Levels of oxygenation will improve  Description: Levels of oxygenation will improve  6/2/2021 1020 by Yvette Ramachandran RN  Outcome: Completed  Note: Oxygen levels above 90 percent on room air.       Problem: Role Relationship:  Goal: Ability to identify and alter barriers to satisfying sexual function will improve  Description: Ability to identify and alter barriers to satisfying sexual function will improve  6/2/2021 1020 by Sung Lim RN  Outcome: Completed  Note: Denies. Problem: Safety:  Goal: Ability to remain free from injury will improve  Description: Ability to remain free from injury will improve  6/2/2021 1020 by Sung Lim RN  Outcome: Met This Shift  Note: Free from falls this shift, at this time. Call light and bedside table within reach. Bed alarm on. Bed wheels locked and bed in lowest position. Pt oriented to own abilities and is encouraged to use call light for needs. No injury noted this shift. Care plan reviewed with patient. Patient verbalizes understanding of the plan of care and contributed to goal setting.

## 2021-06-02 NOTE — PLAN OF CARE
Problem: Pain:  Goal: Control of acute pain  Description: Control of acute pain  6/2/2021 0027 by Smitha Lundy RN  Outcome: Ongoing  Note: No complaints of acute pain. Problem: Pain:  Goal: Control of chronic pain  Description: Control of chronic pain  6/2/2021 0027 by Smitha Lundy RN  Outcome: Ongoing  Note: No complaints of chronic pain. Problem: Falls - Risk of:  Goal: Will remain free from falls  Description: Will remain free from falls  6/2/2021 0027 by Smitha Lundy RN  Outcome: Ongoing  Note: Patient alert and oriented x4. Bed alarmed armed. Bed wheels locked. Bedside table in reach. Patient up with assistance when ambulating. Patient verbalizes and demonstrates the use of the call light. Hourly rounding being completed. Problem: Falls - Risk of:  Goal: Absence of physical injury  Description: Absence of physical injury  6/2/2021 0027 by Smitha Lundy RN  Outcome: Ongoing  Note: Patient free of accidental injury. Patient alert and oriented x4. Bed alarmed armed. Bed wheels locked. Bedside table in reach. Patient up with assistance when ambulating. Patient verbalizes and demonstrates the use of the call light. Hourly rounding being completed. Problem: Discharge Planning:  Goal: Discharged to appropriate level of care  Description: Discharged to appropriate level of care  6/2/2021 0027 by Smitha Lundy RN  Outcome: Ongoing  Note: Patient plans to go home with wife when ready for discharge, case management on case. Problem: Respiratory:  Goal: Ability to maintain a clear airway will improve  Description: Ability to maintain a clear airway will improve  6/2/2021 0027 by Smitha Lundy RN  Outcome: Ongoing  Note: Patient able to maintain clear airway. Problem: Respiratory:  Goal: Levels of oxygenation will improve  Description: Levels of oxygenation will improve  6/2/2021 0027 by Smitha Lundy RN  Outcome: Ongoing  Note: Patient is on room ai at this time.  Patients oxygen

## 2021-06-02 NOTE — CARE COORDINATION
Condition Code 44 conditions met, a Utilization Review Committee member reviewed this case and agrees that this patient does not meet evidenced based criteria for inpatient services, requiring a change in patient status from \"admit as inpatient\" to \"place in observation\", in accordance with condition code 44. Medical care will continue to be provided by Jose Francisco Ozuna MD, who agrees with the decision and has documented the agreement in the patient's medical record, as evidenced by the observation order/additional documentation. SAINI was signed by the patient and placed in the chart. Copy of the MOON was given to the patient. Explanation to the patient that they are in Observation status and Code 44 letter of Explanation was given to the patient. All questions addressed.

## 2021-06-02 NOTE — PROGRESS NOTES
Elba Farley 62  OCCUPATIONAL THERAPY MISSED TREATMENT NOTE  [facility] NEUROSCIENCES 4A  [id number]      Date: 2021  Patient Name: Guerrero Wade        CSN: [id number]   : 1945  (70 y.o.)  Gender: male   Referring Practitioner: MARIE Rodriguez  Diagnosis: Closed nondisplaced fracture of third cervical vertebra         REASON FOR MISSED TREATMENT: OT treatment attempted Pt. Declined at this time reports he is going home this date.

## 2021-06-02 NOTE — PROGRESS NOTES
Pharmacist - Provider Communication    The following stress ulcer prophylaxis agent has been selected for discontinuation based on not meeting the criteria listed below: famotidine PO (patient already on Protonix at home and here, pepcid was ordered via order set). Stress Ulcer Prophylaxis Criteria  Any one of the following independent risk factors:  1) Coagulopathy (INR >1.5 [not on anticoagulation], platelets <65,330, WMMD>7U normal)  2) Mechanical ventilation >48 hours    Any two or more of the followin) Occult or overt bleeding for >6 days  2) ICU admission >1 week  3) Glucocorticoid therapy (>250 mg/day hydrocortisone or equivalent)  4) History of ulcers/bleeding within 1 year  5) Ochoa covering >35% BSA  6) Head/spinal trauma  7) Organ transplantation   8) Sepsis/septic shock    If the prescriber doesn't feel this discontinuation is appropriate, please re-order the medication and place \"SUP appropriate per prescriber\" in comments of the order. If you would like further assistance or have questions, please contact pharmacy at  [phone].

## 2021-06-02 NOTE — PROGRESS NOTES
[facility] Utilization Review  Patient: Hudson President  :   Acct#: [de-identified]  2021 12:26 PM  ADMISSION DAY:  2021 12:59 AM   Case reviewed in referral from UR staff  This is a 69 yo male with MPHx as outlined by Dr. Pastor Reyna, IM & Cardiology notes - reviewed today. Admitted after fall with C3 injury concerns - evaluated by Neurosurgery as well, C-collar removed in absence of ligamentous injury by MRI. Stable overall and Cardio w/u for possible arrhythmia with Zio monitor patch planned. Overall stability good and home-going anticipated soon. Dr. Pastor Reyna contacted and agrees that this is appropriate for observation status. Order placed for this with his permission.     Thank you, Dr. Pastor Reyna for your prompt response, time & assistance in this case    Osmin Wasserman MD

## 2021-06-02 NOTE — PROGRESS NOTES
[facility] - Dr. Love Press  Daily Progress Note    Pt Name: Cindy Bang  Medical Record Number: [id number]  Date of Birth 1945   Today's Date: 6/2/2021    HD: # 1    CC: \"Good\"\"    ASSESSMENT  1. Active Hospital Problems    Diagnosis Date Noted    Closed nondisplaced fracture of third cervical vertebra ([facility] [address] [S12.201A] 05/31/2021    Forehead contusion, initial encounter [employer] 05/31/2021    Hypomagnesemia [E83.42] 05/31/2021    Abnormal EKG [R94.31] 05/31/2021    Hx of CABG [Z95.1] 05/31/2021    Fall [W19. XXXA]     Closed head injury [S09.90XA]     Syncope and collapse [R55]     Abdominal aortic aneurysm (AAA) without rupture ([facility] [address] [I71.4] 10/19/2015    COPD without exacerbation ([facility] [address] [J44.9] 10/19/2015    Coronary artery disease involving native coronary artery of native heart without angina pectoris [I25.10]          PLAN  Patient admitted under [facility] 4[facility]     Anterior-inferior C3 acute corner fracture              -Neurosurgery spine managing non operatively, cleared for cervical collar removal              -PT/OT              -Pain control              -OK to remove [place] collar.   C-spine precautions discontinued              -CTA head/neck noted   - MRI cervical spine notes no ligamentous injury    -Stable from discharge from a neurosurgical perspective   -Follow-up with neurosurgery outpatient in 2 weeks for new flexion-extension cervical x-rays     Closed head injury              - SLP cog eval: Continue therapy recommended              - Limited stimulation brain injury guidelines              - Monitor for postconcussive symptoms              - Neuro checks              - Anti emetics and pain control     Possible syncopal episode              - Hospitalist & Cardiology assisting with workup   - Event monitor at discharge, check orthostatics, consider tilt table test as outpatient, and follow-up with Dr. Brissa Schmidt in 5 weeks per 06/02/21 115/74   02/11/21 100/60   12/09/20 124/72     Pulse Readings from Last 3 Encounters:   06/02/21 (!) 47   02/11/21 52   12/09/20 60       24 HR INTAKE/OUTPUT :     Intake/Output Summary (Last 24 hours) at 6/2/2021 1327  Last data filed at 6/2/2021 0100  Gross per 24 hour   Intake 1679.49 ml   Output 950 ml   Net 729.49 ml     DIET GENERAL;    OBJECTIVE  CURRENT VITALS /74   Pulse (!) 47   Temp 97.9 °F (36.6 °C) (Oral)   Resp 16   Ht 5' 11\" (1.803 m)   Wt 192 lb 4.8 oz (87.2 kg)   SpO2 98%   BMI 26.82 kg/m²   GENERAL: alert, cooperative, no distress  NEURO: awake, alert and oriented. PERRL. Conversing fluently and appropriately. PMS intact in all 4 extremities. No signs of focal neurological deficits. HEAD: Superficial facial abrasions over forehead and nose with no bleeding or drainage. NECK: No midline cervical tenderness to palpation  LUNGS: clear to auscultation bilaterally- no wheezes, rales or rhonchi, normal air movement, no respiratory distress  HEART: normal rate, normal S1 and S2, no gallops, intact distal pulses  ABDOMEN: soft, non-tender, non-distended, normal bowel sounds, no masses or organomegaly  WOUNDS: Abrasions to forehead with no significant drainage, no significant erythema  EXTREMITY: no cyanosis, no clubbing and no edema. PMS intact in all 4 extremity, strength 5/5.       LABS  CBC :   Recent Labs     05/31/21 0058 06/01/21  0404 06/02/21  0414   WBC 4.5* 3.9* 4.7*   HGB 13.5* 12.3* 12.0*   HCT 38.9* 36.5* 35.0*   MCV 95.3* 97.6* 97.0*    121* 133     BMP:   Recent Labs     05/31/21 0058 06/01/21  0404 06/02/21  0414    137 136   K 4.6 4.3 4.3   CL 98 104 101   CO2 24 26 26   BUN 16 11 13   CREATININE 1.0 0.9 1.0     COAGS:   Recent Labs     05/31/21 0058 06/01/21 0404   APTT 25.8  --    PROT 6.4 5.9*   INR 1.19*  --      Pancreas/HFP:    Recent Labs     05/31/21 0058   LIPASE 35.2     Recent Labs     05/31/21 0058 06/01/21 0404   AST 22 24 ALT 14 13   BILITOT 2.0* 1.6*   ALKPHOS 74 62     RADIOLOGY:  Narrative   PROCEDURE: MRI CERVICAL SPINE WO CONTRAST       CLINICAL INFORMATION: Cervical fracture with suspected ligament injury . Persistent neck pain after fall 3 days ago.       COMPARISON: CT cervical spine dated 5/31/2021.       TECHNIQUE: Sagittal T1, T2 and STIR sequences were obtained through the cervical spine. Axial fast and echo and gradient echo T2-weighted images were obtained.       FINDINGS:   There is minimal, grade 1, retrolisthesis of C3 relative to C4 on the basis of degenerative change, stable compared to prior CT. There is otherwise anatomic vertebral body height and alignment. Marrow signal is within normal limits. There is a prominent    retropharyngeal effusion extending from the craniocervical junction inferiorly to the thoracic inlet. There is no evidence of ligamentous injury. Specifically, the anterior longitudinal ligament appears intact. No abnormal signal in the posterior    elements is present. The craniocervical junction appears intact. The cervical spinal cord is normal in caliber without focal area of abnormal T2 signal. Paraspinal soft tissues are otherwise unremarkable. At C2-3 there is no significant spinal canal or neuroforaminal stenosis. At C3-4 there is minimal partial uncovering of the disc without significant spinal canal stenosis. There is mild left and moderate right neural foraminal stenosis in association with uncovertebral joint degenerative change and facet hypertrophy. At C4-5 there is no significant spinal canal stenosis. There is moderate bilateral foraminal stenosis in association with uncovertebral joint degenerative change and facet hypertrophy. At C5-6 there is a minimal disc bulge without significant spinal canal stenosis. There is moderate bilateral foraminal stenosis in association with uncovertebral joint degenerative change and facet hypertrophy.    At C6-7 there is a shallow disc bulge without significant spinal canal stenosis and moderate bilateral foraminal stenosis in association with uncovertebral joint degenerative change and facet hypertrophy. At C7-T1 there is no significant spinal canal or neuroforaminal stenosis.         Impression       1. Prominent retropharyngeal effusion along the anterior cervical spine without evidence of ligamentous injury. 2. Multilevel degenerative changes of cervical spine without significant spinal canal stenosis at any level and multiple areas of up to moderate neural foraminal stenosis.                 **This report has been created using voice recognition software. It may contain minor errors which are inherent in voice recognition technology. **       Final report electronically signed by Dr. Brandi Cavanaugh MD on 6/1/2021 8:08 AM         Narrative   EXAM: HEAD AND NECK CT ANGIOGRAM:       COMPARISON:    CT,SR  - CT CERVICAL SPINE WO CONTRAST  - 05/31/2021 01:26 AM EDT    CT,[facility]  - CT CHEST W CONTRAST  - 08/27/2018 08:46 AM EDT       TECHNIQUE:   Contiguous axial images from upper mediastinum through the    vertex of head during uneventful intravenous administration of iodinated    contrast using CT angiogram technique. Coronal and sagittal reformatted    images were also reviewed.       FINDINGS:       CTA NECK:       Lung apices are unremarkable.       Aortic arch unremarkable.  Major vessel origins are patent. The common, internal and external carotid arteries are symmetric and    remarkable only for mild atherosclerotic changes. .   Vertebral arteries are patent, the right is slightly dominant.  Vessel    origins are grossly unremarkable. The anterior inferior avulsion of the C3 vertebral body is again noted,    without change in alignment of the cervical spine. No soft tissue lesion of the neck is appreciated.    Chronic pleural parenchymal changes of both apices and upper mediastinal    adenopathy again noted.       CTA HEAD:

## 2021-06-02 NOTE — DISCHARGE SUMMARY
Neurosurgery  Cardiology  Hospitalist    Examination:  Vitals:  Vitals:    06/02/21 0315 06/02/21 0924 06/02/21 0930 06/02/21 1217   BP: (!) 111/55  120/62 115/74   Pulse: 53 82 82 (!) 47   Resp: 16  16 16   Temp: 98.3 °F (36.8 °C)  97.9 °F (36.6 °C) 97.9 °F (36.6 °C)   TempSrc: Oral  Oral Oral   SpO2: 96%  98% 98%   Weight:       Height:         Weight: Weight: 192 lb 4.8 oz (87.2 kg)     24 hour intake/output:    Intake/Output Summary (Last 24 hours) at 6/2/2021 1515  Last data filed at 6/2/2021 0100  Gross per 24 hour   Intake 410 ml   Output 950 ml   Net -540 ml       GENERAL: alert, cooperative, no distress  NEURO: awake, alert and oriented. PERRL. Conversing fluently and appropriately. PMS intact in all 4 extremities. No signs of focal neurological deficits. HEAD: Superficial facial abrasions over forehead and nose with no bleeding or drainage. NECK: No midline cervical tenderness to palpation  LUNGS: clear to auscultation bilaterally- no wheezes, rales or rhonchi, normal air movement, no respiratory distress  HEART: normal rate, normal S1 and S2, no gallops, intact distal pulses  ABDOMEN: soft, non-tender, non-distended, normal bowel sounds, no masses or organomegaly  WOUNDS: Abrasions to forehead with no significant drainage, no significant erythema  EXTREMITY: no cyanosis, no clubbing and no edema. PMS intact in all 4 extremity, strength 5/5.     Significant Diagnostics:   Radiology: ECHO Complete 2D W Doppler W Color    Result Date: 5/31/2021  Transthoracic Echocardiography Report (TTE)  Demographics   Patient Name    Samantha Luque  Gender               Male                  R   MR #            [id number]       Race                                                    Ethnicity   Account #       [de-identified]       Room Number          0015   Accession       [id number]      Date of Study        05/31/2021  Number   Date of Birth   1945      Referring Physician  Jennyfer Day MD Lasha Portillo MD   Age             70 year(s)      Danilo Tan, [facility]                                   Interpreting         David Solitario MD                                  Physician  Procedure Type of Study   TTE procedure:ECHOCARDIOGRAM COMPLETE 2D W DOPPLER W COLOR. Procedure Date Date: 05/31/2021 Start: 08:27 AM Study Location: Bedside Technical Quality: Adequate visualization Indications:Recurrent falls. Additional Medical History:CABG, Hypertension, Hyperlipidemia Patient Status: Routine Height: 71 inches Weight: 187 pounds BSA: 2.05 m^2 BMI: 26.08 kg/m^2 BP: 121/66 mmHg  Conclusions   Summary  Ejection fraction is visually estimated at 55%. Overall left ventricular function is normal.  Aortic valve appears tricuspid. Aortic valve leaflets are somewhat thickened. Aortic valve leaflets are Mildly calcified. Trivial aortic regurgitation is noted. Signature   ----------------------------------------------------------------  Electronically signed by David Solitario MD (Interpreting  physician) on 05/31/2021 at 02:06 PM  ----------------------------------------------------------------   Findings   Mitral Valve  The mitral valve structure was normal with normal leaflet separation. DOPPLER: The transmitral velocity was within the normal range with no  evidence for mitral stenosis. There was no evidence of mitral  regurgitation. Aortic Valve  Aortic valve appears tricuspid. Aortic valve leaflets are somewhat thickened. Aortic valve leaflets are Mildly calcified. Trivial aortic regurgitation is noted. Tricuspid Valve  The tricuspid valve structure was normal with normal leaflet separation. DOPPLER: There was no evidence of tricuspid stenosis. There was no  evidence of tricuspid regurgitation. Pulmonic Valve  The pulmonic valve was not well visualized . Trivial pulmonic regurgitation visualized.    Left Atrium  Left atrial size was normal.   Left Ventricle  Ejection fraction is visually estimated at 55%. Overall left ventricular function is normal.   Right Atrium  Right atrial size was normal.   Right Ventricle  The right ventricular size was normal with normal systolic function and  wall thickness. Pericardial Effusion  The pericardium was normal in appearance with no evidence of a pericardial  effusion. Pleural Effusion  No evidence of pleural effusion. Aorta / Great Vessels  -Aortic root dimension within normal limits.  -The Pulmonary artery is within normal limits. -IVC size is within normal limits with normal respiratory phasic changes.   M-Mode/2D Measurements & Calculations   LV Diastolic   LV Systolic Dimension:    AV Cusp Separation: 1.8 cmLA  Dimension: 5.5 3.5 cm                    Dimension: 3.9 cmAO Root  cm             LV Volume Diastolic: 175  Dimension: 3.8 cmLA Area: 13.8  LV FS:36.4 %   ml                        cm^2  LV PW          LV Volume Systolic: 83.6  Diastolic: 0.9 ml  cm             LV EDV/LV EDV Index: 147  Septum         ml/72 m^2LV ESV/LV ESV    RV Diastolic Dimension: 1.8 cm  Diastolic: 0.9 Index: 90.7 ml/25 m^2  cm             EF Calculated: 65.4 %     LA/Aorta: 1.03                                            LA volume/Index: 28.2 ml /14m^2  Doppler Measurements & Calculations   MV Peak E-Wave: 67.7 cm/s  AV Peak Velocity: 148  LVOT Peak Velocity: 107  MV Peak A-Wave: 97.7 cm/s  cm/s                   cm/s  MV E/A Ratio: 0.69         AV Peak Gradient: 8.76 LVOT Peak Gradient: 5  MV Peak Gradient: 1.83     mmHg                   mmHg  mmHg   MV Deceleration Time: 194  msec                                              TR Velocity:176 cm/s  MV P1/2t: 57 msec                                 TR Gradient:12.39 mmHg  MVA by PHT:3.86 cm^2                              PV Peak Velocity: 80.6                                                    cm/s  MV E' Septal Velocity: 8.2 AV DVI (Vmax):0.72     PV Peak Gradient: 2.6  cm/s                                              mmHg  MV A' Septal Velocity:  12.2 cm/s  MV E' Lateral Velocity: 12  cm/s  MV A' Lateral Velocity:  8.5 cm/s  E/E' septal: 8.26  E/E' lateral: 5.64  MR Velocity: 455 cm/s  [email or web address] [id number]    CTA HEAD W WO CONTRAST    Result Date: 5/31/2021  EXAM: HEAD AND NECK CT ANGIOGRAM: COMPARISON:  CT,SR  - CT HEAD WO CONTRAST  - 05/31/2021 01:26 AM EDT  CT,SR  - CT CERVICAL SPINE WO CONTRAST  - 05/31/2021 01:26 AM EDT  CT,SR  - CT CHEST W CONTRAST  - 01/06/2020 07:25 AM EST TECHNIQUE:   Contiguous axial images from upper mediastinum through the vertex of head during uneventful intravenous administration of iodinated contrast using CT angiogram technique. Coronal and sagittal reformatted images were also reviewed. FINDINGS: CTA NECK: Lung apices are unremarkable. Aortic arch unremarkable. Major vessel origins are patent. The common, internal and external carotid arteries are symmetric and remarkable only for mild atherosclerotic changes. . Vertebral arteries are patent, the right is slightly dominant. Vessel origins are grossly unremarkable. The anterior inferior avulsion of the C3 vertebral body is again noted, without change in alignment of the cervical spine. No soft tissue lesion of the neck is appreciated. Chronic pleural parenchymal changes of both apices and upper mediastinal adenopathy again noted. CTA HEAD: Major cerebral arterial structures are patent. The internal carotid arteries are patent through the carotid termini bilaterally. Atherosclerotic ossification of both cavernous ICAs. Both anterior and both middle cerebral arteries are patent, without evidence of vessel occlusion or significant stenosis. [place] of [place] appears to be intact.  Vertebrobasilar arteries are patent and normal appearing, with symmetric appearance of posterior cerebral arteries. No aneurysm or vascular malformation is appreciated. Mild carotid bulb and cavernous ICA calcifications, without significant vessel stenosis or occlusion. Vertebral arteries are grossly unremarkable. This document has been electronically signed by: Lida Montgomery MD on 05/31/2021 05:46 AM All CTs at this facility use dose modulation techniques and iterative reconstructions, and/or weight-based dosing when appropriate to reduce radiation to a low as reasonably achievable. Carotid stenosis and measurements are in accordance with NASCET criteria. XR PELVIS (1-2 VIEWS)    Result Date: 5/31/2021  AP pelvis Comparison: None Findings: No fractures or dislocations. Preserved hip joints. No acetabular dysplasia. (The lateral aspect of the left greater trochanter is not included) Normal stool volume. No abnormal calcifications. No radiopaque foreign body. Multiple surgical clips project over the right groin and medial proximal thigh. Extensive mural calcifications of the iliac and femoral arteries. Impression: 1. No acute fracture or dislocation. This document has been electronically signed by: Josette Gonsalves MD on 05/31/2021 01:55 AM    CT HEAD WO CONTRAST    Result Date: 5/31/2021  CT head without: Comparison: CT  - CT HEAD WO CONTR  - 01/04/2011 05:23 PM EST Findings: Age-appropriate sulcation. No intracranial mass, midline shift, hydrocephalus, acute hemorrhage or infarct. Unremarkable orbits. Visualized paranasal sinuses are clear. Mastoid air cells are clear. No skull fracture. No significant scalp soft tissue swelling. Impression: 1. No acute intracranial abnormality.   Age-appropriate exam. 2.  Stable intracranial exam. This document has been electronically signed by: Josette Gonsalves MD on 05/31/2021 02:38 AM All CTs at this facility use dose modulation techniques and iterative reconstructions, and/or weight-based dosing when appropriate to reduce radiation to a low as reasonably achievable. CT FACIAL BONES WO CONTRAST    Result Date: 5/31/2021  CT maxillofacial without contrast Comparison: None Findings: No significant facial soft tissue swelling. Preserved orbits and globes. No acute midface fracture. Air-fluid level within the right maxillary sinus. Preserved mandible. Moderate osteoarthritis of the right TMJ. Partial opacification of the mastoid air cells with middle ear cavity involvement. Impression: No acute facial fracture. Right maxillary sinus disease. Bilateral mastoiditis with middle ear involvement. This document has been electronically signed by: Andre Medina MD on 05/31/2021 02:34 AM All CTs at this facility use dose modulation techniques and iterative reconstructions, and/or weight-based dosing when appropriate to reduce radiation to a low as reasonably achievable. CTA NECK W WO CONTRAST    Result Date: 5/31/2021  EXAM: HEAD AND NECK CT ANGIOGRAM: COMPARISON:  CT,SR  - CT CERVICAL SPINE WO CONTRAST  - 05/31/2021 01:26 AM EDT  CT,KOSR  - CT CHEST W CONTRAST  - 08/27/2018 08:46 AM EDT TECHNIQUE:   Contiguous axial images from upper mediastinum through the vertex of head during uneventful intravenous administration of iodinated contrast using CT angiogram technique. Coronal and sagittal reformatted images were also reviewed. FINDINGS: CTA NECK: Lung apices are unremarkable. Aortic arch unremarkable. Major vessel origins are patent. The common, internal and external carotid arteries are symmetric and remarkable only for mild atherosclerotic changes. . Vertebral arteries are patent, the right is slightly dominant. Vessel origins are grossly unremarkable. The anterior inferior avulsion of the C3 vertebral body is again noted, without change in alignment of the cervical spine. No soft tissue lesion of the neck is appreciated. Chronic pleural parenchymal changes of both apices and upper mediastinal adenopathy again noted.  CTA HEAD: Major cerebral arterial structures are patent. The internal carotid arteries are patent through the carotid termini bilaterally. Atherosclerotic ossification of both cavernous ICAs. Both anterior and both middle cerebral arteries are patent, without evidence of vessel occlusion or significant stenosis. [place] of [place] appears to be intact. Vertebrobasilar arteries are patent and normal appearing, with symmetric appearance of posterior cerebral arteries. No aneurysm or vascular malformation is appreciated. Mild carotid bulb and cavernous ICA calcifications, without significant vessel stenosis or occlusion. Vertebral arteries are grossly unremarkable. This document has been electronically signed by: Bharath Briggs MD on 05/31/2021 06:02 AM All CTs at this facility use dose modulation techniques and iterative reconstructions, and/or weight-based dosing when appropriate to reduce radiation to a low as reasonably achievable. Carotid stenosis and measurements are in accordance with NASCET criteria. CT CERVICAL SPINE WO CONTRAST    Result Date: 5/31/2021  ** ADDENDUM #1 ** This report was discussed with Jewel Rivera RN on May 31, 2021 02:39:00 EDT. This document has been electronically signed by: Hamilton Garner on 05/31/2021 02:39 AM ** ORIGINAL REPORT ** CT cervical spine without contrast: Comparison: None Findings: Vertebral body heights are preserved. Acute anterior inferior corner fracture of C3, series 4 image 29. Fracture is minimally displaced. Mild widening of the anterior disc space. No significant overlying soft tissue swelling. No significant prevertebral soft tissue swelling. Multilevel moderate to severe degenerative disc and facet disease. Craniocervical junction and C1-C2 articulations are without abnormal alignment/asymmetry. Dens is intact. Severe degenerative narrowing of the atlantodens interval. Normal limited view of the intracranial contents. Neck soft tissues are unremarkable for age.  Lung apices moderate right neural foraminal stenosis in association with uncovertebral joint degenerative change and facet hypertrophy. At C4-5 there is no significant spinal canal stenosis. There is moderate bilateral foraminal stenosis in association with uncovertebral joint degenerative change and facet hypertrophy. At C5-6 there is a minimal disc bulge without significant spinal canal stenosis. There is moderate bilateral foraminal stenosis in association with uncovertebral joint degenerative change and facet hypertrophy. At C6-7 there is a shallow disc bulge without significant spinal canal stenosis and moderate bilateral foraminal stenosis in association with uncovertebral joint degenerative change and facet hypertrophy. At C7-T1 there is no significant spinal canal or neuroforaminal stenosis. 1. Prominent retropharyngeal effusion along the anterior cervical spine without evidence of ligamentous injury. 2. Multilevel degenerative changes of cervical spine without significant spinal canal stenosis at any level and multiple areas of up to moderate neural foraminal stenosis. **This report has been created using voice recognition software. It may contain minor errors which are inherent in voice recognition technology. ** Final report electronically signed by Dr. Vincent Marion MD on 6/1/2021 8:08 AM    XR CHEST PORTABLE    Result Date: 5/31/2021  1 view chest x-ray. Comparison: None Findings: Chronic bilateral upper lobe scarring/fibrosis with volume loss. Lower lobe emphysematous changes without consolidation. Median sternotomy wires and mediastinal clips again noted, without cardiac enlargement. No acute fracture. Impression: Bilateral upper lobe fibrosis and volume loss with bilateral lower lobe emphysematous changes. No superimposed consolidation, pleural effusion, or pneumothorax.  This document has been electronically signed by: Aubrey Gamez MD on 05/31/2021 01:45 AM      Labs:   Recent Results (from the past 72 hour(s))   Comprehensive Metabolic Panel    Collection Time: 05/31/21 12:58 AM   Result Value Ref Range    Glucose 116 (H) 70 - 108 mg/dL    CREATININE 1.0 0.4 - 1.2 mg/dL    BUN 16 7 - 22 mg/dL    Sodium 135 135 - 145 meq/L    Potassium 4.6 3.5 - 5.2 meq/L    Chloride 98 98 - 111 meq/L    CO2 24 23 - 33 meq/L    Calcium 9.0 8.5 - 10.5 mg/dL    AST 22 5 - 40 U/L    Alkaline Phosphatase 74 38 - 126 U/L    Total Protein 6.4 6.1 - 8.0 g/dL    Albumin 4.2 3.5 - 5.1 g/dL    Total Bilirubin 2.0 (H) 0.3 - 1.2 mg/dL    ALT 14 11 - 66 U/L   CBC Auto Differential    Collection Time: 05/31/21 12:58 AM   Result Value Ref Range    WBC 4.5 (L) 4.8 - 10.8 thou/mm3    RBC 4.08 (L) 4.70 - 6.10 mill/mm3    Hemoglobin 13.5 (L) 14.0 - 18.0 gm/dl    Hematocrit 38.9 (L) 42.0 - 52.0 %    MCV 95.3 (H) 80.0 - 94.0 fL    MCH 33.1 (H) 26.0 - 33.0 pg    MCHC 34.7 32.2 - 35.5 gm/dl    RDW-CV 13.2 11.5 - 14.5 %    RDW-SD 45.7 (H) 35.0 - 45.0 fL    Platelets 542 220 - 990 thou/mm3    MPV 9.7 9.4 - 12.4 fL    Seg Neutrophils 70.7 %    Lymphocytes 16.9 %    Monocytes 9.6 %    Eosinophils 2.2 %    Basophils 0.4 %    Immature Granulocytes 0.2 %    Segs Absolute 3.2 1 - 7 thou/mm3    Lymphocytes Absolute 0.8 (L) 1.0 - 4.8 thou/mm3    Monocytes Absolute 0.4 0.4 - 1.3 thou/mm3    Eosinophils Absolute 0.1 0.0 - 0.4 thou/mm3    Basophils Absolute 0.0 0.0 - 0.1 thou/mm3    Immature Grans (Abs) 0.01 0.00 - 0.07 thou/mm3    nRBC 0 /100 wbc   Ethanol    Collection Time: 05/31/21 12:58 AM   Result Value Ref Range    ETHYL ALCOHOL, SERUM < 0.01 0.00 %   Protime-INR    Collection Time: 05/31/21 12:58 AM   Result Value Ref Range    INR 1.19 (H) 0.85 - 1.13   Brain Natriuretic Peptide    Collection Time: 05/31/21 12:58 AM   Result Value Ref Range    Pro-.8 0.0 - 900.0 pg/mL   APTT    Collection Time: 05/31/21 12:58 AM   Result Value Ref Range    aPTT 25.8 22.0 - 38.0 seconds   Lipase    Collection Time: 05/31/21 12:58 AM   Result Value Ref Range Lipase 35.2 5.6 - 51.3 U/L   Troponin    Collection Time: 05/31/21 12:58 AM   Result Value Ref Range    Troponin T < 0.010 ng/ml   Magnesium    Collection Time: 05/31/21 12:58 AM   Result Value Ref Range    Magnesium 1.5 (L) 1.6 - 2.4 mg/dL   TSH without Reflex    Collection Time: 05/31/21 12:58 AM   Result Value Ref Range    TSH 3.370 0.400 - 4.200 uIU/mL   Anion Gap    Collection Time: 05/31/21 12:58 AM   Result Value Ref Range    Anion Gap 13.0 8.0 - 16.0 meq/L   Glomerular Filtration Rate, Estimated    Collection Time: 05/31/21 12:58 AM   Result Value Ref Range    Est, Glom Filt Rate 73 (A) ml/min/1.73m2   Osmolality    Collection Time: 05/31/21 12:58 AM   Result Value Ref Range    Osmolality Calc 272.3 (L) 275.0 - 300.0 mOsmol/kg   EKG 12 Lead    Collection Time: 05/31/21  1:01 AM   Result Value Ref Range    Ventricular Rate 93 BPM    Atrial Rate 93 BPM    P-R Interval 176 ms    QRS Duration 84 ms    Q-T Interval 344 ms    QTc Calculation (Bazett) 427 ms    P Axis 86 degrees    R Axis -33 degrees    T Axis 76 degrees   Urinalysis Reflex to Culture    Collection Time: 05/31/21 10:36 AM    Specimen: Urine, clean catch   Result Value Ref Range    Glucose, Ur NEGATIVE NEGATIVE mg/dl    Bilirubin Urine NEGATIVE NEGATIVE    Ketones, Urine NEGATIVE NEGATIVE    Specific Gravity, Urine 1.025 1.002 - 1.030    Blood, Urine NEGATIVE NEGATIVE    pH, UA 7.0 5.0 - 9.0    Protein, UA NEGATIVE NEGATIVE    Urobilinogen, Urine 1.0 0.0 - 1.0 eu/dl    Nitrite, Urine NEGATIVE NEGATIVE    Leukocyte Esterase, Urine NEGATIVE NEGATIVE    Color, UA YELLOW STRAW-YELLOW    Character, Urine CLEAR CLEAR-SL CLOUD   Urine Drug Screen    Collection Time: 05/31/21 10:36 AM   Result Value Ref Range    AMPHETAMINE+METHAMPHETAMINE URINE SCREEN Negative NEGATIVE    Barbiturate Quant, Ur Negative NEGATIVE    Benzodiazepine Quant, Ur Negative NEGATIVE    Cannabinoid Quant, Ur Negative NEGATIVE    Cocaine Metab Quant, Ur Negative NEGATIVE    Opiates, Urine Negative NEGATIVE    Oxycodone Negative NEGATIVE    PCP Quant, Ur Negative NEGATIVE   Comprehensive Metabolic Panel w/ Reflex to MG    Collection Time: 06/01/21  4:04 AM   Result Value Ref Range    Glucose 99 70 - 108 mg/dL    CREATININE 0.9 0.4 - 1.2 mg/dL    BUN 11 7 - 22 mg/dL    Sodium 137 135 - 145 meq/L    Potassium reflex Magnesium 4.3 3.5 - 5.2 meq/L    Chloride 104 98 - 111 meq/L    CO2 26 23 - 33 meq/L    Calcium 8.6 8.5 - 10.5 mg/dL    AST 24 5 - 40 U/L    Alkaline Phosphatase 62 38 - 126 U/L    Total Protein 5.9 (L) 6.1 - 8.0 g/dL    Albumin 3.6 3.5 - 5.1 g/dL    Total Bilirubin 1.6 (H) 0.3 - 1.2 mg/dL    ALT 13 11 - 66 U/L   CBC auto differential    Collection Time: 06/01/21  4:04 AM   Result Value Ref Range    WBC 3.9 (L) 4.8 - 10.8 thou/mm3    RBC 3.74 (L) 4.70 - 6.10 mill/mm3    Hemoglobin 12.3 (L) 14.0 - 18.0 gm/dl    Hematocrit 36.5 (L) 42.0 - 52.0 %    MCV 97.6 (H) 80.0 - 94.0 fL    MCH 32.9 26.0 - 33.0 pg    MCHC 33.7 32.2 - 35.5 gm/dl    RDW-CV 13.3 11.5 - 14.5 %    RDW-SD 47.6 (H) 35.0 - 45.0 fL    Platelets 162 (L) 335 - 400 thou/mm3    MPV 10.0 9.4 - 12.4 fL    Seg Neutrophils 56.7 %    Lymphocytes 24.9 %    Monocytes 12.2 %    Eosinophils 4.9 %    Basophils 0.8 %    Immature Granulocytes 0.5 %    Segs Absolute 2.2 1 - 7 thou/mm3    Lymphocytes Absolute 1.0 1.0 - 4.8 thou/mm3    Monocytes Absolute 0.5 0.4 - 1.3 thou/mm3    Eosinophils Absolute 0.2 0.0 - 0.4 thou/mm3    Basophils Absolute 0.0 0.0 - 0.1 thou/mm3    Immature Grans (Abs) 0.02 0.00 - 0.07 thou/mm3    nRBC 0 /100 wbc   Anion Gap    Collection Time: 06/01/21  4:04 AM   Result Value Ref Range    Anion Gap 7.0 (L) 8.0 - 16.0 meq/L   Glomerular Filtration Rate, Estimated    Collection Time: 06/01/21  4:04 AM   Result Value Ref Range    Est, Glom Filt Rate 83 (A) ml/min/1.73m2   CBC    Collection Time: 06/02/21  4:14 AM   Result Value Ref Range    WBC 4.7 (L) 4.8 - 10.8 thou/mm3    RBC 3.61 (L) 4.70 - 6.10 mill/mm3    Hemoglobin 12.0 (L) 14.0 - 18.0 gm/dl    Hematocrit 35.0 (L) 42.0 - 52.0 %    MCV 97.0 (H) 80.0 - 94.0 fL    MCH 33.2 (H) 26.0 - 33.0 pg    MCHC 34.3 32.2 - 35.5 gm/dl    RDW-CV 13.2 11.5 - 14.5 %    RDW-SD 46.7 (H) 35.0 - 45.0 fL    Platelets 226 878 - 311 thou/mm3    MPV 10.2 9.4 - 12.4 fL   Basic Metabolic Panel    Collection Time: 06/02/21  4:14 AM   Result Value Ref Range    Sodium 136 135 - 145 meq/L    Potassium 4.3 3.5 - 5.2 meq/L    Chloride 101 98 - 111 meq/L    CO2 26 23 - 33 meq/L    Glucose 103 70 - 108 mg/dL    BUN 13 7 - 22 mg/dL    CREATININE 1.0 0.4 - 1.2 mg/dL    Calcium 8.8 8.5 - 10.5 mg/dL   Anion Gap    Collection Time: 06/02/21  4:14 AM   Result Value Ref Range    Anion Gap 9.0 8.0 - 16.0 meq/L   Glomerular Filtration Rate, Estimated    Collection Time: 06/02/21  4:14 AM   Result Value Ref Range    Est, Glom Filt Rate 73 (A) ml/min/1.73m2       Discharge condition: Stable  Disposition: Home  Time spent on discharge: >60 minutes     Electronically signed by Lincoln Montenegro PA-C on 6/2/2021 at 3:15 PM

## 2021-06-02 NOTE — PROGRESS NOTES
Hospitalist Progress Note      Patient:  Jennifer Myers    Unit/Bed:4A-15/015-A  YOB: 1945  MRN: [id number]   Acct: [de-identified]   PCP: Cuba Saxena MD  Date of Admission: 5/31/2021    Assessment/Plan:    1. Fall with neck injury: neurosx on case, MRI does not show cervical osseous injury, does show retropharyngeal effusion  6/2/21: per neurosx and trauma  2. Possible LOC: unable to definitively determine if this was consequence of fall or cause of; cardiology consulted and plan for Zio patch as outpt; pt was taking OTC cough medication which could have contributed (also consider tussive syncope)  3. COPD: c/w home meds  4. Essential HTN: lisinopril has been held and would recommend continuing to hold upon discharge as BP stable at low end of normal without it currently. Can consider resuming at a later date if BP climbs        Expected discharge date:  Stable for discharge from hospitalist perspective    Disposition:    [x] Home       [] TCU       [] Rehab       [] Psych       [] SNF       [] [place]       [] Other-    Chief Complaint: Fall, neck injury    Hospital Treatment Course: (Prior to today) From H&P: \"He is a 70 y.o. male presenting at [facility] by activation of level II trauma, brought by EMS following a fall down multiple steps  with positive LOC; past medical history COPD, HTN, HTN, CAD s/p CABG on ASA/Plavix. Per patient report he was walking down two steps to go to bed when he became dizzy and lost consciousness while simultaneously falling and striking head. Patient unable to differentiate whether LOC caused fall or caused by fall. Patient reports Head and neck pain. Patient denies chest pain, shortness of breath, cough,  dizziness, lightheadedness, numbness, paraesthesias, weakness, chills, fevers, abdominal pain, nausea, vomiting, diarrhea, blood in stool, n or back pain. Care in coordination with trauma surgeon  Brenda Hopkinsnano. \"    6/2/21: Stable for discharge from medical perspective with plan as determined by cardiology and neurosx     Subjective (past 24 hours): Denies any lightheadedness, dizziness or weakness today       Past medical history, family history, social history and allergies reviewed again and is unchanged since admission. ROS (12 point review of systems completed. Pertinent positives noted. Otherwise ROS is negative)     Medications:  Reviewed    Infusion Medications    sodium chloride       Scheduled Medications    aspirin  325 mg Oral Daily    clopidogrel  75 mg Oral Daily    enoxaparin  40 mg Subcutaneous Daily    sodium chloride flush  5-40 mL Intravenous 2 times per day    allopurinol  300 mg Oral Daily    [Held by provider] lisinopril  10 mg Oral Daily    metoprolol tartrate  25 mg Oral BID    pantoprazole  40 mg Oral QAM AC    atorvastatin  40 mg Oral Daily     PRN Meds: sodium chloride flush, sodium chloride, acetaminophen, promethazine **OR** ondansetron, polyethylene glycol, fentanNYL **OR** fentanNYL, nitroGLYCERIN      Intake/Output Summary (Last 24 hours) at 6/2/2021 1110  Last data filed at 6/2/2021 0100  Gross per 24 hour   Intake 1679.49 ml   Output 950 ml   Net 729.49 ml       Diet:  DIET GENERAL;    Exam:  /62   Pulse 66   Temp 97.9 °F (36.6 °C) (Oral)   Resp 16   Ht 5' 11\" (1.803 m)   Wt 192 lb 4.8 oz (87.2 kg)   SpO2 98%   BMI 26.82 kg/m²   General appearance:  No apparent distress, appears stated age and cooperative. HEENT: Pupils equal, round, and reactive to light. Conjunctivae/corneas clear. Neck: Supple, with full range of motion. No jugular venous distention. Trachea midline. Respiratory:  Normal respiratory effort. Clear to auscultation, bilaterally without Rales/Wheezes/Rhonchi. Cardiovascular: Regular rate and rhythm with normal S1/S2 without murmurs, rubs or gallops.   Abdomen: Soft, non-tender, non-distended with normal bowel sounds. Musculoskeletal: passive and active ROM x 4 extremities. Skin: Skin color, texture, turgor normal.  No rashes or lesions. Neurologic:  Neurovascularly intact without any focal sensory/motor deficits. Cranial nerves: II-XII intact, grossly non-focal.  Psychiatric: Alert and oriented, thought content appropriate, normal insight  Capillary Refill: Brisk,< 3 seconds   Peripheral Pulses: +2 palpable, equal bilaterally     Labs:   Recent Labs     05/31/21 0058 06/01/21  0404 06/02/21  0414   WBC 4.5* 3.9* 4.7*   HGB 13.5* 12.3* 12.0*   HCT 38.9* 36.5* 35.0*    121* 133     Recent Labs     05/31/21 0058 06/01/21  0404 06/02/21  0414    137 136   K 4.6 4.3 4.3   CL 98 104 101   CO2 24 26 26   BUN 16 11 13   CREATININE 1.0 0.9 1.0   CALCIUM 9.0 8.6 8.8     Recent Labs     05/31/21 0058 06/01/21  0404   AST 22 24   ALT 14 13   BILITOT 2.0* 1.6*   ALKPHOS 74 62     Recent Labs     05/31/21 0058   INR 1.19*     No results for input(s): Danica Ortega in the last 72 hours. Microbiology:    Blood culture #1:   Lab Results   Component Value Date    BC No growth-preliminary No growth  11/24/2020       Blood culture #2:No results found for: Minda Tee    Organism:No results found for: ORG    No results found for: LABGRAM    MRSA culture only:No results found for: [facility]    Urine culture: No results found for: LABURIN    Respiratory culture: No results found for: CULTRESP    Aerobic and Anaerobic :  No results found for: LABAERO  No results found for: LABANAE    Urinalysis:      Lab Results   Component Value Date    NITRU NEGATIVE 05/31/2021    WBCUA NONE SEEN 11/19/2018    BACTERIA NONE 11/19/2018    RBCUA NONE SEEN 11/19/2018    BLOODU NEGATIVE 05/31/2021    SPECGRAV 1.010 11/19/2018    GLUCOSEU NEGATIVE 05/31/2021       Radiology:  MRI CERVICAL SPINE WO CONTRAST   Final Result       1. Prominent retropharyngeal effusion along the anterior cervical spine without evidence of ligamentous injury.    2. Multilevel degenerative changes of cervical spine without significant spinal canal stenosis at any level and multiple areas of up to moderate neural foraminal stenosis. **This report has been created using voice recognition software. It may contain minor errors which are inherent in voice recognition technology. **      Final report electronically signed by Dr. Gustavo Best MD on 6/1/2021 8:08 AM      CTA NECK W WO CONTRAST   Final Result   Mild carotid bulb and cavernous ICA calcifications, without significant    vessel stenosis or occlusion. Vertebral arteries are grossly unremarkable. This document has been electronically signed by: Meche Nino MD    on 05/31/2021 06:02 AM      All CTs at this facility use dose modulation techniques and iterative    reconstructions, and/or weight-based dosing   when appropriate to reduce radiation to a low as reasonably achievable. Carotid stenosis and measurements are in accordance with NASCET criteria. CTA HEAD W WO CONTRAST   Final Result   Mild carotid bulb and cavernous ICA calcifications, without significant    vessel stenosis or occlusion. Vertebral arteries are grossly unremarkable. This document has been electronically signed by: Meche Nino MD    on 05/31/2021 05:46 AM      All CTs at this facility use dose modulation techniques and iterative    reconstructions, and/or weight-based dosing   when appropriate to reduce radiation to a low as reasonably achievable. Carotid stenosis and measurements are in accordance with NASCET criteria. CT HEAD WO CONTRAST   Final Result   Impression:   1. No acute intracranial abnormality.   Age-appropriate exam.   2.  Stable intracranial exam.      This document has been electronically signed by: Narendra Arnold MD on    05/31/2021 02:38 AM      All CTs at this facility use dose modulation techniques and iterative    reconstructions, and/or weight-based dosing   when appropriate to reduce radiation to a low as reasonably achievable. CT CERVICAL SPINE WO CONTRAST   Final Result   Impression:   1. Anterior inferior C3 acute corner fracture consistent with extension    type fracture/injury. Relative widening of the anterior disc space at    this level consistent with tear of the anterior longitudinal ligament. 2.  Multilevel severe spondylosis. 3.  Bilateral lung apical consolidations. This document has been electronically signed by: Howard Kearney MD on    05/31/2021 02:35 AM      All CTs at this facility use dose modulation techniques and iterative    reconstructions, and/or weight-based dosing   when appropriate to reduce radiation to a low as reasonably achievable. CT FACIAL BONES WO CONTRAST   Final Result   Impression:   No acute facial fracture. Right maxillary sinus disease. Bilateral mastoiditis with middle ear    involvement. This document has been electronically signed by: Howard Kearney MD on    05/31/2021 02:34 AM      All CTs at this facility use dose modulation techniques and iterative    reconstructions, and/or weight-based dosing   when appropriate to reduce radiation to a low as reasonably achievable. XR PELVIS (1-2 VIEWS)   Final Result   Impression:   1. No acute fracture or dislocation. This document has been electronically signed by: Howard Kearney MD on    05/31/2021 01:55 AM      XR CHEST PORTABLE   Final Result   Impression:   Bilateral upper lobe fibrosis and volume loss with bilateral lower lobe    emphysematous changes. No superimposed consolidation, pleural effusion,    or pneumothorax.       This document has been electronically signed by: Howard Kearney MD on    05/31/2021 01:45 AM        ECHO Complete 2D W Doppler W Color    Result Date: 5/31/2021  Transthoracic Echocardiography Report (TTE)  Demographics   Patient Name    Chiqui Orellana  Gender               Male                  R   MR #            [id number] Race                                                    Ethnicity   Account #       [de-identified]       Room Number          0015   Accession       [id number]      Date of Study        05/31/2021  Number   Date of Birth   1945      Referring Physician  Mallory Eaton MD   Age             70 year(s)      Deangelo Blake, [facility]                                   Interpreting         Lesa Wesley MD                                  Physician  Procedure Type of Study   TTE procedure:ECHOCARDIOGRAM COMPLETE 2D W DOPPLER W COLOR. Procedure Date Date: 05/31/2021 Start: 08:27 AM Study Location: Bedside Technical Quality: Adequate visualization Indications:Recurrent falls. Additional Medical History:CABG, Hypertension, Hyperlipidemia Patient Status: Routine Height: 71 inches Weight: 187 pounds BSA: 2.05 m^2 BMI: 26.08 kg/m^2 BP: 121/66 mmHg  Conclusions   Summary  Ejection fraction is visually estimated at 55%. Overall left ventricular function is normal.  Aortic valve appears tricuspid. Aortic valve leaflets are somewhat thickened. Aortic valve leaflets are Mildly calcified. Trivial aortic regurgitation is noted. Signature   ----------------------------------------------------------------  Electronically signed by Lesa Wesley MD (Interpreting  physician) on 05/31/2021 at 02:06 PM  ----------------------------------------------------------------   Findings   Mitral Valve  The mitral valve structure was normal with normal leaflet separation. DOPPLER: The transmitral velocity was within the normal range with no  evidence for mitral stenosis. There was no evidence of mitral  regurgitation. Aortic Valve  Aortic valve appears tricuspid. Aortic valve leaflets are somewhat thickened. Aortic valve leaflets are Mildly calcified. Trivial aortic regurgitation is noted.    Tricuspid Valve  The tricuspid valve structure was normal with normal leaflet separation. DOPPLER: There was no evidence of tricuspid stenosis. There was no  evidence of tricuspid regurgitation. Pulmonic Valve  The pulmonic valve was not well visualized . Trivial pulmonic regurgitation visualized. Left Atrium  Left atrial size was normal.   Left Ventricle  Ejection fraction is visually estimated at 55%. Overall left ventricular function is normal.   Right Atrium  Right atrial size was normal.   Right Ventricle  The right ventricular size was normal with normal systolic function and  wall thickness. Pericardial Effusion  The pericardium was normal in appearance with no evidence of a pericardial  effusion. Pleural Effusion  No evidence of pleural effusion. Aorta / Great Vessels  -Aortic root dimension within normal limits.  -The Pulmonary artery is within normal limits. -IVC size is within normal limits with normal respiratory phasic changes.   M-Mode/2D Measurements & Calculations   LV Diastolic   LV Systolic Dimension:    AV Cusp Separation: 1.8 cmLA  Dimension: 5.5 3.5 cm                    Dimension: 3.9 cmAO Root  cm             LV Volume Diastolic: 477  Dimension: 3.8 cmLA Area: 13.8  LV FS:36.4 %   ml                        cm^2  LV PW          LV Volume Systolic: 98.8  Diastolic: 0.9 ml  cm             LV EDV/LV EDV Index: 147  Septum         ml/72 m^2LV ESV/LV ESV    RV Diastolic Dimension: 1.8 cm  Diastolic: 0.9 Index: 73.0 ml/25 m^2  cm             EF Calculated: 65.4 %     LA/Aorta: 1.03                                            LA volume/Index: 28.2 ml /14m^2  Doppler Measurements & Calculations   MV Peak E-Wave: 67.7 cm/s  AV Peak Velocity: 148  LVOT Peak Velocity: 107  MV Peak A-Wave: 97.7 cm/s  cm/s                   cm/s  MV E/A Ratio: 0.69         AV Peak Gradient: 8.76 LVOT Peak Gradient: 5  MV Peak Gradient: 1.83     mmHg                   mmHg  mmHg   MV Deceleration Time: 194  msec ossification of both cavernous ICAs. Both anterior and both middle cerebral arteries are patent, without evidence of vessel occlusion or significant stenosis. [place] of [place] appears to be intact. Vertebrobasilar arteries are patent and normal appearing, with symmetric appearance of posterior cerebral arteries. No aneurysm or vascular malformation is appreciated. Mild carotid bulb and cavernous ICA calcifications, without significant vessel stenosis or occlusion. Vertebral arteries are grossly unremarkable. This document has been electronically signed by: Yolanda Martinez MD on 05/31/2021 05:46 AM All CTs at this facility use dose modulation techniques and iterative reconstructions, and/or weight-based dosing when appropriate to reduce radiation to a low as reasonably achievable. Carotid stenosis and measurements are in accordance with NASCET criteria. XR PELVIS (1-2 VIEWS)    Result Date: 5/31/2021  AP pelvis Comparison: None Findings: No fractures or dislocations. Preserved hip joints. No acetabular dysplasia. (The lateral aspect of the left greater trochanter is not included) Normal stool volume. No abnormal calcifications. No radiopaque foreign body. Multiple surgical clips project over the right groin and medial proximal thigh. Extensive mural calcifications of the iliac and femoral arteries. Impression: 1. No acute fracture or dislocation. This document has been electronically signed by: Michael Colbert MD on 05/31/2021 01:55 AM    CT HEAD WO CONTRAST    Result Date: 5/31/2021  CT head without: Comparison: CT  - CT HEAD WO CONTR  - 01/04/2011 05:23 PM EST Findings: Age-appropriate sulcation. No intracranial mass, midline shift, hydrocephalus, acute hemorrhage or infarct. Unremarkable orbits. Visualized paranasal sinuses are clear. Mastoid air cells are clear. No skull fracture. No significant scalp soft tissue swelling. Impression: 1. No acute intracranial abnormality.   Age-appropriate inferior avulsion of the C3 vertebral body is again noted, without change in alignment of the cervical spine. No soft tissue lesion of the neck is appreciated. Chronic pleural parenchymal changes of both apices and upper mediastinal adenopathy again noted. CTA HEAD: Major cerebral arterial structures are patent. The internal carotid arteries are patent through the carotid termini bilaterally. Atherosclerotic ossification of both cavernous ICAs. Both anterior and both middle cerebral arteries are patent, without evidence of vessel occlusion or significant stenosis. [place] of [place] appears to be intact. Vertebrobasilar arteries are patent and normal appearing, with symmetric appearance of posterior cerebral arteries. No aneurysm or vascular malformation is appreciated. Mild carotid bulb and cavernous ICA calcifications, without significant vessel stenosis or occlusion. Vertebral arteries are grossly unremarkable. This document has been electronically signed by: Elli Reece MD on 05/31/2021 06:02 AM All CTs at this facility use dose modulation techniques and iterative reconstructions, and/or weight-based dosing when appropriate to reduce radiation to a low as reasonably achievable. Carotid stenosis and measurements are in accordance with NASCET criteria. CT CERVICAL SPINE WO CONTRAST    Result Date: 5/31/2021  ** ADDENDUM #1 ** This report was discussed with Laureen Saravia RN on May 31, 2021 02:39:00 EDT. This document has been electronically signed by: Eddie Faye on 05/31/2021 02:39 AM ** ORIGINAL REPORT ** CT cervical spine without contrast: Comparison: None Findings: Vertebral body heights are preserved. Acute anterior inferior corner fracture of C3, series 4 image 29. Fracture is minimally displaced. Mild widening of the anterior disc space. No significant overlying soft tissue swelling. No significant prevertebral soft tissue swelling.  Multilevel moderate to severe degenerative disc and facet disease. Craniocervical junction and C1-C2 articulations are without abnormal alignment/asymmetry. Dens is intact. Severe degenerative narrowing of the atlantodens interval. Normal limited view of the intracranial contents. Neck soft tissues are unremarkable for age. Lung apices demonstrate consolidations. Impression: 1. Anterior inferior C3 acute corner fracture consistent with extension type fracture/injury. Relative widening of the anterior disc space at this level consistent with tear of the anterior longitudinal ligament. 2.  Multilevel severe spondylosis. 3.  Bilateral lung apical consolidations. This document has been electronically signed by: Zhao Woods MD on 05/31/2021 02:35 AM All CTs at this facility use dose modulation techniques and iterative reconstructions, and/or weight-based dosing when appropriate to reduce radiation to a low as reasonably achievable. MRI CERVICAL SPINE WO CONTRAST    Result Date: 6/1/2021  PROCEDURE: MRI CERVICAL SPINE WO CONTRAST CLINICAL INFORMATION: Cervical fracture with suspected ligament injury . Persistent neck pain after fall 3 days ago. COMPARISON: CT cervical spine dated 5/31/2021. TECHNIQUE: Sagittal T1, T2 and STIR sequences were obtained through the cervical spine. Axial fast and echo and gradient echo T2-weighted images were obtained. FINDINGS: There is minimal, grade 1, retrolisthesis of C3 relative to C4 on the basis of degenerative change, stable compared to prior CT. There is otherwise anatomic vertebral body height and alignment. Marrow signal is within normal limits. There is a prominent retropharyngeal effusion extending from the craniocervical junction inferiorly to the thoracic inlet. There is no evidence of ligamentous injury. Specifically, the anterior longitudinal ligament appears intact. No abnormal signal in the posterior elements is present. The craniocervical junction appears intact.  The cervical spinal cord is normal in caliber without focal area of abnormal T2 signal. Paraspinal soft tissues are otherwise unremarkable. At C2-3 there is no significant spinal canal or neuroforaminal stenosis. At C3-4 there is minimal partial uncovering of the disc without significant spinal canal stenosis. There is mild left and moderate right neural foraminal stenosis in association with uncovertebral joint degenerative change and facet hypertrophy. At C4-5 there is no significant spinal canal stenosis. There is moderate bilateral foraminal stenosis in association with uncovertebral joint degenerative change and facet hypertrophy. At C5-6 there is a minimal disc bulge without significant spinal canal stenosis. There is moderate bilateral foraminal stenosis in association with uncovertebral joint degenerative change and facet hypertrophy. At C6-7 there is a shallow disc bulge without significant spinal canal stenosis and moderate bilateral foraminal stenosis in association with uncovertebral joint degenerative change and facet hypertrophy. At C7-T1 there is no significant spinal canal or neuroforaminal stenosis. 1. Prominent retropharyngeal effusion along the anterior cervical spine without evidence of ligamentous injury. 2. Multilevel degenerative changes of cervical spine without significant spinal canal stenosis at any level and multiple areas of up to moderate neural foraminal stenosis. **This report has been created using voice recognition software. It may contain minor errors which are inherent in voice recognition technology. ** Final report electronically signed by Dr. Iron Munoz MD on 6/1/2021 8:08 AM    XR CHEST PORTABLE    Result Date: 5/31/2021  1 view chest x-ray. Comparison: None Findings: Chronic bilateral upper lobe scarring/fibrosis with volume loss. Lower lobe emphysematous changes without consolidation. Median sternotomy wires and mediastinal clips again noted, without cardiac enlargement. No acute fracture.      Impression:

## 2021-06-02 NOTE — CARE COORDINATION
6/2/21, 3:10 PM EDT    Patient goals/plan/ treatment preferences discussed by  and . Patient goals/plan/ treatment preferences reviewed with patient/ family. Patient/ family verbalize understanding of discharge plan and are in agreement with goal/plan/treatment preferences. Understanding was demonstrated using the teach back method. AVS provided by RN at time of discharge, which includes all necessary medical information pertaining to the patients current course of illness, treatment, post-discharge goals of care, and treatment preferences. IMM Letter  IMM Letter given to Patient/Family/Significant other/Guardian/POA/by[de-identified] CM  IMM Letter date given[de-identified] 06/02/21  IMM Letter time given[de-identified] 0912  Observation Status Letter date given[de-identified] 06/02/21  Observation Status Letter time given[de-identified] 1300     Pt to be discharged to home. Denies needs or services.

## 2021-06-04 ENCOUNTER — TELEPHONE (OUTPATIENT)
Dept: FAMILY MEDICINE CLINIC | Age: 76
End: 2021-06-04

## 2021-06-04 NOTE — TELEPHONE ENCOUNTER
KYM for pt to call back about ED visit      [facility]) ED Follow up Call    Reason for ED visit:  fall      Did you receive discharge instructions? Yes  Do you understand the discharge instructions? Yes  Did the ED give you any new prescriptions? No:   Were you able to fill your prescriptions? No:       Do you need or want to make a follow up appt with your PCP? Yes

## 2021-06-07 ENCOUNTER — TELEPHONE (OUTPATIENT)
Dept: FAMILY MEDICINE CLINIC | Age: 76
End: 2021-06-07

## 2021-06-07 ENCOUNTER — OFFICE VISIT (OUTPATIENT)
Dept: FAMILY MEDICINE CLINIC | Age: 76
End: 2021-06-07
Payer: MEDICARE

## 2021-06-07 VITALS
BODY MASS INDEX: 25.5 KG/M2 | HEART RATE: 59 BPM | OXYGEN SATURATION: 98 % | DIASTOLIC BLOOD PRESSURE: 76 MMHG | WEIGHT: 182.8 LBS | SYSTOLIC BLOOD PRESSURE: 124 MMHG | RESPIRATION RATE: 18 BRPM

## 2021-06-07 DIAGNOSIS — D50.9 IRON DEFICIENCY ANEMIA, UNSPECIFIED IRON DEFICIENCY ANEMIA TYPE: ICD-10-CM

## 2021-06-07 DIAGNOSIS — R06.00 DYSPNEA, UNSPECIFIED TYPE: ICD-10-CM

## 2021-06-07 DIAGNOSIS — I73.9 PVD (PERIPHERAL VASCULAR DISEASE) WITH CLAUDICATION (HCC): ICD-10-CM
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
## 2021-06-07 DIAGNOSIS — K21.9 GASTROESOPHAGEAL REFLUX DISEASE, UNSPECIFIED WHETHER ESOPHAGITIS PRESENT: ICD-10-CM

## 2021-06-07 DIAGNOSIS — R55 SYNCOPE AND COLLAPSE: Primary | ICD-10-CM

## 2021-06-07 DIAGNOSIS — H70.93 MASTOIDITIS OF BOTH SIDES: ICD-10-CM

## 2021-06-07 DIAGNOSIS — I10 ESSENTIAL HYPERTENSION: ICD-10-CM

## 2021-06-07 DIAGNOSIS — E78.5 HYPERLIPIDEMIA, UNSPECIFIED HYPERLIPIDEMIA TYPE: ICD-10-CM

## 2021-06-07 DIAGNOSIS — M05.20 RHEUMATOID ARTERITIS (HCC): ICD-10-CM

## 2021-06-07 DIAGNOSIS — I25.810 CORONARY ARTERY DISEASE INVOLVING CORONARY BYPASS GRAFT OF NATIVE HEART WITHOUT ANGINA PECTORIS: ICD-10-CM

## 2021-06-07 DIAGNOSIS — J44.9 CHRONIC OBSTRUCTIVE PULMONARY DISEASE, UNSPECIFIED COPD TYPE (HCC): ICD-10-CM

## 2021-06-07 PROCEDURE — 1111F DSCHRG MED/CURRENT MED MERGE: CPT | Performed by: FAMILY MEDICINE

## 2021-06-07 PROCEDURE — 99495 TRANSJ CARE MGMT MOD F2F 14D: CPT | Performed by: FAMILY MEDICINE

## 2021-06-07 RX ORDER — CEFDINIR 300 MG/1
300 CAPSULE ORAL 2 TIMES DAILY
Qty: 20 CAPSULE | Refills: 0 | Status: SHIPPED | OUTPATIENT
Start: 2021-06-07 | End: 2021-06-17

## 2021-06-07 ASSESSMENT — ENCOUNTER SYMPTOMS
NAUSEA: 0
ABDOMINAL PAIN: 0
BLOOD IN STOOL: 0
DIARRHEA: 0
VOMITING: 0
ANAL BLEEDING: 0
CONSTIPATION: 0
CHEST TIGHTNESS: 0
SHORTNESS OF BREATH: 0

## 2021-06-07 NOTE — PROGRESS NOTES
[facility]  [address] [facility] [address]  Dept: [phone]  Loc: [phone]      DISCHARGE TRANSITIONAL CARE MANAGEMENT SERVICES     Guillermina Nguyen   YOB: 1945    Allergies   Allergen Reactions    Crestor [Rosuvastatin Calcium]      Muscle aches.  Lipitor      Muscle aches.  Tramadol Nausea Only     Dizziness    Vicodin [Hydrocodone-Acetaminophen]      Dizziness.  Codeine Nausea And Vomiting     Dizziness    Percocet [Oxycodone-Acetaminophen] Nausea And Vomiting     Dizziness     Outpatient Medications Marked as Taking for the 6/7/21 encounter (Office Visit) with Tino Muñiz MD   Medication Sig Dispense Refill    cefdinir (OMNICEF) 300 MG capsule Take 1 capsule by mouth 2 times daily for 10 days 20 capsule 0    simvastatin (ZOCOR) 40 MG tablet Take 1 tablet by mouth nightly 90 tablet 3    Ascorbic Acid (VITAMIN C PO) Take by mouth daily      Cholecalciferol (D3 VITAMIN PO) Take by mouth daily      ZINC PO Take by mouth daily       Probiotic Product (PROBIOTIC PO) Take by mouth daily       pantoprazole (PROTONIX) 40 MG tablet Take 1 tablet by mouth every morning (before breakfast) 90 tablet 3    metoprolol tartrate (LOPRESSOR) 50 MG tablet TAKE 1/2 TABLET EVERY DAY 45 tablet 3    nitroGLYCERIN (NITROSTAT) 0.4 MG SL tablet Place 1 tablet under the tongue every 5 minutes as needed for Chest pain 25 tablet 3    allopurinol (ZYLOPRIM) 300 MG tablet Take 1 tablet by mouth daily 90 tablet 3    clopidogrel (PLAVIX) 75 MG tablet Take 1 tablet by mouth daily 30 tablet 5    ferrous sulfate 325 (65 FE) MG tablet Take 325 mg by mouth daily       acetaminophen (TYLENOL) 500 MG tablet Take 1,000 mg by mouth as needed for Pain.  aspirin 325 MG EC tablet Take 325 mg by mouth daily.              Patient was admitted to [facility]  Admission date: 5/31/2021  Discharge date: 6/2/2021  Admitting Problem: Closed head or rebound. Hernia: No hernia is present. Musculoskeletal:         General: No swelling, deformity or signs of injury. Normal range of motion. Cervical back: Normal range of motion. Skin:     General: Skin is warm and dry. Coloration: Skin is not jaundiced or pale. Findings: No bruising, erythema, lesion or rash. Neurological:      General: No focal deficit present. Mental Status: He is alert and oriented to person, place, and time. Mental status is at baseline. Motor: No weakness. Coordination: Coordination normal.      Gait: Gait normal.   Psychiatric:         Mood and Affect: Mood normal.         Behavior: Behavior normal.         Thought Content: Thought content normal.         Judgment: Judgment normal.         CTA NECK W WO CONTRAST        Impression   Mild carotid bulb and cavernous ICA calcifications, without significant    vessel stenosis or occlusion.  Vertebral arteries are grossly unremarkable.       This document has been electronically signed by: Rudolph Tinajero MD    on 05/31/2021 06:02 AM       All CTs at this facility use dose modulation techniques and iterative    reconstructions, and/or weight-based dosing   when appropriate to reduce radiation to a low as reasonably achievable.       Carotid stenosis and measurements are in accordance with NASCET criteria. MRI CERVICAL SPINE WO CONTRAST   Impression       1. Prominent retropharyngeal effusion along the anterior cervical spine without evidence of ligamentous injury. 2. Multilevel degenerative changes of cervical spine without significant spinal canal stenosis at any level and multiple areas of up to moderate neural foraminal stenosis.                 **This report has been created using voice recognition software. It may contain minor errors which are inherent in voice recognition technology. **       Final report electronically signed by Dr. Elvis Mitchell MD on 6/1/2021 8:08 AM CT FACIAL BONES WO CONTRAST  Impression   Impression:   No acute facial fracture. Right maxillary sinus disease.  Bilateral mastoiditis with middle ear    involvement.       This document has been electronically signed by: Terry Davis MD on    05/31/2021 02:34 AM       All CTs at this facility use dose modulation techniques and iterative    reconstructions, and/or weight-based dosing   when appropriate to reduce radiation to a low as reasonably achievable. CT CERVICAL SPINE WO CONTRAST   Impression   Impression:   1.  Anterior inferior C3 acute corner fracture consistent with extension    type fracture/injury.  Relative widening of the anterior disc space at    this level consistent with tear of the anterior longitudinal ligament. 2.  Multilevel severe spondylosis. 3.  Bilateral lung apical consolidations.       This document has been electronically signed by: Terry Davis MD on    05/31/2021 02:35 AM       All CTs at this facility use dose modulation techniques and iterative    reconstructions, and/or weight-based dosing   when appropriate to reduce radiation to a low as reasonably achievable. CT HEAD WO CONTRAST  Impression:   1.  No acute intracranial abnormality.  Age-appropriate exam.   2.  Stable intracranial exam.       This document has been electronically signed by: Terry Davis MD on    05/31/2021 02:38 AM       All CTs at this facility use dose modulation techniques and iterative    reconstructions, and/or weight-based dosing   when appropriate to reduce radiation to a low as reasonably achievable.        XR PELVIS (1-2 VIEWS)   Impression   Impression:   1.  No acute fracture or dislocation.       This document has been electronically signed by: Terry Davis MD on    05/31/2021 01:55 AM     XR CHEST PORTABLE   Impression   Impression:   Bilateral upper lobe fibrosis and volume loss with bilateral lower lobe    emphysematous changes.  No superimposed consolidation, pleural effusion,    or pneumothorax.       This document has been electronically signed by: Lizzette Paz MD on    05/31/2021 01:45 AM     Lab Results   Component Value Date    LABA1C 4.7 03/16/2018     No results found for: EAG    Lab Results   Component Value Date    CHOL 140 03/06/2021    TRIG 109 03/06/2021    HDL 38 03/06/2021    LDLCALC 80 03/06/2021    LDLDIRECT 72.81 02/08/2021     Lab Results   Component Value Date     06/02/2021    K 4.3 06/02/2021     06/02/2021    CO2 26 06/02/2021    BUN 13 06/02/2021    CREATININE 1.0 06/02/2021    GLUCOSE 103 06/02/2021    CALCIUM 8.8 06/02/2021    PROT 5.9 (L) 06/01/2021    LABALBU 3.6 06/01/2021    BILITOT 1.6 (H) 06/01/2021    ALKPHOS 62 06/01/2021    AST 24 06/01/2021    ALT 13 06/01/2021    LABGLOM 73 (A) 06/02/2021       No results found for: LABMICR, [id number]  Lab Results   Component Value Date    TSH 3.370 05/31/2021    T4FREE 1.22 09/12/2011     Lab Results   Component Value Date    WBC 4.7 (L) 06/02/2021    HGB 12.0 (L) 06/02/2021    HCT 35.0 (L) 06/02/2021    MCV 97.0 (H) 06/02/2021     06/02/2021     Lab Results   Component Value Date    PSA 0.62 12/28/2020    PSA 0.84 02/06/2019    PSA 0.48 03/22/2018         Future Appointments   Date Time Provider Eliot Davidi   6/8/2021 11:00 AM Brant Hampton, DANIELA STRZ [facility] [address] [facility]   7/2/2021  9:00 AM AURELIA Saldivar [address] [facility]   7/7/2021  1:30 PM STR PULMONARY FUNCTION ROOM 1 STRZ PFT [address] [facility]   8/11/2021 10:45 AM MERE Molina - CNP [facility] - [address] [facility]   9/24/2021  1:00 PM STR VASCULAR LAB ROOM 2 STRZ VAS LAB STR Radiolog         ASSESSMENT/PLAN         Sharon Camilo was seen today for follow-up from hospital.    Diagnoses and all orders for this visit:    Syncope and collapse  -     AR DISCHARGE MEDS RECONCILED W/ CURRENT OUTPATIENT MED LIST    Fall, subsequent encounter  -     AR DISCHARGE MEDS RECONCILED W/ CURRENT OUTPATIENT MED LIST    Dyspnea, unspecified type  - Full PFT Study With Bronchodilator; Future    Mastoiditis of both sides  -     cefdinir (OMNICEF) 300 MG capsule; Take 1 capsule by mouth 2 times daily for 10 days    Essential hypertension    Hyperlipidemia, unspecified hyperlipidemia type    Coronary artery disease involving coronary bypass graft of native heart without angina pectoris    PVD (peripheral vascular disease) with claudication (HCC)    Chronic obstructive pulmonary disease, unspecified COPD type (HCC)    Iron deficiency anemia, unspecified iron deficiency anemia type    Gastroesophageal reflux disease, unspecified whether esophagitis present    Rheumatoid arteritis ([facility] [address]          Await results of cardiac event monitor  Follow-up with cardiology/neurosurgery as scheduled. Check PFTs at this time given history of shortness of breath and previous tobacco use. Given previous documentation of acute mastoiditis, will treat with cefdinir 300 mg-1 pill twice daily x10 days #20/no refills  Continue current medicines otherwise  No refills needed  Follow up with Speech Therapy as planned. Check HDL, D- LDL, HFP, and H/H as planned prior to next appt. Follow-up as scheduled August 11, 2021 with TS. Initial post-discharge communication occurred between medical assistant and patient on 6/4/2021- see documentation in chart: telephone encounter.     Date of Visit:6/7/2021  30 Day Post-Discharge Date: 7/2/2021    Diagnostic test results reviewed: inpatient labs, MRI-Cervical Spine and echocardiogram    Patient risk of morbidity and mortality: moderate    Medical Decision Making: moderate complexity

## 2021-06-07 NOTE — PATIENT INSTRUCTIONS
Await results of cardiac event monitor  Follow-up with cardiology/neurosurgery as scheduled. Check PFTs at this time given history of shortness of breath and previous tobacco use. Given previous documentation of acute mastoiditis, will treat with cefdinir 300 mg-1 pill twice daily x10 days #20/no refills  Continue current medicines otherwise  No refills needed  Check HDL, D- LDL, HFP, and H/H as planned prior to next appt. Follow-up as scheduled August 11, 2021 with TS.

## 2021-06-08 ENCOUNTER — HOSPITAL ENCOUNTER (OUTPATIENT)
Dept: SPEECH THERAPY | Age: 76
Setting detail: THERAPIES SERIES
Discharge: HOME | End: 2021-06-08
Payer: MEDICARE

## 2021-06-08 PROCEDURE — 92523 SPEECH SOUND LANG COMPREHEN: CPT

## 2021-06-08 NOTE — DISCHARGE SUMMARY
** PLEASE SIGN, DATE AND TIME CERTIFICATION BELOW AND RETURN TO [facility] OUTPATIENT REHABILITATION (FAX #: [phone]). ATTEST/CO-SIGN IF ACCESSING VIA [employer]. THANK YOU.**    I certify that I have examined the patient below and determined that Physical Medicine and Rehabilitation service is necessary and that I approve the established plan of care for up to 90 days or as specifically noted. Attestation, signature or co-signature of physician indicates approval of certification requirements.    ________________________ ____________ __________  Physician Signature   Date   Time     [address] [facility] THERAPY  [x] SPEECH LANGUAGE COGNITIVE EVALUATION  [] DAILY NOTE   [] PROGRESS NOTE [] DISCHARGE NOTE    [] [facility]   [x] [address]    [] [address] [facility]   [] Matilda Geronimo    Date: 2021  Patient Name:  Cata Hampton  :   MRN: [id number]  CSN: [id number]    Referring Practitioner Mohinder Jarrett MD   Diagnosis Unspecified injury of head, initial encounter [S09.90XA]  Syncope and collapse [R55]    Treatment Diagnosis Cognitive impairments    Date of Evaluation 21      Functional Outcome Measure Used [address] [place] [address]   Functional Outcome Score Version 7.1 score  (21)       Insurance: Primary: Payor: Kellee Khanna /  /  / ,   Secondary:    Authorization Information: Pre-cert required after eval with cohere. $40 copay per visit. Visit # 1, 1/10 for progress note   Visits Allowed:    Recertification Date: N/A   Physician Follow-Up:    Physician Orders: Speech therapy evaluate and treat s/p fall, cognition. Pertinent History: Per patient and wife, on 21 the patient was walking from the bathroom to their bedroom and fell down 2 steps head first into the floor. Patient and wife endorse loss of consciousness. The wife stated he was disoriented and shaking upon awakening.  He then started talking and then went by EMS to [facility]. Patient was admitted and stayed for 2 days then discharged to home. Both reporting his head CT came back \"clear\". He reports he feels near his baseline at this time. ST evaluation to assess current cognitive functioning. SUBJECTIVE: Patient pleasant and cooperative. Wife present and assisting with providing PMHx and recent information. Social/Functional History:  Medications and Allergies have been reviewed and are listed on the Medical History Questionnaire    Richa Hartley lives with spouse in a single story home (with 2 steps between multiple rooms) with stairs and a handrail to enter. Task Prior Level of Function Current Level of Function   ADLs  Independent Independent   Finance Management Independent Independent   Medication Management Independent Independent   Educational Level 9th grade. 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 Hobbies Tinkering with cars, lawnmowers, spending time with grandchildren and great grandchildren     Vision Status Corrected (reading glasses) Corrected (reading glasses)   Hearing Status WFL Some hearing difficulty since the fall. Mastoiditis and sinus infection. Currently on antibiotic. Diet Regular solids. Thin liquids. ORAL MOTOR:  Oral-Motor Assessment WNL    SPEECH / VOICE:  Speech and Voice appear to be grossly intact for basic and complex daily communication    LANGUAGE:  Receptive:  Receptive language skills appear to be grossly intact for basic and complex daily communication. Expressive:  Expressive language skills appear to be grossly intact for basic and complex daily communication. COGNITION:  Cog-Lo/30   *Mild impairment in working/immediate recall    Yony Cognitive Assessment (MOCA) version 7.1 completed. Pt scored 21/30.   Normal is greater than or equal to 26/30.  +1 point for highest level of education < 12th grade. Visuospatial/Executive function: 5/5   Naming: 3/3  Immediate Recall: 3/5 and 5/5   Attention: 4/6 (mental calculations 1/3)  Language: 1/3 (8 items named in 1 minute where target is 11 or greater in 1 minute)  Abstraction: 1/2  Delayed Recall: 0/5 (4/5 with multiple choice cues)  Orientation: 6/6     *Patient scored 21/30 on MOCA Version 7.3 during inpatient admission on 6/1/21. *Patient and wife report the patient is near or at his cognitive baseline. He reports he is able to recall functional information (appointments, work related information, taking medications, etc.) with no deficits. When asked, the patient stated he has never been \"good\" at mental math, stating he would not have been able to complete the mental calculation task prior to his head injury. He demonstrated great self-monitoring, evidenced by self-correcting two tasks in the executive function/visuospatial component of this assessment. SWALLOWING:  Denies s/s of dysphagia. IMPRESSIONS: The patient presents with mild cognitive-linguistic impairment characterized by deficits in immediate, working, and short-term recall. The patient reports no cognitive deficits that are impeding his functioning in the home and community environments, and reports that he is at or near his baseline. He is currently successful with ADLs and IADLs with the exception of active driving due to awaiting MD clearance for ongoing assessment of why patient \"passed out\". SLP provided handout on internal and external memory strategies, educated on all techniques, and recommended implementation of those that may be beneficial if indicated. No dysphagia, dysarthria, voice, or language impairments present. No recommendations for skilled speech therapy services at this time due to patient and wife stating he is at his cognitive baseline and is functioning well at home.      Assessment: Mild cognitive impairment however may have been present at his baseline  Areas for Improvement: N/A  Prognosis: N/A  Specific Interventions Next Treatment: N/A    Activity/Treatment Tolerance:  [x]  Patient tolerated treatment well  []  Patient limited by fatigue  []  Patient limited by pain   []  Patient limited by other medical complications  []  Other:     GOALS:  Patient Goal: N/A    Short Term Goals: N/A    Long Term Goals: N/A    Patient Education:   [x]  HEP/Education Completed: evaluation only, memory strategy handout and functional use   []  No new Education completed  []  Reviewed Prior HEP      [x]  Patient verbalized and/or demonstrated understanding of education provided. []  Patient unable to verbalize and/or demonstrate understanding of education provided. Will continue education. []  Barriers to learning:     PLAN:  Treatment Recommendations:     []  Plan of care initiated. Plan to see patient x times per week for x weeks to address the treatment planned outlined above.   []  Continue with current plan of care  []  Modify plan of care as follows:    []  Hold pending physician visit  [x]  Discharge    Time In 1100   Time Out 1155   Timed Code Minutes: 0 min   Total Treatment Time: 54 min       Anali Valdes M.S., [address] [place] [address]

## 2021-06-18 DIAGNOSIS — I25.810 CORONARY ARTERY DISEASE INVOLVING CORONARY BYPASS GRAFT OF NATIVE HEART WITHOUT ANGINA PECTORIS: ICD-10-CM

## 2021-06-18 DIAGNOSIS — I10 ESSENTIAL HYPERTENSION: ICD-10-CM

## 2021-06-21 RX ORDER — METOPROLOL TARTRATE 50 MG/1
TABLET, FILM COATED ORAL
Qty: 45 TABLET | Refills: 3 | Status: SHIPPED | OUTPATIENT
Start: 2021-06-21 | End: 2022-03-30

## 2021-06-22 ENCOUNTER — HOSPITAL ENCOUNTER (OUTPATIENT)
Dept: GENERAL RADIOLOGY | Age: 76
Discharge: HOME | End: 2021-06-22
Payer: MEDICARE

## 2021-06-22 ENCOUNTER — HOSPITAL ENCOUNTER (OUTPATIENT)
Age: 76
Discharge: HOME | End: 2021-06-22
Payer: MEDICARE

## 2021-06-22 DIAGNOSIS — S09.90XA CLOSED HEAD INJURY, INITIAL ENCOUNTER: ICD-10-CM

## 2021-06-22 DIAGNOSIS — S12.201A CLOSED NONDISPLACED FRACTURE OF THIRD CERVICAL VERTEBRA, UNSPECIFIED FRACTURE MORPHOLOGY, INITIAL ENCOUNTER (HCC): ICD-10-CM
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
## 2021-06-22 PROCEDURE — 72040 X-RAY EXAM NECK SPINE 2-3 VW: CPT

## 2021-07-07 ENCOUNTER — HOSPITAL ENCOUNTER (OUTPATIENT)
Dept: PULMONOLOGY | Age: 76
Discharge: HOME | End: 2021-07-07
Payer: MEDICARE

## 2021-07-07 DIAGNOSIS — R06.00 DYSPNEA, UNSPECIFIED TYPE: ICD-10-CM

## 2021-07-07 PROCEDURE — 94726 PLETHYSMOGRAPHY LUNG VOLUMES: CPT

## 2021-07-07 PROCEDURE — 94060 EVALUATION OF WHEEZING: CPT

## 2021-07-07 PROCEDURE — 94729 DIFFUSING CAPACITY: CPT

## 2021-07-08 ENCOUNTER — OFFICE VISIT (OUTPATIENT)
Dept: NEUROSURGERY | Age: 76
End: 2021-07-08
Payer: MEDICARE

## 2021-07-08 VITALS
BODY MASS INDEX: 26.38 KG/M2 | WEIGHT: 188.4 LBS | HEART RATE: 54 BPM | HEIGHT: 71 IN | SYSTOLIC BLOOD PRESSURE: 137 MMHG | DIASTOLIC BLOOD PRESSURE: 73 MMHG | TEMPERATURE: 97.6 F

## 2021-07-08 DIAGNOSIS — S12.291D OTHER CLOSED NONDISPLACED FRACTURE OF THIRD CERVICAL VERTEBRA WITH ROUTINE HEALING, SUBSEQUENT ENCOUNTER: Primary | ICD-10-CM

## 2021-07-08 PROCEDURE — 1123F ACP DISCUSS/DSCN MKR DOCD: CPT | Performed by: PHYSICIAN ASSISTANT

## 2021-07-08 PROCEDURE — 1036F TOBACCO NON-USER: CPT | Performed by: PHYSICIAN ASSISTANT

## 2021-07-08 PROCEDURE — 99214 OFFICE O/P EST MOD 30 MIN: CPT | Performed by: PHYSICIAN ASSISTANT

## 2021-07-08 PROCEDURE — 3017F COLORECTAL CA SCREEN DOC REV: CPT | Performed by: PHYSICIAN ASSISTANT

## 2021-07-08 PROCEDURE — G8417 CALC BMI ABV UP PARAM F/U: HCPCS | Performed by: PHYSICIAN ASSISTANT

## 2021-07-08 PROCEDURE — 4040F PNEUMOC VAC/ADMIN/RCVD: CPT | Performed by: PHYSICIAN ASSISTANT

## 2021-07-08 PROCEDURE — G8427 DOCREV CUR MEDS BY ELIG CLIN: HCPCS | Performed by: PHYSICIAN ASSISTANT

## 2021-07-08 NOTE — PROGRESS NOTES
[address] [facility]  Dept: [phone]  Dept Fax: [phone]  Loc: Fatmata Delgado 1160 Follow Visit  Visit Date: 7/8/2021      Deepak Panchal  is a 70 y.o. male who is returning to the office today for a post-discharge hospital follow-up visit to address cervical spine injury. She was initially seen in consultation evaluation of injury sustained in a ground-level fall. CT scanning on admission revealed anterior-inferior C3 acute corner fracture consistent with extension type fracture injury. Follow-up MRI was unremarkable for ligamentous injury and the patient was placed in an [employer] collar with conservative care ordered. He presents today having undergone a flexion-extension x-ray of the cervical spine on 6/22/2021 is absent any evidence of instability. At today's visit he arrives unaccompanied and without an [employer] collar. He does not relate any cervical discomfort and was stable and grossly intact on exam for upper extremity strength and sensation bilaterally and symmetrically. Some increased fatigue is noted and is to be expected following his injury and will improve with activity. Based on the stable intact nature of his exam and stable findings on imaging we have agreed to follow-up with him as needed moving forward. He is encouraged to reach out for office with any additional questions or concerns or should he experience any significant changes in his general condition. He remains happy with his care to date and with today's visit having his question concerns addressed and answered.     · Patient was evaluated today and is doing well overall. · No new complaints were voiced. · Patient  lives with their family  · Wound: none  · Follow-up Studies: No orders of the defined types were placed in this encounter.   ·      Assessment/Plan:  · Status Post evaluation of cervical spine injury secondary to mechanical fall. · Doing well overall  · Encouraged gradual increase in physical and mental activity. · Fall precaution and home safety education provided to patient. · Follow-up: Based on the stable intact nature of his exam and stable findings on imaging we have agreed to follow-up with him as needed moving forward. He is encouraged to reach out for office with any additional questions or concerns or should he experience any significant changes in his general condition. He remains happy with his care to date and with today's visit having his question concerns addressed and answered.       Electronically signed by Ozzy Fox PA-C on 7/8/21 at 8:47 AM EDT

## 2021-07-13 NOTE — PROCEDURES
[address] [place] [address]                                 HOLTER MONITOR    PATIENT NAME: Chon Pringle                   :        1945  MED REC NO:   [id number]                           ROOM:       [id number]  ACCOUNT NO:   [de-identified]                           ADMIT DATE: 2021  PROVIDER:     Kadie Oscar. SONAL Gary 24 HOURS    DATE OF STUDY:  2021    INDICATION FOR STUDY:  A 82-year-old gentleman with history of  palpitation. INTERPRETATION:  Holter monitor was placed for a total of 24 hours. During the time of the monitoring, the intrinsic rhythm was sinus  rhythm. The minimum heart rate was 42 beats per minute at 05:13 a.m. on  2021. The maximum heart rate was 119 and sinus tachycardia at  10:49 p.m. on 2021. The patient has no significant ventricular or  supraventricular arrhythmia. No conduction abnormalities. No pauses. No abnormal symptoms reported by the patient. SUMMARY:  1. Intrinsic rhythm is sinus rhythm. 2.  No significant ventricular or supraventricular arrhythmia. 3.  No abnormal symptoms reported.         Milly Austin M.D.    D: 2021 9:44:10       T: 2021 18:09:09     [email or web address]  Job#: [id number]     Doc#: [id number]

## 2021-07-14 ENCOUNTER — TELEPHONE (OUTPATIENT)
Dept: FAMILY MEDICINE CLINIC | Age: 76
End: 2021-07-14

## 2021-07-14 DIAGNOSIS — Z87.39 HISTORY OF GOUT: ICD-10-CM

## 2021-07-14 RX ORDER — ALLOPURINOL 300 MG/1
300 TABLET ORAL DAILY
Qty: 90 TABLET | Refills: 3 | Status: SHIPPED | OUTPATIENT
Start: 2021-07-14 | End: 2022-04-28

## 2021-07-14 RX ORDER — ALBUTEROL SULFATE 90 UG/1
2 AEROSOL, METERED RESPIRATORY (INHALATION) EVERY 6 HOURS PRN
Qty: 1 INHALER | Refills: 11 | Status: SHIPPED | OUTPATIENT
Start: 2021-07-14

## 2021-07-14 NOTE — TELEPHONE ENCOUNTER
----- Message from Giselle Reddy MD sent at 7/10/2021  6:27 AM EDT -----  Call patient-PFT's  show mild obstructive defect with reversible componentStart Spiriva (2.5 mcg per actuation)-2 puffs inhaled daily. #1/11 refillsStart albuterol MDI-1 to 2 puffs every 6 hours as needed for shortness of breath/wheezing. #1/11 refills   May need to consider adding Symbicort in future if not significantly improved with above measures.   ES

## 2021-07-14 NOTE — TELEPHONE ENCOUNTER
Pt notified of the PFT result and ES recommendations and he is agreeable. Rx's EP'd to [facility].

## 2021-08-09 ENCOUNTER — HOSPITAL ENCOUNTER (OUTPATIENT)
Age: 76
Discharge: HOME | End: 2021-08-09
Payer: MEDICARE

## 2021-08-09 DIAGNOSIS — D50.9 IRON DEFICIENCY ANEMIA, UNSPECIFIED IRON DEFICIENCY ANEMIA TYPE: ICD-10-CM

## 2021-08-09 DIAGNOSIS — K76.0 HEPATIC STEATOSIS: ICD-10-CM

## 2021-08-09 DIAGNOSIS — E78.5 HYPERLIPIDEMIA, UNSPECIFIED HYPERLIPIDEMIA TYPE: ICD-10-CM

## 2021-08-09 LAB
ALBUMIN SERPL-MCNC: 4.5 G/DL (ref 3.5–5.1)
ALP BLD-CCNC: 69 U/L (ref 38–126)
ALT SERPL-CCNC: 14 U/L (ref 11–66)
AST SERPL-CCNC: 19 U/L (ref 5–40)
BILIRUB SERPL-MCNC: 1.4 MG/DL (ref 0.3–1.2)
BILIRUBIN DIRECT: 0.3 MG/DL (ref 0–0.3)
HCT VFR BLD CALC: 38.9 % (ref 42–52)
HDLC SERPL-MCNC: 36 MG/DL
HEMOGLOBIN: 13.1 GM/DL (ref 14–18)
LDL CHOLESTEROL DIRECT: 78.42 MG/DL
TOTAL PROTEIN: 6.4 G/DL (ref 6.1–8)

## 2021-08-09 PROCEDURE — 80076 HEPATIC FUNCTION PANEL: CPT

## 2021-08-09 PROCEDURE — 85018 HEMOGLOBIN: CPT

## 2021-08-09 PROCEDURE — 36415 COLL VENOUS BLD VENIPUNCTURE: CPT

## 2021-08-09 PROCEDURE — 83721 ASSAY OF BLOOD LIPOPROTEIN: CPT

## 2021-08-09 PROCEDURE — 85014 HEMATOCRIT: CPT

## 2021-08-09 PROCEDURE — 83718 ASSAY OF LIPOPROTEIN: CPT

## 2021-08-11 ENCOUNTER — OFFICE VISIT (OUTPATIENT)
Dept: FAMILY MEDICINE CLINIC | Age: 76
End: 2021-08-11
Payer: MEDICARE

## 2021-08-11 VITALS
RESPIRATION RATE: 16 BRPM | HEART RATE: 68 BPM | BODY MASS INDEX: 27.06 KG/M2 | SYSTOLIC BLOOD PRESSURE: 136 MMHG | HEIGHT: 70 IN | WEIGHT: 189 LBS | DIASTOLIC BLOOD PRESSURE: 70 MMHG | TEMPERATURE: 98.1 F

## 2021-08-11 DIAGNOSIS — Z00.00 ROUTINE GENERAL MEDICAL EXAMINATION AT A HEALTH CARE FACILITY: Primary | ICD-10-CM

## 2021-08-11 DIAGNOSIS — I71.40 ABDOMINAL AORTIC ANEURYSM (AAA) WITHOUT RUPTURE: ICD-10-CM

## 2021-08-11 DIAGNOSIS — E78.5 HYPERLIPIDEMIA, UNSPECIFIED HYPERLIPIDEMIA TYPE: ICD-10-CM

## 2021-08-11 DIAGNOSIS — I73.9 PVD (PERIPHERAL VASCULAR DISEASE) WITH CLAUDICATION (HCC): ICD-10-CM

## 2021-08-11 DIAGNOSIS — I25.10 CORONARY ARTERY DISEASE INVOLVING NATIVE CORONARY ARTERY OF NATIVE HEART WITHOUT ANGINA PECTORIS: ICD-10-CM

## 2021-08-11 DIAGNOSIS — D50.9 IRON DEFICIENCY ANEMIA, UNSPECIFIED IRON DEFICIENCY ANEMIA TYPE: ICD-10-CM

## 2021-08-11 DIAGNOSIS — R55 SYNCOPE AND COLLAPSE: ICD-10-CM

## 2021-08-11 DIAGNOSIS — I10 ESSENTIAL HYPERTENSION: ICD-10-CM

## 2021-08-11 DIAGNOSIS — Z12.5 SCREENING PSA (PROSTATE SPECIFIC ANTIGEN): ICD-10-CM

## 2021-08-11 DIAGNOSIS — J44.9 COPD WITHOUT EXACERBATION (HCC): ICD-10-CM

## 2021-08-11 PROCEDURE — 1123F ACP DISCUSS/DSCN MKR DOCD: CPT | Performed by: NURSE PRACTITIONER

## 2021-08-11 PROCEDURE — 4040F PNEUMOC VAC/ADMIN/RCVD: CPT | Performed by: NURSE PRACTITIONER

## 2021-08-11 PROCEDURE — G0439 PPPS, SUBSEQ VISIT: HCPCS | Performed by: NURSE PRACTITIONER

## 2021-08-11 PROCEDURE — 3017F COLORECTAL CA SCREEN DOC REV: CPT | Performed by: NURSE PRACTITIONER

## 2021-08-11 RX ORDER — ISOSORBIDE MONONITRATE 30 MG/1
30 TABLET, EXTENDED RELEASE ORAL DAILY
COMMUNITY
Start: 2021-08-04

## 2021-08-11 ASSESSMENT — PATIENT HEALTH QUESTIONNAIRE - PHQ9
2. FEELING DOWN, DEPRESSED OR HOPELESS: 0
SUM OF ALL RESPONSES TO PHQ QUESTIONS 1-9: 0
SUM OF ALL RESPONSES TO PHQ QUESTIONS 1-9: 0
SUM OF ALL RESPONSES TO PHQ9 QUESTIONS 1 & 2: 0
1. LITTLE INTEREST OR PLEASURE IN DOING THINGS: 0
SUM OF ALL RESPONSES TO PHQ QUESTIONS 1-9: 0

## 2021-08-11 ASSESSMENT — ENCOUNTER SYMPTOMS
CHEST TIGHTNESS: 0
ABDOMINAL PAIN: 0
EYES NEGATIVE: 1
SHORTNESS OF BREATH: 0
BLOOD IN STOOL: 0

## 2021-08-11 ASSESSMENT — LIFESTYLE VARIABLES: HOW OFTEN DO YOU HAVE A DRINK CONTAINING ALCOHOL: 0

## 2021-08-11 NOTE — PROGRESS NOTES
[facility]  [facility]  Dept: [phone]  Dept Fax: [phone]    Nicholas Del Toro is a 70 y.o.male    Pt presents for follow up of HTN, hyperlipidemia, PVD, CAD, COPD, GERD, AAA, anemia, Boyce syndrome, and gout. Pt feeling ok since last visit- interval history and any new issues noted below:      Pt was hospitalized in May after syncope and collapse. He did have an acute C3 corner fracture. He did follow up with Neurosurgery postop. Following with Dr Osiel Barker for cardiac. He did have a stress test. Scheduled for tilt table. The home BP readings have been running under 140/90. Monitors daily.       Wt Readings from Last 3 Encounters:   08/11/21 189 lb (85.7 kg)   07/08/21 188 lb 6.4 oz (85.5 kg)   06/07/21 182 lb 12.8 oz (82.9 kg)       Patient Active Problem List   Diagnosis    Coronary artery disease involving native coronary artery of native heart without angina pectoris    Erectile dysfunction    Hepatic steatosis    Low HDL (under 40)    Essential hypertension    Gastroesophageal reflux disease    Hyperlipidemia    Primary osteoarthritis of left knee    Coronary artery disease involving coronary bypass graft of native heart without angina pectoris- Dr. Searcy Galeazzi COPD without exacerbation ([address]    Gout of left foot    Abdominal aortic aneurysm (AAA) without rupture ([address]    Angina pectoris, variant ([address]    Abnormal nuclear stress test    PVD (peripheral vascular disease) with claudication (HCC)    Iron deficiency anemia    Pneumonia    Closed nondisplaced fracture of third cervical vertebra (HCC)    Closed head injury    Syncope and collapse    Forehead contusion, initial encounter    Hypomagnesemia    Abnormal EKG    Hx of CABG       Current Outpatient Medications   Medication Sig Dispense Refill    isosorbide mononitrate (IMDUR) 30 MG extended release tablet Take 30 mg by mouth daily       Multiple Vitamin (MULTIVITAMIN PO) Take by mouth daily       tiotropium (SPIRIVA RESPIMAT) 2.5 MCG/ACT AERS inhaler Inhale 2 puffs into the lungs daily 1 Inhaler 11    albuterol sulfate  (90 Base) MCG/ACT inhaler Inhale 2 puffs into the lungs every 6 hours as needed for Wheezing 1 Inhaler 11    allopurinol (ZYLOPRIM) 300 MG tablet Take 1 tablet by mouth daily 90 tablet 3    metoprolol tartrate (LOPRESSOR) 50 MG tablet TAKE 1/2 TABLET EVERY DAY 45 tablet 3    simvastatin (ZOCOR) 40 MG tablet Take 1 tablet by mouth nightly 90 tablet 3    Probiotic Product (PROBIOTIC PO) Take by mouth daily       pantoprazole (PROTONIX) 40 MG tablet Take 1 tablet by mouth every morning (before breakfast) 90 tablet 3    nitroGLYCERIN (NITROSTAT) 0.4 MG SL tablet Place 1 tablet under the tongue every 5 minutes as needed for Chest pain 25 tablet 3    clopidogrel (PLAVIX) 75 MG tablet Take 1 tablet by mouth daily 30 tablet 5    ferrous sulfate 325 (65 FE) MG tablet Take 325 mg by mouth daily       acetaminophen (TYLENOL) 500 MG tablet Take 1,000 mg by mouth as needed for Pain.  aspirin 325 MG EC tablet Take by mouth daily        No current facility-administered medications for this visit. Review of Systems   Constitutional: Negative for chills, fatigue, fever and unexpected weight change. HENT: Negative. Eyes: Negative. Respiratory: Negative for chest tightness and shortness of breath. Cardiovascular: Negative for chest pain, palpitations and leg swelling. Gastrointestinal: Negative for abdominal pain and blood in stool. Genitourinary: Negative for dysuria. Musculoskeletal: Positive for neck pain. Negative for joint swelling. Skin: Negative for rash. Neurological: Negative for dizziness. Psychiatric/Behavioral: Negative. All other systems reviewed and are negative.     OBJECTIVE     /70 (Site: Left Upper Arm)   Pulse 68   Temp 98.1 °F (36.7 °C) (Oral)   Resp 16   Ht 5' 10.08\" (1.78 m)   Wt 189 lb (85.7 kg)   BMI 27.06 kg/m²   Body mass index is 27.06 kg/m². BP Readings from Last 3 Encounters:   08/11/21 136/70   07/08/21 137/73   06/07/21 124/76         Physical Exam  Vitals and nursing note reviewed. Constitutional:       Appearance: He is well-developed. HENT:      Head: Normocephalic and atraumatic. Right Ear: External ear normal.      Left Ear: External ear normal.      Nose: Nose normal.   Eyes:      Conjunctiva/sclera: Conjunctivae normal.      Pupils: Pupils are equal, round, and reactive to light. Cardiovascular:      Rate and Rhythm: Normal rate and regular rhythm. Pulmonary:      Effort: Pulmonary effort is normal.      Breath sounds: Normal breath sounds. Abdominal:      General: Bowel sounds are normal.      Palpations: Abdomen is soft. Musculoskeletal:         General: Normal range of motion. Cervical back: Normal range of motion and neck supple. Skin:     General: Skin is warm and dry. Neurological:      Mental Status: He is alert and oriented to person, place, and time. Deep Tendon Reflexes: Reflexes are normal and symmetric. Psychiatric:         Behavior: Behavior normal.         Thought Content:  Thought content normal.         Judgment: Judgment normal.       Component      Latest Ref Rng & Units 8/9/2021   Albumin      3.5 - 5.1 g/dL 4.5   Bilirubin      0.3 - 1.2 mg/dL 1.4 (H)   Bilirubin, Direct      0.0 - 0.3 mg/dL 0.3   Alk Phos      38 - 126 U/L 69   AST      5 - 40 U/L 19   ALT      11 - 66 U/L 14   Total Protein      6.1 - 8.0 g/dL 6.4   Hemoglobin Quant      14.0 - 18.0 gm/dl 13.1 (L)   Hematocrit      42.0 - 52.0 % 38.9 (L)   LDL Direct      mg/dl 78.42   HDL Cholesterol      mg/dL 36           Lab Results   Component Value Date    LABA1C 4.7 03/16/2018       Lab Results   Component Value Date    CHOL 140 03/06/2021    TRIG 109 03/06/2021    HDL 36 08/09/2021    LDLCALC 80 03/06/2021    LDLDIRECT 78.42 08/09/2021       The 10-year ASCVD risk score (Nehemiah Bergman, et al., 2013) is: 27.2%    Values used to calculate the score:      Age: 70 years      Sex: Male      Is Non- : No      Diabetic: No      Tobacco smoker: No      Systolic Blood Pressure: 383 mmHg      Is BP treated: Yes      HDL Cholesterol: 36 mg/dL      Total Cholesterol: 140 mg/dL    Lab Results   Component Value Date     06/02/2021    K 4.3 06/02/2021     06/02/2021    CO2 26 06/02/2021    BUN 13 06/02/2021    CREATININE 1.0 06/02/2021    GLUCOSE 103 06/02/2021    CALCIUM 8.8 06/02/2021    PROT 6.4 08/09/2021    LABALBU 4.5 08/09/2021    BILITOT 1.4 (H) 08/09/2021    ALKPHOS 69 08/09/2021    AST 19 08/09/2021    ALT 14 08/09/2021    LABGLOM 73 (A) 06/02/2021     No results found for: LABMICR, [id number]    Lab Results   Component Value Date    TSH 3.370 05/31/2021    T4FREE 1.22 09/12/2011       Lab Results   Component Value Date    WBC 4.7 (L) 06/02/2021    HGB 13.1 (L) 08/09/2021    HCT 38.9 (L) 08/09/2021    MCV 97.0 (H) 06/02/2021     06/02/2021       Lab Results   Component Value Date    PSA 0.62 12/28/2020    PSA 0.84 02/06/2019    PSA 0.48 03/22/2018       Immunization History   Administered Date(s) Administered    COVID-19, Moderna, PF, 100mcg/0.5mL 05/29/2021, 06/26/2021    Influenza Virus Vaccine 11/01/2011, 10/24/2012, 10/01/2013, 10/06/2014, 09/28/2015    Influenza, High Dose (Fluzone 65 yrs and older) 09/30/2018, 11/09/2019    Pneumococcal Conjugate 13-valent ([id number]) 04/13/2015    Pneumococcal Polysaccharide ([id number]) 08/01/2005, 04/14/2014    Tdap (Boostrix, Adacel) 12/04/2013       Health Maintenance   Topic Date Due    Shingles Vaccine (1 of 2) Never done   [employer] Visit (AWV)  Never done    Flu vaccine (1) 09/01/2021    Potassium monitoring  06/02/2022    Creatinine monitoring  06/02/2022    Lipid screen  08/09/2022    DTaP/Tdap/Td vaccine (2 - Td or Tdap) 12/04/2023    Colon cancer screen colonoscopy  02/13/2024    Pneumococcal 65+ years Vaccine  Completed    COVID-19 Vaccine  Completed    Hepatitis C screen  Addressed    Hepatitis A vaccine  Aged Out    Hepatitis B vaccine  Aged Out    Hib vaccine  Aged Out    Meningococcal (ACWY) vaccine  Aged Out       AAA ultrasound (Male, 65-75, smoked ever) indicated at this time? YES- management per Vascular Surgery  CT Lung Screen (55-80, 30 pk-yrs, smoking or quit <15 years) indicated at this time? Yes, 3/4 PPD x 40 years,  Stopped 14 years ago- completed 1/6/2020- to follow up annually.    Sleep Medicine referral indicated at this time (Obesity, Snoring, Daytime Somnolence, Apneic Episodes)? Pt denies symptoms, other than snoring. Future Appointments   Date Time Provider Eliot Preciado   8/26/2021  1:30 PM STR NM STRESS RM1 STRZ STRESS [place] HOD   9/24/2021  1:00 PM STR VASCULAR LAB ROOM 2 STRZ VAS LAB STR Radiolog         ASSESSMENT       Diagnosis Orders   1. Routine general medical examination at a health care facility     2. Hyperlipidemia, unspecified hyperlipidemia type     3. Essential hypertension  Comprehensive Metabolic Panel   4. Coronary artery disease involving native coronary artery of native heart without angina pectoris  Comprehensive Metabolic Panel   5. PVD (peripheral vascular disease) with claudication ([address]     6. COPD without exacerbation ([address]     7. Iron deficiency anemia, unspecified iron deficiency anemia type  CBC   8. Abdominal aortic aneurysm (AAA) without rupture ([address]     9. Syncope and collapse     10. Screening PSA (prostate specific antigen)  PSA screening       PLAN        After discussion with patient, will continue max dose simvastatin at this time, as patient previously unable to tolerate both Lipitor and Crestor. Currently taking 1/2-1 tab simvastatin 40 mg daily due to muscle pain. Check CBC, CMP, PSA in 6 months  Continue current medicines otherwise   US abd Aorta completed in 9/2019.   Patient would like to hold on repeat at this time. Follow up in 6 months.          Preventive Health Topics:    Encouraged annual FLU VACCINE- pt declines. (updated 2021)  Encouraged SHINGLES SHOT [facility])- check with your local pharmacy. (updated 2021)  COLONOSCOPY done 2019 per Dr[facility]- to do in 2024 (updated 2021)         Electronically signed by MERE Bishop CNP on 2021 at 11:24 AM    Medicare Annual Wellness Visit  Name: Keshawn Lacy Date: 2021   MRN: [id number] Sex: Male   Age: 70 y.o. Ethnicity: Non- / Non    : 1945 Race: White (non-)      Jolie Arguello is here for Medicare AWV (Here today for Medicare AWV. )    Screenings for behavioral, psychosocial and functional/safety risks, and cognitive dysfunction are all negative except as indicated below. These results, as well as other patient data from the [address] form, are documented in Flowsheets linked to this Encounter. Allergies   Allergen Reactions    Crestor [Rosuvastatin Calcium]      Muscle aches.  Lipitor      Muscle aches.  Tramadol Nausea Only     Dizziness    Vicodin [Hydrocodone-Acetaminophen]      Dizziness.  Codeine Nausea And Vomiting     Dizziness    Percocet [Oxycodone-Acetaminophen] Nausea And Vomiting     Dizziness         Prior to Visit Medications    Medication Sig Taking?  Authorizing Provider   isosorbide mononitrate (IMDUR) 30 MG extended release tablet Take 30 mg by mouth daily  Yes Historical Provider, MD   Multiple Vitamin (MULTIVITAMIN PO) Take by mouth daily  Yes Historical Provider, MD   tiotropium (SPIRIVA RESPIMAT) 2.5 MCG/ACT AERS inhaler Inhale 2 puffs into the lungs daily Yes Ilia Humphreys MD   albuterol sulfate  (90 Base) MCG/ACT inhaler Inhale 2 puffs into the lungs every 6 hours as needed for Wheezing Yes Ilia Humphreys MD   allopurinol (ZYLOPRIM) 300 MG tablet Take 1 tablet by mouth daily Yes Hector Humphreys MD Pe[place]   metoprolol tartrate (LOPRESSOR) 50 MG tablet TAKE 1/2 TABLET EVERY DAY Yes Ilia Humphreys MD   simvastatin (ZOCOR) 40 MG tablet Take 1 tablet by mouth nightly Yes Ilia Humphreys MD   Probiotic Product (PROBIOTIC PO) Take by mouth daily  Yes Historical Provider, MD   pantoprazole (PROTONIX) 40 MG tablet Take 1 tablet by mouth every morning (before breakfast) Yes Margy Mcneil, APRN - CNP   nitroGLYCERIN (NITROSTAT) 0.4 MG SL tablet Place 1 tablet under the tongue every 5 minutes as needed for Chest pain Yes Ilia Humphreys MD   clopidogrel (PLAVIX) 75 MG tablet Take 1 tablet by mouth daily Yes Kierra Wong MD   ferrous sulfate 325 (65 FE) MG tablet Take 325 mg by mouth daily  Yes Historical Provider, MD   acetaminophen (TYLENOL) 500 MG tablet Take 1,000 mg by mouth as needed for Pain. Yes Historical Provider, MD   aspirin 325 MG EC tablet Take by mouth daily  Yes Historical Provider, MD         Past Medical History:   Diagnosis Date    AAA (abdominal aortic aneurysm) ([facility]     watching for now    COPD (chronic obstructive pulmonary disease) ([facility]     Coronary artery disease     Emphysema of lung ([facility]     Erectile dysfunction     GERD (gastroesophageal reflux disease)     Elevated liver enzymes    Gout     History of blood transfusion     Hyperlipidemia     Hypertension     Liver disease     Osteoarthritis        Past Surgical History:   Procedure Laterality Date    APPENDECTOMY  1955    CARDIAC CATHETERIZATION      [facility] CARDIAC SURGERY  2006    COLONOSCOPY  12/18/13    Dr. Maria Elena Melendrez  2006    Dr. Art Lynn.  ENDOSCOPY, COLON, DIAGNOSTIC      FOOT SURGERY  2011    Left foot.     HEMORRHOID SURGERY  04/2019    Banding--Dr. Angélica Solis    INGUINAL HERNIA REPAIR Right 12/1/2014    THROMBOENDARTERECTOMY Right 12/22/2016    Right Femoral Endarterectomy and Right SFA Angioplasty    UPPER GASTROINTESTINAL ENDOSCOPY           Family     Not on File Not on File Not on File Not on File      General Health Risk Interventions:  · No Living Will: Advance Care Planning addressed with patient today    Health Habits/Nutrition:  Health Habits/Nutrition  Do you exercise for at least 20 minutes 2-3 times per week?: Yes  Have you lost any weight without trying in the past 3 months?: No  Do you eat only one meal per day?: No  Have you seen the dentist within the past year?: (!) No  Body mass index: (!) 27.05  Health Habits/Nutrition Interventions:  · Dental exam overdue:  patient encouraged to make appointment with his/her dentist    Hearing/Vision:  No exam data present  Hearing/Vision  Do you or your family notice any trouble with your hearing that hasn't been managed with hearing aids?: No  Do you have difficulty driving, watching TV, or doing any of your daily activities because of your eyesight?: No  Have you had an eye exam within the past year?: (!) No  Hearing/Vision Interventions:  · Vision concerns:  patient encouraged to make appointment with his/her eye specialist    Safety:  Safety  Do you have working smoke detectors?: Yes  Have all throw rugs been removed or fastened?: (!) No  Do you have non-slip mats or surfaces in all bathtubs/showers?: Yes  Do all of your stairways have a railing or banister?: (!) No  Are your doorways, halls and stairs free of clutter?: Yes  Do you always fasten your seatbelt when you are in a car?: Yes  Safety Interventions:  · Home safety tips provided     Personalized Preventive Plan   Current Health Maintenance Status  Immunization History   Administered Date(s) Administered    COVID-19, Moderna, PF, 100mcg/0.5mL 05/29/2021, 06/26/2021    Influenza Virus Vaccine 11/01/2011, 10/24/2012, 10/01/2013, 10/06/2014, 09/28/2015    Influenza, High Dose (Fluzone 65 yrs and older) 09/30/2018, 11/09/2019    Pneumococcal Conjugate 13-valent ([id number]) 04/13/2015    Pneumococcal Polysaccharide ([id number])

## 2021-08-11 NOTE — PATIENT INSTRUCTIONS
Personalized Preventive Plan for Rashad Holland - 8/11/2021  Medicare offers a range of preventive health benefits. Some of the tests and screenings are paid in full while other may be subject to a deductible, co-insurance, and/or copay. Some of these benefits include a comprehensive review of your medical history including lifestyle, illnesses that may run in your family, and various assessments and screenings as appropriate. After reviewing your medical record and screening and assessments performed today your provider may have ordered immunizations, labs, imaging, and/or referrals for you. A list of these orders (if applicable) as well as your Preventive Care list are included within your After Visit Summary for your review. Other Preventive Recommendations:    · A preventive eye exam performed by an eye specialist is recommended every 1-2 years to screen for glaucoma; cataracts, macular degeneration, and other eye disorders. · A preventive dental visit is recommended every 6 months. · Try to get at least 150 minutes of exercise per week or 10,000 steps per day on a pedometer . · Order or download the FREE \"Exercise & Physical Activity: Your Everyday Guide\" from The [employer]. Call 4-964.799.2345 or search The [employer] online. · You need 0898-1163 mg of calcium and 2332-2547 IU of vitamin D per day. It is possible to meet your calcium requirement with diet alone, but a vitamin D supplement is usually necessary to meet this goal.  · When exposed to the sun, use a sunscreen that protects against both UVA and UVB radiation with an SPF of 30 or greater. Reapply every 2 to 3 hours or after sweating, drying off with a towel, or swimming. · Always wear a seat belt when traveling in a car. Always wear a helmet when riding a bicycle or motorcycle.

## 2021-08-19 ENCOUNTER — TELEPHONE (OUTPATIENT)
Dept: FAMILY MEDICINE CLINIC | Age: 76
End: 2021-08-19

## 2021-08-19 NOTE — TELEPHONE ENCOUNTER
Wife calls in stating that the pt and his wife had taken care of their grand kids over the weekend. The grand kids now have RSV. His wife and him are no sick with congestion and a productive cough. denies fever, sob, sore throat. She states she is concerned that this will turn into Pneumonia like it has in the past. He was last seen on 8-11-21 and requesting that an atb be called in.  appts full as well    Pharmacy- canal    [phone]

## 2021-08-19 NOTE — TELEPHONE ENCOUNTER
Notified pt's wife. Made an appointment with WS on 08/23/2021. Pt's wife stated that last year he got really bad sick and he ended up in the hospital. Dr. Katy Gonzalez recommended not to take Coricidin last year due to heart conditions. She will have him try Mucinex. She is just worried about him ending up in the hospital again.

## 2021-08-23 ENCOUNTER — HOSPITAL ENCOUNTER (OUTPATIENT)
Age: 76
Discharge: HOME | End: 2021-08-23
Payer: MEDICARE

## 2021-08-23 ENCOUNTER — OFFICE VISIT (OUTPATIENT)
Dept: FAMILY MEDICINE CLINIC | Age: 76
End: 2021-08-23
Payer: MEDICARE

## 2021-08-23 VITALS
WEIGHT: 190.6 LBS | DIASTOLIC BLOOD PRESSURE: 78 MMHG | SYSTOLIC BLOOD PRESSURE: 136 MMHG | HEART RATE: 66 BPM | HEIGHT: 70 IN | RESPIRATION RATE: 16 BRPM | BODY MASS INDEX: 27.29 KG/M2

## 2021-08-23 DIAGNOSIS — J44.9 COPD WITHOUT EXACERBATION (HCC): ICD-10-CM

## 2021-08-23 DIAGNOSIS — R05.9 COUGH: ICD-10-CM

## 2021-08-23 DIAGNOSIS — J40 BRONCHITIS: Primary | ICD-10-CM

## 2021-08-23 DIAGNOSIS — J40 BRONCHITIS: ICD-10-CM

## 2021-08-23 PROCEDURE — 3017F COLORECTAL CA SCREEN DOC REV: CPT | Performed by: NURSE PRACTITIONER

## 2021-08-23 PROCEDURE — G8417 CALC BMI ABV UP PARAM F/U: HCPCS | Performed by: NURSE PRACTITIONER

## 2021-08-23 PROCEDURE — 1036F TOBACCO NON-USER: CPT | Performed by: NURSE PRACTITIONER

## 2021-08-23 PROCEDURE — G8427 DOCREV CUR MEDS BY ELIG CLIN: HCPCS | Performed by: NURSE PRACTITIONER

## 2021-08-23 PROCEDURE — 99213 OFFICE O/P EST LOW 20 MIN: CPT | Performed by: NURSE PRACTITIONER

## 2021-08-23 PROCEDURE — 3023F SPIROM DOC REV: CPT | Performed by: NURSE PRACTITIONER

## 2021-08-23 PROCEDURE — 4040F PNEUMOC VAC/ADMIN/RCVD: CPT | Performed by: NURSE PRACTITIONER

## 2021-08-23 PROCEDURE — G8926 SPIRO NO PERF OR DOC: HCPCS | Performed by: NURSE PRACTITIONER

## 2021-08-23 PROCEDURE — 87636 SARSCOV2 & INF A&B AMP PRB: CPT

## 2021-08-23 PROCEDURE — 1123F ACP DISCUSS/DSCN MKR DOCD: CPT | Performed by: NURSE PRACTITIONER

## 2021-08-23 RX ORDER — AZITHROMYCIN 250 MG/1
250 TABLET, FILM COATED ORAL SEE ADMIN INSTRUCTIONS
Qty: 6 TABLET | Refills: 0 | Status: SHIPPED | OUTPATIENT
Start: 2021-08-23 | End: 2021-08-28

## 2021-08-23 RX ORDER — BENZONATATE 100 MG/1
100 CAPSULE ORAL 3 TIMES DAILY PRN
Qty: 30 CAPSULE | Refills: 0 | Status: SHIPPED | OUTPATIENT
Start: 2021-08-23 | End: 2021-08-30

## 2021-08-23 ASSESSMENT — ENCOUNTER SYMPTOMS
SORE THROAT: 0
HEMOPTYSIS: 0
RHINORRHEA: 1
WHEEZING: 0
COUGH: 1
HEARTBURN: 0

## 2021-08-23 NOTE — PATIENT INSTRUCTIONS
Patient Education        Bronchitis: Care Instructions  Your Care Instructions     Bronchitis is inflammation of the bronchial tubes, which carry air to the lungs. The tubes swell and produce mucus, or phlegm. The mucus and inflamed bronchial tubes make you cough. You may have trouble breathing. Most cases of bronchitis are caused by viruses like those that cause colds. Antibiotics usually do not help and they may be harmful. Bronchitis usually develops rapidly and lasts about 2 to 3 weeks in otherwise healthy people. Follow-up care is a key part of your treatment and safety. Be sure to make and go to all appointments, and call your doctor if you are having problems. It's also a good idea to know your test results and keep a list of the medicines you take. How can you care for yourself at home? · Take all medicines exactly as prescribed. Call your doctor if you think you are having a problem with your medicine. · Get some extra rest.  · Take an over-the-counter pain medicine, such as acetaminophen (Tylenol), ibuprofen (Advil, Motrin), or naproxen (Aleve) to reduce fever and relieve body aches. Read and follow all instructions on the label. · Do not take two or more pain medicines at the same time unless the doctor told you to. Many pain medicines have acetaminophen, which is Tylenol. Too much acetaminophen (Tylenol) can be harmful. · Take an over-the-counter cough medicine that contains dextromethorphan to help quiet a dry, hacking cough so that you can sleep. Avoid cough medicines that have more than one active ingredient. Read and follow all instructions on the label. · Breathe moist air from a humidifier, hot shower, or sink filled with hot water. The heat and moisture will thin mucus so you can cough it out. · Do not smoke. Smoking can make bronchitis worse. If you need help quitting, talk to your doctor about stop-smoking programs and medicines.  These can increase your chances of quitting for good.  When should you call for help? Call 911 anytime you think you may need emergency care. For example, call if:    · You have severe trouble breathing. Call your doctor now or seek immediate medical care if:    · You have new or worse trouble breathing.     · You cough up dark brown or bloody mucus (sputum).     · You have a new or higher fever.     · You have a new rash. Watch closely for changes in your health, and be sure to contact your doctor if:    · You cough more deeply or more often, especially if you notice more mucus or a change in the color of your mucus.     · You are not getting better as expected. Where can you learn more? Go to https://[email or web address]. org and sign in to your e[employer] account. Enter H333 in the [employer]Club box to learn more about \"Bronchitis: Care Instructions. \"     If you do not have an account, please click on the \"Sign Up Now\" link. Current as of: October 26, 2020               Content Version: 12.9  © 2006-2021 Healthwise, Incorporated. Care instructions adapted under license by [facility] ([facility]). If you have questions about a medical condition or this instruction, always ask your healthcare professional. [place] [address] any warranty or liability for your use of this information.

## 2021-08-23 NOTE — PROGRESS NOTES
  tiotropium (SPIRIVA RESPIMAT) 2.5 MCG/ACT AERS inhaler, Inhale 2 puffs into the lungs daily, Disp: 1 Inhaler, Rfl: 11    albuterol sulfate  (90 Base) MCG/ACT inhaler, Inhale 2 puffs into the lungs every 6 hours as needed for Wheezing, Disp: 1 Inhaler, Rfl: 11    allopurinol (ZYLOPRIM) 300 MG tablet, Take 1 tablet by mouth daily, Disp: 90 tablet, Rfl: 3    metoprolol tartrate (LOPRESSOR) 50 MG tablet, TAKE 1/2 TABLET EVERY DAY, Disp: 45 tablet, Rfl: 3    simvastatin (ZOCOR) 40 MG tablet, Take 1 tablet by mouth nightly, Disp: 90 tablet, Rfl: 3    Probiotic Product (PROBIOTIC PO), Take by mouth daily , Disp: , Rfl:     pantoprazole (PROTONIX) 40 MG tablet, Take 1 tablet by mouth every morning (before breakfast), Disp: 90 tablet, Rfl: 3    nitroGLYCERIN (NITROSTAT) 0.4 MG SL tablet, Place 1 tablet under the tongue every 5 minutes as needed for Chest pain, Disp: 25 tablet, Rfl: 3    clopidogrel (PLAVIX) 75 MG tablet, Take 1 tablet by mouth daily, Disp: 30 tablet, Rfl: 5    ferrous sulfate 325 (65 FE) MG tablet, Take 325 mg by mouth daily , Disp: , Rfl:     acetaminophen (TYLENOL) 500 MG tablet, Take 1,000 mg by mouth as needed for Pain., Disp: , Rfl:     aspirin 325 MG EC tablet, Take by mouth daily , Disp: , Rfl:     The patient is allergic to crestor [rosuvastatin calcium], lipitor, tramadol, vicodin [hydrocodone-acetaminophen], codeine, and percocet [oxycodone-acetaminophen]. Past Medical History  Jose Armando Galdamez  has a past medical history of AAA (abdominal aortic aneurysm) ([facility] [address] COPD (chronic obstructive pulmonary disease) ([facility] [address] Coronary artery disease, Emphysema of lung ([address] Erectile dysfunction, GERD (gastroesophageal reflux disease), Gout, History of blood transfusion, Hyperlipidemia, Hypertension, Liver disease, and Osteoarthritis. Subjective:      Review of Systems   Constitutional: Negative for fever and weight loss. HENT: Positive for postnasal drip and rhinorrhea.  Negative for ear occipital adenopathy. Cervical: No cervical adenopathy. Skin:     General: Skin is warm and dry. Findings: No rash. Neurological:      General: No focal deficit present. Mental Status: He is alert and oriented to person, place, and time. Coordination: Coordination normal.   Psychiatric:         Mood and Affect: Mood normal.         Behavior: Behavior normal.         Thought Content: Thought content normal.         Judgment: Judgment normal.         Assessment/Plan:      Haylie Forrest was seen today for cough. Diagnoses and all orders for this visit:    Bronchitis  -     azithromycin (ZITHROMAX) 250 MG tablet; Take 1 tablet by mouth See Admin Instructions for 5 days 500mg on day 1 followed by 250mg on days 2 - 5  -     COVID-19 & Influenza Combo; Future    Cough  -     benzonatate (TESSALON) 100 MG capsule; Take 1 capsule by mouth 3 times daily as needed for Cough  -     COVID-19 & Influenza Combo; Future    COPD without exacerbation ([facility] [address]    - Rest and increase fluids  - Tylenol as needed for pain and fever  - Isolate until testing results are back  - Good handwashing and clean all surfaces touched  - Call office with any questions or concerns, or if symptoms are getting worse or changing  - ER for any chest pain or SOB    Return if symptoms worsen or fail to improve, for Routine follow up. Patient given educational materials - see patient instructions. Discussed use, benefit, and side effects of prescribed medications. All patient questions answered. Pt voiced understanding.         Electronically signed by MERE Gallegos CNP on 8/24/2021 at 7:36 AM

## 2021-08-24 ENCOUNTER — TELEPHONE (OUTPATIENT)
Dept: FAMILY MEDICINE CLINIC | Age: 76
End: 2021-08-24

## 2021-08-24 LAB
INFLUENZA A: NOT DETECTED
INFLUENZA B: NOT DETECTED
SARS-COV-2 RNA, RT PCR: NOT DETECTED

## 2021-08-24 NOTE — TELEPHONE ENCOUNTER
----- Message from MERE Gordon - CNP sent at 8/24/2021  7:29 AM EDT -----  Please let pt know that his COVID and flu testing was negative. Follow plan of care as discussed at appt.   -WS

## 2021-09-20 RX ORDER — PANTOPRAZOLE SODIUM 40 MG/1
40 TABLET, DELAYED RELEASE ORAL
Qty: 90 TABLET | Refills: 3 | Status: SHIPPED | OUTPATIENT
Start: 2021-09-20 | End: 2022-08-08

## 2021-09-20 NOTE — TELEPHONE ENCOUNTER
This medication refill is regarding a electronic request.  Refill requested by patient. Requested Prescriptions     Pending Prescriptions Disp Refills    pantoprazole (PROTONIX) 40 MG tablet [Pharmacy Med Name: PANTOPRAZOLE SODIUM 40 MG Tablet Delayed Release] 90 tablet 3     Sig: TAKE 1 TABLET BY MOUTH EVERY MORNING (BEFORE BREAKFAST)       Date of last visit: 8/23/2021   Date of next visit: 2/15/2022  Date of last refill: 11/20/2020  Pharmacy Name: [facility] [place]    Last Lipid Panel:    Lab Results   Component Value Date    CHOL 140 03/06/2021    TRIG 109 03/06/2021    HDL 36 08/09/2021    LDLCALC 80 03/06/2021     Last CMP:   Lab Results   Component Value Date     06/02/2021    K 4.3 06/02/2021     06/02/2021    CO2 26 06/02/2021    BUN 13 06/02/2021    CREATININE 1.0 06/02/2021    GLUCOSE 103 06/02/2021    CALCIUM 8.8 06/02/2021    PROT 6.4 08/09/2021    LABALBU 4.5 08/09/2021    BILITOT 1.4 (H) 08/09/2021    ALKPHOS 69 08/09/2021    AST 19 08/09/2021    ALT 14 08/09/2021    LABGLOM 73 (A) 06/02/2021       Last Thyroid:    Lab Results   Component Value Date    TSH 3.370 05/31/2021    T4FREE 1.22 09/12/2011     Last Hemoglobin A1C:    Lab Results   Component Value Date    LABA1C 4.7 03/16/2018    AVGG 81 03/16/2018       Rx verified, ordered and set to EP.

## 2021-09-23 ENCOUNTER — HOSPITAL ENCOUNTER (OUTPATIENT)
Dept: NON INVASIVE DIAGNOSTICS | Age: 76
Discharge: HOME | End: 2021-09-23
Payer: MEDICARE

## 2021-09-23 PROCEDURE — 93660 TILT TABLE EVALUATION: CPT | Performed by: INTERNAL MEDICINE

## 2021-09-24 ENCOUNTER — HOSPITAL ENCOUNTER (OUTPATIENT)
Dept: INTERVENTIONAL RADIOLOGY/VASCULAR | Age: 76
Discharge: HOME | End: 2021-09-24
Payer: MEDICARE

## 2021-09-24 DIAGNOSIS — I73.9 PERIPHERAL VASCULAR DISEASE, UNSPECIFIED (HCC): ICD-10-CM

## 2021-09-24 PROCEDURE — 93923 UPR/LXTR ART STDY 3+ LVLS: CPT

## 2021-09-24 NOTE — PROCEDURES
[address] [id number]                                 TILT TABLE TEST    PATIENT NAME: Romero Salas                   :        1945  MED REC NO:   [id number]                           ROOM:  ACCOUNT NO:   [de-identified]                           ADMIT DATE: 2021  PROVIDER:     Ian Baeza. Katelin Fish M.D.    Laina Fernandez:  This is a 79-year-old nice gentleman; has been  having dizziness, near syncopal episode; referred for tilt table test to  evaluate postural hypotension as a cause for his symptoms. FINDINGS:  His heart rate was 60. Blood pressure at rest was 153/73. His heart rate was 70 in the immediate minute, blood pressure 141/85. The patient continued, however, to be in sinus rhythm. The lowest blood  pressure, he was dropped 134/74. He continued to be in sinus rhythm  rate of 86. The patient continued to be symptom-free. IMPRESSION:  Essentially, tilt table test is negative for dizziness. No  acute complication from the procedure. The patient was advised to  monitor his symptoms, increase fluid intake, and to follow up in the  office.         Kennedi Suarez M.D.    D: 2021 16:20:17       T: 2021 22:34:27     [email or web address]  Job#: [id number]     Doc#: [id number]    CC:

## 2021-11-03 ENCOUNTER — PRE-PROCEDURE TELEPHONE (OUTPATIENT)
Dept: INPATIENT UNIT | Age: 76
End: 2021-11-03

## 2021-11-03 NOTE — PROGRESS NOTES
Spoke to patient's wife and relayed the following information:  NPO after midnight  Bring drivers license and insurance information  Wear comfortable clean clothes  Shower morning of and night before with liquid antibacterial soap  Remove jewelry   May have to stay overnight if have PTCA/stent  Bring medications in original bottles, and CPAP if uses  Made aware of visitors limit to 2 at a time  Follow all instructions given by your physician  Please notify doctor office if you need to cancel or reschedule your procedure   needed at discharge

## 2021-11-17 ENCOUNTER — OFFICE VISIT (OUTPATIENT)
Dept: FAMILY MEDICINE CLINIC | Age: 76
End: 2021-11-17
Payer: MEDICARE

## 2021-11-17 VITALS
SYSTOLIC BLOOD PRESSURE: 126 MMHG | BODY MASS INDEX: 28.47 KG/M2 | DIASTOLIC BLOOD PRESSURE: 72 MMHG | TEMPERATURE: 98 F | HEART RATE: 64 BPM | RESPIRATION RATE: 16 BRPM | WEIGHT: 192.8 LBS

## 2021-11-17 DIAGNOSIS — J44.9 COPD WITHOUT EXACERBATION (HCC): ICD-10-CM

## 2021-11-17 DIAGNOSIS — J40 BRONCHITIS: Primary | ICD-10-CM

## 2021-11-17 LAB
Lab: NORMAL
QC PASS/FAIL: NORMAL
SARS-COV-2 RDRP RESP QL NAA+PROBE: NEGATIVE

## 2021-11-17 PROCEDURE — 99213 OFFICE O/P EST LOW 20 MIN: CPT | Performed by: NURSE PRACTITIONER

## 2021-11-17 PROCEDURE — 4040F PNEUMOC VAC/ADMIN/RCVD: CPT | Performed by: NURSE PRACTITIONER

## 2021-11-17 PROCEDURE — 1123F ACP DISCUSS/DSCN MKR DOCD: CPT | Performed by: NURSE PRACTITIONER

## 2021-11-17 PROCEDURE — 87635 SARS-COV-2 COVID-19 AMP PRB: CPT | Performed by: NURSE PRACTITIONER

## 2021-11-17 PROCEDURE — G8427 DOCREV CUR MEDS BY ELIG CLIN: HCPCS | Performed by: NURSE PRACTITIONER

## 2021-11-17 PROCEDURE — G8417 CALC BMI ABV UP PARAM F/U: HCPCS | Performed by: NURSE PRACTITIONER

## 2021-11-17 PROCEDURE — G8926 SPIRO NO PERF OR DOC: HCPCS | Performed by: NURSE PRACTITIONER

## 2021-11-17 PROCEDURE — 1036F TOBACCO NON-USER: CPT | Performed by: NURSE PRACTITIONER

## 2021-11-17 PROCEDURE — 3017F COLORECTAL CA SCREEN DOC REV: CPT | Performed by: NURSE PRACTITIONER

## 2021-11-17 PROCEDURE — 3023F SPIROM DOC REV: CPT | Performed by: NURSE PRACTITIONER

## 2021-11-17 PROCEDURE — G8484 FLU IMMUNIZE NO ADMIN: HCPCS | Performed by: NURSE PRACTITIONER

## 2021-11-17 RX ORDER — AZITHROMYCIN 250 MG/1
250 TABLET, FILM COATED ORAL SEE ADMIN INSTRUCTIONS
Qty: 6 TABLET | Refills: 0 | Status: SHIPPED | OUTPATIENT
Start: 2021-11-17 | End: 2021-11-22

## 2021-11-17 RX ORDER — BENZONATATE 100 MG/1
100 CAPSULE ORAL 3 TIMES DAILY PRN
Qty: 30 CAPSULE | Refills: 0 | Status: SHIPPED | OUTPATIENT
Start: 2021-11-17 | End: 2021-11-24

## 2021-11-17 ASSESSMENT — ENCOUNTER SYMPTOMS
SORE THROAT: 1
HEMOPTYSIS: 0
RHINORRHEA: 1
WHEEZING: 0
COUGH: 1
HEARTBURN: 0
SHORTNESS OF BREATH: 0

## 2021-11-17 NOTE — PROGRESS NOTES
[address] [address]  Dept: [phone]  Dept Fax: [phone]: [phone]     Visit Date:  11/17/2021      Patient:  Seb Olson  YOB: 1945    HPI:     Chief Complaint   Patient presents with    Cough     x week       Pt presents to the office today for cough and drainage. Started about 2 weeks ago with cough, congestion and drainage. He reports that he brings up a lot of mucous in the AM that is green, this improves during the day. No fever or chills. Cough  This is a new problem. Episode onset: 2 weeks. The problem has been gradually worsening. The cough is productive of sputum. Associated symptoms include ear pain, nasal congestion, postnasal drip, rhinorrhea and a sore throat. Pertinent negatives include no chest pain, chills, ear congestion, fever, headaches, heartburn, hemoptysis, myalgias, rash, shortness of breath, sweats, weight loss or wheezing. The symptoms are aggravated by lying down. He has tried rest and cool air for the symptoms.        Medications    Current Outpatient Medications:     azithromycin (ZITHROMAX) 250 MG tablet, Take 1 tablet by mouth See Admin Instructions for 5 days 500mg on day 1 followed by 250mg on days 2 - 5, Disp: 6 tablet, Rfl: 0    benzonatate (TESSALON) 100 MG capsule, Take 1 capsule by mouth 3 times daily as needed for Cough, Disp: 30 capsule, Rfl: 0    pantoprazole (PROTONIX) 40 MG tablet, TAKE 1 TABLET BY MOUTH EVERY MORNING (BEFORE BREAKFAST), Disp: 90 tablet, Rfl: 3    isosorbide mononitrate (IMDUR) 30 MG extended release tablet, Take 30 mg by mouth daily , Disp: , Rfl:     Multiple Vitamin (MULTIVITAMIN PO), Take by mouth daily , Disp: , Rfl:     tiotropium (SPIRIVA RESPIMAT) 2.5 MCG/ACT AERS inhaler, Inhale 2 puffs into the lungs daily, Disp: 1 Inhaler, Rfl: 11    albuterol sulfate  (90 Base) MCG/ACT inhaler, Inhale 2 puffs into the lungs every 6 hours as needed for Wheezing, Disp: 1 Inhaler, Rfl: 11    allopurinol (ZYLOPRIM) 300 MG tablet, Take 1 tablet by mouth daily, Disp: 90 tablet, Rfl: 3    metoprolol tartrate (LOPRESSOR) 50 MG tablet, TAKE 1/2 TABLET EVERY DAY, Disp: 45 tablet, Rfl: 3    simvastatin (ZOCOR) 40 MG tablet, Take 1 tablet by mouth nightly, Disp: 90 tablet, Rfl: 3    Probiotic Product (PROBIOTIC PO), Take by mouth daily , Disp: , Rfl:     nitroGLYCERIN (NITROSTAT) 0.4 MG SL tablet, Place 1 tablet under the tongue every 5 minutes as needed for Chest pain, Disp: 25 tablet, Rfl: 3    clopidogrel (PLAVIX) 75 MG tablet, Take 1 tablet by mouth daily, Disp: 30 tablet, Rfl: 5    ferrous sulfate 325 (65 FE) MG tablet, Take 325 mg by mouth daily , Disp: , Rfl:     acetaminophen (TYLENOL) 500 MG tablet, Take 1,000 mg by mouth as needed for Pain., Disp: , Rfl:     aspirin 325 MG EC tablet, Take by mouth daily , Disp: , Rfl:     The patient is allergic to crestor [rosuvastatin calcium], lipitor, tramadol, vicodin [hydrocodone-acetaminophen], codeine, and percocet [oxycodone-acetaminophen]. Past Medical History  Hayley Rayo  has a past medical history of AAA (abdominal aortic aneurysm) ([facility] [address] COPD (chronic obstructive pulmonary disease) ([facility] [address] Coronary artery disease, Emphysema of lung ([facility] [address] Erectile dysfunction, GERD (gastroesophageal reflux disease), Gout, History of blood transfusion, Hyperlipidemia, Hypertension, Liver disease, and Osteoarthritis. Subjective:      Review of Systems   Constitutional: Negative for chills, fever and weight loss. HENT: Positive for ear pain, postnasal drip, rhinorrhea and sore throat. Respiratory: Positive for cough. Negative for hemoptysis, shortness of breath and wheezing. Cardiovascular: Negative for chest pain. Gastrointestinal: Negative for heartburn. Musculoskeletal: Negative for myalgias. Skin: Negative for rash. Neurological: Negative for headaches.        Objective:     BP 126/72 (Site: Left Upper Arm, Position: Sitting)   Pulse 64   Temp 98 °F (36.7 °C) (Oral)   Resp 16   Wt 192 lb 12.8 oz (87.5 kg)   BMI 28.47 kg/m²     Physical Exam  Vitals reviewed. Constitutional:       General: He is not in acute distress. Appearance: Normal appearance. He is well-developed. HENT:      Head: Normocephalic and atraumatic. Right Ear: Hearing, tympanic membrane, ear canal and external ear normal.      Left Ear: Hearing, tympanic membrane, ear canal and external ear normal.      Nose: Congestion present. No nasal tenderness. Mouth/Throat:      Lips: Pink. Mouth: Mucous membranes are moist. No oral lesions. Pharynx: Oropharynx is clear. Uvula midline. Eyes:      General:         Right eye: No discharge. Left eye: No discharge. Conjunctiva/sclera: Conjunctivae normal.   Neck:      Trachea: No tracheal deviation. Cardiovascular:      Rate and Rhythm: Normal rate and regular rhythm. Heart sounds: Normal heart sounds. No murmur heard. Pulmonary:      Effort: Pulmonary effort is normal. No respiratory distress. Breath sounds: Normal breath sounds. Comments: Congested cough  Abdominal:      General: Bowel sounds are normal.      Palpations: Abdomen is soft. Tenderness: There is no abdominal tenderness. Musculoskeletal:      Cervical back: Full passive range of motion without pain. Right lower leg: No edema. Left lower leg: No edema. Lymphadenopathy:      Head:      Right side of head: No submental, submandibular, tonsillar, preauricular, posterior auricular or occipital adenopathy. Left side of head: No submental, submandibular, tonsillar, preauricular, posterior auricular or occipital adenopathy. Skin:     General: Skin is warm and dry. Findings: No rash. Neurological:      General: No focal deficit present. Mental Status: He is alert and oriented to person, place, and time.       Coordination: Coordination normal.   Psychiatric:         Mood and Affect: Mood normal.         Behavior: Behavior normal.         Thought Content: Thought content normal.         Judgment: Judgment normal.     No results found for this visit on 11/17/21. Assessment/Plan:      Loreta Burkitt was seen today for cough. Diagnoses and all orders for this visit:    Bronchitis  -     azithromycin (ZITHROMAX) 250 MG tablet; Take 1 tablet by mouth See Admin Instructions for 5 days 500mg on day 1 followed by 250mg on days 2 - 5  -     benzonatate (TESSALON) 100 MG capsule; Take 1 capsule by mouth 3 times daily as needed for Cough  -     Cancel: POCT COVID-19, Antigen  -     POCT COVID-19, Rapid    COPD without exacerbation ([facility] [address]    - COVID testing in office was negative. - Rest and increase fluids  - Tylenol as needed for pain and fever  - Good handwashing and clean all surfaces touched  - Call office with any questions or concerns, or if symptoms are getting worse or changing  - ER for any chest pain or SOB      Return if symptoms worsen or fail to improve. Patient given educational materials - see patient instructions. Discussed use, benefit, and side effects of prescribed medications. All patient questions answered. Pt voiced understanding.         Electronically signed by MERE Pina CNP on 11/18/2021 at 7:44 AM

## 2021-12-23 ENCOUNTER — HOSPITAL ENCOUNTER (OUTPATIENT)
Dept: INTERVENTIONAL RADIOLOGY/VASCULAR | Age: 76
Discharge: HOME | End: 2021-12-23
Payer: MEDICARE

## 2021-12-23 DIAGNOSIS — R09.89 BILATERAL CAROTID BRUITS: ICD-10-CM

## 2021-12-23 PROCEDURE — 93880 EXTRACRANIAL BILAT STUDY: CPT

## 2022-02-07 ENCOUNTER — HOSPITAL ENCOUNTER (OUTPATIENT)
Age: 77
Discharge: HOME | End: 2022-02-07
Payer: MEDICARE

## 2022-02-07 DIAGNOSIS — I25.10 CORONARY ARTERY DISEASE INVOLVING NATIVE CORONARY ARTERY OF NATIVE HEART WITHOUT ANGINA PECTORIS: ICD-10-CM

## 2022-02-07 DIAGNOSIS — D50.9 IRON DEFICIENCY ANEMIA, UNSPECIFIED IRON DEFICIENCY ANEMIA TYPE: ICD-10-CM

## 2022-02-07 DIAGNOSIS — Z12.5 SCREENING PSA (PROSTATE SPECIFIC ANTIGEN): ICD-10-CM

## 2022-02-07 DIAGNOSIS — I10 ESSENTIAL HYPERTENSION: ICD-10-CM

## 2022-02-07 LAB
ALBUMIN SERPL-MCNC: 4.5 G/DL (ref 3.5–5.1)
ALP BLD-CCNC: 75 U/L (ref 38–126)
ALT SERPL-CCNC: 20 U/L (ref 11–66)
ANION GAP SERPL CALCULATED.3IONS-SCNC: 12 MEQ/L (ref 8–16)
AST SERPL-CCNC: 25 U/L (ref 5–40)
BILIRUB SERPL-MCNC: 1.6 MG/DL (ref 0.3–1.2)
BUN BLDV-MCNC: 15 MG/DL (ref 7–22)
CALCIUM SERPL-MCNC: 9.2 MG/DL (ref 8.5–10.5)
CHLORIDE BLD-SCNC: 105 MEQ/L (ref 98–111)
CO2: 27 MEQ/L (ref 23–33)
CREAT SERPL-MCNC: 1 MG/DL (ref 0.4–1.2)
ERYTHROCYTE [DISTWIDTH] IN BLOOD BY AUTOMATED COUNT: 13.7 % (ref 11.5–14.5)
ERYTHROCYTE [DISTWIDTH] IN BLOOD BY AUTOMATED COUNT: 52.2 FL (ref 35–45)
GFR SERPL CREATININE-BSD FRML MDRD: 73 ML/MIN/1.73M2
GLUCOSE BLD-MCNC: 91 MG/DL (ref 70–108)
HCT VFR BLD CALC: 45 % (ref 42–52)
HEMOGLOBIN: 14.6 GM/DL (ref 14–18)
MCH RBC QN AUTO: 33.6 PG (ref 26–33)
MCHC RBC AUTO-ENTMCNC: 32.4 GM/DL (ref 32.2–35.5)
MCV RBC AUTO: 103.4 FL (ref 80–94)
PLATELET # BLD: 167 THOU/MM3 (ref 130–400)
PMV BLD AUTO: 10.5 FL (ref 9.4–12.4)
POTASSIUM SERPL-SCNC: 5.6 MEQ/L (ref 3.5–5.2)
PROSTATE SPECIFIC ANTIGEN: 0.49 NG/ML (ref 0–1)
RBC # BLD: 4.35 MILL/MM3 (ref 4.7–6.1)
SODIUM BLD-SCNC: 144 MEQ/L (ref 135–145)
TOTAL PROTEIN: 6.3 G/DL (ref 6.1–8)
WBC # BLD: 6.1 THOU/MM3 (ref 4.8–10.8)

## 2022-02-07 PROCEDURE — G0103 PSA SCREENING: HCPCS

## 2022-02-07 PROCEDURE — 85027 COMPLETE CBC AUTOMATED: CPT

## 2022-02-07 PROCEDURE — 80053 COMPREHEN METABOLIC PANEL: CPT

## 2022-02-07 PROCEDURE — 36415 COLL VENOUS BLD VENIPUNCTURE: CPT

## 2022-02-15 ENCOUNTER — OFFICE VISIT (OUTPATIENT)
Dept: FAMILY MEDICINE CLINIC | Age: 77
End: 2022-02-15
Payer: MEDICARE

## 2022-02-15 VITALS
SYSTOLIC BLOOD PRESSURE: 126 MMHG | HEART RATE: 60 BPM | TEMPERATURE: 97.5 F | BODY MASS INDEX: 29.56 KG/M2 | WEIGHT: 200.2 LBS | RESPIRATION RATE: 16 BRPM | DIASTOLIC BLOOD PRESSURE: 74 MMHG

## 2022-02-15 DIAGNOSIS — I71.40 ABDOMINAL AORTIC ANEURYSM (AAA) WITHOUT RUPTURE: ICD-10-CM

## 2022-02-15 DIAGNOSIS — E78.5 HYPERLIPIDEMIA, UNSPECIFIED HYPERLIPIDEMIA TYPE: ICD-10-CM

## 2022-02-15 DIAGNOSIS — I25.10 CORONARY ARTERY DISEASE INVOLVING NATIVE CORONARY ARTERY OF NATIVE HEART WITHOUT ANGINA PECTORIS: ICD-10-CM

## 2022-02-15 DIAGNOSIS — J44.9 COPD WITHOUT EXACERBATION (HCC): ICD-10-CM

## 2022-02-15 DIAGNOSIS — E87.5 HYPERKALEMIA: ICD-10-CM

## 2022-02-15 DIAGNOSIS — Z91.81 AT HIGH RISK FOR FALLS: ICD-10-CM

## 2022-02-15 DIAGNOSIS — Z95.1 HX OF CABG: ICD-10-CM

## 2022-02-15 DIAGNOSIS — D50.9 IRON DEFICIENCY ANEMIA, UNSPECIFIED IRON DEFICIENCY ANEMIA TYPE: ICD-10-CM

## 2022-02-15 DIAGNOSIS — I10 ESSENTIAL HYPERTENSION: Primary | ICD-10-CM

## 2022-02-15 PROCEDURE — G8417 CALC BMI ABV UP PARAM F/U: HCPCS | Performed by: NURSE PRACTITIONER

## 2022-02-15 PROCEDURE — 1123F ACP DISCUSS/DSCN MKR DOCD: CPT | Performed by: NURSE PRACTITIONER

## 2022-02-15 PROCEDURE — G8484 FLU IMMUNIZE NO ADMIN: HCPCS | Performed by: NURSE PRACTITIONER

## 2022-02-15 PROCEDURE — 99214 OFFICE O/P EST MOD 30 MIN: CPT | Performed by: NURSE PRACTITIONER

## 2022-02-15 PROCEDURE — 4040F PNEUMOC VAC/ADMIN/RCVD: CPT | Performed by: NURSE PRACTITIONER

## 2022-02-15 PROCEDURE — 3017F COLORECTAL CA SCREEN DOC REV: CPT | Performed by: NURSE PRACTITIONER

## 2022-02-15 PROCEDURE — 3023F SPIROM DOC REV: CPT | Performed by: NURSE PRACTITIONER

## 2022-02-15 PROCEDURE — 1036F TOBACCO NON-USER: CPT | Performed by: NURSE PRACTITIONER

## 2022-02-15 PROCEDURE — G8427 DOCREV CUR MEDS BY ELIG CLIN: HCPCS | Performed by: NURSE PRACTITIONER

## 2022-02-15 RX ORDER — SIMVASTATIN 40 MG
40 TABLET ORAL NIGHTLY
Qty: 90 TABLET | Refills: 3 | Status: SHIPPED
Start: 2022-02-15

## 2022-02-15 SDOH — ECONOMIC STABILITY: FOOD INSECURITY: WITHIN THE PAST 12 MONTHS, THE FOOD YOU BOUGHT JUST DIDN'T LAST AND YOU DIDN'T HAVE MONEY TO GET MORE.: NEVER TRUE

## 2022-02-15 SDOH — ECONOMIC STABILITY: FOOD INSECURITY: WITHIN THE PAST 12 MONTHS, YOU WORRIED THAT YOUR FOOD WOULD RUN OUT BEFORE YOU GOT MONEY TO BUY MORE.: NEVER TRUE

## 2022-02-15 ASSESSMENT — SOCIAL DETERMINANTS OF HEALTH (SDOH): HOW HARD IS IT FOR YOU TO PAY FOR THE VERY BASICS LIKE FOOD, HOUSING, MEDICAL CARE, AND HEATING?: NOT HARD AT ALL

## 2022-02-15 ASSESSMENT — ENCOUNTER SYMPTOMS
CHEST TIGHTNESS: 0
BLOOD IN STOOL: 0
ABDOMINAL PAIN: 0
EYES NEGATIVE: 1
SHORTNESS OF BREATH: 0

## 2022-02-15 NOTE — PATIENT INSTRUCTIONS
Patient Education        Learning About Low-Potassium Foods  What foods are low in potassium? The foods you eat contain nutrients, such as vitamins and minerals. Potassium is a nutrient. Your body needs the right amount to stay healthy and work as it should. You can use the list below to help you make choices about which foods to eat. The foods in this list have less than 200 milligrams (mg) of potassium per serving. Fruits  · Applesauce, ½ cup  · Blueberries, ½ cup  · Grapes, 9 grapes  · Pineapple, ½ cup  · Raspberries, ½ cup  · Watermelon, ½ cup  Vegetables  · Cucumber (peeled), ½ cup  · Eggplant, ½ cup  · Green beans, ½ cup  · Lettuce, 1 cup  · Peas, ½ cup  · Radish, 1 radish  Grains  · Bagel (plain), 4-inch  · Bread, 1 slice  · Cereal (puffed rice or puffed wheat), 1 cup  · Oatmeal (cooked), ½ cup  · Pasta and noodles (cooked), ½ cup  · Rice (cooked), ½ cup  · Tortilla (flour or corn), 1 tortilla  Dairy and dairy alternatives  · Butter, 1 Tbsp  · Cheese, 1 oz  Meats and other protein foods  · Eggs, 1 egg  · Hotdogs (beef or pork), 1 hotdog  Work with your doctor to find out how much of this nutrient you need. Depending on your health, you may need more or less of it in your diet. Where can you learn more? Go to https://[email or web address].[email or web address] org and sign in to your [employer] account. Enter P485 in the [facility] box to learn more about \"Learning About Low-Potassium Foods. \"     If you do not have an account, please click on the \"Sign Up Now\" link. Current as of: September 8, 2021               Content Version: 13.1  © 8591-7842 Healthwise, Incorporated. Care instructions adapted under license by [facility] ([facility]). If you have questions about a medical condition or this instruction, always ask your healthcare professional. [address] any warranty or liability for your use of this information.          Patient Education        Potassium-Restricted Diet: Care Instructions  Your Care Instructions     Potassium is a mineral. It helps keep the right mix of fluids in your body. It also helps your nerves and muscles work as they should. Most people get the potassium they need from the foods they eat. But if you have certain health problems, such as kidney disease, you may need to be careful about how much potassium you get. This is because too much potassium can be harmful. You can control how much potassium you get. You can do this if you eat foods that don't have much of it and you don't eat foods that have lots of it. Follow-up care is a key part of your treatment and safety. Be sure to make and go to all appointments, and call your doctor if you are having problems. It's also a good idea to know your test results and keep a list of the medicines you take. How can you care for yourself at home? · Limit foods that are high in potassium. Potassium is in many foods, such as vegetables, fruits, and milk products. High-potassium foods include:  ? Fruits such as bananas, oranges, and cantaloupe. ? Tomatoes. ? Broccoli. ? Milk. ? Spinach. ? Potatoes and sweet potatoes. · Eat foods that don't have as much potassium. These low-potassium foods include:  ? Fruits such as applesauce, pineapple, grapes, blueberries, and raspberries. ? Cucumbers. ? White or brown rice. ? Pasta and noodles. ? Tortillas. · Do not use a salt substitute or \"lite\" salt unless you talk to your doctor first. These often are very high in potassium. · Be sure to tell your doctor about any prescription or over-the-counter medicines you are taking. Some medicines can increase the potassium in your body. Where can you learn more? Go to https://Multigigpejileweb.[email or web address] org and sign in to your [employer] account. Enter M262 in the [employer] box to learn more about \"Potassium-Restricted Diet: Care Instructions. \"     If you do not have an account, please click on the \"Sign Up Now\" link.  Current as of: September 8, 2021               Content Version: 13.1  © 9819-4143 Healthwise, Incorporated. Care instructions adapted under license by [facility] ([facility]). If you have questions about a medical condition or this instruction, always ask your healthcare professional. [address] any warranty or liability for your use of this information.

## 2022-02-15 NOTE — PROGRESS NOTES
TABLET BY MOUTH EVERY MORNING (BEFORE BREAKFAST) 90 tablet 3    isosorbide mononitrate (IMDUR) 30 MG extended release tablet Take 30 mg by mouth daily       Multiple Vitamin (MULTIVITAMIN PO) Take by mouth daily       tiotropium (SPIRIVA RESPIMAT) 2.5 MCG/ACT AERS inhaler Inhale 2 puffs into the lungs daily 1 Inhaler 11    albuterol sulfate  (90 Base) MCG/ACT inhaler Inhale 2 puffs into the lungs every 6 hours as needed for Wheezing 1 Inhaler 11    allopurinol (ZYLOPRIM) 300 MG tablet Take 1 tablet by mouth daily 90 tablet 3    metoprolol tartrate (LOPRESSOR) 50 MG tablet TAKE 1/2 TABLET EVERY DAY 45 tablet 3    Probiotic Product (PROBIOTIC PO) Take by mouth daily       nitroGLYCERIN (NITROSTAT) 0.4 MG SL tablet Place 1 tablet under the tongue every 5 minutes as needed for Chest pain 25 tablet 3    clopidogrel (PLAVIX) 75 MG tablet Take 1 tablet by mouth daily 30 tablet 5    ferrous sulfate 325 (65 FE) MG tablet Take 325 mg by mouth daily       acetaminophen (TYLENOL) 500 MG tablet Take 1,000 mg by mouth as needed for Pain.  aspirin 325 MG EC tablet Take by mouth daily        No current facility-administered medications for this visit. Review of Systems   Constitutional: Negative for chills, fatigue, fever and unexpected weight change. HENT: Negative. Eyes: Negative. Respiratory: Negative for chest tightness and shortness of breath. Cardiovascular: Negative for chest pain, palpitations and leg swelling. Gastrointestinal: Negative for abdominal pain and blood in stool. Genitourinary: Negative for dysuria. Musculoskeletal: Positive for arthralgias (bilat knees). Negative for joint swelling. Skin: Negative for rash. Neurological: Negative for dizziness. Psychiatric/Behavioral: Negative. All other systems reviewed and are negative.     OBJECTIVE     /74   Pulse 60   Temp 97.5 °F (36.4 °C) (Oral)   Resp 16   Wt 200 lb 3.2 oz (90.8 kg)   BMI 29.56 kg/m² 26.0 - 33.0 pg 33.6 (H)   MCHC      32.2 - 35.5 gm/dl 32.4   RDW-CV      11.5 - 14.5 % 13.7   RDW-SD      35.0 - 45.0 fL 52.2 (H)   Platelet Count      278 - 400 thou/mm3 167   MPV      9.4 - 12.4 fL 10.5   Prostatic Specific Ag      0.00 - 1.00 ng/ml 0.49   Anion Gap      8.0 - 16.0 meq/L 12.0   Est, Glom Filt Rate      ml/min/1.73m2 73 (A)       Lab Results   Component Value Date    LABA1C 4.7 03/16/2018       Lab Results   Component Value Date    CHOL 130 11/04/2021    TRIG 152 11/04/2021    HDL 38 11/04/2021    LDLCALC 62 11/04/2021    LDLDIRECT 78.42 08/09/2021       The 10-year ASCVD risk score (Kinza Salinas, et al., 2013) is: 24.7%    Values used to calculate the score:      Age: 78 years      Sex: Male      Is Non- : No      Diabetic: No      Tobacco smoker: No      Systolic Blood Pressure: 890 mmHg      Is BP treated: Yes      HDL Cholesterol: 38 mg/dL      Total Cholesterol: 130 mg/dL    Lab Results   Component Value Date     02/07/2022    K 5.6 (H) 02/07/2022     02/07/2022    CO2 27 02/07/2022    BUN 15 02/07/2022    CREATININE 1.0 02/07/2022    GLUCOSE 91 02/07/2022    CALCIUM 9.2 02/07/2022    PROT 6.3 02/07/2022    LABALBU 4.5 02/07/2022    BILITOT 1.6 (H) 02/07/2022    ALKPHOS 75 02/07/2022    AST 25 02/07/2022    ALT 20 02/07/2022    LABGLOM 73 (A) 02/07/2022     No results found for: LABMICR, [id number]    Lab Results   Component Value Date    TSH 3.370 05/31/2021    T4FREE 1.22 09/12/2011       Lab Results   Component Value Date    WBC 6.1 02/07/2022    HGB 14.6 02/07/2022    HCT 45.0 02/07/2022    .4 (H) 02/07/2022     02/07/2022       Lab Results   Component Value Date    PSA 0.49 02/07/2022    PSA 0.62 12/28/2020    PSA 0.84 02/06/2019       Immunization History   Administered Date(s) Administered    COVID-19, Moderna, Primary or Immunocompromised, PF, 100mcg/0.5mL 05/29/2021, 06/26/2021    Influenza Virus Vaccine 11/01/2011, 10/24/2012, 10/01/2013, 10/06/2014, 09/28/2015    Influenza, High Dose (Fluzone 65 yrs and older) 09/30/2018, 11/09/2019    Influenza, Quadv, adjuvanted, 65 yrs +, IM, PF (Fluad) 12/03/2021    Pneumococcal Conjugate 13-valent ([id number]) 04/13/2015    Pneumococcal Polysaccharide ([id number]) 08/01/2005, 04/14/2014    Tdap (Boostrix, Adacel) 12/04/2013       Health Maintenance   Topic Date Due    Shingles Vaccine (1 of 2) Never done    COVID-19 Vaccine (3 - Booster for Moderna series) 11/26/2021    Depression Screen  08/11/2022    Annual Wellness Visit (AWV)  08/12/2022    Lipid screen  11/04/2022    Potassium monitoring  02/07/2023    Creatinine monitoring  02/07/2023    DTaP/Tdap/Td vaccine (2 - Td or Tdap) 12/04/2023    Colon cancer screen colonoscopy  02/13/2024    Flu vaccine  Completed    Pneumococcal 65+ years Vaccine  Completed    Hepatitis C screen  Addressed    Hepatitis A vaccine  Aged Out    Hepatitis B vaccine  Aged Out    Hib vaccine  Aged Out    Meningococcal (ACWY) vaccine  Aged Out       AAA ultrasound (Male, 65-75, smoked ever) indicated at this time? YES- management per Vascular Surgery  CT Lung Screen (55-80, 30 pk-yrs, smoking or quit <15 years) indicated at this time? Yes, 3/4 PPD x 40 years,  Stopped 14 years ago- completed 1/6/2020- to follow up annually.    Sleep Medicine referral indicated at this time (Obesity, Snoring, Daytime Somnolence, Apneic Episodes)? Pt denies symptoms, other than snoring. Future Appointments   Date Time Provider Eliot Preciado   8/15/2022  8:15 AM David Loredo MD [address]   8/25/2022 10:00 AM David Loredo MD [facility] [address] [facility]         ASSESSMENT       Diagnosis Orders   1. Essential hypertension     2. Hyperkalemia  Potassium   3. Hyperlipidemia, unspecified hyperlipidemia type  simvastatin (ZOCOR) 40 MG tablet   4. At high risk for falls     5. COPD without exacerbation ([address]     6. Hx of CABG     7.  Coronary artery disease involving native coronary artery of native heart without angina pectoris     8. Iron deficiency anemia, unspecified iron deficiency anemia type     9. Abdominal aortic aneurysm (AAA) without rupture ([address]         PLAN        After discussion with patient, will continue max dose simvastatin at this time, as patient previously unable to tolerate both Lipitor and Crestor. Currently taking 1/2-1 tab simvastatin 40 mg daily due to muscle pain. (updated 2/15/2022)  Continue current medicines otherwise   US abd Aorta completed in 9/2019. Patient would like to hold on repeat at this time. Will discuss with Dr Emmanuel Madrigal at next appt. Follow up in 6 months.         Preventive Health Topics:    Encouraged SHINGLES SHOT [facility])- check with your local pharmacy.  (updated 2/15/2022)  COLONOSCOPY done 2/13/2019 per Dr. Mary Villa- to do in 2/2024 (updated 2/15/2022)         Electronically signed by Ropatec Scotty, MERE - CNP on 2/15/2022 at 1:58 PM

## 2022-02-21 ENCOUNTER — HOSPITAL ENCOUNTER (OUTPATIENT)
Age: 77
Discharge: HOME | End: 2022-02-21
Payer: MEDICARE

## 2022-02-21 DIAGNOSIS — E87.5 HYPERKALEMIA: ICD-10-CM

## 2022-02-21 LAB — POTASSIUM SERPL-SCNC: 4.4 MEQ/L (ref 3.5–5.2)

## 2022-02-21 PROCEDURE — 84132 ASSAY OF SERUM POTASSIUM: CPT

## 2022-02-21 PROCEDURE — 36415 COLL VENOUS BLD VENIPUNCTURE: CPT

## 2022-03-30 DIAGNOSIS — I10 ESSENTIAL HYPERTENSION: ICD-10-CM

## 2022-03-30 DIAGNOSIS — E78.5 HYPERLIPIDEMIA, UNSPECIFIED HYPERLIPIDEMIA TYPE: ICD-10-CM

## 2022-03-30 DIAGNOSIS — I25.810 CORONARY ARTERY DISEASE INVOLVING CORONARY BYPASS GRAFT OF NATIVE HEART WITHOUT ANGINA PECTORIS: ICD-10-CM

## 2022-03-30 RX ORDER — SIMVASTATIN 40 MG
TABLET ORAL
Qty: 90 TABLET | Refills: 3 | OUTPATIENT
Start: 2022-03-30

## 2022-03-30 RX ORDER — METOPROLOL TARTRATE 50 MG/1
TABLET, FILM COATED ORAL
Qty: 45 TABLET | Refills: 3 | Status: SHIPPED | OUTPATIENT
Start: 2022-03-30

## 2022-03-30 NOTE — TELEPHONE ENCOUNTER
This medication refill is regarding a electronic request.  Refill requested by [facility]. Requested Prescriptions     Pending Prescriptions Disp Refills    metoprolol tartrate (LOPRESSOR) 50 MG tablet [Pharmacy Med Name: METOPROLOL TARTRATE 50 MG Tablet] 45 tablet 3     Sig: TAKE 1/2 TABLET EVERY DAY     Refused Prescriptions Disp Refills    simvastatin (ZOCOR) 40 MG tablet [Pharmacy Med Name: SIMVASTATIN 40 MG Tablet] 90 tablet 3     Sig: TAKE 1 TABLET EVERY NIGHT     Date of last visit: 2/15/2022   Date of next visit: 8/15/2022  Date of last refill:    -Simvastatin 2/15/22 #90/3   -Metoprolol Tartrate 6/21/21 #45/3    Rx verified, ordered and set to EP.

## 2022-04-28 DIAGNOSIS — Z87.39 HISTORY OF GOUT: ICD-10-CM

## 2022-04-28 RX ORDER — ALLOPURINOL 300 MG/1
TABLET ORAL
Qty: 90 TABLET | Refills: 3 | Status: SHIPPED | OUTPATIENT
Start: 2022-04-28

## 2022-04-28 NOTE — TELEPHONE ENCOUNTER
This medication refill is regarding a electronic request.  Refill requested by [facility]. Requested Prescriptions     Pending Prescriptions Disp Refills    allopurinol (ZYLOPRIM) 300 MG tablet [Pharmacy Med Name: ALLOPURINOL 300 MG Tablet] 90 tablet 3     Sig: TAKE 1 TABLET EVERY DAY     Date of last visit: 2/15/2022   Date of next visit: 8/25/2022  Date of last refill: 7/14/21 #90/3    Rx verified, ordered and set to EP.

## 2022-05-05 RX ORDER — CLOPIDOGREL BISULFATE 75 MG/1
75 TABLET ORAL DAILY
Qty: 90 TABLET | Refills: 1 | Status: SHIPPED | OUTPATIENT
Start: 2022-05-05 | End: 2022-10-24

## 2022-05-05 NOTE — TELEPHONE ENCOUNTER
Will send in. I believe pt ws following with Dr Candelario Bermudez. With his history I would recommend he stay with cardiology. Is there an reason he is no longer seeing Dr Candelario Bermudez?  TS

## 2022-05-05 NOTE — TELEPHONE ENCOUNTER
Patients wife was wondering if ES could start prescribing this medication since he no longer sees the doctor that was prescribing it. If we can not prescribe it please call [phone]. This medication refill is regarding a telephone request.  Refill requested by patient. Requested Prescriptions     Pending Prescriptions Disp Refills    clopidogrel (PLAVIX) 75 MG tablet 30 tablet 5     Sig: Take 1 tablet by mouth daily       Date of last visit: 2/15/2022   Date of next visit: 8/15/22  Date of last refill: 12/27/2017 #30/5  [facility]         Rx verified, ordered and set to EP.

## 2022-05-06 NOTE — TELEPHONE ENCOUNTER
REGGIE for pt and his wife letting them know I was calling regarding some questions TS had about his Plavix. I let them know TS sent the prescription into the pharmacy but I would like for them to call the office back at their earliest convenience. Will try again on Monday.

## 2022-05-09 NOTE — TELEPHONE ENCOUNTER
Spoke with pt's wife. She states that patient does still follow with Dr. Tho Johnson annually but he is not the managing physician for the Plavix. This was previously prescripted by Dr. Lucy Newman. Patient does not follow with him any longer. Can discuss medication management further with you at appointment in August. No need to call back unless further instruction.

## 2022-05-18 VITALS — DIASTOLIC BLOOD PRESSURE: 72 MMHG | WEIGHT: 192 LBS | BODY MASS INDEX: 28.35 KG/M2 | SYSTOLIC BLOOD PRESSURE: 140 MMHG

## 2022-08-08 RX ORDER — PANTOPRAZOLE SODIUM 40 MG/1
TABLET, DELAYED RELEASE ORAL
Qty: 90 TABLET | Refills: 3 | Status: SHIPPED | OUTPATIENT
Start: 2022-08-08

## 2022-08-08 NOTE — TELEPHONE ENCOUNTER
Request sent from [address] for refill of protonix 40 mg qd. Last seen 2/15/22, next appt 8/15/22. Medication verified. Order pended.

## 2022-08-15 ENCOUNTER — TELEPHONE (OUTPATIENT)
Dept: FAMILY MEDICINE CLINIC | Age: 77
End: 2022-08-15

## 2022-08-15 NOTE — TELEPHONE ENCOUNTER
Unfortunately, plavix and simvastatin are contraindications to using Paxlovid. We could temporarily stop simvastatin, but would advise against stopping plavix. Would recommend Vitamin C, D3, zinc, coricidin as needed, and push fluids. Can come in for appt or do VV for further evaluation and possible treatment options.  Thanks, TS

## 2022-08-15 NOTE — TELEPHONE ENCOUNTER
Pt tested positive for covid yesterday, is running fever of 100.7 has achy joints, coughing, pt says his throat is tight with congestion. Pt is a little sob but has has copd. Pt spouse is asking what you recommend. Pt is asking about Paxlovid. Says he has heard of it and would like to try it. Pt is unable to do a video visit. Pt does have some diarrhea. Pt is asking what you recommend refusing UC and in office visit.     [facility] pharmacy  cpb

## 2022-08-15 NOTE — TELEPHONE ENCOUNTER
Pt notified and wrote down recommendations. He was advised to go to the ER if needed and to call the office if he not getting better.

## 2022-08-16 RX ORDER — TIOTROPIUM BROMIDE INHALATION SPRAY 3.12 UG/1
SPRAY, METERED RESPIRATORY (INHALATION)
Qty: 1 EACH | Refills: 11 | Status: SHIPPED | OUTPATIENT
Start: 2022-08-16

## 2022-08-30 SDOH — HEALTH STABILITY: PHYSICAL HEALTH: ON AVERAGE, HOW MANY MINUTES DO YOU ENGAGE IN EXERCISE AT THIS LEVEL?: 20 MIN

## 2022-08-30 SDOH — HEALTH STABILITY: PHYSICAL HEALTH
ON AVERAGE, HOW MANY DAYS PER WEEK DO YOU ENGAGE IN MODERATE TO STRENUOUS EXERCISE (LIKE A BRISK WALK)?: PATIENT DECLINED

## 2022-08-30 ASSESSMENT — PATIENT HEALTH QUESTIONNAIRE - PHQ9
SUM OF ALL RESPONSES TO PHQ QUESTIONS 1-9: 0
SUM OF ALL RESPONSES TO PHQ QUESTIONS 1-9: 0
1. LITTLE INTEREST OR PLEASURE IN DOING THINGS: 0
SUM OF ALL RESPONSES TO PHQ9 QUESTIONS 1 & 2: 0
SUM OF ALL RESPONSES TO PHQ QUESTIONS 1-9: 0
SUM OF ALL RESPONSES TO PHQ QUESTIONS 1-9: 0
2. FEELING DOWN, DEPRESSED OR HOPELESS: 0

## 2022-08-30 ASSESSMENT — LIFESTYLE VARIABLES
HOW OFTEN DO YOU HAVE A DRINK CONTAINING ALCOHOL: NEVER
HOW OFTEN DO YOU HAVE SIX OR MORE DRINKS ON ONE OCCASION: 1
HOW OFTEN DO YOU HAVE A DRINK CONTAINING ALCOHOL: 1
HOW MANY STANDARD DRINKS CONTAINING ALCOHOL DO YOU HAVE ON A TYPICAL DAY: PATIENT DOES NOT DRINK
HOW MANY STANDARD DRINKS CONTAINING ALCOHOL DO YOU HAVE ON A TYPICAL DAY: 0

## 2022-08-31 ENCOUNTER — OFFICE VISIT (OUTPATIENT)
Dept: FAMILY MEDICINE CLINIC | Age: 77
End: 2022-08-31
Payer: MEDICARE

## 2022-08-31 VITALS
TEMPERATURE: 98 F | SYSTOLIC BLOOD PRESSURE: 130 MMHG | RESPIRATION RATE: 16 BRPM | HEIGHT: 69 IN | DIASTOLIC BLOOD PRESSURE: 70 MMHG | WEIGHT: 189.2 LBS | BODY MASS INDEX: 28.02 KG/M2 | HEART RATE: 60 BPM

## 2022-08-31 DIAGNOSIS — Z00.00 MEDICARE ANNUAL WELLNESS VISIT, SUBSEQUENT: Primary | ICD-10-CM

## 2022-08-31 DIAGNOSIS — Z12.5 SCREENING PSA (PROSTATE SPECIFIC ANTIGEN): ICD-10-CM

## 2022-08-31 DIAGNOSIS — E78.5 HYPERLIPIDEMIA, UNSPECIFIED HYPERLIPIDEMIA TYPE: ICD-10-CM

## 2022-08-31 DIAGNOSIS — I71.40 ABDOMINAL AORTIC ANEURYSM (AAA) WITHOUT RUPTURE: ICD-10-CM

## 2022-08-31 DIAGNOSIS — J44.9 COPD WITHOUT EXACERBATION (HCC): ICD-10-CM

## 2022-08-31 DIAGNOSIS — I10 ESSENTIAL HYPERTENSION: ICD-10-CM

## 2022-08-31 DIAGNOSIS — I25.10 CORONARY ARTERY DISEASE INVOLVING NATIVE CORONARY ARTERY OF NATIVE HEART WITHOUT ANGINA PECTORIS: ICD-10-CM

## 2022-08-31 PROCEDURE — 1123F ACP DISCUSS/DSCN MKR DOCD: CPT | Performed by: NURSE PRACTITIONER

## 2022-08-31 PROCEDURE — 3017F COLORECTAL CA SCREEN DOC REV: CPT | Performed by: NURSE PRACTITIONER

## 2022-08-31 PROCEDURE — G0439 PPPS, SUBSEQ VISIT: HCPCS | Performed by: NURSE PRACTITIONER

## 2022-08-31 ASSESSMENT — PATIENT HEALTH QUESTIONNAIRE - PHQ9
SUM OF ALL RESPONSES TO PHQ QUESTIONS 1-9: 0
1. LITTLE INTEREST OR PLEASURE IN DOING THINGS: 0
SUM OF ALL RESPONSES TO PHQ9 QUESTIONS 1 & 2: 0
SUM OF ALL RESPONSES TO PHQ QUESTIONS 1-9: 0
2. FEELING DOWN, DEPRESSED OR HOPELESS: 0
SUM OF ALL RESPONSES TO PHQ QUESTIONS 1-9: 0
SUM OF ALL RESPONSES TO PHQ QUESTIONS 1-9: 0

## 2022-08-31 ASSESSMENT — LIFESTYLE VARIABLES
HOW MANY STANDARD DRINKS CONTAINING ALCOHOL DO YOU HAVE ON A TYPICAL DAY: PATIENT DOES NOT DRINK
HOW OFTEN DO YOU HAVE A DRINK CONTAINING ALCOHOL: NEVER

## 2022-08-31 NOTE — PROGRESS NOTES
[facility]  [facility]  Dept: [phone]  Dept Fax: [phone]    SUBJECTIVE     Chief Complaint   Patient presents with    Medicare AWV     Pt would like to discuss getting a medical card. Rendall Essex is a 78 y.o.male    Pt presents for follow up of HTN, hyperlipidemia, PVD, CAD, COPD, GERD, AAA, anemia, Kemp syndrome, and gout. Pt had covid early august. Pt is feeling better. Following with Dr Jason Cohn for cardiac. Pt reports he has been doing well lately. C/o right shoulder pain. This has been ongoing. Declines ortho referral but will notify office if he changes his mind. The home BP readings are always under 140/90. Typically 436X systolic. Some sob with activity. Wt Readings from Last 3 Encounters:   08/31/22 189 lb 3.2 oz (85.8 kg)   12/17/21 192 lb (87.1 kg)   02/15/22 200 lb 3.2 oz (90.8 kg)   Sit ups in his chair. Lifts small weight regularly.      Patient Active Problem List   Diagnosis    Coronary artery disease involving native coronary artery of native heart without angina pectoris    Erectile dysfunction    Hepatic steatosis    Low HDL (under 40)    Essential hypertension    Gastroesophageal reflux disease    Hyperlipidemia    Primary osteoarthritis of left knee    Coronary artery disease involving coronary bypass graft of native heart without angina pectoris- Dr. Jason Cohn    COPD without exacerbation [facility])    Gout of left foot    Abdominal aortic aneurysm (AAA) without rupture (HCC)    Angina pectoris, variant (HCC)    Abnormal nuclear stress test    PVD (peripheral vascular disease) with claudication (HCC)    Iron deficiency anemia    Pneumonia    Closed nondisplaced fracture of third cervical vertebra (HCC)    Closed head injury    Syncope and collapse    Forehead contusion, initial encounter    Hypomagnesemia    Abnormal EKG    Hx of CABG       Current Outpatient Medications   Medication Sig Dispense Refill    SPIRIVA RESPIMAT 2. 5 MCG/ACT AERS inhaler INHALE TWO (2) PUFFS INTO THE LUNGS DAILY 1 each 11    pantoprazole (PROTONIX) 40 MG tablet TAKE 1 TABLET EVERY MORNING (BEFORE BREAKFAST) 90 tablet 3    clopidogrel (PLAVIX) 75 MG tablet Take 1 tablet by mouth daily 90 tablet 1    allopurinol (ZYLOPRIM) 300 MG tablet TAKE 1 TABLET EVERY DAY 90 tablet 3    metoprolol tartrate (LOPRESSOR) 50 MG tablet TAKE 1/2 TABLET EVERY DAY (Patient taking differently: 50 mg) 45 tablet 3    simvastatin (ZOCOR) 40 MG tablet Take 1 tablet by mouth nightly Usually takes 1/2 tablet due to muscle aches 90 tablet 3    isosorbide mononitrate (IMDUR) 30 MG extended release tablet Take 30 mg by mouth daily       Multiple Vitamin (MULTIVITAMIN PO) Take by mouth daily       albuterol sulfate  (90 Base) MCG/ACT inhaler Inhale 2 puffs into the lungs every 6 hours as needed for Wheezing 1 Inhaler 11    nitroGLYCERIN (NITROSTAT) 0.4 MG SL tablet Place 1 tablet under the tongue every 5 minutes as needed for Chest pain 25 tablet 3    ferrous sulfate 325 (65 FE) MG tablet Take 325 mg by mouth daily       acetaminophen (TYLENOL) 500 MG tablet Take 1,000 mg by mouth as needed for Pain. aspirin 325 MG EC tablet Take by mouth daily        No current facility-administered medications for this visit. Review of Systems   Constitutional:  Negative for chills, fatigue, fever and unexpected weight change. HENT: Negative. Eyes: Negative. Respiratory:  Negative for chest tightness and shortness of breath. Cardiovascular:  Negative for chest pain, palpitations and leg swelling. Gastrointestinal:  Negative for abdominal pain and blood in stool. Genitourinary:  Negative for dysuria. Musculoskeletal:  Positive for arthralgias (right shoulder pain) and myalgias (bilat calves when ambulating). Negative for joint swelling. Skin:  Negative for rash. Neurological:  Negative for dizziness. Psychiatric/Behavioral: Negative.      All other systems reviewed and are negative. OBJECTIVE     /70   Pulse 60   Temp 98 °F (36.7 °C) (Oral)   Resp 16   Ht 5' 9\" (1.753 m)   Wt 189 lb 3.2 oz (85.8 kg)   BMI 27.94 kg/m²   Body mass index is 27.94 kg/m². BP Readings from Last 3 Encounters:   08/31/22 130/70   12/17/21 (!) 140/72   02/15/22 126/74         Physical Exam  Vitals and nursing note reviewed. Constitutional:       Appearance: He is well-developed. HENT:      Head: Normocephalic and atraumatic. Right Ear: External ear normal.      Left Ear: External ear normal.      Nose: Nose normal.   Eyes:      Conjunctiva/sclera: Conjunctivae normal.      Pupils: Pupils are equal, round, and reactive to light. Cardiovascular:      Rate and Rhythm: Normal rate and regular rhythm. Pulmonary:      Effort: Pulmonary effort is normal.      Breath sounds: Normal breath sounds. Abdominal:      General: Bowel sounds are normal.      Palpations: Abdomen is soft. Musculoskeletal:         General: Normal range of motion. Cervical back: Normal range of motion and neck supple. Skin:     General: Skin is warm and dry. Neurological:      Mental Status: He is alert and oriented to person, place, and time. Deep Tendon Reflexes: Reflexes are normal and symmetric. Psychiatric:         Behavior: Behavior normal.         Thought Content:  Thought content normal.         Judgment: Judgment normal.         Immunization History   Administered Date(s) Administered    COVID-19, [facility], Primary or Immunocompromised, (age 12y+), IM, 100 mcg/0.5mL 05/29/2021, 06/26/2021    Influenza Virus Vaccine 11/01/2011, 10/24/2012, 10/01/2013, 10/06/2014, 09/28/2015    Influenza, FLUAD, (age 72 y+), Adjuvanted, 0.5mL 12/03/2021    Influenza, High Dose (Fluzone 65 yrs and older) 09/30/2018, 11/09/2019    Pneumococcal Conjugate 13-valent ([id number]) 04/13/2015    Pneumococcal Polysaccharide ([id number]) 08/01/2005, 04/14/2014    Tdap (Boostrix, Adacel) 12/04/2013 Health Maintenance   Topic Date Due    Shingles vaccine (1 of 2) Never done    COVID-19 Vaccine (3 - Booster for Moderna series) 11/26/2021    Flu vaccine (1) 09/01/2022    Lipids  11/04/2022    Depression Screen  08/31/2023    Annual Wellness Visit (AWV)  09/01/2023    DTaP/Tdap/Td vaccine (2 - Td or Tdap) 12/04/2023    Colorectal Cancer Screen  02/13/2024    Pneumococcal 65+ years Vaccine  Completed    Hepatitis C screen  Addressed    Hepatitis A vaccine  Aged Out    Hepatitis B vaccine  Aged Out    Hib vaccine  Aged Out    Meningococcal (ACWY) vaccine  Aged Out       AAA ultrasound (Male, 65-75, smoked ever) indicated at this time? YES- management per Vascular Surgery  CT Lung Screen (55-80, 30 pk-yrs, smoking or quit <15 years) indicated at this time? Yes, 3/4 PPD x 40 years,  Stopped 14 years ago- completed 1/6/2020- to follow up annually. Sleep Medicine referral indicated at this time (Obesity, Snoring, Daytime Somnolence, Apneic Episodes)? Pt denies symptoms, other than snoring. Future Appointments   Date Time Provider Eliot Preciado   11/23/2022 10:30 AM [address], MD [facility] [email or web address]         ASSESSMENT       Diagnosis Orders   1. Medicare annual wellness visit, subsequent        2. Coronary artery disease involving native coronary artery of native heart without angina pectoris  Lipid Panel    CBC with Auto Differential    Comprehensive Metabolic Panel      3. Hyperlipidemia, unspecified hyperlipidemia type  Lipid Panel      4. Essential hypertension  CBC with Auto Differential    Comprehensive Metabolic Panel      5. COPD without exacerbation ([address]  CBC with Auto Differential    Comprehensive Metabolic Panel      6. Abdominal aortic aneurysm (AAA) without rupture ([address]        7.  Screening PSA (prostate specific antigen)  PSA Screening          PLAN        After discussion with patient, will continue max dose simvastatin at this time, as patient previously unable to tolerate both Lipitor and Crestor. Currently taking 1/2-1 tab simvastatin 40 mg daily due to muscle pain. (updated 9/6/2022). Continue current medicines otherwise     US abd Aorta completed in 9/2019. Patient would like to continue to hold on repeat at this time. Labs - FLP in November  PSA, CMP, CBC in February  Follow up in 6 months. Preventive Health Topics:    Encouraged SHINGLES SHOT [facility])- check with your local pharmacy. (updated 9/6/2022)  COLONOSCOPY done 2/13/2019 per Dr. Lyudmila Rayo- to do in 2/2024 (updated 9/6/2022)         Electronically signed by MERE Nelson CNP on 9/6/2022 at 8:19 AM    Medicare Annual Wellness Visit    Isa Thomas is here for Medicare AWV (Pt would like to discuss getting a medical card. )    Assessment & Plan   Medicare annual wellness visit, subsequent  Coronary artery disease involving native coronary artery of native heart without angina pectoris  -     Lipid Panel; Future  -     CBC with Auto Differential; Future  -     Comprehensive Metabolic Panel; Future  Hyperlipidemia, unspecified hyperlipidemia type  -     Lipid Panel; Future  Essential hypertension  -     CBC with Auto Differential; Future  -     Comprehensive Metabolic Panel; Future  COPD without exacerbation ([address]  -     CBC with Auto Differential; Future  -     Comprehensive Metabolic Panel; Future  Abdominal aortic aneurysm (AAA) without rupture (HCC)  Screening PSA (prostate specific antigen)  -     PSA Screening; Future    Recommendations for Preventive Services Due: see orders and patient instructions/AVS.  Recommended screening schedule for the next 5-10 years is provided to the patient in written form: see Patient Instructions/AVS.     Return for Medicare Annual Wellness Visit in 1 year. Subjective       Patient's complete Health Risk Assessment and screening values have been reviewed and are found in Flowsheets.  The following problems were reviewed today and where indicated follow up appointments were made and/or referrals ordered. Positive Risk Factor Screenings with Interventions:             General Health and ACP:  General  In general, how would you say your health is?: Good  In the past 7 days, have you experienced any of the following: New or Increased Pain, New or Increased Fatigue, Loneliness, Social Isolation, Stress or Anger?: (!) Yes  Select all that apply: (!) New or Increased Pain  Do you get the social and emotional support that you need?: Yes  Do you have a Living Will?: (!) No    Advance Directives       Power of  Living Will ACP-Advance Directive ACP-Power of     Not on File Not on File Not on File Not on File          General Health Risk Interventions:  Increased right shoulder pain     Hearing/Vision:  Do you or your family notice any trouble with your hearing that hasn't been managed with hearing aids?: No  Do you have difficulty driving, watching TV, or doing any of your daily activities because of your eyesight?: (!) Yes  Have you had an eye exam within the past year?: (!) No  No results found. Hearing/Vision Interventions:  Vision concerns:  patient encouraged to make appointment with his/her eye specialist    Safety:  Do you have working smoke detectors?: Yes  Do you have any tripping hazards - loose or unsecured carpets or rugs?: No  Do you have any tripping hazards - clutter in doorways, halls, or stairs?: No  Do you have either shower bars, grab bars, non-slip mats or non-slip surfaces in your shower or bathtub?: Yes  Do all of your stairways have a railing or banister?: (!) No  Do you always fasten your seatbelt when you are in a car?: Yes  Safety Interventions:  Home safety tips provided           Objective   Vitals:    08/31/22 0838   BP: 130/70   Pulse: 60   Resp: 16   Temp: 98 °F (36.7 °C)   TempSrc: Oral   Weight: 189 lb 3.2 oz (85.8 kg)   Height: 5' 9\" (1.753 m)      Body mass index is 27.94 kg/m².            Allergies   Allergen Reactions Crestor [Rosuvastatin Calcium]      Muscle aches. Lipitor      Muscle aches. Tramadol Nausea Only     Dizziness    Vicodin [Hydrocodone-Acetaminophen]      Dizziness. Codeine Nausea And Vomiting     Dizziness    Percocet [Oxycodone-Acetaminophen] Nausea And Vomiting     Dizziness     Prior to Visit Medications    Medication Sig Taking? Authorizing Provider   SPIRIVA RESPIMAT 2.5 MCG/ACT AERS inhaler INHALE TWO (2) PUFFS INTO THE LUNGS DAILY Yes MERE Larios CNP   pantoprazole (PROTONIX) 40 MG tablet TAKE 1 TABLET EVERY MORNING (BEFORE BREAKFAST) Yes MERE Pat CNP   clopidogrel (PLAVIX) 75 MG tablet Take 1 tablet by mouth daily Yes MERE Pat CNP   allopurinol (ZYLOPRIM) 300 MG tablet TAKE 1 TABLET EVERY DAY Yes Liban Cobos MD   metoprolol tartrate (LOPRESSOR) 50 MG tablet TAKE 1/2 TABLET EVERY DAY  Patient taking differently: 50 mg Yes Liban Cobos MD   simvastatin (ZOCOR) 40 MG tablet Take 1 tablet by mouth nightly Usually takes 1/2 tablet due to muscle aches Yes MERE Pat CNP   isosorbide mononitrate (IMDUR) 30 MG extended release tablet Take 30 mg by mouth daily  Yes Historical Provider, MD   Multiple Vitamin (MULTIVITAMIN PO) Take by mouth daily  Yes Historical Provider, MD   albuterol sulfate  (90 Base) MCG/ACT inhaler Inhale 2 puffs into the lungs every 6 hours as needed for Wheezing Yes Liban Cobos MD   nitroGLYCERIN (NITROSTAT) 0.4 MG SL tablet Place 1 tablet under the tongue every 5 minutes as needed for Chest pain Yes Liban Cobos MD   ferrous sulfate 325 (65 FE) MG tablet Take 325 mg by mouth daily  Yes Historical Provider, MD   acetaminophen (TYLENOL) 500 MG tablet Take 1,000 mg by mouth as needed for Pain.  Yes Historical Provider, MD   aspirin 325 MG EC tablet Take by mouth daily  Yes Historical Provider, MD Quinteros (Including outside providers/suppliers regularly involved in providing care):   Patient Care Team:  MERE Reddy CNP as PCP - General (Certified Nurse Practitioner)  MERE Reddy CNP as PCP - [facility] Empaneled Provider  Aimee Figueroa MD as Surgeon (Cardiothoracic Surgery)     Reviewed and updated this visit:  Tobacco  Allergies  Meds  Problems  Med Hx  Surg Hx  Soc Hx  Fam Hx

## 2022-08-31 NOTE — PATIENT INSTRUCTIONS
Personalized Preventive Plan for Keena Kearns - 8/31/2022  Medicare offers a range of preventive health benefits. Some of the tests and screenings are paid in full while other may be subject to a deductible, co-insurance, and/or copay. Some of these benefits include a comprehensive review of your medical history including lifestyle, illnesses that may run in your family, and various assessments and screenings as appropriate. After reviewing your medical record and screening and assessments performed today your provider may have ordered immunizations, labs, imaging, and/or referrals for you. A list of these orders (if applicable) as well as your Preventive Care list are included within your After Visit Summary for your review. Other Preventive Recommendations:    A preventive eye exam performed by an eye specialist is recommended every 1-2 years to screen for glaucoma; cataracts, macular degeneration, and other eye disorders. A preventive dental visit is recommended every 6 months. Try to get at least 150 minutes of exercise per week or 10,000 steps per day on a pedometer . Order or download the FREE \"Exercise & Physical Activity: Your Everyday Guide\" from [employer]. Call 2-130.298.2516 or search [employer] online. You need 4220-8792 mg of calcium and 8309-3095 IU of vitamin D per day. It is possible to meet your calcium requirement with diet alone, but a vitamin D supplement is usually necessary to meet this goal.  When exposed to the sun, use a sunscreen that protects against both UVA and UVB radiation with an SPF of 30 or greater. Reapply every 2 to 3 hours or after sweating, drying off with a towel, or swimming. Always wear a seat belt when traveling in a car. Always wear a helmet when riding a bicycle or motorcycle.

## 2022-08-31 NOTE — PROGRESS NOTES
Bilateral ear flush performed per orders of TS with warm water and a cerumen spoon. Small amount of wax removed from both ears. Tympanic membranes visualized as pearly white and intact. Patient tolerated well.

## 2022-09-06 ASSESSMENT — ENCOUNTER SYMPTOMS
BLOOD IN STOOL: 0
CHEST TIGHTNESS: 0
SHORTNESS OF BREATH: 0
ABDOMINAL PAIN: 0
EYES NEGATIVE: 1

## 2022-10-24 RX ORDER — CLOPIDOGREL BISULFATE 75 MG/1
TABLET ORAL
Qty: 90 TABLET | Refills: 3 | Status: SHIPPED | OUTPATIENT
Start: 2022-10-24

## 2022-10-24 NOTE — TELEPHONE ENCOUNTER
This medication refill is regarding a electronic request.  Refill requested by  [employer] . Requested Prescriptions     Pending Prescriptions Disp Refills    clopidogrel (PLAVIX) 75 MG tablet [Pharmacy Med Name: CLOPIDOGREL 75 MG Tablet] 90 tablet 3     Sig: TAKE 1 TABLET EVERY DAY     Date of last visit: 8/31/2022   Date of next visit: None  Date of last refill: 5/5/22 #90/1    Rx verified, ordered and set to EP.

## 2022-11-19 ENCOUNTER — HOSPITAL ENCOUNTER (OUTPATIENT)
Age: 77
Discharge: HOME | End: 2022-11-19
Payer: MEDICARE

## 2022-11-19 LAB
ALBUMIN SERPL-MCNC: 4.4 G/DL (ref 3.5–5.1)
ALP BLD-CCNC: 82 U/L (ref 38–126)
ALT SERPL-CCNC: 21 U/L (ref 11–66)
ANION GAP SERPL CALCULATED.3IONS-SCNC: 9 MEQ/L (ref 8–16)
AST SERPL-CCNC: 26 U/L (ref 5–40)
BILIRUB SERPL-MCNC: 1.8 MG/DL (ref 0.3–1.2)
BILIRUBIN DIRECT: 0.3 MG/DL (ref 0–0.3)
BUN BLDV-MCNC: 12 MG/DL (ref 7–22)
CALCIUM SERPL-MCNC: 9.2 MG/DL (ref 8.5–10.5)
CHLORIDE BLD-SCNC: 105 MEQ/L (ref 98–111)
CHOLESTEROL, TOTAL: 136 MG/DL (ref 100–199)
CO2: 29 MEQ/L (ref 23–33)
CREAT SERPL-MCNC: 0.9 MG/DL (ref 0.4–1.2)
ERYTHROCYTE [DISTWIDTH] IN BLOOD BY AUTOMATED COUNT: 13.2 % (ref 11.5–14.5)
ERYTHROCYTE [DISTWIDTH] IN BLOOD BY AUTOMATED COUNT: 47.6 FL (ref 35–45)
GFR SERPL CREATININE-BSD FRML MDRD: > 60 ML/MIN/1.73M2
GLUCOSE BLD-MCNC: 90 MG/DL (ref 70–108)
HCT VFR BLD CALC: 42.1 % (ref 42–52)
HDLC SERPL-MCNC: 34 MG/DL
HEMOGLOBIN: 14.4 GM/DL (ref 14–18)
LDL CHOLESTEROL CALCULATED: 75 MG/DL
MCH RBC QN AUTO: 33.6 PG (ref 26–33)
MCHC RBC AUTO-ENTMCNC: 34.2 GM/DL (ref 32.2–35.5)
MCV RBC AUTO: 98.1 FL (ref 80–94)
PLATELET # BLD: 175 THOU/MM3 (ref 130–400)
PMV BLD AUTO: 10.1 FL (ref 9.4–12.4)
POTASSIUM SERPL-SCNC: 5.3 MEQ/L (ref 3.5–5.2)
RBC # BLD: 4.29 MILL/MM3 (ref 4.7–6.1)
SODIUM BLD-SCNC: 143 MEQ/L (ref 135–145)
TOTAL PROTEIN: 6.5 G/DL (ref 6.1–8)
TRIGL SERPL-MCNC: 137 MG/DL (ref 0–199)
WBC # BLD: 6.1 THOU/MM3 (ref 4.8–10.8)

## 2022-11-19 PROCEDURE — 80061 LIPID PANEL: CPT

## 2022-11-19 PROCEDURE — 36415 COLL VENOUS BLD VENIPUNCTURE: CPT

## 2022-11-19 PROCEDURE — 80053 COMPREHEN METABOLIC PANEL: CPT

## 2022-11-19 PROCEDURE — 82248 BILIRUBIN DIRECT: CPT

## 2022-11-19 PROCEDURE — 85027 COMPLETE CBC AUTOMATED: CPT

## 2022-11-23 ENCOUNTER — HOSPITAL ENCOUNTER (OUTPATIENT)
Age: 77
Discharge: HOME | End: 2022-11-23
Payer: MEDICARE

## 2022-11-23 ENCOUNTER — OFFICE VISIT (OUTPATIENT)
Dept: FAMILY MEDICINE CLINIC | Age: 77
End: 2022-11-23
Payer: MEDICARE

## 2022-11-23 ENCOUNTER — HOSPITAL ENCOUNTER (OUTPATIENT)
Dept: GENERAL RADIOLOGY | Age: 77
Discharge: HOME | End: 2022-11-23
Payer: MEDICARE

## 2022-11-23 VITALS
OXYGEN SATURATION: 93 % | HEART RATE: 76 BPM | DIASTOLIC BLOOD PRESSURE: 68 MMHG | SYSTOLIC BLOOD PRESSURE: 106 MMHG | TEMPERATURE: 98.4 F | WEIGHT: 189.2 LBS | BODY MASS INDEX: 27.94 KG/M2 | RESPIRATION RATE: 16 BRPM

## 2022-11-23 DIAGNOSIS — R52 BODY ACHES: ICD-10-CM

## 2022-11-23 DIAGNOSIS — M25.50 ARTHRALGIA, UNSPECIFIED JOINT: ICD-10-CM

## 2022-11-23 DIAGNOSIS — R05.9 COUGH, UNSPECIFIED TYPE: ICD-10-CM

## 2022-11-23 DIAGNOSIS — R52 BODY ACHES: Primary | ICD-10-CM

## 2022-11-23 DIAGNOSIS — R50.9 FEVER, UNSPECIFIED FEVER CAUSE: ICD-10-CM

## 2022-11-23 LAB
INFLUENZA A ANTIBODY: NOT DETECTED
INFLUENZA B ANTIBODY: NOT DETECTED
Lab: NORMAL
QC PASS/FAIL: NORMAL
SARS-COV-2 RDRP RESP QL NAA+PROBE: NEGATIVE

## 2022-11-23 PROCEDURE — 3074F SYST BP LT 130 MM HG: CPT | Performed by: NURSE PRACTITIONER

## 2022-11-23 PROCEDURE — G8484 FLU IMMUNIZE NO ADMIN: HCPCS | Performed by: NURSE PRACTITIONER

## 2022-11-23 PROCEDURE — 96372 THER/PROPH/DIAG INJ SC/IM: CPT | Performed by: NURSE PRACTITIONER

## 2022-11-23 PROCEDURE — G8417 CALC BMI ABV UP PARAM F/U: HCPCS | Performed by: NURSE PRACTITIONER

## 2022-11-23 PROCEDURE — 71046 X-RAY EXAM CHEST 2 VIEWS: CPT

## 2022-11-23 PROCEDURE — 87635 SARS-COV-2 COVID-19 AMP PRB: CPT | Performed by: NURSE PRACTITIONER

## 2022-11-23 PROCEDURE — 99214 OFFICE O/P EST MOD 30 MIN: CPT | Performed by: NURSE PRACTITIONER

## 2022-11-23 PROCEDURE — G8427 DOCREV CUR MEDS BY ELIG CLIN: HCPCS | Performed by: NURSE PRACTITIONER

## 2022-11-23 PROCEDURE — 3078F DIAST BP <80 MM HG: CPT | Performed by: NURSE PRACTITIONER

## 2022-11-23 PROCEDURE — 1036F TOBACCO NON-USER: CPT | Performed by: NURSE PRACTITIONER

## 2022-11-23 PROCEDURE — 87804 INFLUENZA ASSAY W/OPTIC: CPT | Performed by: NURSE PRACTITIONER

## 2022-11-23 PROCEDURE — 3017F COLORECTAL CA SCREEN DOC REV: CPT | Performed by: NURSE PRACTITIONER

## 2022-11-23 PROCEDURE — 1123F ACP DISCUSS/DSCN MKR DOCD: CPT | Performed by: NURSE PRACTITIONER

## 2022-11-23 RX ORDER — BENZONATATE 100 MG/1
100-200 CAPSULE ORAL 3 TIMES DAILY PRN
Qty: 30 CAPSULE | Refills: 0 | Status: SHIPPED | OUTPATIENT
Start: 2022-11-23 | End: 2022-11-30

## 2022-11-23 RX ORDER — AZITHROMYCIN 250 MG/1
250 TABLET, FILM COATED ORAL SEE ADMIN INSTRUCTIONS
Qty: 6 TABLET | Refills: 0 | Status: SHIPPED | OUTPATIENT
Start: 2022-11-23 | End: 2022-11-28

## 2022-11-23 RX ORDER — METHYLPREDNISOLONE ACETATE 80 MG/ML
80 INJECTION, SUSPENSION INTRA-ARTICULAR; INTRALESIONAL; INTRAMUSCULAR; SOFT TISSUE ONCE
Status: COMPLETED | OUTPATIENT
Start: 2022-11-23 | End: 2022-11-23

## 2022-11-23 RX ADMIN — METHYLPREDNISOLONE ACETATE 80 MG: 80 INJECTION, SUSPENSION INTRA-ARTICULAR; INTRALESIONAL; INTRAMUSCULAR; SOFT TISSUE at 12:28

## 2022-11-23 NOTE — PROGRESS NOTES
Chief Complaint   Patient presents with    Cough     Cough, fever, body aches, chills x 1.5 days, getting worse         General Silvino is a 78 y. o.male      Pt complains of cough, body aches, sinus congestion. Tmax 100.0 with sweats and chills. Denies sob. Is using something otc for cold and cough but he is unsure if it is helping. Is also taking tylenol without relief. Is c/o lots of knee and right shoulder pain. He has RA but is not currently being treated for this. Pt is hoping for an injection to help with his arthritis pain. Review of Systems   Constitutional:  Positive for activity change, appetite change, chills, fatigue and fever. Negative for unexpected weight change. HENT:  Positive for congestion. Eyes: Negative. Respiratory:  Positive for cough. Negative for chest tightness and shortness of breath. Cardiovascular:  Negative for chest pain, palpitations and leg swelling. Gastrointestinal:  Negative for abdominal pain and blood in stool. Genitourinary:  Negative for dysuria. Musculoskeletal:  Positive for arthralgias. Negative for joint swelling. Skin:  Negative for rash. Neurological:  Negative for dizziness. Psychiatric/Behavioral: Negative. All other systems reviewed and are negative. OBJECTIVE     /68 (Site: Left Upper Arm)   Pulse 76   Temp 98.4 °F (36.9 °C) (Oral)   Resp 16   Wt 189 lb 3.2 oz (85.8 kg)   SpO2 93%   BMI 27.94 kg/m²     Physical Exam  Vitals and nursing note reviewed. Constitutional:       General: He is not in acute distress. Appearance: He is well-developed. He is ill-appearing. He is not toxic-appearing. HENT:      Head: Normocephalic and atraumatic. Right Ear: External ear normal.      Left Ear: External ear normal.      Nose: Nose normal.   Eyes:      Conjunctiva/sclera: Conjunctivae normal.      Pupils: Pupils are equal, round, and reactive to light.    Cardiovascular:      Rate and Rhythm: Normal rate and regular rhythm. Pulmonary:      Effort: Pulmonary effort is normal.      Breath sounds: Decreased breath sounds present. Abdominal:      General: Bowel sounds are normal.      Palpations: Abdomen is soft. Musculoskeletal:         General: Normal range of motion. Cervical back: Normal range of motion and neck supple. Skin:     General: Skin is warm and dry. Neurological:      Mental Status: He is alert and oriented to person, place, and time. Deep Tendon Reflexes: Reflexes are normal and symmetric. Psychiatric:         Behavior: Behavior normal.         Thought Content: Thought content normal.         Judgment: Judgment normal.         Results for POC orders placed in visit on 11/23/22   POCT Influenza A/B   Result Value Ref Range    Influenza A Ab not detected     Influenza B Ab not detected          ASSESSMENT       Diagnosis Orders   1. Body aches  POCT Influenza A/B    POCT COVID-19 Rapid, NAAT    XR CHEST (2 VW)    azithromycin (ZITHROMAX) 250 MG tablet      2. Cough, unspecified type  POCT Influenza A/B    POCT COVID-19 Rapid, NAAT    XR CHEST (2 VW)    azithromycin (ZITHROMAX) 250 MG tablet      3. Fever, unspecified fever cause        4.  Arthralgia, unspecified joint            PLAN     Requested Prescriptions     Signed Prescriptions Disp Refills    benzonatate (TESSALON) 100 MG capsule 30 capsule 0     Sig: Take 1-2 capsules by mouth 3 times daily as needed for Cough    azithromycin (ZITHROMAX) 250 MG tablet 6 tablet 0     Sig: Take 1 tablet by mouth See Admin Instructions for 5 days 500mg on day 1 followed by 250mg on days 2 - 5         Orders Placed This Encounter   Procedures    XR CHEST (2 VW)     Standing Status:   Future     Number of Occurrences:   1     Standing Expiration Date:   11/23/2023     Order Specific Question:   Reason for exam:     Answer:   ongoing cough, history of pneumonia    POCT Influenza A/B    POCT COVID-19 Rapid, NAAT     Order Specific Question: Previously tested for COVID-19? Answer:   Yes     Order Specific Question:   Pregnant: Answer:   No       Offered Xray shoulder and or referral to ortho. He would like to hold at this time. No toradol due to cardiac history but can try depo medrol 80 mg IM  Flu and covid are negative  CXR ordered due to symptoms and history of pneumonia  Will send tessalon and azithromycin to pharmacy. Pt to start antibiotic in next few days if symptoms persist or worsen  Follow up as needed        Electronically signed by MERE Mitchell CNP on 11/28/2022 at 10:59 AM    Greater than 30 minutes was spent in face-to face contact with patient with greater than 50% in counseling and coordination of care.

## 2022-11-23 NOTE — PROGRESS NOTES
Administrations This Visit       methylPREDNISolone acetate (DEPO-MEDROL) injection 80 mg       Admin Date  11/23/2022  12:28 Action  Given Dose  80 mg Route  IntraMUSCular Site  Deltoid Right Administered By  Shira Mitchell CMA ([address] [place] [address])    Ordering Provider: MERE Quinn CNP    NDC: [address]-1    Lot#: [id number]    : Ron Rondon    Patient Supplied?: No

## 2022-11-28 ASSESSMENT — ENCOUNTER SYMPTOMS
ABDOMINAL PAIN: 0
COUGH: 1
CHEST TIGHTNESS: 0
SHORTNESS OF BREATH: 0
EYES NEGATIVE: 1
BLOOD IN STOOL: 0

## 2022-12-19 DIAGNOSIS — E78.5 HYPERLIPIDEMIA, UNSPECIFIED HYPERLIPIDEMIA TYPE: ICD-10-CM

## 2022-12-19 RX ORDER — SIMVASTATIN 20 MG
20 TABLET ORAL NIGHTLY
Qty: 30 TABLET | Refills: 3 | Status: SHIPPED | OUTPATIENT
Start: 2022-12-19

## 2022-12-19 NOTE — TELEPHONE ENCOUNTER
This medication refill is regarding a telephone request. Refill requested by pts wife. Requested Prescriptions     Pending Prescriptions Disp Refills    simvastatin (ZOCOR) 20 MG tablet 30 tablet 3     Sig: Take 1 tablet by mouth nightly       Date of last visit: 11/23/2022   Date of next visit: none  Date of last refill: 2/15/22 for 90/3 40 mg 1/2 tablet daily; prefers to have a 20 mg tablet so he doesn't have to cut it in half. Pharmacy Name: [facility] Lipid Panel:    Lab Results   Component Value Date/Time    CHOL 136 11/19/2022 10:08 AM    TRIG 137 11/19/2022 10:08 AM    HDL 34 11/19/2022 10:08 AM    LDLCALC 75 11/19/2022 10:08 AM     Last CMP:   Lab Results   Component Value Date     11/19/2022    K 5.3 (H) 11/19/2022     11/19/2022    CO2 29 11/19/2022    BUN 12 11/19/2022    CREATININE 0.9 11/19/2022    GLUCOSE 90 11/19/2022    CALCIUM 9.2 11/19/2022    PROT 6.5 11/19/2022    LABALBU 4.4 11/19/2022    BILITOT 1.8 (H) 11/19/2022    ALKPHOS 82 11/19/2022    AST 26 11/19/2022    ALT 21 11/19/2022    LABGLOM >60 11/19/2022       Rx verified, ordered and set to EP.      WCP

## 2023-01-26 ENCOUNTER — HOSPITAL ENCOUNTER (OUTPATIENT)
Age: 78
Discharge: HOME | End: 2023-01-26
Payer: MEDICARE

## 2023-01-26 ENCOUNTER — OFFICE VISIT (OUTPATIENT)
Dept: CARDIOLOGY CLINIC | Age: 78
End: 2023-01-26
Payer: MEDICARE

## 2023-01-26 VITALS
BODY MASS INDEX: 29.18 KG/M2 | HEART RATE: 55 BPM | SYSTOLIC BLOOD PRESSURE: 151 MMHG | DIASTOLIC BLOOD PRESSURE: 77 MMHG | RESPIRATION RATE: 18 BRPM | HEIGHT: 69 IN | WEIGHT: 197 LBS

## 2023-01-26 DIAGNOSIS — I10 ESSENTIAL HYPERTENSION: Primary | ICD-10-CM

## 2023-01-26 DIAGNOSIS — I10 ESSENTIAL HYPERTENSION: ICD-10-CM

## 2023-01-26 DIAGNOSIS — E87.5 HYPERKALEMIA: ICD-10-CM

## 2023-01-26 DIAGNOSIS — E78.5 DYSLIPIDEMIA (HIGH LDL; LOW HDL): ICD-10-CM

## 2023-01-26 PROCEDURE — 3017F COLORECTAL CA SCREEN DOC REV: CPT | Performed by: INTERNAL MEDICINE

## 2023-01-26 PROCEDURE — 36415 COLL VENOUS BLD VENIPUNCTURE: CPT

## 2023-01-26 PROCEDURE — 99214 OFFICE O/P EST MOD 30 MIN: CPT | Performed by: INTERNAL MEDICINE

## 2023-01-26 PROCEDURE — 3077F SYST BP >= 140 MM HG: CPT | Performed by: INTERNAL MEDICINE

## 2023-01-26 PROCEDURE — 1036F TOBACCO NON-USER: CPT | Performed by: INTERNAL MEDICINE

## 2023-01-26 PROCEDURE — G8417 CALC BMI ABV UP PARAM F/U: HCPCS | Performed by: INTERNAL MEDICINE

## 2023-01-26 PROCEDURE — 3078F DIAST BP <80 MM HG: CPT | Performed by: INTERNAL MEDICINE

## 2023-01-26 PROCEDURE — G8484 FLU IMMUNIZE NO ADMIN: HCPCS | Performed by: INTERNAL MEDICINE

## 2023-01-26 PROCEDURE — 93000 ELECTROCARDIOGRAM COMPLETE: CPT | Performed by: INTERNAL MEDICINE

## 2023-01-26 PROCEDURE — 84132 ASSAY OF SERUM POTASSIUM: CPT

## 2023-01-26 PROCEDURE — G8427 DOCREV CUR MEDS BY ELIG CLIN: HCPCS | Performed by: INTERNAL MEDICINE

## 2023-01-26 PROCEDURE — 1123F ACP DISCUSS/DSCN MKR DOCD: CPT | Performed by: INTERNAL MEDICINE

## 2023-01-26 RX ORDER — ISOSORBIDE MONONITRATE 30 MG/1
30 TABLET, EXTENDED RELEASE ORAL DAILY
Qty: 90 TABLET | Refills: 3 | Status: SHIPPED | OUTPATIENT
Start: 2023-01-26

## 2023-01-26 NOTE — PROGRESS NOTES
[address]  Dept: [address]  Loc: [phone]     1/26/2023       Benld Claudia is here today for   Chief Complaint   Patient presents with    Follow-up           Referring Physician:  No ref.  provider found     Patient Active Problem List   Diagnosis    Coronary artery disease involving native coronary artery of native heart without angina pectoris    Erectile dysfunction    Hepatic steatosis    Low HDL (under 40)    Essential hypertension    Gastroesophageal reflux disease    Hyperlipidemia    Primary osteoarthritis of left knee    Coronary artery disease involving coronary bypass graft of native heart without angina pectoris- Dr. Perfecto Zapata    COPD without exacerbation [facility])    Gout of left foot    Abdominal aortic aneurysm (AAA) without rupture    Angina pectoris, variant (HCC)    Abnormal nuclear stress test    PVD (peripheral vascular disease) with claudication (HCC)    Iron deficiency anemia    Pneumonia    Closed nondisplaced fracture of third cervical vertebra (HCC)    Closed head injury    Syncope and collapse    Forehead contusion, initial encounter    Hypomagnesemia    Abnormal EKG    Hx of CABG       Review of Systems     Past Medical History:   Diagnosis Date    AAA (abdominal aortic aneurysm)     watching for now    Back pain     Cervical spine fracture (HCC)     Cholelithiasis     asymptomatic    COPD (chronic obstructive pulmonary disease) (HCC)     Coronary artery disease     Diabetes mellitus (HCC)     Emphysema of lung (HCC)     Erectile dysfunction     GERD (gastroesophageal reflux disease)     Elevated liver enzymes    Gout     History of blood transfusion     Hyperlipidemia     Hypertension     Liver disease     MI (myocardial infarction) (HCC)     Osteoarthritis     Peripheral vascular disease (HCC)     Syncope and collapse        Allergies   Allergen Reactions Crestor [Rosuvastatin Calcium]      Muscle aches. Lipitor      Muscle aches. Tramadol Nausea Only     Dizziness    Vicodin [Hydrocodone-Acetaminophen]      Dizziness. Codeine Nausea And Vomiting     Dizziness    Percocet [Oxycodone-Acetaminophen] Nausea And Vomiting     Dizziness       Current Outpatient Medications   Medication Sig Dispense Refill    isosorbide mononitrate (IMDUR) 30 MG extended release tablet Take 1 tablet by mouth daily 90 tablet 3    simvastatin (ZOCOR) 20 MG tablet Take 1 tablet by mouth nightly 30 tablet 3    clopidogrel (PLAVIX) 75 MG tablet TAKE 1 TABLET EVERY DAY 90 tablet 3    SPIRIVA RESPIMAT 2.5 MCG/ACT AERS inhaler INHALE TWO (2) PUFFS INTO THE LUNGS DAILY 1 each 11    pantoprazole (PROTONIX) 40 MG tablet TAKE 1 TABLET EVERY MORNING (BEFORE BREAKFAST) 90 tablet 3    allopurinol (ZYLOPRIM) 300 MG tablet TAKE 1 TABLET EVERY DAY 90 tablet 3    metoprolol tartrate (LOPRESSOR) 50 MG tablet TAKE 1/2 TABLET EVERY DAY (Patient taking differently: Take 25 mg by mouth daily) 45 tablet 3    Multiple Vitamin (MULTIVITAMIN PO) Take by mouth daily       albuterol sulfate  (90 Base) MCG/ACT inhaler Inhale 2 puffs into the lungs every 6 hours as needed for Wheezing 1 Inhaler 11    nitroGLYCERIN (NITROSTAT) 0.4 MG SL tablet Place 1 tablet under the tongue every 5 minutes as needed for Chest pain 25 tablet 3    ferrous sulfate 325 (65 FE) MG tablet Take 325 mg by mouth daily       acetaminophen (TYLENOL) 500 MG tablet Take 1,000 mg by mouth as needed for Pain. aspirin 325 MG EC tablet Take by mouth daily        No current facility-administered medications for this visit.        Social History     Socioeconomic History    Marital status:      Spouse name: None    Number of children: 3    Years of education: None    Highest education level: None   Tobacco Use    Smoking status: Former     Packs/day: 1.00     Years: 15.00     Pack years: 15.00     Types: Cigarettes     Quit date: 2005     Years since quittin.0    Smokeless tobacco: Never   Vaping Use    Vaping Use: Never used   Substance and Sexual Activity    Alcohol use: No    Drug use: No    Sexual activity: Not Currently     Partners: Female     Comment:      Social Determinants of Health     Financial Resource Strain: Low Risk     Difficulty of Paying Living Expenses: Not hard at all   Food Insecurity: No Food Insecurity    Worried About Running Out of Food in the Last Year: Never true    Ran Out of Food in the Last Year: Never true   Physical Activity: Sufficiently Active    Days of Exercise per Week: 5 days    Minutes of Exercise per Session: 30 min       Family History   Problem Relation Age of Onset    Heart Disease Mother     Diabetes Mother     Heart Disease Father     Other Sister 77        P.E.    Heart Disease Sister     Cancer Sister     Heart Attack Sister     Heart Disease Brother     Heart Attack Brother     Heart Attack Sister     Heart Disease Sister     Heart Attack Brother     Heart Disease Brother     Heart Attack Sister     Heart Disease Sister     Heart Disease Sister        Blood pressure (!) 151/77, pulse 55, resp. rate 18, height 5' 9\" (1.753 m), weight 197 lb (89.4 kg).     Physical Exam:    General Appearance: alert and oriented to person, place and time, in no acute distress  Cardiovascular: normal rate, regular rhythm, normal S1 and S2, no murmurs, rubs, clicks, or gallops, distal pulses intact, no carotid bruits, no JVD  Pulmonary/Chest: clear to auscultation bilaterally- no wheezes, rales or rhonchi, normal air movement, no respiratory distress  Abdomen: soft, non-tender, non-distended, normal bowel sounds, no masses   Extremities: no cyanosis, clubbing or edema, pulse   Skin: warm and dry, no rash or erythema  Head: normocephalic and atraumatic  Eyes: pupils equal, round, and reactive to light  Neck: supple and non-tender without mass, no thyromegaly   Musculoskeletal: normal range of motion, no joint swelling, deformity or tenderness  Neurological: alert, oriented, normal speech, no focal findings or movement disorder noted    Lab Data:    Cardiac Enzymes:  No results for input(s): CKTOTAL, CKMB, CKMBINDEX, TROPONINI in the last 72 hours. CBC:   Lab Results   Component Value Date/Time    WBC 6.1 11/19/2022 10:08 AM    RBC 4.29 11/19/2022 10:08 AM    RBC 4.57 03/27/2012 08:35 AM    HGB 14.4 11/19/2022 10:08 AM    HCT 42.1 11/19/2022 10:08 AM     11/19/2022 10:08 AM       CMP:    Lab Results   Component Value Date/Time     11/19/2022 10:08 AM    K 5.3 11/19/2022 10:08 AM    K 4.4 11/04/2021 05:18 AM     11/19/2022 10:08 AM    CO2 29 11/19/2022 10:08 AM    BUN 12 11/19/2022 10:08 AM    CREATININE 0.9 11/19/2022 10:08 AM    LABGLOM >60 11/19/2022 10:08 AM    GLUCOSE 90 11/19/2022 10:08 AM    GLUCOSE 105 10/01/2011 06:11 PM    CALCIUM 9.2 11/19/2022 10:08 AM       Hepatic Function Panel:    Lab Results   Component Value Date/Time    ALKPHOS 82 11/19/2022 10:08 AM    ALT 21 11/19/2022 10:08 AM    AST 26 11/19/2022 10:08 AM    PROT 6.5 11/19/2022 10:08 AM    BILITOT 1.8 11/19/2022 10:08 AM    BILIDIR 0.3 11/19/2022 10:08 AM    LABALBU 4.4 11/19/2022 10:08 AM    LABALBU 4.2 09/12/2011 01:22 PM       Magnesium:    Lab Results   Component Value Date/Time    MG 1.5 05/31/2021 12:58 AM       PT/INR:    Lab Results   Component Value Date/Time    INR 1.09 11/04/2021 05:18 AM       HgBA1c:    Lab Results   Component Value Date/Time    LABA1C 4.7 03/16/2018 09:25 AM       FLP:    Lab Results   Component Value Date/Time    TRIG 137 11/19/2022 10:08 AM    HDL 34 11/19/2022 10:08 AM    LDLCALC 75 11/19/2022 10:08 AM    LDLDIRECT 78.42 08/09/2021 08:37 AM       TSH:    Lab Results   Component Value Date/Time    TSH 3.370 05/31/2021 12:58 AM        Diagnosis Orders   1.  Essential hypertension  71760 - [place] ELECTROCARDIOGRAM, COMPLETE    Potassium    CBC    Basic Metabolic Panel    Lipid Panel Hepatic Function Panel      2. Hyperkalemia  Potassium    CBC    Basic Metabolic Panel    Lipid Panel    Hepatic Function Panel      3. Dyslipidemia (high LDL; low HDL)  CBC    Basic Metabolic Panel    Lipid Panel    Hepatic Function Panel           Assessment/Plan    And is a 78years old patient history of coronary artery disease he has a history of CABG in the past has history of hypertension hypercholesterolemia and obesity he is here for a yearly visit he indicates he is feeling well his EKG showed sinus rhythm poor R wave progression V1 to V3 no significant change he had history of hypercholesterolemia on a statin under control history of hypertension his blood pressure has been under control. Patient was advised about diet exercise weight reduction patient will be seen periodically seek medical attention if he has any change in clinical condition thank    Orders Placed This Encounter   Procedures    Potassium     Standing Status:   Future     Standing Expiration Date:   1/26/2024    CBC     Standing Status:   Future     Standing Expiration Date:   7/27/7057    Basic Metabolic Panel     Standing Status:   Future     Standing Expiration Date:   1/26/2024    Lipid Panel     Standing Status:   Future     Standing Expiration Date:   1/26/2024     Order Specific Question:   Is Patient Fasting?/# of Hours     Answer:   12 hours    Hepatic Function Panel     Standing Status:   Future     Standing Expiration Date:   1/26/2024    82582 - [place] ELECTROCARDIOGRAM, COMPLETE       Return in about 1 year (around 1/26/2024) for cad.      Emery Carlos MD

## 2023-01-27 LAB — POTASSIUM SERPL-SCNC: 5.2 MEQ/L (ref 3.5–5.2)

## 2023-03-03 ENCOUNTER — HOSPITAL ENCOUNTER (OUTPATIENT)
Age: 78
Discharge: HOME | End: 2023-03-03
Payer: MEDICARE

## 2023-03-03 DIAGNOSIS — J44.9 COPD WITHOUT EXACERBATION (HCC): ICD-10-CM

## 2023-03-03 DIAGNOSIS — I25.10 CORONARY ARTERY DISEASE INVOLVING NATIVE CORONARY ARTERY OF NATIVE HEART WITHOUT ANGINA PECTORIS: ICD-10-CM

## 2023-03-03 DIAGNOSIS — Z12.5 SCREENING PSA (PROSTATE SPECIFIC ANTIGEN): ICD-10-CM

## 2023-03-03 DIAGNOSIS — I10 ESSENTIAL HYPERTENSION: ICD-10-CM

## 2023-03-03 LAB
ALBUMIN SERPL BCG-MCNC: 4.6 G/DL (ref 3.5–5.1)
ALP SERPL-CCNC: 70 U/L (ref 38–126)
ALT SERPL W/O P-5'-P-CCNC: 18 U/L (ref 11–66)
ANION GAP SERPL CALC-SCNC: 8 MEQ/L (ref 8–16)
AST SERPL-CCNC: 22 U/L (ref 5–40)
BASOPHILS ABSOLUTE: 0 THOU/MM3 (ref 0–0.1)
BASOPHILS NFR BLD AUTO: 0.4 %
BILIRUB SERPL-MCNC: 2 MG/DL (ref 0.3–1.2)
BUN SERPL-MCNC: 12 MG/DL (ref 7–22)
CALCIUM SERPL-MCNC: 9.3 MG/DL (ref 8.5–10.5)
CHLORIDE SERPL-SCNC: 102 MEQ/L (ref 98–111)
CO2 SERPL-SCNC: 29 MEQ/L (ref 23–33)
CREAT SERPL-MCNC: 0.9 MG/DL (ref 0.4–1.2)
DEPRECATED RDW RBC AUTO: 48.4 FL (ref 35–45)
EOSINOPHIL NFR BLD AUTO: 4 %
EOSINOPHILS ABSOLUTE: 0.2 THOU/MM3 (ref 0–0.4)
ERYTHROCYTE [DISTWIDTH] IN BLOOD BY AUTOMATED COUNT: 13.5 % (ref 11.5–14.5)
GFR SERPL CREATININE-BSD FRML MDRD: > 60 ML/MIN/1.73M2
GLUCOSE SERPL-MCNC: 91 MG/DL (ref 70–108)
HCT VFR BLD AUTO: 40.5 % (ref 42–52)
HGB BLD-MCNC: 14 GM/DL (ref 14–18)
IMM GRANULOCYTES # BLD AUTO: 0.01 THOU/MM3 (ref 0–0.07)
IMM GRANULOCYTES NFR BLD AUTO: 0.2 %
LYMPHOCYTES ABSOLUTE: 1.6 THOU/MM3 (ref 1–4.8)
LYMPHOCYTES NFR BLD AUTO: 30.3 %
MCH RBC QN AUTO: 34 PG (ref 26–33)
MCHC RBC AUTO-ENTMCNC: 34.6 GM/DL (ref 32.2–35.5)
MCV RBC AUTO: 98.3 FL (ref 80–94)
MONOCYTES ABSOLUTE: 0.3 THOU/MM3 (ref 0.4–1.3)
MONOCYTES NFR BLD AUTO: 6.5 %
NEUTROPHILS NFR BLD AUTO: 58.6 %
NRBC BLD AUTO-RTO: 0 /100 WBC
PLATELET # BLD AUTO: 168 THOU/MM3 (ref 130–400)
PMV BLD AUTO: 9.9 FL (ref 9.4–12.4)
POTASSIUM SERPL-SCNC: 4.5 MEQ/L (ref 3.5–5.2)
PROT SERPL-MCNC: 7.1 G/DL (ref 6.1–8)
PSA SERPL-MCNC: 0.58 NG/ML (ref 0–1)
RBC # BLD AUTO: 4.12 MILL/MM3 (ref 4.7–6.1)
SEGMENTED NEUTROPHILS ABSOLUTE COUNT: 3 THOU/MM3 (ref 1.8–7.7)
SODIUM SERPL-SCNC: 139 MEQ/L (ref 135–145)
WBC # BLD AUTO: 5.2 THOU/MM3 (ref 4.8–10.8)

## 2023-03-03 PROCEDURE — 80053 COMPREHEN METABOLIC PANEL: CPT

## 2023-03-03 PROCEDURE — G0103 PSA SCREENING: HCPCS

## 2023-03-03 PROCEDURE — 36415 COLL VENOUS BLD VENIPUNCTURE: CPT

## 2023-03-03 PROCEDURE — 85025 COMPLETE CBC W/AUTO DIFF WBC: CPT

## 2023-03-06 ENCOUNTER — TELEPHONE (OUTPATIENT)
Dept: FAMILY MEDICINE CLINIC | Age: 78
End: 2023-03-06

## 2023-03-09 NOTE — TELEPHONE ENCOUNTER
Spoke to pts wife and notified her of his lab results and TS recommendations and she verbalized understanding.

## 2023-03-27 DIAGNOSIS — I25.810 CORONARY ARTERY DISEASE INVOLVING CORONARY BYPASS GRAFT OF NATIVE HEART WITHOUT ANGINA PECTORIS: ICD-10-CM

## 2023-03-27 DIAGNOSIS — I10 ESSENTIAL HYPERTENSION: ICD-10-CM

## 2023-03-29 RX ORDER — METOPROLOL TARTRATE 50 MG/1
TABLET, FILM COATED ORAL
Qty: 45 TABLET | Refills: 3 | Status: SHIPPED | OUTPATIENT
Start: 2023-03-29

## 2023-03-29 RX ORDER — SIMVASTATIN 20 MG
TABLET ORAL
Qty: 90 TABLET | Refills: 3 | Status: SHIPPED | OUTPATIENT
Start: 2023-03-29

## 2023-03-29 NOTE — TELEPHONE ENCOUNTER
This medication refill is regarding a electronic request. Refill requested by  [employer] . Requested Prescriptions     Pending Prescriptions Disp Refills    simvastatin (ZOCOR) 20 MG tablet [Pharmacy Med Name: SIMVASTATIN 20 MG Tablet] 90 tablet 3     Sig: TAKE 1 TABLET EVERY NIGHT     Date of last visit: 11/23/2022   Date of next visit: None  Date of last refill: 12/19/22 #30/3    Last Lipid Panel:    Lab Results   Component Value Date/Time    CHOL 136 11/19/2022 10:08 AM    TRIG 137 11/19/2022 10:08 AM    HDL 34 11/19/2022 10:08 AM    LDLCALC 75 11/19/2022 10:08 AM     Last CMP:   Lab Results   Component Value Date     03/03/2023    K 4.5 03/03/2023     03/03/2023    CO2 29 03/03/2023    BUN 12 03/03/2023    CREATININE 0.9 03/03/2023    GLUCOSE 91 03/03/2023    CALCIUM 9.3 03/03/2023    PROT 7.1 03/03/2023    LABALBU 4.6 03/03/2023    BILITOT 2.0 (H) 03/03/2023    ALKPHOS 70 03/03/2023    AST 22 03/03/2023    ALT 18 03/03/2023    LABGLOM >60 03/03/2023     Rx verified, ordered and set to EP.

## 2023-03-29 NOTE — TELEPHONE ENCOUNTER
This medication refill is regarding a electronic request. Refill requested by  [employer] . Requested Prescriptions     Pending Prescriptions Disp Refills    metoprolol tartrate (LOPRESSOR) 50 MG tablet [Pharmacy Med Name: METOPROLOL TARTRATE 50 MG Tablet] 45 tablet 3     Sig: TAKE 1/2 TABLET EVERY DAY     Date of last visit: 11/23/2022   Date of next visit: None  Date of last refill: 3/30/22 #45/3    Rx verified, ordered and set to EP.

## 2023-08-23 RX ORDER — PANTOPRAZOLE SODIUM 40 MG/1
TABLET, DELAYED RELEASE ORAL
Qty: 90 TABLET | Refills: 0 | Status: SHIPPED | OUTPATIENT
Start: 2023-08-23

## 2023-08-23 NOTE — TELEPHONE ENCOUNTER
This medication refill is regarding a electronic request. Refill requested by  [employer] . Requested Prescriptions     Pending Prescriptions Disp Refills    pantoprazole (PROTONIX) 40 MG tablet [Pharmacy Med Name: PANTOPRAZOLE SODIUM 40 MG Tablet Delayed Release] 90 tablet 0     Sig: TAKE 1 TABLET EVERY MORNING (BEFORE BREAKFAST)     Date of last visit: 11/23/2022   Date of next visit: None  Date of last refill: 8/8/22 #90/3    Rx verified, ordered and set to EP.

## 2023-09-05 RX ORDER — TIOTROPIUM BROMIDE INHALATION SPRAY 3.12 UG/1
SPRAY, METERED RESPIRATORY (INHALATION)
Qty: 4 G | Refills: 2 | Status: SHIPPED | OUTPATIENT
Start: 2023-09-05

## 2023-09-05 NOTE — TELEPHONE ENCOUNTER
Last visit- 11/23/2022  Next visit- Visit date not found    Requested Prescriptions     Pending Prescriptions Disp Refills    SPIRIVA RESPIMAT 2.5 MCG/ACT AERS inhaler [Pharmacy Med Name: [place] Heath 2.5MCG AEROSOL SOLN] 4 g 11     Sig: INHALE TWO (2) PUFFS INTO THE LUNGS DAILY

## 2023-11-24 RX ORDER — CLOPIDOGREL BISULFATE 75 MG/1
TABLET ORAL
Qty: 90 TABLET | Refills: 3 | Status: SHIPPED | OUTPATIENT
Start: 2023-11-24

## 2023-11-24 NOTE — TELEPHONE ENCOUNTER
Last visit- 11/23/2022  Next visit- Visit date not found    Requested Prescriptions     Pending Prescriptions Disp Refills    clopidogrel (PLAVIX) 75 MG tablet [Pharmacy Med Name: CLOPIDOGREL 75 MG Tablet] 90 tablet 3     Sig: TAKE 1 TABLET EVERY DAY

## 2024-01-15 ENCOUNTER — HOSPITAL ENCOUNTER (OUTPATIENT)
Age: 79
Discharge: HOME | End: 2024-01-15
Payer: MEDICARE

## 2024-01-15 DIAGNOSIS — I10 ESSENTIAL HYPERTENSION: ICD-10-CM

## 2024-01-15 DIAGNOSIS — E87.5 HYPERKALEMIA: ICD-10-CM

## 2024-01-15 DIAGNOSIS — E78.5 DYSLIPIDEMIA (HIGH LDL; LOW HDL): ICD-10-CM

## 2024-01-15 LAB
ALBUMIN SERPL BCG-MCNC: 4.4 G/DL (ref 3.5–5.1)
ALP SERPL-CCNC: 72 U/L (ref 38–126)
ALT SERPL W/O P-5'-P-CCNC: 24 U/L (ref 11–66)
ANION GAP SERPL CALC-SCNC: 8 MEQ/L (ref 8–16)
AST SERPL-CCNC: 22 U/L (ref 5–40)
BILIRUB CONJ SERPL-MCNC: 0.3 MG/DL (ref 0–0.3)
BILIRUB SERPL-MCNC: 1.7 MG/DL (ref 0.3–1.2)
BUN SERPL-MCNC: 14 MG/DL (ref 7–22)
CALCIUM SERPL-MCNC: 9.4 MG/DL (ref 8.5–10.5)
CHLORIDE SERPL-SCNC: 103 MEQ/L (ref 98–111)
CHOLEST SERPL-MCNC: 137 MG/DL (ref 100–199)
CO2 SERPL-SCNC: 30 MEQ/L (ref 23–33)
CREAT SERPL-MCNC: 0.9 MG/DL (ref 0.4–1.2)
DEPRECATED RDW RBC AUTO: 48 FL (ref 35–45)
ERYTHROCYTE [DISTWIDTH] IN BLOOD BY AUTOMATED COUNT: 13.2 % (ref 11.5–14.5)
GFR SERPL CREATININE-BSD FRML MDRD: > 60 ML/MIN/1.73M2
GLUCOSE SERPL-MCNC: 99 MG/DL (ref 70–108)
HCT VFR BLD AUTO: 41.4 % (ref 42–52)
HDLC SERPL-MCNC: 31 MG/DL
HGB BLD-MCNC: 14 GM/DL (ref 14–18)
LDLC SERPL CALC-MCNC: 65 MG/DL
MCH RBC QN AUTO: 33.7 PG (ref 26–33)
MCHC RBC AUTO-ENTMCNC: 33.8 GM/DL (ref 32.2–35.5)
MCV RBC AUTO: 99.8 FL (ref 80–94)
PLATELET # BLD AUTO: 164 THOU/MM3 (ref 130–400)
PMV BLD AUTO: 10.1 FL (ref 9.4–12.4)
POTASSIUM SERPL-SCNC: 4.6 MEQ/L (ref 3.5–5.2)
PROT SERPL-MCNC: 6.7 G/DL (ref 6.1–8)
RBC # BLD AUTO: 4.15 MILL/MM3 (ref 4.7–6.1)
SODIUM SERPL-SCNC: 141 MEQ/L (ref 135–145)
TRIGL SERPL-MCNC: 206 MG/DL (ref 0–199)
WBC # BLD AUTO: 6.2 THOU/MM3 (ref 4.8–10.8)

## 2024-01-15 PROCEDURE — 80053 COMPREHEN METABOLIC PANEL: CPT

## 2024-01-15 PROCEDURE — 80061 LIPID PANEL: CPT

## 2024-01-15 PROCEDURE — 85027 COMPLETE CBC AUTOMATED: CPT

## 2024-01-15 PROCEDURE — 36415 COLL VENOUS BLD VENIPUNCTURE: CPT

## 2024-01-15 PROCEDURE — 82248 BILIRUBIN DIRECT: CPT

## 2024-01-18 ENCOUNTER — OFFICE VISIT (OUTPATIENT)
Dept: CARDIOLOGY CLINIC | Age: 79
End: 2024-01-18
Payer: MEDICARE

## 2024-01-18 VITALS
HEIGHT: 69 IN | BODY MASS INDEX: 29.07 KG/M2 | DIASTOLIC BLOOD PRESSURE: 76 MMHG | WEIGHT: 196.25 LBS | HEART RATE: 66 BPM | SYSTOLIC BLOOD PRESSURE: 140 MMHG

## 2024-01-18 DIAGNOSIS — I73.9 CLAUDICATION (HCC): ICD-10-CM

## 2024-01-18 DIAGNOSIS — I25.10 CORONARY ARTERY DISEASE INVOLVING NATIVE CORONARY ARTERY OF NATIVE HEART WITHOUT ANGINA PECTORIS: ICD-10-CM

## 2024-01-18 DIAGNOSIS — I73.9 PAD (PERIPHERAL ARTERY DISEASE) (HCC): Primary | ICD-10-CM

## 2024-01-18 DIAGNOSIS — R00.2 HEART PALPITATIONS: ICD-10-CM

## 2024-01-18 DIAGNOSIS — R94.31 ABNORMAL EKG: ICD-10-CM

## 2024-01-18 DIAGNOSIS — I10 PRIMARY HYPERTENSION: ICD-10-CM

## 2024-01-18 PROCEDURE — 1123F ACP DISCUSS/DSCN MKR DOCD: CPT | Performed by: INTERNAL MEDICINE

## 2024-01-18 PROCEDURE — 3078F DIAST BP <80 MM HG: CPT | Performed by: INTERNAL MEDICINE

## 2024-01-18 PROCEDURE — G8417 CALC BMI ABV UP PARAM F/U: HCPCS | Performed by: INTERNAL MEDICINE

## 2024-01-18 PROCEDURE — 99204 OFFICE O/P NEW MOD 45 MIN: CPT | Performed by: INTERNAL MEDICINE

## 2024-01-18 PROCEDURE — G8484 FLU IMMUNIZE NO ADMIN: HCPCS | Performed by: INTERNAL MEDICINE

## 2024-01-18 PROCEDURE — G8427 DOCREV CUR MEDS BY ELIG CLIN: HCPCS | Performed by: INTERNAL MEDICINE

## 2024-01-18 PROCEDURE — 3077F SYST BP >= 140 MM HG: CPT | Performed by: INTERNAL MEDICINE

## 2024-01-18 PROCEDURE — 1036F TOBACCO NON-USER: CPT | Performed by: INTERNAL MEDICINE

## 2024-01-18 PROCEDURE — 93000 ELECTROCARDIOGRAM COMPLETE: CPT | Performed by: INTERNAL MEDICINE

## 2024-01-18 RX ORDER — ISOSORBIDE MONONITRATE 30 MG/1
30 TABLET, EXTENDED RELEASE ORAL DAILY
Qty: 90 TABLET | Refills: 3 | Status: SHIPPED | OUTPATIENT
Start: 2024-01-18

## 2024-01-18 NOTE — PROGRESS NOTES
New patient former amanda mukherjee here for 1 yr check up     EKG done today     Pt continues with heart palpitations ,     Pt wife reviewed med list on my chart all is correct

## 2024-01-18 NOTE — PROGRESS NOTES
[facility] [place] [facility]  [facility] CARDIOLOGY  [address]  [address]  [facility] [address]  Dept: [phone]  Dept Fax: [phone]  Loc: [phone]    Visit Date: 1/18/2024    Mr. Angel is a 78 y.o. male  who presented for:  Chief Complaint   Patient presents with    Check-Up    Hypertension       HPI:   77 yo M c hx of CAD s/p CABG x 4 (2006), HTN, HLD, COPD, DM, PAD s/p Rt PTA is here to establish cardiology. He does report bilateral leg pains.  He had endarectomy of CFA on right.           Current Outpatient Medications:     clopidogrel (PLAVIX) 75 MG tablet, TAKE 1 TABLET EVERY DAY, Disp: 90 tablet, Rfl: 3    SPIRIVA RESPIMAT 2.5 MCG/ACT AERS inhaler, INHALE TWO (2) PUFFS INTO THE LUNGS DAILY, Disp: 4 g, Rfl: 2    pantoprazole (PROTONIX) 40 MG tablet, TAKE 1 TABLET EVERY MORNING (BEFORE BREAKFAST), Disp: 90 tablet, Rfl: 0    allopurinol (ZYLOPRIM) 300 MG tablet, TAKE 1 TABLET EVERY DAY, Disp: 90 tablet, Rfl: 0    metoprolol tartrate (LOPRESSOR) 50 MG tablet, TAKE 1/2 TABLET EVERY DAY, Disp: 45 tablet, Rfl: 3    simvastatin (ZOCOR) 20 MG tablet, TAKE 1 TABLET EVERY NIGHT, Disp: 90 tablet, Rfl: 3    isosorbide mononitrate (IMDUR) 30 MG extended release tablet, Take 1 tablet by mouth daily, Disp: 90 tablet, Rfl: 3    Multiple Vitamin (MULTIVITAMIN PO), Take by mouth daily , Disp: , Rfl:     albuterol sulfate  (90 Base) MCG/ACT inhaler, Inhale 2 puffs into the lungs every 6 hours as needed for Wheezing, Disp: 1 Inhaler, Rfl: 11    nitroGLYCERIN (NITROSTAT) 0.4 MG SL tablet, Place 1 tablet under the tongue every 5 minutes as needed for Chest pain, Disp: 25 tablet, Rfl: 3    ferrous sulfate 325 (65 FE) MG tablet, Take 1 tablet by mouth daily, Disp: , Rfl:     acetaminophen (TYLENOL) 500 MG tablet, Take 2 tablets by mouth as needed for Pain, Disp: , Rfl:     aspirin 325 MG EC tablet, Take by mouth daily , Disp: , Rfl:     Past Medical History  Conner  has a past medical

## 2024-01-22 ENCOUNTER — HOSPITAL ENCOUNTER (OUTPATIENT)
Dept: INTERVENTIONAL RADIOLOGY/VASCULAR | Age: 79
Discharge: HOME | End: 2024-01-24
Attending: INTERNAL MEDICINE
Payer: MEDICARE

## 2024-01-22 DIAGNOSIS — I73.9 CLAUDICATION (HCC): ICD-10-CM

## 2024-01-22 DIAGNOSIS — I10 PRIMARY HYPERTENSION: ICD-10-CM

## 2024-01-22 DIAGNOSIS — R00.2 HEART PALPITATIONS: ICD-10-CM

## 2024-01-22 DIAGNOSIS — I73.9 PAD (PERIPHERAL ARTERY DISEASE) (HCC): ICD-10-CM

## 2024-01-22 DIAGNOSIS — R94.31 ABNORMAL EKG: ICD-10-CM

## 2024-01-22 DIAGNOSIS — I25.10 CORONARY ARTERY DISEASE INVOLVING NATIVE CORONARY ARTERY OF NATIVE HEART WITHOUT ANGINA PECTORIS: ICD-10-CM

## 2024-01-22 PROCEDURE — 93922 UPR/L XTREMITY ART 2 LEVELS: CPT

## 2024-01-25 ENCOUNTER — TELEPHONE (OUTPATIENT)
Dept: FAMILY MEDICINE CLINIC | Age: 79
End: 2024-01-25

## 2024-01-25 NOTE — TELEPHONE ENCOUNTER
Patient would like to see if you would be willing to provide rx for him to get a handicap placard. Has h/o OA, CAD, COPD.  If ok, please mail to pt.    Ok for order? Duration 5 years?  Order pended.

## 2024-02-15 RX ORDER — PANTOPRAZOLE SODIUM 40 MG/1
TABLET, DELAYED RELEASE ORAL
Qty: 90 TABLET | Refills: 3 | Status: SHIPPED | OUTPATIENT
Start: 2024-02-15

## 2024-02-15 NOTE — TELEPHONE ENCOUNTER
Request sent from [facility] pharmacy for refill of protonix 40 mg qam.  Last seen 11/23/22, next appt 3/4/24.  Med verified.  Order pended.

## 2024-03-01 SDOH — ECONOMIC STABILITY: FOOD INSECURITY: WITHIN THE PAST 12 MONTHS, THE FOOD YOU BOUGHT JUST DIDN'T LAST AND YOU DIDN'T HAVE MONEY TO GET MORE.: NEVER TRUE

## 2024-03-01 SDOH — ECONOMIC STABILITY: INCOME INSECURITY: HOW HARD IS IT FOR YOU TO PAY FOR THE VERY BASICS LIKE FOOD, HOUSING, MEDICAL CARE, AND HEATING?: NOT VERY HARD

## 2024-03-01 SDOH — ECONOMIC STABILITY: HOUSING INSECURITY
IN THE LAST 12 MONTHS, WAS THERE A TIME WHEN YOU DID NOT HAVE A STEADY PLACE TO SLEEP OR SLEPT IN A SHELTER (INCLUDING NOW)?: NO

## 2024-03-01 SDOH — ECONOMIC STABILITY: FOOD INSECURITY: WITHIN THE PAST 12 MONTHS, YOU WORRIED THAT YOUR FOOD WOULD RUN OUT BEFORE YOU GOT MONEY TO BUY MORE.: NEVER TRUE

## 2024-03-01 ASSESSMENT — PATIENT HEALTH QUESTIONNAIRE - PHQ9
SUM OF ALL RESPONSES TO PHQ QUESTIONS 1-9: 0
SUM OF ALL RESPONSES TO PHQ QUESTIONS 1-9: 0
2. FEELING DOWN, DEPRESSED OR HOPELESS: 0
1. LITTLE INTEREST OR PLEASURE IN DOING THINGS: 0
SUM OF ALL RESPONSES TO PHQ9 QUESTIONS 1 & 2: 0
SUM OF ALL RESPONSES TO PHQ QUESTIONS 1-9: 0
SUM OF ALL RESPONSES TO PHQ QUESTIONS 1-9: 0

## 2024-03-01 ASSESSMENT — LIFESTYLE VARIABLES
HOW OFTEN DO YOU HAVE A DRINK CONTAINING ALCOHOL: 2
HOW MANY STANDARD DRINKS CONTAINING ALCOHOL DO YOU HAVE ON A TYPICAL DAY: 1
HOW OFTEN DO YOU HAVE SIX OR MORE DRINKS ON ONE OCCASION: 1
HOW MANY STANDARD DRINKS CONTAINING ALCOHOL DO YOU HAVE ON A TYPICAL DAY: 1 OR 2
HOW OFTEN DO YOU HAVE A DRINK CONTAINING ALCOHOL: MONTHLY OR LESS

## 2024-03-04 ENCOUNTER — OFFICE VISIT (OUTPATIENT)
Dept: FAMILY MEDICINE CLINIC | Age: 79
End: 2024-03-04
Payer: MEDICARE

## 2024-03-04 VITALS
DIASTOLIC BLOOD PRESSURE: 70 MMHG | SYSTOLIC BLOOD PRESSURE: 138 MMHG | TEMPERATURE: 97.7 F | WEIGHT: 199.6 LBS | HEIGHT: 69 IN | BODY MASS INDEX: 29.56 KG/M2 | RESPIRATION RATE: 16 BRPM | HEART RATE: 68 BPM

## 2024-03-04 DIAGNOSIS — R20.2 NUMBNESS AND TINGLING OF BOTH FEET: ICD-10-CM

## 2024-03-04 DIAGNOSIS — I25.810 CORONARY ARTERY DISEASE INVOLVING CORONARY BYPASS GRAFT OF NATIVE HEART WITHOUT ANGINA PECTORIS: ICD-10-CM

## 2024-03-04 DIAGNOSIS — Z00.00 MEDICARE ANNUAL WELLNESS VISIT, SUBSEQUENT: Primary | ICD-10-CM

## 2024-03-04 DIAGNOSIS — M05.20 RHEUMATOID ARTERITIS (HCC): ICD-10-CM

## 2024-03-04 DIAGNOSIS — I73.9 PERIPHERAL VASCULAR DISEASE (HCC): ICD-10-CM

## 2024-03-04 DIAGNOSIS — J44.9 COPD WITHOUT EXACERBATION (HCC): ICD-10-CM

## 2024-03-04 DIAGNOSIS — L21.9 SEBORRHEIC DERMATITIS: ICD-10-CM

## 2024-03-04 DIAGNOSIS — L98.9 LESION OF SKIN OF CHEEK: ICD-10-CM

## 2024-03-04 DIAGNOSIS — I73.9 INTERMITTENT CLAUDICATION (HCC): ICD-10-CM

## 2024-03-04 DIAGNOSIS — I20.1 ANGINA PECTORIS, VARIANT (HCC): ICD-10-CM

## 2024-03-04 DIAGNOSIS — R20.0 NUMBNESS AND TINGLING OF BOTH FEET: ICD-10-CM

## 2024-03-04 DIAGNOSIS — I10 ESSENTIAL HYPERTENSION: ICD-10-CM

## 2024-03-04 DIAGNOSIS — Z12.5 SCREENING PSA (PROSTATE SPECIFIC ANTIGEN): ICD-10-CM

## 2024-03-04 DIAGNOSIS — Z87.39 HISTORY OF GOUT: ICD-10-CM

## 2024-03-04 PROCEDURE — 3078F DIAST BP <80 MM HG: CPT | Performed by: NURSE PRACTITIONER

## 2024-03-04 PROCEDURE — 3075F SYST BP GE 130 - 139MM HG: CPT | Performed by: NURSE PRACTITIONER

## 2024-03-04 PROCEDURE — G8484 FLU IMMUNIZE NO ADMIN: HCPCS | Performed by: NURSE PRACTITIONER

## 2024-03-04 PROCEDURE — 1123F ACP DISCUSS/DSCN MKR DOCD: CPT | Performed by: NURSE PRACTITIONER

## 2024-03-04 PROCEDURE — G0439 PPPS, SUBSEQ VISIT: HCPCS | Performed by: NURSE PRACTITIONER

## 2024-03-04 RX ORDER — KETOCONAZOLE 20 MG/ML
SHAMPOO TOPICAL
Qty: 120 ML | Refills: 1 | Status: SHIPPED | OUTPATIENT
Start: 2024-03-04

## 2024-03-04 RX ORDER — ALLOPURINOL 300 MG/1
300 TABLET ORAL DAILY
Qty: 90 TABLET | Refills: 3 | Status: SHIPPED | OUTPATIENT
Start: 2024-03-04

## 2024-03-04 RX ORDER — METOPROLOL TARTRATE 50 MG/1
25 TABLET, FILM COATED ORAL DAILY
Qty: 45 TABLET | Refills: 3 | Status: SHIPPED | OUTPATIENT
Start: 2024-03-04

## 2024-03-04 RX ORDER — SIMVASTATIN 20 MG
20 TABLET ORAL NIGHTLY
Qty: 90 TABLET | Refills: 3 | Status: SHIPPED | OUTPATIENT
Start: 2024-03-04

## 2024-03-04 NOTE — PROGRESS NOTES
[facility]  [address]  [facility] [address]  Dept: [phone]  Dept Fax: [phone]    SUBJECTIVE     Chief Complaint   Patient presents with    Medicare AWV     Here today for Medicare AWV.     Scalp problem     C/O itchy scalp x several months.     Tinnitus     C/O tinnitus for \"a long time\".        Conner Angel is a 78 y.o.male    Pt presents for follow up of HTN, hyperlipidemia, PVD, CAD, COPD, GERD, AAA, anemia, Randolph syndrome, and gout.    Pain in bilat legs. Recent TRAVON's are positive for leg pain from feet to hips. This is mostly when ambulating and improves when sitting.   Some numbness and tingling in the feet clear up to shins, even at rest.     Saw a dermatologist many years ago for scalp itching. However, this did not help. He is currently dealing with lots of itching on the scalp and dandruff. This is affecting his beard as well.   Notes a sore area on his left cheek. It will heal and then get sore again.    The home BP readings are doing well.       Wt Readings from Last 3 Encounters:   03/04/24 90.5 kg (199 lb 9.6 oz)   01/18/24 89 kg (196 lb 4 oz)   01/26/23 89.4 kg (197 lb)       Patient Active Problem List   Diagnosis    Coronary artery disease involving native coronary artery of native heart without angina pectoris    Erectile dysfunction    Hepatic steatosis    Low HDL (under 40)    Essential hypertension    Gastroesophageal reflux disease    Hyperlipidemia    Primary osteoarthritis of left knee    Coronary artery disease involving coronary bypass graft of native heart without angina pectoris- Dr. Encinas    COPD without exacerbation (HCC)    Gout of left foot    Abdominal aortic aneurysm (AAA) without rupture (HCC)    Angina pectoris, variant (HCC)    Abnormal nuclear stress test    PVD (peripheral vascular disease) with claudication (HCC)    Iron deficiency anemia    Pneumonia    Closed nondisplaced fracture of third cervical vertebra (HCC)    Closed head injury

## 2024-03-08 ASSESSMENT — ENCOUNTER SYMPTOMS
EYES NEGATIVE: 1
SHORTNESS OF BREATH: 0
ABDOMINAL PAIN: 0
BLOOD IN STOOL: 0
CHEST TIGHTNESS: 0

## 2024-03-08 NOTE — PROGRESS NOTES
Referral and notes faxed to Dr. Branch's office at [phone]. They will contact the pt to schedule.

## 2024-03-21 ENCOUNTER — HOSPITAL ENCOUNTER (OUTPATIENT)
Age: 79
Discharge: HOME | End: 2024-03-21
Payer: MEDICARE

## 2024-03-21 DIAGNOSIS — Z12.5 SCREENING PSA (PROSTATE SPECIFIC ANTIGEN): ICD-10-CM

## 2024-03-21 PROCEDURE — 36415 COLL VENOUS BLD VENIPUNCTURE: CPT

## 2024-03-21 PROCEDURE — G0103 PSA SCREENING: HCPCS

## 2024-03-22 ENCOUNTER — OFFICE VISIT (OUTPATIENT)
Dept: FAMILY MEDICINE CLINIC | Age: 79
End: 2024-03-22
Payer: MEDICARE

## 2024-03-22 VITALS
SYSTOLIC BLOOD PRESSURE: 130 MMHG | HEART RATE: 60 BPM | DIASTOLIC BLOOD PRESSURE: 66 MMHG | BODY MASS INDEX: 28.5 KG/M2 | RESPIRATION RATE: 18 BRPM | WEIGHT: 193 LBS | TEMPERATURE: 97.7 F

## 2024-03-22 DIAGNOSIS — R09.89 RHONCHI: ICD-10-CM

## 2024-03-22 DIAGNOSIS — J40 BRONCHITIS: Primary | ICD-10-CM

## 2024-03-22 LAB — PSA SERPL-MCNC: 0.63 NG/ML (ref 0–1)

## 2024-03-22 PROCEDURE — 3078F DIAST BP <80 MM HG: CPT | Performed by: NURSE PRACTITIONER

## 2024-03-22 PROCEDURE — 99213 OFFICE O/P EST LOW 20 MIN: CPT | Performed by: NURSE PRACTITIONER

## 2024-03-22 PROCEDURE — G8427 DOCREV CUR MEDS BY ELIG CLIN: HCPCS | Performed by: NURSE PRACTITIONER

## 2024-03-22 PROCEDURE — G8484 FLU IMMUNIZE NO ADMIN: HCPCS | Performed by: NURSE PRACTITIONER

## 2024-03-22 PROCEDURE — G8417 CALC BMI ABV UP PARAM F/U: HCPCS | Performed by: NURSE PRACTITIONER

## 2024-03-22 PROCEDURE — 1036F TOBACCO NON-USER: CPT | Performed by: NURSE PRACTITIONER

## 2024-03-22 PROCEDURE — 3075F SYST BP GE 130 - 139MM HG: CPT | Performed by: NURSE PRACTITIONER

## 2024-03-22 PROCEDURE — 1123F ACP DISCUSS/DSCN MKR DOCD: CPT | Performed by: NURSE PRACTITIONER

## 2024-03-22 RX ORDER — AMOXICILLIN AND CLAVULANATE POTASSIUM 875; 125 MG/1; MG/1
1 TABLET, FILM COATED ORAL 2 TIMES DAILY
Qty: 20 TABLET | Refills: 0 | Status: SHIPPED | OUTPATIENT
Start: 2024-03-22 | End: 2024-04-01

## 2024-03-22 RX ORDER — BENZONATATE 100 MG/1
100 CAPSULE ORAL 3 TIMES DAILY PRN
Qty: 30 CAPSULE | Refills: 0 | Status: SHIPPED | OUTPATIENT
Start: 2024-03-22 | End: 2024-04-01

## 2024-03-22 ASSESSMENT — ENCOUNTER SYMPTOMS
BLOOD IN STOOL: 0
SINUS PRESSURE: 1
COUGH: 1
ABDOMINAL PAIN: 0
CHEST TIGHTNESS: 0
SHORTNESS OF BREATH: 0

## 2024-03-22 NOTE — PROGRESS NOTES
Chief Complaint   Patient presents with    Cough     Pt still c/o cough and congestion and would like to discuss. Pt has tried OTC medications and they have not been working.         SUBJECTIVE     Ocnneredson Angel is a 78 y.o.male      Pt complains of cough, sinus congestion, PND, sinus pressure, ear pressure. Has tried a few different cold and cough medicine without relief. Home covid is negative.     Review of Systems   Constitutional:  Positive for activity change and fatigue. Negative for chills, fever and unexpected weight change.   HENT:  Positive for congestion, postnasal drip and sinus pressure.    Eyes: Negative.    Respiratory:  Positive for cough. Negative for chest tightness and shortness of breath.    Cardiovascular:  Negative for chest pain, palpitations and leg swelling.   Gastrointestinal:  Negative for abdominal pain and blood in stool.   Genitourinary:  Negative for dysuria.   Musculoskeletal:  Negative for joint swelling.   Skin:  Negative for rash.   Neurological:  Negative for dizziness.   Psychiatric/Behavioral: Negative.     All other systems reviewed and are negative.        OBJECTIVE     /66   Pulse 60   Temp 97.7 °F (36.5 °C) (Oral)   Resp 18   Wt 87.5 kg (193 lb)   BMI 28.50 kg/m²     Physical Exam  Vitals and nursing note reviewed.   Constitutional:       General: He is not in acute distress.     Appearance: He is well-developed. He is ill-appearing. He is not toxic-appearing.   HENT:      Head: Normocephalic and atraumatic.      Right Ear: External ear normal.      Left Ear: External ear normal.      Nose: Nose normal.   Eyes:      Conjunctiva/sclera: Conjunctivae normal.      Pupils: Pupils are equal, round, and reactive to light.   Cardiovascular:      Rate and Rhythm: Normal rate and regular rhythm.   Pulmonary:      Effort: Pulmonary effort is normal.      Breath sounds: Rhonchi present.   Abdominal:      General: Bowel sounds are normal.      Palpations: Abdomen is soft.

## 2024-03-25 ENCOUNTER — HOSPITAL ENCOUNTER (OUTPATIENT)
Dept: GENERAL RADIOLOGY | Age: 79
Discharge: HOME | End: 2024-03-25
Payer: MEDICARE

## 2024-03-25 ENCOUNTER — HOSPITAL ENCOUNTER (OUTPATIENT)
Age: 79
Discharge: HOME | End: 2024-03-25
Payer: MEDICARE

## 2024-03-25 DIAGNOSIS — R09.89 RHONCHI: ICD-10-CM

## 2024-03-25 DIAGNOSIS — J40 BRONCHITIS: ICD-10-CM

## 2024-03-25 PROCEDURE — 71046 X-RAY EXAM CHEST 2 VIEWS: CPT

## 2024-03-26 ENCOUNTER — TELEPHONE (OUTPATIENT)
Dept: FAMILY MEDICINE CLINIC | Age: 79
End: 2024-03-26

## 2024-03-26 DIAGNOSIS — R06.2 WHEEZING: Primary | ICD-10-CM

## 2024-03-26 NOTE — TELEPHONE ENCOUNTER
Tried calling patient but the line keeps ringing busy every time I call and I am unable to leave a VM.    Will try again at a later time.

## 2024-03-26 NOTE — TELEPHONE ENCOUNTER
----- Message from MERE Singh - CNP sent at 3/25/2024 10:39 PM EDT -----  CXR is negative for acute issues. How is he feeling today? TS

## 2024-03-28 RX ORDER — PREDNISONE 20 MG/1
20 TABLET ORAL 2 TIMES DAILY
Qty: 10 TABLET | Refills: 0 | Status: SHIPPED | OUTPATIENT
Start: 2024-03-28 | End: 2024-04-02

## 2024-03-28 NOTE — TELEPHONE ENCOUNTER
Tried to call pt and his wife but both numbers stated all circuits were busy and to try again later. Ledy sent to pt letting him know a steroid was sent into [facility] and to give the office a call if he had any questions.

## 2024-03-28 NOTE — TELEPHONE ENCOUNTER
Spoke with pt's wife and she let me know that pt is feeling slightly better but is still noticing some wheezing. Wife states that pt is still dealing with his head and ears being plugged which is bothering pt the most. Does pt need to do anything further or any recommendations? Please advise.

## 2024-04-01 ENCOUNTER — OFFICE VISIT (OUTPATIENT)
Age: 79
End: 2024-04-01
Payer: MEDICARE

## 2024-04-01 VITALS
HEART RATE: 60 BPM | SYSTOLIC BLOOD PRESSURE: 155 MMHG | BODY MASS INDEX: 29.1 KG/M2 | WEIGHT: 196.5 LBS | HEIGHT: 69 IN | DIASTOLIC BLOOD PRESSURE: 79 MMHG

## 2024-04-01 DIAGNOSIS — I70.213 ATHEROSCLEROSIS OF NATIVE ARTERY OF BOTH LOWER EXTREMITIES WITH INTERMITTENT CLAUDICATION (HCC): ICD-10-CM

## 2024-04-01 DIAGNOSIS — I73.9 CLAUDICATION (HCC): Primary | ICD-10-CM

## 2024-04-01 PROCEDURE — 3077F SYST BP >= 140 MM HG: CPT | Performed by: THORACIC SURGERY (CARDIOTHORACIC VASCULAR SURGERY)

## 2024-04-01 PROCEDURE — G8427 DOCREV CUR MEDS BY ELIG CLIN: HCPCS | Performed by: THORACIC SURGERY (CARDIOTHORACIC VASCULAR SURGERY)

## 2024-04-01 PROCEDURE — G8417 CALC BMI ABV UP PARAM F/U: HCPCS | Performed by: THORACIC SURGERY (CARDIOTHORACIC VASCULAR SURGERY)

## 2024-04-01 PROCEDURE — 1123F ACP DISCUSS/DSCN MKR DOCD: CPT | Performed by: THORACIC SURGERY (CARDIOTHORACIC VASCULAR SURGERY)

## 2024-04-01 PROCEDURE — 99204 OFFICE O/P NEW MOD 45 MIN: CPT | Performed by: THORACIC SURGERY (CARDIOTHORACIC VASCULAR SURGERY)

## 2024-04-01 PROCEDURE — 3078F DIAST BP <80 MM HG: CPT | Performed by: THORACIC SURGERY (CARDIOTHORACIC VASCULAR SURGERY)

## 2024-04-01 PROCEDURE — 1036F TOBACCO NON-USER: CPT | Performed by: THORACIC SURGERY (CARDIOTHORACIC VASCULAR SURGERY)

## 2024-04-01 RX ORDER — CILOSTAZOL 100 MG/1
100 TABLET ORAL 2 TIMES DAILY
Qty: 60 TABLET | Refills: 3 | Status: SHIPPED | OUTPATIENT
Start: 2024-04-01

## 2024-04-01 NOTE — PATIENT INSTRUCTIONS
If you receive a survey asking about your care experience, please respond. Your answers will help ensure you receive high-quality care at this office. Thank you!    Your Medical Assistant today: Beth  Thank you for coming to our office! It was a pleasure to serve you.

## 2024-04-01 NOTE — PROGRESS NOTES
PROCEDURE: VL LOWER EXTREMITY ARTERIES BILATERAL    CLINICAL INFORMATION: PVD (peripheral vascular disease) ([facility])    COMPARISON: No prior study.    TECHNIQUE: Multiple grayscale and color flow sonographic images of the major arteries of both lower extremities were obtained from the level of the groin to the level of the ankle . Spectral Doppler waveforms were obtained and velocity measurements were   measured.                FINDINGS:     RIGHT ARTERY  (PSV cm/sec)    CFA ---------------> 164 PSV cm/sec  PROF -------------> 105 PSV cm/sec  SFA PROX ------->86 PSV cm/sec   SFA MID ---------->210 PSV cm/sec  SFA DIST -------->106 PSV cm/sec   POP A PROX --->33 PSV cm/sec   POP A DIST ---->41 PSV cm/sec   PTA --------------->27 PSV cm/sec   PERONEAL ----->36 PSV cm/sec   MELODIE ----------------> 44 PSV cm/sec      LEFT ARTERY  (PSV cm/sec)  ??????????????????????????  CFA ---------------> 105 PSV cm/sec  PROF -------------> 122 PSV cm/sec  SFA PROX ------->68 PSV cm/sec   SFA MID ---------->90 PSV cm/sec  SFA DIST -------->64 PSV cm/sec   POP A PROX --->61 PSV cm/sec   POP A DIST ---->35 PSV cm/sec   PTA --------------->29 PSV cm/sec   PERONEAL ----->38 PSV cm/sec   MELODIE ----------------> 40 PSV cm/sec      TRAVON  RIGHT    MELODIE----->1.02  PTA----->0.99      TRAVON   LEFT    MELODIE----->1.02  PTA----->1.03    VELOCITY MEASUREMENTS: The ABIs are normal bilaterally.     RIGHT LEG: Moderate atherosclerotic plaque is seen in the proximal SFA which becomes worse in the mid SFA. There is a significant elevation of velocity at this site, suggesting a stenosis of at least 50%. Moderate atherosclerotic plaque is seen in the   distal SFA with no appreciable stenosis. Is also moderate plaque in the right popliteal artery with stenosis less than 50%. Pulsatility in the calf vessels is moderately dampened.    LEFT LEG: Excellent pulsatility is seen in the common femoral artery and profunda femoris artery. Moderate atherosclerotic plaque is seen

## 2024-04-02 ASSESSMENT — ENCOUNTER SYMPTOMS
GASTROINTESTINAL NEGATIVE: 1
EYES NEGATIVE: 1
ALLERGIC/IMMUNOLOGIC NEGATIVE: 1
RESPIRATORY NEGATIVE: 1

## 2024-04-19 ENCOUNTER — HOSPITAL ENCOUNTER (EMERGENCY)
Age: 79
Discharge: HOME | End: 2024-04-19
Payer: MEDICARE

## 2024-04-19 VITALS
RESPIRATION RATE: 16 BRPM | BODY MASS INDEX: 27.44 KG/M2 | OXYGEN SATURATION: 96 % | SYSTOLIC BLOOD PRESSURE: 144 MMHG | TEMPERATURE: 98.7 F | HEART RATE: 83 BPM | HEIGHT: 71 IN | WEIGHT: 196 LBS | DIASTOLIC BLOOD PRESSURE: 77 MMHG

## 2024-04-19 DIAGNOSIS — J32.4 CHRONIC PANSINUSITIS: Primary | ICD-10-CM

## 2024-04-19 PROCEDURE — 99213 OFFICE O/P EST LOW 20 MIN: CPT | Performed by: NURSE PRACTITIONER

## 2024-04-19 PROCEDURE — 99213 OFFICE O/P EST LOW 20 MIN: CPT

## 2024-04-19 RX ORDER — PREDNISONE 20 MG/1
40 TABLET ORAL DAILY
Qty: 10 TABLET | Refills: 0 | Status: SHIPPED | OUTPATIENT
Start: 2024-04-19 | End: 2024-04-24

## 2024-04-19 RX ORDER — DOXYCYCLINE HYCLATE 100 MG
100 TABLET ORAL 2 TIMES DAILY
Qty: 14 TABLET | Refills: 0 | Status: SHIPPED | OUTPATIENT
Start: 2024-04-19 | End: 2024-04-26

## 2024-04-19 ASSESSMENT — ENCOUNTER SYMPTOMS
RHINORRHEA: 1
SINUS PRESSURE: 1
COUGH: 1
SINUS PAIN: 1

## 2024-04-19 ASSESSMENT — PAIN SCALES - GENERAL: PAINLEVEL_OUTOF10: 0

## 2024-04-19 ASSESSMENT — PAIN - FUNCTIONAL ASSESSMENT: PAIN_FUNCTIONAL_ASSESSMENT: 0-10

## 2024-04-19 NOTE — ED PROVIDER NOTES
[facility]  UrgentCare Encounter      CHIEFCOMPLAINT       Chief Complaint   Patient presents with    Facial Pain     Sinus congestion and pressure x 1 month (originally seen 3/22/24 family MD given Augmentin and Tessalon)    Otalgia     Ear pressure       Nurses Notes reviewed and I agree except as noted in the HPI.  HISTORY OF PRESENT ILLNESS   Conner Angel is a 78 y.o. male who presents to urgent care with complaints of sinus congestion, pressure, pain, postnasal drainage, ear pain for approximately 1 month.  He states that he took a round of antibiotics at the end of March but symptoms never seem to fully get better.  They seem to come back worse.    REVIEW OF SYSTEMS     Review of Systems   HENT:  Positive for congestion, postnasal drip, rhinorrhea, sinus pressure and sinus pain.    Respiratory:  Positive for cough.        PAST MEDICAL HISTORY         Diagnosis Date    AAA (abdominal aortic aneurysm) (HCC)     watching for now    Aneurysm (HCC)     Back pain     Cervical spine fracture (HCC)     Cholelithiasis     asymptomatic    Chronic back pain     COPD (chronic obstructive pulmonary disease) (HCC)     Coronary artery disease     Diabetes mellitus (HCC)     Emphysema of lung (HCC)     Erectile dysfunction     GERD (gastroesophageal reflux disease)     Elevated liver enzymes    Gout     History of blood transfusion     Hyperlipidemia     Hypertension     Liver disease     MI (myocardial infarction) (HCC)     Osteoarthritis     Peripheral vascular disease (HCC)     Syncope and collapse        SURGICAL HISTORY     Patient  has a past surgical history that includes Foot surgery (2011); Coronary artery bypass graft (2006); Cardiac surgery (2006); Colonoscopy (12/18/13); Appendectomy (1955); Cardiac catheterization; Inguinal hernia repair (Right, 12/1/2014); Upper gastrointestinal endoscopy; Endoscopy, colon, diagnostic; thromboendarterectomy (Right, 12/22/2016); Hemorrhoid surgery

## 2024-04-19 NOTE — ED TRIAGE NOTES
To room via ambulation. Pt c/o sinus pressure and congestion with ear pain/pressure x 1 month (originally seen 3/22/24 family MD given Augmentin and Tessalon). Pt states he's had little improvement but denies coughing now

## 2024-04-25 ENCOUNTER — HOSPITAL ENCOUNTER (OUTPATIENT)
Dept: CT IMAGING | Age: 79
Discharge: HOME | End: 2024-04-25
Attending: THORACIC SURGERY (CARDIOTHORACIC VASCULAR SURGERY)
Payer: MEDICARE

## 2024-04-25 DIAGNOSIS — I73.9 CLAUDICATION (HCC): ICD-10-CM

## 2024-04-25 DIAGNOSIS — I70.213 ATHEROSCLEROSIS OF NATIVE ARTERY OF BOTH LOWER EXTREMITIES WITH INTERMITTENT CLAUDICATION (HCC): ICD-10-CM

## 2024-04-25 LAB — POC CREATININE WHOLE BLOOD: 1.2 MG/DL (ref 0.5–1.2)

## 2024-04-25 PROCEDURE — 6360000004 HC RX CONTRAST MEDICATION: Performed by: THORACIC SURGERY (CARDIOTHORACIC VASCULAR SURGERY)

## 2024-04-25 PROCEDURE — 82565 ASSAY OF CREATININE: CPT

## 2024-04-25 PROCEDURE — 75635 CT ANGIO ABDOMINAL ARTERIES: CPT

## 2024-04-25 RX ADMIN — IOPAMIDOL 115 ML: 755 INJECTION, SOLUTION INTRAVENOUS at 09:29

## 2024-05-13 ENCOUNTER — TELEPHONE (OUTPATIENT)
Dept: CARDIOLOGY CLINIC | Age: 79
End: 2024-05-13

## 2024-05-13 ENCOUNTER — OFFICE VISIT (OUTPATIENT)
Age: 79
End: 2024-05-13
Payer: MEDICARE

## 2024-05-13 VITALS
SYSTOLIC BLOOD PRESSURE: 139 MMHG | HEART RATE: 66 BPM | WEIGHT: 189.2 LBS | BODY MASS INDEX: 26.49 KG/M2 | DIASTOLIC BLOOD PRESSURE: 73 MMHG | HEIGHT: 71 IN

## 2024-05-13 DIAGNOSIS — I70.213 ATHEROSCLEROSIS OF NATIVE ARTERY OF BOTH LOWER EXTREMITIES WITH INTERMITTENT CLAUDICATION (HCC): Primary | ICD-10-CM

## 2024-05-13 DIAGNOSIS — I25.10 CORONARY ARTERY DISEASE INVOLVING NATIVE CORONARY ARTERY OF NATIVE HEART WITHOUT ANGINA PECTORIS: ICD-10-CM

## 2024-05-13 PROCEDURE — G8417 CALC BMI ABV UP PARAM F/U: HCPCS | Performed by: THORACIC SURGERY (CARDIOTHORACIC VASCULAR SURGERY)

## 2024-05-13 PROCEDURE — 3075F SYST BP GE 130 - 139MM HG: CPT | Performed by: THORACIC SURGERY (CARDIOTHORACIC VASCULAR SURGERY)

## 2024-05-13 PROCEDURE — 99213 OFFICE O/P EST LOW 20 MIN: CPT | Performed by: THORACIC SURGERY (CARDIOTHORACIC VASCULAR SURGERY)

## 2024-05-13 PROCEDURE — 1123F ACP DISCUSS/DSCN MKR DOCD: CPT | Performed by: THORACIC SURGERY (CARDIOTHORACIC VASCULAR SURGERY)

## 2024-05-13 PROCEDURE — 1036F TOBACCO NON-USER: CPT | Performed by: THORACIC SURGERY (CARDIOTHORACIC VASCULAR SURGERY)

## 2024-05-13 PROCEDURE — G8427 DOCREV CUR MEDS BY ELIG CLIN: HCPCS | Performed by: THORACIC SURGERY (CARDIOTHORACIC VASCULAR SURGERY)

## 2024-05-13 PROCEDURE — 3078F DIAST BP <80 MM HG: CPT | Performed by: THORACIC SURGERY (CARDIOTHORACIC VASCULAR SURGERY)

## 2024-05-13 RX ORDER — BETAMETHASONE DIPROPIONATE 0.5 MG/G
LOTION TOPICAL DAILY
COMMUNITY
Start: 2024-04-03

## 2024-05-13 NOTE — PROGRESS NOTES
abnormally enlarged para-aortic lymph nodes.    Bones : No evidence for acute fracture or bone destruction.    Pelvis: No mass lesion. No free fluid. Urinary bladder unremarkable.     Lower extremities: The muscular and other soft tissue structures of both lower extremities are within normal limits.    Vascular:     AORTA: Distal descending thoracic aorta unremarkable. Small 3.1 cm fusiform aneurysm of the distal abdominal aorta with a moderate amount of mural thrombus. Celiac artery and SMA widely patent. Bilateral renal arteries widely patent. ARELI widely patent.    PELVIC VESSELS: Moderate calcific plaque in both common iliac arteries and external iliac arteries with mild multifocal stenosis. Both internal iliac arteries are patent.    LEFT LEG:     COMMON FEMORAL ARTERY/PROFUNDA:  60% calcific stenosis left common femoral artery. Profunda widely patent.    SFA/POPLITEAL ARTERY:  Extensive calcific plaque in the SFA and popliteal artery with multifocal stenoses upwards of 70%    TRIFURCATION VESSELS: Total occlusion of the posterior tibial artery. The anterior tibial and peroneal arteries are patent but both demonstrate extensive calcific disease. There is collateral reconstitution of the distal posterior tibial artery by   peroneal collateral. Both posterior tibial and anterior tibial arteries are patent at the level of the ankle.    RIGHT LEG:    COMMON FEMORAL ARTERY/PROFUNDA:  Ectatic right common femoral artery, 12 mm. Vascular clips in the right inguinal region from prior surgery. The common femoral artery and profunda are widely patent.    SFA/POPLITEAL ARTERY:  Extensive calcific plaque throughout the SFA and popliteal artery with multifocal stenosis, upwards of 70%    TRIFURCATION VESSELS: Total occlusion of the posterior tibial artery. Extensive calcific plaque in the peroneal and anterior tibial artery with multifocal stenosis. There is collateral reconstitution of the posterior tibial artery distally.

## 2024-05-13 NOTE — TELEPHONE ENCOUNTER
Pt saw Dr. Mari 5-13-24.  Dr. Mari is wanting to proceed with a hybrid approach a left femoral endarterectomy with patch angioplasty, aortogram and possible left iliac artery intervention, left lower extremity angiogram and possible left SFA and popliteal intervention..  Dr. Mari is needing clearance from Dr. Sanon for this procedure.    Pt is on ASA, Plavix and Pletal.

## 2024-05-28 ENCOUNTER — TELEPHONE (OUTPATIENT)
Age: 79
End: 2024-05-28

## 2024-05-28 NOTE — TELEPHONE ENCOUNTER
Surgery Scheduling Form   [facility] [address] [place], [place] [address]    Phone * [phone]   Surgical Scheduling Direct line Phone * [phone]  Fax * [phone]      Conner Angel      1945    male    [address] [facility] [address]   Marital Status:    M     Home Phone: [phone]   Cell Phone:   No relevant phone numbers on file.              Surgeon:Dr. Mari  Surgery Date:7/8/2024   Time: 12:00 pm     Procedure: left femoral endarterectomy with patch angioplasty, aortogram and possible left iliac artery intervention, left lower extremity angiogram and possible left SFA and popliteal intervention.                                  Inpatient     Diagnosis: Atherosclerosis of native arteries of extremities with intermittent claudication, bilateral legs  I70.213    Important Medical History:     Special Inst/Equip:     CPT Codes: 74805,14750    Latex Allergy:   no Cardiac Device:  no    Anesthesia Type: General    Case Location:  HYBRID OR     Preadmission Testing: Phone Call      PAT Date and Time: _7/2/2024   10:30 am________________________    PAT Confirmation #: __Tim_______________________________    Post Op Visit:  __7/17/2024 11:30 am____________________________    Need Preop Cardiac Clearance:   yes, Emeka Sanon MD   to [facility]    5/17/2024 12:31 pm  May proceed at moderate risk         Does patient have Cardiologist/physician? Dr. Sanon      Surgery Conformation #:WILBERT Giron Racer 5/28/2024 1:05 pm__    : _Beth Clark CMA__Date:__5/28/2024__________

## 2024-05-28 NOTE — TELEPHONE ENCOUNTER
Ed is scheduled for left femoral endarterectomy with patch angioplasty, aortogram and possible left iliac artery intervention, left lower extremity angiogram and possible left SFA and popliteal intervention.  with Dr. Angulo on 7/8/2024 at 12:00 pm. Pt agreed to date/time.     Surgery instructions are as follows:  - Arrive to Same Day Surgery at [facility] at 10:00 am  - NPO after midnight the night before surgery  - Hold Aspirin for 5 days prior to surgery  - Hold Pletal for 5 days prior to surgery  - Hold Plavix 5 days prior to surgery  - You must have a  upon discharge home  - Take your Metoprolol morning of surgery          PAT visit scheduled for 7/2/2024 at 10:30 am. Confirmed with Alex.     All instructions discussed with pt, he verbalized understanding. he  denied questions or concerns at this time.     Primary insurance is [employer]. Will obtain prior authorization as necessary.     Hybrid OR Ok per Bree Racer 5/28/2024 1:05 pm.    Text sent to Michaelle Mallory 5/28/2024 1:19 pm.

## 2024-06-26 ENCOUNTER — PREP FOR PROCEDURE (OUTPATIENT)
Age: 79
End: 2024-06-26

## 2024-06-26 DIAGNOSIS — I73.9 PVD (PERIPHERAL VASCULAR DISEASE) WITH CLAUDICATION (HCC): Primary | ICD-10-CM

## 2024-06-26 RX ORDER — SODIUM CHLORIDE 9 MG/ML
INJECTION, SOLUTION INTRAVENOUS CONTINUOUS
Status: CANCELLED | OUTPATIENT
Start: 2024-06-26

## 2024-06-26 RX ORDER — SODIUM CHLORIDE 9 MG/ML
INJECTION, SOLUTION INTRAVENOUS PRN
Status: CANCELLED | OUTPATIENT
Start: 2024-06-26

## 2024-06-26 RX ORDER — SODIUM CHLORIDE 0.9 % (FLUSH) 0.9 %
5-40 SYRINGE (ML) INJECTION EVERY 12 HOURS SCHEDULED
Status: CANCELLED | OUTPATIENT
Start: 2024-06-26

## 2024-06-26 RX ORDER — SODIUM CHLORIDE 0.9 % (FLUSH) 0.9 %
5-40 SYRINGE (ML) INJECTION PRN
Status: CANCELLED | OUTPATIENT
Start: 2024-06-26

## 2024-06-26 NOTE — PROGRESS NOTES
PAT VISIT  Appointment reminder call given  Date: 7/2  Arrival time: 10:30 and location; [facility]st [facility]   Bring Drivers license and insurance  Bring Medications in original bottles  Please be ready to give Urine Sample  If possible bring caregiver for appointment  Take am medications with water unless you are holding any for surgery  Appointment may last 2 hours

## 2024-06-27 DIAGNOSIS — Z01.818 PRE-OP EXAMINATION: Primary | ICD-10-CM

## 2024-07-02 ENCOUNTER — TELEPHONE (OUTPATIENT)
Dept: CARDIOLOGY CLINIC | Age: 79
End: 2024-07-02

## 2024-07-02 ENCOUNTER — HOSPITAL ENCOUNTER (OUTPATIENT)
Dept: GENERAL RADIOLOGY | Age: 79
Discharge: HOME | End: 2024-07-02
Payer: MEDICARE

## 2024-07-02 ENCOUNTER — HOSPITAL ENCOUNTER (OUTPATIENT)
Dept: PREADMISSION TESTING | Age: 79
Discharge: HOME | End: 2024-07-06
Payer: MEDICARE

## 2024-07-02 VITALS
SYSTOLIC BLOOD PRESSURE: 145 MMHG | WEIGHT: 189.38 LBS | OXYGEN SATURATION: 100 % | BODY MASS INDEX: 27.11 KG/M2 | RESPIRATION RATE: 18 BRPM | DIASTOLIC BLOOD PRESSURE: 70 MMHG | HEART RATE: 56 BPM | HEIGHT: 70 IN | TEMPERATURE: 98 F

## 2024-07-02 DIAGNOSIS — I73.9 PVD (PERIPHERAL VASCULAR DISEASE) WITH CLAUDICATION (HCC): ICD-10-CM

## 2024-07-02 DIAGNOSIS — Z01.818 PRE-OP EXAMINATION: ICD-10-CM

## 2024-07-02 LAB
ABO: NORMAL
ANION GAP SERPL CALC-SCNC: 12 MEQ/L (ref 8–16)
ANTIBODY SCREEN: NORMAL
BILIRUB UR QL STRIP: NEGATIVE
BUN SERPL-MCNC: 10 MG/DL (ref 7–22)
CALCIUM SERPL-MCNC: 9 MG/DL (ref 8.5–10.5)
CHARACTER UR: CLEAR
CHLORIDE SERPL-SCNC: 103 MEQ/L (ref 98–111)
CO2 SERPL-SCNC: 25 MEQ/L (ref 23–33)
COLOR UR: YELLOW
CREAT SERPL-MCNC: 1 MG/DL (ref 0.4–1.2)
DEPRECATED RDW RBC AUTO: 46.4 FL (ref 35–45)
ERYTHROCYTE [DISTWIDTH] IN BLOOD BY AUTOMATED COUNT: 13.1 % (ref 11.5–14.5)
GFR SERPL CREATININE-BSD FRML MDRD: 78 ML/MIN/1.73M2
GLUCOSE SERPL-MCNC: 96 MG/DL (ref 70–108)
GLUCOSE UR QL STRIP.AUTO: NEGATIVE MG/DL
HCT VFR BLD AUTO: 39.2 % (ref 42–52)
HGB BLD-MCNC: 13.4 GM/DL (ref 14–18)
HGB UR QL STRIP.AUTO: NEGATIVE
KETONES UR QL STRIP.AUTO: NEGATIVE
LEUKOCYTE ESTERASE UR QL STRIP.AUTO: NEGATIVE
MCH RBC QN AUTO: 33.2 PG (ref 26–33)
MCHC RBC AUTO-ENTMCNC: 34.2 GM/DL (ref 32.2–35.5)
MCV RBC AUTO: 97 FL (ref 80–94)
NITRITE UR QL STRIP.AUTO: NEGATIVE
PH UR STRIP.AUTO: 6.5 [PH] (ref 5–9)
PLATELET # BLD AUTO: 163 THOU/MM3 (ref 130–400)
PMV BLD AUTO: 9.7 FL (ref 9.4–12.4)
POTASSIUM SERPL-SCNC: 4.3 MEQ/L (ref 3.5–5.2)
PROT UR STRIP.AUTO-MCNC: NEGATIVE MG/DL
RBC # BLD AUTO: 4.04 MILL/MM3 (ref 4.7–6.1)
RH FACTOR: NORMAL
SODIUM SERPL-SCNC: 140 MEQ/L (ref 135–145)
SP GR UR REFRACT.AUTO: 1.01 (ref 1–1.03)
UROBILINOGEN UR QL STRIP.AUTO: 0.2 EU/DL (ref 0–1)
WBC # BLD AUTO: 6.2 THOU/MM3 (ref 4.8–10.8)

## 2024-07-02 PROCEDURE — 80048 BASIC METABOLIC PNL TOTAL CA: CPT

## 2024-07-02 PROCEDURE — 86901 BLOOD TYPING SEROLOGIC RH(D): CPT

## 2024-07-02 PROCEDURE — 81003 URINALYSIS AUTO W/O SCOPE: CPT

## 2024-07-02 PROCEDURE — 85027 COMPLETE CBC AUTOMATED: CPT

## 2024-07-02 PROCEDURE — 36415 COLL VENOUS BLD VENIPUNCTURE: CPT

## 2024-07-02 PROCEDURE — 86850 RBC ANTIBODY SCREEN: CPT

## 2024-07-02 PROCEDURE — 86900 BLOOD TYPING SEROLOGIC ABO: CPT

## 2024-07-02 PROCEDURE — 93005 ELECTROCARDIOGRAM TRACING: CPT

## 2024-07-02 PROCEDURE — 71046 X-RAY EXAM CHEST 2 VIEWS: CPT

## 2024-07-02 NOTE — DISCHARGE INSTRUCTIONS
Hold for 5 days before surgery   Aspirin, Pletal, & Plavix   Hold today until  informs to restart   Multiple Vitamin  Take am of surgery  Metoprolol, Isosorbide Mononitrate, Albuterol (if needed), & Spiriva     Nothing to Eat or Drink after midnight except you may have sips of water with medications instructed to take am of surgery.  This includes no mints, gum and ice chips.   Bring insurance info and 's license  Wear comfortable clean clothing  Do not wear jewelry,piercings,nail polish or contact lenses  Shower as instructed prior to surgery  Bring medications in original bottles  Bring CPAP machine if you have one  Follow all instructions given by your physician   needed at discharge  Routine preop instructions given for showering with no lotions,creams or powders.    Do not shave the surgical area! If needed, your nurse will use clippers to remove any excess hair at the surgical site when you come in for surgery. Do not use a razor on the site of your surgery for at least 2 days prior to surgery because shaving can leave tiny nicks in the skin which may allow germs to enter and cause infection.    Information regarding hand hygiene, MRSA, general anesthesia, fall precautions, coughing and deep breathing, infection prevention and signs & symptoms of infection and blood transfusions reviewed and handouts given to patient. Questions answered.    Call [facility] [phone] for any questions  Report to [facility] on 2nd floor- written directions given  If you would become ill prior to surgery, please call the surgeon right away  Masks not required at this time, still recommended and we do have them at front information desk if you would like to have one.  We like to keep visitors in surgical waiting area to 2 people if possible.      START[employer]® KIT SHOWER INSTRUCITONS    ___7-8-99_____ (date)   FIRST SHOWER: Two days before surgery: Take a shower and wash your entire body, including your hair and scalp in the

## 2024-07-02 NOTE — PROGRESS NOTES
Hold for 5 days before surgery   Aspirin, Pletal, & Plavix   Hold today until  informs to restart   Multiple Vitamin  Take am of surgery  Metoprolol, Isosorbide Mononitrate, Albuterol (if needed), & Spiriva     Nothing to Eat or Drink after midnight except you may have sips of water with medications instructed to take am of surgery.  This includes no mints, gum and ice chips.   Bring insurance info and 's license  Wear comfortable clean clothing  Do not wear jewelry,piercings,nail polish or contact lenses  Shower as instructed prior to surgery  Bring medications in original bottles  Bring CPAP machine if you have one  Follow all instructions given by your physician   needed at discharge  Routine preop instructions given for showering with no lotions,creams or powders.    Do not shave the surgical area! If needed, your nurse will use clippers to remove any excess hair at the surgical site when you come in for surgery. Do not use a razor on the site of your surgery for at least 2 days prior to surgery because shaving can leave tiny nicks in the skin which may allow germs to enter and cause infection.    Information regarding hand hygiene, MRSA, general anesthesia, fall precautions, coughing and deep breathing, infection prevention and signs & symptoms of infection and blood transfusions reviewed and handouts given to patient. Questions answered.    Call [facility] [phone] for any questions  Report to [facility] on 2nd floor- written directions given  If you would become ill prior to surgery, please call the surgeon right away  Masks not required at this time, still recommended and we do have them at front information desk if you would like to have one.  We like to keep visitors in surgical waiting area to 2 people if possible.      [employer]® KIT SHOWER INSTRUCITONS    ___0-5-46_____ (date)   FIRST SHOWER: Two days before surgery: Take a shower and wash your entire body, including your hair and scalp in the

## 2024-07-02 NOTE — PROGRESS NOTES
Dermatologist took a tag off of left eyelid last week.  Has a stuffiness in head and ears and clear runny nose since march this year.  Was on 2 rounds of ATB     Called to inform Dr Mari office about this  Awaiting reply  Dr Gonsalo Perry said ok to proceed if not running fever. Per Pt and pt wife no fevers when I asked earlier

## 2024-07-02 NOTE — TELEPHONE ENCOUNTER
Raquel with PAT called and she wants Dr. Sanon to look at EKG done today. Patient was already cleared to have surgery on 7/8/24 with Dr. Mari but EKG from today has changed. So they want Dr. Sanon to look at it and make sure he is still clear  EKG tracing in media. Raquel ext [phone]  Please advise

## 2024-07-02 NOTE — PROGRESS NOTES
Pt denies chest pain and dizziness. He only has Shortness of breathe with walking long distances.    Sent abnormal EKG to Dr Sanon office awaiting reply Ok to proceed see note

## 2024-07-02 NOTE — PROGRESS NOTES
Preliminary Discharge Planning Questionnaire  Date of Surgery 7-8-24   Surgeon Kamaljit      Having the proper help and care after surgery is very important to your recovery. Who will be able to help you at home when you are discharged from the hospital?    spouse    How many steps to enter your home?  {3    Bathroom on first floor?  Yes    Bedroom on the first floor?  Yes    Do you have an elevated toilet seat to use at home?  Yes    Do you have a walker to use at home?    Total Joints - with wheels No   Spine - with wheels  N/A     Have you been doing home exercises?no    *You will go home with some outpatient physical therapy, where do you prefer to go?  Unsure whateer insurance cover     This typically will not start until after your post visit to your Dr. (3-4 weeks after surgery)    * What home health agency would you like to use?[facility]      List of all Home Health Agencies Available given to patient, with website ( per Social Work Team)      Please have 2 preferences when you come for surgery- 1st and 2nd choice

## 2024-07-03 LAB
EKG ATRIAL RATE: 64 BPM
EKG P AXIS: 74 DEGREES
EKG P-R INTERVAL: 178 MS
EKG Q-T INTERVAL: 404 MS
EKG QRS DURATION: 84 MS
EKG QTC CALCULATION (BAZETT): 416 MS
EKG R AXIS: 0 DEGREES
EKG T AXIS: -49 DEGREES
EKG VENTRICULAR RATE: 64 BPM

## 2024-07-08 ENCOUNTER — ANESTHESIA (OUTPATIENT)
Dept: OPERATING ROOM | Age: 79
End: 2024-07-08
Payer: MEDICARE

## 2024-07-08 ENCOUNTER — HOSPITAL ENCOUNTER (INPATIENT)
Age: 79
LOS: 2 days | Discharge: HOME | DRG: 271 | End: 2024-07-10
Attending: THORACIC SURGERY (CARDIOTHORACIC VASCULAR SURGERY) | Admitting: THORACIC SURGERY (CARDIOTHORACIC VASCULAR SURGERY)
Payer: MEDICARE

## 2024-07-08 ENCOUNTER — APPOINTMENT (OUTPATIENT)
Dept: INTERVENTIONAL RADIOLOGY/VASCULAR | Age: 79
DRG: 271 | End: 2024-07-08
Attending: THORACIC SURGERY (CARDIOTHORACIC VASCULAR SURGERY)
Payer: MEDICARE

## 2024-07-08 ENCOUNTER — ANESTHESIA EVENT (OUTPATIENT)
Dept: OPERATING ROOM | Age: 79
End: 2024-07-08
Payer: MEDICARE

## 2024-07-08 DIAGNOSIS — I70.213 ATHEROSCLEROSIS OF NATIVE ARTERY OF BOTH LOWER EXTREMITIES WITH INTERMITTENT CLAUDICATION (HCC): Primary | ICD-10-CM

## 2024-07-08 DIAGNOSIS — I73.9 PVD (PERIPHERAL VASCULAR DISEASE) WITH CLAUDICATION (HCC): ICD-10-CM

## 2024-07-08 DIAGNOSIS — I70.213 ATHEROSCLEROSIS OF NATIVE ARTERIES OF EXTREMITIES WITH INTERMITTENT CLAUDICATION, BILATERAL LEGS (HCC): ICD-10-CM

## 2024-07-08 LAB
KCT BLD-ACNC: 147 SECONDS (ref 1–150)
KCT BLD-ACNC: 220 SECONDS (ref 1–150)
KCT BLD-ACNC: 232 SECONDS (ref 1–150)
KCT BLD-ACNC: 250 SECONDS (ref 1–150)
KCT BLD-ACNC: 256 SECONDS (ref 1–150)
KCT BLD-ACNC: 256 SECONDS (ref 1–150)

## 2024-07-08 PROCEDURE — 2060000000 HC ICU INTERMEDIATE R&B

## 2024-07-08 PROCEDURE — A4217 STERILE WATER/SALINE, 500 ML: HCPCS | Performed by: THORACIC SURGERY (CARDIOTHORACIC VASCULAR SURGERY)

## 2024-07-08 PROCEDURE — 6360000004 HC RX CONTRAST MEDICATION: Performed by: THORACIC SURGERY (CARDIOTHORACIC VASCULAR SURGERY)

## 2024-07-08 PROCEDURE — 2580000003 HC RX 258: Performed by: PHYSICIAN ASSISTANT

## 2024-07-08 PROCEDURE — X27J395 DILATION OF LEFT FEMORAL ARTERY WITH TWO SUSTAINED RELEASE DRUG-ELUTING INTRALUMINAL DEVICES, PERCUTANEOUS APPROACH, NEW TECHNOLOGY GROUP 5: ICD-10-PCS | Performed by: THORACIC SURGERY (CARDIOTHORACIC VASCULAR SURGERY)

## 2024-07-08 PROCEDURE — 6360000002 HC RX W HCPCS: Performed by: PHYSICIAN ASSISTANT

## 2024-07-08 PROCEDURE — 37227 HC FEM/POPL REVASC STNT & ATHER: CPT

## 2024-07-08 PROCEDURE — 7100000001 HC PACU RECOVERY - ADDTL 15 MIN: Performed by: THORACIC SURGERY (CARDIOTHORACIC VASCULAR SURGERY)

## 2024-07-08 PROCEDURE — 2500000003 HC RX 250 WO HCPCS: Performed by: REGISTERED NURSE

## 2024-07-08 PROCEDURE — 3600000007 HC SURGERY HYBRID BASE: Performed by: THORACIC SURGERY (CARDIOTHORACIC VASCULAR SURGERY)

## 2024-07-08 PROCEDURE — 2580000003 HC RX 258: Performed by: THORACIC SURGERY (CARDIOTHORACIC VASCULAR SURGERY)

## 2024-07-08 PROCEDURE — 88304 TISSUE EXAM BY PATHOLOGIST: CPT

## 2024-07-08 PROCEDURE — C1769 GUIDE WIRE: HCPCS

## 2024-07-08 PROCEDURE — 85347 COAGULATION TIME ACTIVATED: CPT

## 2024-07-08 PROCEDURE — C1768 GRAFT, VASCULAR: HCPCS | Performed by: THORACIC SURGERY (CARDIOTHORACIC VASCULAR SURGERY)

## 2024-07-08 PROCEDURE — 2500000003 HC RX 250 WO HCPCS: Performed by: NURSE ANESTHETIST, CERTIFIED REGISTERED

## 2024-07-08 PROCEDURE — 6360000002 HC RX W HCPCS: Performed by: NURSE ANESTHETIST, CERTIFIED REGISTERED

## 2024-07-08 PROCEDURE — 3600000017 HC SURGERY HYBRID ADDL 15MIN: Performed by: THORACIC SURGERY (CARDIOTHORACIC VASCULAR SURGERY)

## 2024-07-08 PROCEDURE — 2720000010 HC SURG SUPPLY STERILE: Performed by: THORACIC SURGERY (CARDIOTHORACIC VASCULAR SURGERY)

## 2024-07-08 PROCEDURE — 2709999900 HC NON-CHARGEABLE SUPPLY: Performed by: THORACIC SURGERY (CARDIOTHORACIC VASCULAR SURGERY)

## 2024-07-08 PROCEDURE — 04CL3ZZ EXTIRPATION OF MATTER FROM LEFT FEMORAL ARTERY, PERCUTANEOUS APPROACH: ICD-10-PCS | Performed by: THORACIC SURGERY (CARDIOTHORACIC VASCULAR SURGERY)

## 2024-07-08 PROCEDURE — 6360000002 HC RX W HCPCS: Performed by: STUDENT IN AN ORGANIZED HEALTH CARE EDUCATION/TRAINING PROGRAM

## 2024-07-08 PROCEDURE — 04CN3ZZ EXTIRPATION OF MATTER FROM LEFT POPLITEAL ARTERY, PERCUTANEOUS APPROACH: ICD-10-PCS | Performed by: THORACIC SURGERY (CARDIOTHORACIC VASCULAR SURGERY)

## 2024-07-08 PROCEDURE — 3700000000 HC ANESTHESIA ATTENDED CARE: Performed by: THORACIC SURGERY (CARDIOTHORACIC VASCULAR SURGERY)

## 2024-07-08 PROCEDURE — X27N395 DILATION OF DISTAL LEFT POPLITEAL ARTERY WITH TWO SUSTAINED RELEASE DRUG-ELUTING INTRALUMINAL DEVICES, PERCUTANEOUS APPROACH, NEW TECHNOLOGY GROUP 5: ICD-10-PCS | Performed by: THORACIC SURGERY (CARDIOTHORACIC VASCULAR SURGERY)

## 2024-07-08 PROCEDURE — 6360000002 HC RX W HCPCS: Performed by: REGISTERED NURSE

## 2024-07-08 PROCEDURE — 6370000000 HC RX 637 (ALT 250 FOR IP): Performed by: THORACIC SURGERY (CARDIOTHORACIC VASCULAR SURGERY)

## 2024-07-08 PROCEDURE — 88311 DECALCIFY TISSUE: CPT

## 2024-07-08 PROCEDURE — 6360000002 HC RX W HCPCS: Performed by: THORACIC SURGERY (CARDIOTHORACIC VASCULAR SURGERY)

## 2024-07-08 PROCEDURE — 2580000003 HC RX 258: Performed by: REGISTERED NURSE

## 2024-07-08 PROCEDURE — 6360000002 HC RX W HCPCS

## 2024-07-08 PROCEDURE — 3700000001 HC ADD 15 MINUTES (ANESTHESIA): Performed by: THORACIC SURGERY (CARDIOTHORACIC VASCULAR SURGERY)

## 2024-07-08 PROCEDURE — 7100000000 HC PACU RECOVERY - FIRST 15 MIN: Performed by: THORACIC SURGERY (CARDIOTHORACIC VASCULAR SURGERY)

## 2024-07-08 DEVICE — DRUG-ELUTING VASCULAR STENT SYSTEM
Type: IMPLANTABLE DEVICE | Site: LEG | Status: FUNCTIONAL
Brand: ELUVIA™

## 2024-07-08 DEVICE — XENOSURE BIOLOGIC PATCH, 0.8CM X 8CM, EIFU
Type: IMPLANTABLE DEVICE | Site: GROIN | Status: FUNCTIONAL
Brand: XENOSURE BIOLOGIC PATCH

## 2024-07-08 RX ORDER — POTASSIUM CHLORIDE 7.45 MG/ML
10 INJECTION INTRAVENOUS PRN
Status: DISCONTINUED | OUTPATIENT
Start: 2024-07-08 | End: 2024-07-10 | Stop reason: HOSPADM

## 2024-07-08 RX ORDER — HEPARIN SODIUM 1000 [USP'U]/ML
INJECTION, SOLUTION INTRAVENOUS; SUBCUTANEOUS PRN
Status: DISCONTINUED | OUTPATIENT
Start: 2024-07-08 | End: 2024-07-08 | Stop reason: SDUPTHER

## 2024-07-08 RX ORDER — SODIUM CHLORIDE 9 MG/ML
INJECTION, SOLUTION INTRAVENOUS CONTINUOUS
Status: DISCONTINUED | OUTPATIENT
Start: 2024-07-08 | End: 2024-07-08 | Stop reason: HOSPADM

## 2024-07-08 RX ORDER — OXYCODONE HYDROCHLORIDE 5 MG/1
5 TABLET ORAL EVERY 4 HOURS PRN
Status: DISCONTINUED | OUTPATIENT
Start: 2024-07-08 | End: 2024-07-10 | Stop reason: HOSPADM

## 2024-07-08 RX ORDER — GLYCOPYRROLATE 1 MG/5 ML
SYRINGE (ML) INTRAVENOUS PRN
Status: DISCONTINUED | OUTPATIENT
Start: 2024-07-08 | End: 2024-07-08 | Stop reason: SDUPTHER

## 2024-07-08 RX ORDER — CLOPIDOGREL BISULFATE 75 MG/1
75 TABLET ORAL DAILY
Status: DISCONTINUED | OUTPATIENT
Start: 2024-07-09 | End: 2024-07-10 | Stop reason: HOSPADM

## 2024-07-08 RX ORDER — SODIUM CHLORIDE 9 MG/ML
INJECTION, SOLUTION INTRAVENOUS PRN
Status: DISCONTINUED | OUTPATIENT
Start: 2024-07-08 | End: 2024-07-08 | Stop reason: HOSPADM

## 2024-07-08 RX ORDER — NITROGLYCERIN 0.4 MG/1
0.4 TABLET SUBLINGUAL EVERY 5 MIN PRN
Status: DISCONTINUED | OUTPATIENT
Start: 2024-07-08 | End: 2024-07-10 | Stop reason: HOSPADM

## 2024-07-08 RX ORDER — SODIUM CHLORIDE 0.9 % (FLUSH) 0.9 %
5-40 SYRINGE (ML) INJECTION EVERY 12 HOURS SCHEDULED
Status: DISCONTINUED | OUTPATIENT
Start: 2024-07-08 | End: 2024-07-10 | Stop reason: HOSPADM

## 2024-07-08 RX ORDER — SODIUM CHLORIDE 0.9 % (FLUSH) 0.9 %
5-40 SYRINGE (ML) INJECTION PRN
Status: DISCONTINUED | OUTPATIENT
Start: 2024-07-08 | End: 2024-07-10 | Stop reason: HOSPADM

## 2024-07-08 RX ORDER — FENTANYL CITRATE 50 UG/ML
INJECTION, SOLUTION INTRAMUSCULAR; INTRAVENOUS PRN
Status: DISCONTINUED | OUTPATIENT
Start: 2024-07-08 | End: 2024-07-08 | Stop reason: SDUPTHER

## 2024-07-08 RX ORDER — LIDOCAINE HYDROCHLORIDE 20 MG/ML
INJECTION, SOLUTION INFILTRATION; PERINEURAL PRN
Status: DISCONTINUED | OUTPATIENT
Start: 2024-07-08 | End: 2024-07-08 | Stop reason: SDUPTHER

## 2024-07-08 RX ORDER — SODIUM CHLORIDE 0.9 % (FLUSH) 0.9 %
5-40 SYRINGE (ML) INJECTION EVERY 12 HOURS SCHEDULED
Status: DISCONTINUED | OUTPATIENT
Start: 2024-07-08 | End: 2024-07-08 | Stop reason: HOSPADM

## 2024-07-08 RX ORDER — EPHEDRINE SULFATE/0.9% NACL/PF 25 MG/5 ML
SYRINGE (ML) INTRAVENOUS PRN
Status: DISCONTINUED | OUTPATIENT
Start: 2024-07-08 | End: 2024-07-08 | Stop reason: SDUPTHER

## 2024-07-08 RX ORDER — ONDANSETRON 4 MG/1
4 TABLET, ORALLY DISINTEGRATING ORAL EVERY 8 HOURS PRN
Status: DISCONTINUED | OUTPATIENT
Start: 2024-07-08 | End: 2024-07-10 | Stop reason: HOSPADM

## 2024-07-08 RX ORDER — PROPOFOL 10 MG/ML
INJECTION, EMULSION INTRAVENOUS PRN
Status: DISCONTINUED | OUTPATIENT
Start: 2024-07-08 | End: 2024-07-08 | Stop reason: SDUPTHER

## 2024-07-08 RX ORDER — POTASSIUM CHLORIDE 20 MEQ/1
40 TABLET, EXTENDED RELEASE ORAL PRN
Status: DISCONTINUED | OUTPATIENT
Start: 2024-07-08 | End: 2024-07-10 | Stop reason: HOSPADM

## 2024-07-08 RX ORDER — FERROUS SULFATE 325(65) MG
325 TABLET ORAL DAILY
Status: DISCONTINUED | OUTPATIENT
Start: 2024-07-08 | End: 2024-07-10 | Stop reason: HOSPADM

## 2024-07-08 RX ORDER — ONDANSETRON 2 MG/ML
4 INJECTION INTRAMUSCULAR; INTRAVENOUS
Status: COMPLETED | OUTPATIENT
Start: 2024-07-08 | End: 2024-07-08

## 2024-07-08 RX ORDER — SODIUM CHLORIDE, SODIUM LACTATE, POTASSIUM CHLORIDE, CALCIUM CHLORIDE 600; 310; 30; 20 MG/100ML; MG/100ML; MG/100ML; MG/100ML
INJECTION, SOLUTION INTRAVENOUS CONTINUOUS PRN
Status: DISCONTINUED | OUTPATIENT
Start: 2024-07-08 | End: 2024-07-08 | Stop reason: SDUPTHER

## 2024-07-08 RX ORDER — PANTOPRAZOLE SODIUM 40 MG/1
40 TABLET, DELAYED RELEASE ORAL
Status: DISCONTINUED | OUTPATIENT
Start: 2024-07-09 | End: 2024-07-10 | Stop reason: HOSPADM

## 2024-07-08 RX ORDER — CEFAZOLIN SODIUM 1 G/3ML
INJECTION, POWDER, FOR SOLUTION INTRAMUSCULAR; INTRAVENOUS PRN
Status: DISCONTINUED | OUTPATIENT
Start: 2024-07-08 | End: 2024-07-08 | Stop reason: SDUPTHER

## 2024-07-08 RX ORDER — NALOXONE HYDROCHLORIDE 0.4 MG/ML
INJECTION, SOLUTION INTRAMUSCULAR; INTRAVENOUS; SUBCUTANEOUS PRN
Status: DISCONTINUED | OUTPATIENT
Start: 2024-07-08 | End: 2024-07-08 | Stop reason: HOSPADM

## 2024-07-08 RX ORDER — SODIUM CHLORIDE 0.9 % (FLUSH) 0.9 %
5-40 SYRINGE (ML) INJECTION PRN
Status: DISCONTINUED | OUTPATIENT
Start: 2024-07-08 | End: 2024-07-08 | Stop reason: HOSPADM

## 2024-07-08 RX ORDER — MAGNESIUM SULFATE IN WATER 40 MG/ML
2000 INJECTION, SOLUTION INTRAVENOUS PRN
Status: DISCONTINUED | OUTPATIENT
Start: 2024-07-08 | End: 2024-07-10 | Stop reason: HOSPADM

## 2024-07-08 RX ORDER — SODIUM CHLORIDE 9 MG/ML
INJECTION, SOLUTION INTRAVENOUS CONTINUOUS
Status: DISCONTINUED | OUTPATIENT
Start: 2024-07-08 | End: 2024-07-10

## 2024-07-08 RX ORDER — ONDANSETRON 2 MG/ML
4 INJECTION INTRAMUSCULAR; INTRAVENOUS EVERY 6 HOURS PRN
Status: DISCONTINUED | OUTPATIENT
Start: 2024-07-08 | End: 2024-07-10 | Stop reason: HOSPADM

## 2024-07-08 RX ORDER — ALLOPURINOL 300 MG/1
300 TABLET ORAL DAILY
Status: DISCONTINUED | OUTPATIENT
Start: 2024-07-08 | End: 2024-07-10 | Stop reason: HOSPADM

## 2024-07-08 RX ORDER — ONDANSETRON 2 MG/ML
INJECTION INTRAMUSCULAR; INTRAVENOUS
Status: COMPLETED
Start: 2024-07-08 | End: 2024-07-08

## 2024-07-08 RX ORDER — DIPHENHYDRAMINE HYDROCHLORIDE 50 MG/ML
12.5 INJECTION INTRAMUSCULAR; INTRAVENOUS
Status: DISCONTINUED | OUTPATIENT
Start: 2024-07-08 | End: 2024-07-08 | Stop reason: HOSPADM

## 2024-07-08 RX ORDER — PROTAMINE SULFATE 10 MG/ML
INJECTION, SOLUTION INTRAVENOUS PRN
Status: DISCONTINUED | OUTPATIENT
Start: 2024-07-08 | End: 2024-07-08 | Stop reason: SDUPTHER

## 2024-07-08 RX ORDER — MULTIVITAMIN WITH IRON
1 TABLET ORAL DAILY
Status: DISCONTINUED | OUTPATIENT
Start: 2024-07-08 | End: 2024-07-10 | Stop reason: HOSPADM

## 2024-07-08 RX ORDER — ACETAMINOPHEN 325 MG/1
650 TABLET ORAL EVERY 4 HOURS PRN
Status: DISCONTINUED | OUTPATIENT
Start: 2024-07-08 | End: 2024-07-10 | Stop reason: HOSPADM

## 2024-07-08 RX ORDER — FENTANYL CITRATE 50 UG/ML
INJECTION, SOLUTION INTRAMUSCULAR; INTRAVENOUS
Status: COMPLETED
Start: 2024-07-08 | End: 2024-07-08

## 2024-07-08 RX ORDER — ROCURONIUM BROMIDE 10 MG/ML
INJECTION, SOLUTION INTRAVENOUS PRN
Status: DISCONTINUED | OUTPATIENT
Start: 2024-07-08 | End: 2024-07-08 | Stop reason: SDUPTHER

## 2024-07-08 RX ORDER — CLOPIDOGREL BISULFATE 75 MG/1
300 TABLET ORAL ONCE
Status: COMPLETED | OUTPATIENT
Start: 2024-07-08 | End: 2024-07-08

## 2024-07-08 RX ORDER — HYDRALAZINE HYDROCHLORIDE 20 MG/ML
10 INJECTION INTRAMUSCULAR; INTRAVENOUS EVERY 4 HOURS PRN
Status: DISCONTINUED | OUTPATIENT
Start: 2024-07-08 | End: 2024-07-10 | Stop reason: HOSPADM

## 2024-07-08 RX ORDER — ISOSORBIDE MONONITRATE 30 MG/1
30 TABLET, EXTENDED RELEASE ORAL DAILY
Status: DISCONTINUED | OUTPATIENT
Start: 2024-07-08 | End: 2024-07-10 | Stop reason: HOSPADM

## 2024-07-08 RX ORDER — CILOSTAZOL 100 MG/1
100 TABLET ORAL 2 TIMES DAILY
Status: DISCONTINUED | OUTPATIENT
Start: 2024-07-08 | End: 2024-07-10 | Stop reason: HOSPADM

## 2024-07-08 RX ORDER — ATORVASTATIN CALCIUM 10 MG/1
10 TABLET, FILM COATED ORAL NIGHTLY
Status: DISCONTINUED | OUTPATIENT
Start: 2024-07-08 | End: 2024-07-10 | Stop reason: HOSPADM

## 2024-07-08 RX ORDER — BISACODYL 5 MG/1
5 TABLET, DELAYED RELEASE ORAL DAILY PRN
Status: DISCONTINUED | OUTPATIENT
Start: 2024-07-08 | End: 2024-07-10 | Stop reason: HOSPADM

## 2024-07-08 RX ORDER — DEXAMETHASONE SODIUM PHOSPHATE 4 MG/ML
INJECTION, SOLUTION INTRA-ARTICULAR; INTRALESIONAL; INTRAMUSCULAR; INTRAVENOUS; SOFT TISSUE PRN
Status: DISCONTINUED | OUTPATIENT
Start: 2024-07-08 | End: 2024-07-08 | Stop reason: SDUPTHER

## 2024-07-08 RX ORDER — SODIUM CHLORIDE 9 MG/ML
INJECTION, SOLUTION INTRAVENOUS PRN
Status: DISCONTINUED | OUTPATIENT
Start: 2024-07-08 | End: 2024-07-10 | Stop reason: HOSPADM

## 2024-07-08 RX ORDER — ASPIRIN 81 MG/1
81 TABLET, CHEWABLE ORAL DAILY
Status: DISCONTINUED | OUTPATIENT
Start: 2024-07-09 | End: 2024-07-10 | Stop reason: HOSPADM

## 2024-07-08 RX ORDER — FENTANYL CITRATE 50 UG/ML
50 INJECTION, SOLUTION INTRAMUSCULAR; INTRAVENOUS EVERY 5 MIN PRN
Status: DISCONTINUED | OUTPATIENT
Start: 2024-07-08 | End: 2024-07-08 | Stop reason: HOSPADM

## 2024-07-08 RX ORDER — ALBUTEROL SULFATE 90 UG/1
2 AEROSOL, METERED RESPIRATORY (INHALATION) EVERY 6 HOURS PRN
Status: DISCONTINUED | OUTPATIENT
Start: 2024-07-08 | End: 2024-07-10 | Stop reason: HOSPADM

## 2024-07-08 RX ADMIN — CLOPIDOGREL BISULFATE 300 MG: 75 TABLET ORAL at 21:21

## 2024-07-08 RX ADMIN — HEPARIN SODIUM 1500 UNITS: 1000 INJECTION INTRAVENOUS; SUBCUTANEOUS at 17:04

## 2024-07-08 RX ADMIN — ONDANSETRON 4 MG: 2 INJECTION INTRAMUSCULAR; INTRAVENOUS at 18:55

## 2024-07-08 RX ADMIN — PHENYLEPHRINE HYDROCHLORIDE 100 MCG: 10 INJECTION INTRAVENOUS at 14:53

## 2024-07-08 RX ADMIN — PHENYLEPHRINE HYDROCHLORIDE 50 MCG: 10 INJECTION INTRAVENOUS at 16:42

## 2024-07-08 RX ADMIN — PHENYLEPHRINE HYDROCHLORIDE 100 MCG: 10 INJECTION INTRAVENOUS at 13:42

## 2024-07-08 RX ADMIN — EPHEDRINE SULFATE 10 MG: 5 INJECTION INTRAVENOUS at 15:35

## 2024-07-08 RX ADMIN — ROCURONIUM BROMIDE 10 MG: 10 INJECTION INTRAVENOUS at 15:06

## 2024-07-08 RX ADMIN — SODIUM CHLORIDE 2.5 MG/HR: 9 INJECTION, SOLUTION INTRAVENOUS at 21:19

## 2024-07-08 RX ADMIN — DEXAMETHASONE SODIUM PHOSPHATE 5 MG: 4 INJECTION, SOLUTION INTRAMUSCULAR; INTRAVENOUS at 13:21

## 2024-07-08 RX ADMIN — SODIUM CHLORIDE, PRESERVATIVE FREE 10 ML: 5 INJECTION INTRAVENOUS at 21:21

## 2024-07-08 RX ADMIN — ROCURONIUM BROMIDE 10 MG: 10 INJECTION INTRAVENOUS at 16:25

## 2024-07-08 RX ADMIN — HEPARIN SODIUM 2500 UNITS: 1000 INJECTION INTRAVENOUS; SUBCUTANEOUS at 16:16

## 2024-07-08 RX ADMIN — FENTANYL CITRATE 50 MCG: 50 INJECTION, SOLUTION INTRAMUSCULAR; INTRAVENOUS at 14:20

## 2024-07-08 RX ADMIN — ATORVASTATIN CALCIUM 10 MG: 10 TABLET, FILM COATED ORAL at 21:21

## 2024-07-08 RX ADMIN — PROPOFOL 50 MG: 10 INJECTION, EMULSION INTRAVENOUS at 14:36

## 2024-07-08 RX ADMIN — HEPARIN SODIUM 8500 UNITS: 1000 INJECTION INTRAVENOUS; SUBCUTANEOUS at 14:29

## 2024-07-08 RX ADMIN — FENTANYL CITRATE 50 MCG: 50 INJECTION, SOLUTION INTRAMUSCULAR; INTRAVENOUS at 18:53

## 2024-07-08 RX ADMIN — PHENYLEPHRINE HYDROCHLORIDE 100 MCG: 10 INJECTION INTRAVENOUS at 13:26

## 2024-07-08 RX ADMIN — HYDROMORPHONE HYDROCHLORIDE 0.5 MG: 1 INJECTION, SOLUTION INTRAMUSCULAR; INTRAVENOUS; SUBCUTANEOUS at 22:31

## 2024-07-08 RX ADMIN — FENTANYL CITRATE 50 MCG: 50 INJECTION, SOLUTION INTRAMUSCULAR; INTRAVENOUS at 14:26

## 2024-07-08 RX ADMIN — PHENYLEPHRINE HYDROCHLORIDE 100 MCG: 10 INJECTION INTRAVENOUS at 15:55

## 2024-07-08 RX ADMIN — ALLOPURINOL 300 MG: 300 TABLET ORAL at 21:21

## 2024-07-08 RX ADMIN — ROCURONIUM BROMIDE 10 MG: 10 INJECTION INTRAVENOUS at 15:30

## 2024-07-08 RX ADMIN — PHENYLEPHRINE HYDROCHLORIDE 100 MCG: 10 INJECTION INTRAVENOUS at 13:34

## 2024-07-08 RX ADMIN — PHENYLEPHRINE HYDROCHLORIDE 50 MCG: 10 INJECTION INTRAVENOUS at 16:14

## 2024-07-08 RX ADMIN — PHENYLEPHRINE HYDROCHLORIDE 100 MCG: 10 INJECTION INTRAVENOUS at 13:13

## 2024-07-08 RX ADMIN — PHENYLEPHRINE HYDROCHLORIDE 50 MCG: 10 INJECTION INTRAVENOUS at 16:50

## 2024-07-08 RX ADMIN — ROCURONIUM BROMIDE 20 MG: 10 INJECTION INTRAVENOUS at 14:40

## 2024-07-08 RX ADMIN — PHENYLEPHRINE HYDROCHLORIDE 50 MCG: 10 INJECTION INTRAVENOUS at 17:00

## 2024-07-08 RX ADMIN — FENTANYL CITRATE 50 MCG: 50 INJECTION, SOLUTION INTRAMUSCULAR; INTRAVENOUS at 12:52

## 2024-07-08 RX ADMIN — SUGAMMADEX 300 MG: 100 INJECTION, SOLUTION INTRAVENOUS at 18:28

## 2024-07-08 RX ADMIN — EPHEDRINE SULFATE 10 MG: 5 INJECTION INTRAVENOUS at 15:54

## 2024-07-08 RX ADMIN — ROCURONIUM BROMIDE 10 MG: 10 INJECTION INTRAVENOUS at 15:54

## 2024-07-08 RX ADMIN — PHENYLEPHRINE HYDROCHLORIDE 100 MCG: 10 INJECTION INTRAVENOUS at 15:45

## 2024-07-08 RX ADMIN — EPHEDRINE SULFATE 5 MG: 5 INJECTION INTRAVENOUS at 14:56

## 2024-07-08 RX ADMIN — FERROUS SULFATE TAB 325 MG (65 MG ELEMENTAL FE) 325 MG: 325 (65 FE) TAB at 21:21

## 2024-07-08 RX ADMIN — ISOSORBIDE MONONITRATE 30 MG: 30 TABLET, EXTENDED RELEASE ORAL at 21:21

## 2024-07-08 RX ADMIN — WATER 2000 MG: 1 INJECTION INTRAMUSCULAR; INTRAVENOUS; SUBCUTANEOUS at 13:30

## 2024-07-08 RX ADMIN — SODIUM CHLORIDE, POTASSIUM CHLORIDE, SODIUM LACTATE AND CALCIUM CHLORIDE: 600; 310; 30; 20 INJECTION, SOLUTION INTRAVENOUS at 16:25

## 2024-07-08 RX ADMIN — PHENYLEPHRINE HYDROCHLORIDE 50 MCG: 10 INJECTION INTRAVENOUS at 16:06

## 2024-07-08 RX ADMIN — PROPOFOL 150 MG: 10 INJECTION, EMULSION INTRAVENOUS at 12:52

## 2024-07-08 RX ADMIN — FENTANYL CITRATE 50 MCG: 50 INJECTION, SOLUTION INTRAMUSCULAR; INTRAVENOUS at 13:53

## 2024-07-08 RX ADMIN — PROPOFOL 50 MG: 10 INJECTION, EMULSION INTRAVENOUS at 13:53

## 2024-07-08 RX ADMIN — ROCURONIUM BROMIDE 10 MG: 10 INJECTION INTRAVENOUS at 17:00

## 2024-07-08 RX ADMIN — PHENYLEPHRINE HYDROCHLORIDE 100 MCG: 10 INJECTION INTRAVENOUS at 13:52

## 2024-07-08 RX ADMIN — PHENYLEPHRINE HYDROCHLORIDE 100 MCG: 10 INJECTION INTRAVENOUS at 15:35

## 2024-07-08 RX ADMIN — HEPARIN SODIUM 5000 UNITS: 1000 INJECTION INTRAVENOUS; SUBCUTANEOUS at 15:09

## 2024-07-08 RX ADMIN — ROCURONIUM BROMIDE 30 MG: 10 INJECTION INTRAVENOUS at 13:41

## 2024-07-08 RX ADMIN — IOPAMIDOL 80 ML: 612 INJECTION, SOLUTION INTRAVENOUS at 17:21

## 2024-07-08 RX ADMIN — PHENYLEPHRINE HYDROCHLORIDE 100 MCG: 10 INJECTION INTRAVENOUS at 13:21

## 2024-07-08 RX ADMIN — CILOSTAZOL 100 MG: 100 TABLET ORAL at 21:21

## 2024-07-08 RX ADMIN — Medication 0.2 MG: at 13:32

## 2024-07-08 RX ADMIN — PHENYLEPHRINE HYDROCHLORIDE 50 MCG: 10 INJECTION INTRAVENOUS at 16:35

## 2024-07-08 RX ADMIN — PHENYLEPHRINE HYDROCHLORIDE 100 MCG: 10 INJECTION INTRAVENOUS at 15:04

## 2024-07-08 RX ADMIN — FENTANYL CITRATE 50 MCG: 50 INJECTION, SOLUTION INTRAMUSCULAR; INTRAVENOUS at 18:58

## 2024-07-08 RX ADMIN — PROTAMINE SULFATE 20 MG: 10 INJECTION, SOLUTION INTRAVENOUS at 17:55

## 2024-07-08 RX ADMIN — PHENYLEPHRINE HYDROCHLORIDE 50 MCG: 10 INJECTION INTRAVENOUS at 16:21

## 2024-07-08 RX ADMIN — SODIUM CHLORIDE: 9 INJECTION, SOLUTION INTRAVENOUS at 11:22

## 2024-07-08 RX ADMIN — PHENYLEPHRINE HYDROCHLORIDE 100 MCG: 10 INJECTION INTRAVENOUS at 13:07

## 2024-07-08 RX ADMIN — CEFAZOLIN 2 G: 1 INJECTION, POWDER, FOR SOLUTION INTRAMUSCULAR; INTRAVENOUS at 17:27

## 2024-07-08 RX ADMIN — PHENYLEPHRINE HYDROCHLORIDE 100 MCG: 10 INJECTION INTRAVENOUS at 15:20

## 2024-07-08 RX ADMIN — PHENYLEPHRINE HYDROCHLORIDE 100 MCG: 10 INJECTION INTRAVENOUS at 15:30

## 2024-07-08 RX ADMIN — METOPROLOL TARTRATE 25 MG: 25 TABLET, FILM COATED ORAL at 21:21

## 2024-07-08 RX ADMIN — Medication 1 TABLET: at 21:21

## 2024-07-08 RX ADMIN — LIDOCAINE HYDROCHLORIDE 60 MG: 20 INJECTION, SOLUTION INFILTRATION; PERINEURAL at 12:52

## 2024-07-08 RX ADMIN — PHENYLEPHRINE HYDROCHLORIDE 100 MCG: 10 INJECTION INTRAVENOUS at 14:48

## 2024-07-08 RX ADMIN — ROCURONIUM BROMIDE 70 MG: 10 INJECTION INTRAVENOUS at 12:52

## 2024-07-08 ASSESSMENT — PAIN DESCRIPTION - LOCATION
LOCATION: GROIN;LEG
LOCATION: GROIN;LEG

## 2024-07-08 ASSESSMENT — PAIN DESCRIPTION - DESCRIPTORS
DESCRIPTORS: ACHING;BURNING;DISCOMFORT
DESCRIPTORS: ACHING;BURNING

## 2024-07-08 ASSESSMENT — PAIN - FUNCTIONAL ASSESSMENT
PAIN_FUNCTIONAL_ASSESSMENT: WONG-BAKER FACES
PAIN_FUNCTIONAL_ASSESSMENT: PREVENTS OR INTERFERES SOME ACTIVE ACTIVITIES AND ADLS
PAIN_FUNCTIONAL_ASSESSMENT: PREVENTS OR INTERFERES SOME ACTIVE ACTIVITIES AND ADLS
PAIN_FUNCTIONAL_ASSESSMENT: 0-10

## 2024-07-08 ASSESSMENT — PAIN DESCRIPTION - ORIENTATION
ORIENTATION: LEFT
ORIENTATION: LEFT

## 2024-07-08 ASSESSMENT — PAIN SCALES - GENERAL
PAINLEVEL_OUTOF10: 4
PAINLEVEL_OUTOF10: 8
PAINLEVEL_OUTOF10: 0

## 2024-07-08 ASSESSMENT — PAIN DESCRIPTION - PAIN TYPE
TYPE: SURGICAL PAIN
TYPE: SURGICAL PAIN

## 2024-07-08 ASSESSMENT — PAIN DESCRIPTION - FREQUENCY
FREQUENCY: CONTINUOUS
FREQUENCY: CONTINUOUS

## 2024-07-08 ASSESSMENT — PAIN DESCRIPTION - ONSET
ONSET: GRADUAL
ONSET: GRADUAL

## 2024-07-08 NOTE — PROGRESS NOTES
1841: pt arrives to pacu, respirations unlabored on simple mask 8L. Pt awakens to voice but quickly drifts back to sleep. NPT in place  1850: NPT removed, pt weaned to room air.   1853: medicated with 50 mcg fentanyl for pain   1855: medicated with 4 mg Zofran for nausea.  1858: medicated with 50 mcg fentanyl  1915: RN receiving shift report, will call back.   1918: pt meets criteria for discharge from pacu at this time.   1948: Report called to RN on 4k. Pt placed for transport.  1957: pt transported to 4 in stable condition

## 2024-07-08 NOTE — H&P
mouth 2 times daily 4/1/24   Eric Mari MD   allopurinol (ZYLOPRIM) 300 MG tablet Take 1 tablet by mouth daily 3/4/24   Carmen Felix APRN - CNP   metoprolol tartrate (LOPRESSOR) 50 MG tablet Take 0.5 tablets by mouth daily 3/4/24   Carmen Felix APRN - CNP   simvastatin (ZOCOR) 20 MG tablet Take 1 tablet by mouth nightly 3/4/24   Carmen Felix APRN - CNP   tiotropium (SPIRIVA RESPIMAT) 2.5 MCG/ACT AERS inhaler INHALE TWO (2) PUFFS INTO THE LUNGS DAILY 3/4/24   Carmen Felix APRN - CNP   ketoconazole (NIZORAL) 2 % shampoo Apply shampoo 2x/wk for 8 weeks then as needed 3/4/24   Carmen Felix APRN - CNP   pantoprazole (PROTONIX) 40 MG tablet TAKE 1 TABLET EVERY MORNING (BEFORE BREAKFAST) 2/15/24   Carmen Felix APRN - CNP   Handicap Placard MISC by Does not apply route Duration: 5 years  Dx: OA, CAD, COPD 1/25/24   Carmen Felix APRN - CNP   isosorbide mononitrate (IMDUR) 30 MG extended release tablet Take 1 tablet by mouth daily 1/18/24   Emeka Sanon MD   clopidogrel (PLAVIX) 75 MG tablet TAKE 1 TABLET EVERY DAY 11/24/23   Carmen Felix APRN - CNP   Multiple Vitamin (MULTIVITAMIN PO) Take by mouth daily     Riley Agudelo MD   albuterol sulfate  (90 Base) MCG/ACT inhaler Inhale 2 puffs into the lungs every 6 hours as needed for Wheezing 7/14/21   Elias Cantu MD   nitroGLYCERIN (NITROSTAT) 0.4 MG SL tablet Place 1 tablet under the tongue every 5 minutes as needed for Chest pain 5/20/19   Elias Cantu MD   ferrous sulfate 325 (65 FE) MG tablet Take 1 tablet by mouth daily    Riley Agudelo MD   acetaminophen (TYLENOL) 500 MG tablet Take 2 tablets by mouth as needed for Pain    Riley Agudelo MD   aspirin 325 MG EC tablet Take by mouth daily     Provider, Historical, MD     PastMedical History:  Conner  has a past medical history of AAA (abdominal aortic aneurysm) (HCC), Aneurysm (HCC), Back pain, Cervical spine fracture (HCC),  1+ femoral pulses bilaterally  No popliteal or pedal pulses  No edema noted.  +varicose veins 2 mm in size in right anterior shin. No ulcers or color changes.  Pulmonary:      Effort: Pulmonary effort is normal.      Breath sounds: Normal breath sounds.   Abdominal:      General: Abdomen is flat.      Palpations: Abdomen is soft.   Musculoskeletal:      Right lower leg: No edema.      Left lower leg: No edema.   Lymphadenopathy:      Cervical: No cervical adenopathy.   Skin:     General: Skin is warm and dry.   Neurological:      General: No focal deficit present.      Mental Status: He is alert and oriented to person, place, and time.   Psychiatric:         Mood and Affect: Mood normal.         Behavior: Behavior normal.      Assessment:   1. 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 Coronary artery disease involving native coronary artery of native heart without angina pectoris         Plan: 7/8/24    Therefore I believe the patient has multilevel severe left greater than right lower extremity arterial occlusive disease.  His infrarenal AAA is small and does not require treatment.  Based upon my review of the CT angiogram I feel the best option is a hybrid approach a left femoral endarterectomy with patch angioplasty, aortogram and possible left iliac artery intervention, left lower extremity angiogram and possible left SFA and popliteal intervention.     This will be done in the hybrid room with general anesthesia.  He also needs cardiac clearance with  given the fact that he has had extensive previous coronary disease and previous CABG and he has current shortness of breath with exertion.     I have described the operative plan in detail with the patient and his wife and also went over the possible risks and complications which include but not limited to death bleeding infection MI, PE, Ischemia, lower extremity acute ischemia, limb loss, renal

## 2024-07-08 NOTE — ANESTHESIA PRE PROCEDURE
Date/Time     07/02/2024 10:15 AM    K 4.3 07/02/2024 10:15 AM    K 4.4 11/04/2021 05:18 AM     07/02/2024 10:15 AM    CO2 25 07/02/2024 10:15 AM    BUN 10 07/02/2024 10:15 AM    CREATININE 1.0 07/02/2024 10:15 AM    LABGLOM 78 07/02/2024 10:15 AM    LABGLOM >60 01/15/2024 08:41 AM    GLUCOSE 96 07/02/2024 10:15 AM    GLUCOSE 105 10/01/2011 06:11 PM    CALCIUM 9.0 07/02/2024 10:15 AM    BILITOT 1.7 01/15/2024 08:41 AM    ALKPHOS 72 01/15/2024 08:41 AM    AST 22 01/15/2024 08:41 AM    ALT 24 01/15/2024 08:41 AM       POC Tests: No results for input(s): \"POCGLU\", \"POCNA\", \"POCK\", \"POCCL\", \"POCBUN\", \"POCHEMO\", \"POCHCT\" in the last 72 hours.    Coags:   Lab Results   Component Value Date/Time    INR 1.09 11/04/2021 05:18 AM    APTT 25.8 05/31/2021 12:58 AM       HCG (If Applicable): No results found for: \"PREGTESTUR\", \"PREGSERUM\", \"HCG\", \"HCGQUANT\"     ABGs: No results found for: \"PHART\", \"PO2ART\", \"OQI1FUW\", \"BQC5APN\", \"BEART\", \"O6AEQMKO\"     Type & Screen (If Applicable):  No results found for: \"LABABO\"    Drug/Infectious Status (If Applicable):  No results found for: \"HIV\", \"HEPCAB\"    COVID-19 Screening (If Applicable):   Lab Results   Component Value Date/Time    COVID19 Negative 11/23/2022 11:45 AM    COVID19 NOT DETECTED 08/23/2021 06:41 PM    COVID19 NOT DETECTED 11/24/2020 05:10 PM           Anesthesia Evaluation     no history of anesthetic complications:   Airway: Mallampati: II          Dental:          Pulmonary:normal exam    (+)  COPD:              (-) asthma                           Cardiovascular:    (+) hypertension:, past MI: > 6 months, CABG/stent:                  Neuro/Psych:      (-) seizures and CVA           GI/Hepatic/Renal:   (+) GERD:, liver disease (Hepatic steatosis):     (-) no renal disease       Endo/Other:    (+) Diabetes, : arthritis: rheumatoid..                 Abdominal: normal exam            Vascular:   + PVD, aortic or cerebral.  - DVT and PE.       ROS comment:

## 2024-07-08 NOTE — ANESTHESIA PROCEDURE NOTES
Arterial Line:    An arterial line was placed using surface landmarks, in the OR for the following indication(s): continuous blood pressure monitoring and blood sampling needed.    A 20 gauge (size), 1 and 3/8 inch (length), Angiocath (type) catheter was placed, Seldinger technique used, into the right radial artery, secured by tape and Tegaderm.  Anesthesia type: General    Events:  patient tolerated procedure well with no complications.7/8/2024 12:54 PM7/8/2024 12:57 PM  Anesthesiologist: Romero Muller DO  Performed: Anesthesiologist   Preanesthetic Checklist  Completed: patient identified, IV checked, site marked, risks and benefits discussed, surgical/procedural consents, equipment checked, pre-op evaluation, timeout performed, anesthesia consent given, oxygen available, monitors applied/VS acknowledged, fire risk safety assessment completed and verbalized and blood product R/B/A discussed and consented

## 2024-07-08 NOTE — OP NOTE
Then next, the 0.8 cm x 8 cm Bovine pericardial patch was brought to the field, and the anterior surface was marked and patch angioplasty was accomplished with 5-0 Prolene from the superior aspect and brought down both sides.   Prior to tying the last stitch, the artery was de-aired proximally and distally and from the profunda, and the final stitch was tied down.  Good hemostasis was noted.  Excellent pulse was noted. Doppler was brought to the field and excellent multiphasic flow was noted in the SFA and Profunda.  Bleeding points on the patch angioplasty sites were sutured with 6-0 Prolene.      Then next attention was then turned to the lower extremity intervention.  Using a microneedle the left femoral patch angioplasty site was cannulated at the mid aspect and a microwire was then placed into the SFA under fluoroscopic guidance.  Following that a micro sheath was placed and a left lower extremity angiogram was taken and it showed good position of the micro sheath in the left proximal superficial femoral artery (SFA).  Once done through the micro sheath a Glidewire advantage was placed into the mid SFA artery and then a 7F x 11 cm sheath was placed and left lower extremity angiogram was taken in multiple stations.    ANGIOGRAPHIC FINDINGS:    Left CFA patch widely patent  Left Profunda Femoris patent  Subtotal occlusion, 99% to Left Common Femoral artery  80-90% diffuse Left Superficial Femoral artery stenosis with severe calcific plaque  80% Left above knee and Below Knee Popliteal artery stenosis  Occlusion Left Posterior Tibial artery  Microvascular disease Left Foot     Thus given the patient's severe symptomatic claudication and the anatomy as stated above decision was made to intervene.  Using a 0.035 [place] catheter the left SFA and popliteal artery stenoses were crossed and the catheter was placed into the left below-knee popliteal artery level.  Then next the Glidewire advantage was replaced with a

## 2024-07-09 LAB
ANION GAP SERPL CALC-SCNC: 10 MEQ/L (ref 8–16)
BUN SERPL-MCNC: 10 MG/DL (ref 7–22)
CALCIUM SERPL-MCNC: 7.5 MG/DL (ref 8.5–10.5)
CHLORIDE SERPL-SCNC: 102 MEQ/L (ref 98–111)
CO2 SERPL-SCNC: 24 MEQ/L (ref 23–33)
CREAT SERPL-MCNC: 0.8 MG/DL (ref 0.4–1.2)
DEPRECATED RDW RBC AUTO: 46.6 FL (ref 35–45)
ERYTHROCYTE [DISTWIDTH] IN BLOOD BY AUTOMATED COUNT: 13.2 % (ref 11.5–14.5)
GFR SERPL CREATININE-BSD FRML MDRD: > 90 ML/MIN/1.73M2
GLUCOSE SERPL-MCNC: 128 MG/DL (ref 70–108)
HCT VFR BLD AUTO: 30.9 % (ref 42–52)
HGB BLD-MCNC: 10.8 GM/DL (ref 14–18)
MAGNESIUM SERPL-MCNC: 1.5 MG/DL (ref 1.6–2.4)
MCH RBC QN AUTO: 34.5 PG (ref 26–33)
MCHC RBC AUTO-ENTMCNC: 35 GM/DL (ref 32.2–35.5)
MCV RBC AUTO: 98.7 FL (ref 80–94)
PLATELET # BLD AUTO: 151 THOU/MM3 (ref 130–400)
PMV BLD AUTO: 10.2 FL (ref 9.4–12.4)
POTASSIUM SERPL-SCNC: 4.4 MEQ/L (ref 3.5–5.2)
RBC # BLD AUTO: 3.13 MILL/MM3 (ref 4.7–6.1)
SODIUM SERPL-SCNC: 136 MEQ/L (ref 135–145)
WBC # BLD AUTO: 10.8 THOU/MM3 (ref 4.8–10.8)

## 2024-07-09 PROCEDURE — 6370000000 HC RX 637 (ALT 250 FOR IP): Performed by: THORACIC SURGERY (CARDIOTHORACIC VASCULAR SURGERY)

## 2024-07-09 PROCEDURE — 83735 ASSAY OF MAGNESIUM: CPT

## 2024-07-09 PROCEDURE — 6360000002 HC RX W HCPCS: Performed by: THORACIC SURGERY (CARDIOTHORACIC VASCULAR SURGERY)

## 2024-07-09 PROCEDURE — 2060000000 HC ICU INTERMEDIATE R&B

## 2024-07-09 PROCEDURE — 80048 BASIC METABOLIC PNL TOTAL CA: CPT

## 2024-07-09 PROCEDURE — 36415 COLL VENOUS BLD VENIPUNCTURE: CPT

## 2024-07-09 PROCEDURE — 85027 COMPLETE CBC AUTOMATED: CPT

## 2024-07-09 PROCEDURE — APPSS30 APP SPLIT SHARED TIME 16-30 MINUTES: Performed by: PHYSICIAN ASSISTANT

## 2024-07-09 PROCEDURE — 2580000003 HC RX 258: Performed by: THORACIC SURGERY (CARDIOTHORACIC VASCULAR SURGERY)

## 2024-07-09 RX ADMIN — SODIUM CHLORIDE: 9 INJECTION, SOLUTION INTRAVENOUS at 13:29

## 2024-07-09 RX ADMIN — ALLOPURINOL 300 MG: 300 TABLET ORAL at 08:51

## 2024-07-09 RX ADMIN — METOPROLOL TARTRATE 25 MG: 25 TABLET, FILM COATED ORAL at 08:51

## 2024-07-09 RX ADMIN — CLOPIDOGREL BISULFATE 75 MG: 75 TABLET, FILM COATED ORAL at 08:51

## 2024-07-09 RX ADMIN — ACETAMINOPHEN 650 MG: 325 TABLET ORAL at 13:20

## 2024-07-09 RX ADMIN — SODIUM CHLORIDE 2.5 MG/HR: 9 INJECTION, SOLUTION INTRAVENOUS at 07:07

## 2024-07-09 RX ADMIN — FERROUS SULFATE TAB 325 MG (65 MG ELEMENTAL FE) 325 MG: 325 (65 FE) TAB at 08:51

## 2024-07-09 RX ADMIN — ISOSORBIDE MONONITRATE 30 MG: 30 TABLET, EXTENDED RELEASE ORAL at 08:51

## 2024-07-09 RX ADMIN — MAGNESIUM SULFATE HEPTAHYDRATE 2000 MG: 40 INJECTION, SOLUTION INTRAVENOUS at 06:59

## 2024-07-09 RX ADMIN — Medication 1 TABLET: at 08:51

## 2024-07-09 RX ADMIN — CILOSTAZOL 100 MG: 100 TABLET ORAL at 20:22

## 2024-07-09 RX ADMIN — SODIUM CHLORIDE: 9 INJECTION, SOLUTION INTRAVENOUS at 22:20

## 2024-07-09 RX ADMIN — PANTOPRAZOLE SODIUM 40 MG: 40 TABLET, DELAYED RELEASE ORAL at 05:55

## 2024-07-09 RX ADMIN — SODIUM CHLORIDE, PRESERVATIVE FREE 10 ML: 5 INJECTION INTRAVENOUS at 08:51

## 2024-07-09 RX ADMIN — HYDROMORPHONE HYDROCHLORIDE 0.5 MG: 1 INJECTION, SOLUTION INTRAMUSCULAR; INTRAVENOUS; SUBCUTANEOUS at 03:53

## 2024-07-09 RX ADMIN — SODIUM CHLORIDE, PRESERVATIVE FREE 10 ML: 5 INJECTION INTRAVENOUS at 20:22

## 2024-07-09 RX ADMIN — ACETAMINOPHEN 650 MG: 325 TABLET ORAL at 22:19

## 2024-07-09 RX ADMIN — ASPIRIN 81 MG 81 MG: 81 TABLET ORAL at 08:51

## 2024-07-09 RX ADMIN — SODIUM CHLORIDE: 9 INJECTION, SOLUTION INTRAVENOUS at 02:33

## 2024-07-09 RX ADMIN — ATORVASTATIN CALCIUM 10 MG: 10 TABLET, FILM COATED ORAL at 20:22

## 2024-07-09 RX ADMIN — ACETAMINOPHEN 650 MG: 325 TABLET ORAL at 18:13

## 2024-07-09 RX ADMIN — CILOSTAZOL 100 MG: 100 TABLET ORAL at 08:51

## 2024-07-09 ASSESSMENT — PAIN DESCRIPTION - PAIN TYPE
TYPE: SURGICAL PAIN

## 2024-07-09 ASSESSMENT — PAIN SCALES - GENERAL
PAINLEVEL_OUTOF10: 6
PAINLEVEL_OUTOF10: 3
PAINLEVEL_OUTOF10: 3
PAINLEVEL_OUTOF10: 6
PAINLEVEL_OUTOF10: 7
PAINLEVEL_OUTOF10: 8
PAINLEVEL_OUTOF10: 7

## 2024-07-09 ASSESSMENT — PAIN DESCRIPTION - DESCRIPTORS
DESCRIPTORS: BURNING;DISCOMFORT
DESCRIPTORS: BURNING
DESCRIPTORS_2: SORE
DESCRIPTORS: BURNING
DESCRIPTORS: ACHING;BURNING;DISCOMFORT
DESCRIPTORS: BURNING
DESCRIPTORS: BURNING;DISCOMFORT

## 2024-07-09 ASSESSMENT — PAIN DESCRIPTION - FREQUENCY
FREQUENCY: CONTINUOUS

## 2024-07-09 ASSESSMENT — PAIN DESCRIPTION - LOCATION
LOCATION: GROIN
LOCATION_2: ARM
LOCATION: LEG
LOCATION: GROIN
LOCATION: LEG
LOCATION: LEG

## 2024-07-09 ASSESSMENT — PAIN DESCRIPTION - ONSET
ONSET: ON-GOING

## 2024-07-09 ASSESSMENT — PAIN DESCRIPTION - ORIENTATION
ORIENTATION: LEFT
ORIENTATION_2: RIGHT
ORIENTATION: LEFT

## 2024-07-09 ASSESSMENT — PAIN DESCRIPTION - INTENSITY: RATING_2: 3

## 2024-07-09 ASSESSMENT — PAIN - FUNCTIONAL ASSESSMENT
PAIN_FUNCTIONAL_ASSESSMENT: PREVENTS OR INTERFERES SOME ACTIVE ACTIVITIES AND ADLS
PAIN_FUNCTIONAL_ASSESSMENT_SITE2: ACTIVITIES ARE NOT PREVENTED

## 2024-07-09 NOTE — CARE COORDINATION
Case Management Assessment Initial Evaluation    Date/Time of Evaluation: 7/9/2024 8:27 AM  Assessment Completed by: Ronnie Lopez RN    If patient is discharged prior to next notation, then this note serves as note for discharge by case management.    Patient Name: Conner Angel                   YOB: 1945  Diagnosis: Atherosclerosis of native arteries of extremities with intermittent claudication, bilateral legs (HCC) [I70.213]  Atherosclerosis of native artery of both lower extremities with intermittent claudication (HCC) [I70.213]                   Date / Time: 7/8/2024 10:00 AM  Location: [facility]/004     Patient Admission Status: Inpatient   Readmission Risk Low 0-14, Mod 15-19), High > 20: Readmission Risk Score: 10.4    Current PCP: Carmen Felix APRN - [facility]    Additional Case Management Notes:   BLE Claudication  History: COPD, Liver Disease, MI, OHS  IVF, Cardene gtt  Procedures:   7/8   Left Common Femoral Endarterectomy  Left Superficial Femoral Endarterectomy  Third order cannulation, Left Peroneal artery  2.1 mm Jetstream Atherectomy to Left SFA and Popliteal arteries  Balloon angioplasty Left Popliteal artery (BK) with 5 mm x 60 Medtronic DCB balloon  Balloon angioplasty Left SFA artery with 6 mm x 200 Balloon  Stents to Left SFA and Popliteal arteries with 6 mm x 150, 6 mm x 150, and 6 mm x 120 Burden Tonia JORGE  Patient Goals/Plan/Treatment Preferences: plans home w spouse daniel Kemp drives  7/10/24, 11:38 AM EDT    Patient goals/plan/ treatment preferences discussed by  and .  Patient goals/plan/ treatment preferences reviewed with patient/ family.  Patient/ family verbalize understanding of discharge plan and are in agreement with goal/plan/treatment preferences.  Understanding was demonstrated using the teach back method.  AVS provided by RN at time of discharge, which includes all necessary medical information pertaining to the patients

## 2024-07-09 NOTE — PLAN OF CARE
Problem: Discharge Planning  Goal: Discharge to home or other facility with appropriate resources  Outcome: Progressing  Flowsheets (Taken 7/9/2024 1722)  Discharge to home or other facility with appropriate resources:   Identify barriers to discharge with patient and caregiver   Identify discharge learning needs (meds, wound care, etc)   Refer to discharge planning if patient needs post-hospital services based on physician order or complex needs related to functional status, cognitive ability or social support system   Arrange for needed discharge resources and transportation as appropriate     Problem: Chronic Conditions and Co-morbidities  Goal: Patient's chronic conditions and co-morbidity symptoms are monitored and maintained or improved  Outcome: Progressing  Flowsheets (Taken 7/9/2024 1722)  Care Plan - Patient's Chronic Conditions and Co-Morbidity Symptoms are Monitored and Maintained or Improved:   Monitor and assess patient's chronic conditions and comorbid symptoms for stability, deterioration, or improvement   Collaborate with multidisciplinary team to address chronic and comorbid conditions and prevent exacerbation or deterioration   Update acute care plan with appropriate goals if chronic or comorbid symptoms are exacerbated and prevent overall improvement and discharge     Problem: Pain  Goal: Verbalizes/displays adequate comfort level or baseline comfort level  Outcome: Progressing  Flowsheets (Taken 7/9/2024 1722)  Verbalizes/displays adequate comfort level or baseline comfort level:   Encourage patient to monitor pain and request assistance   Consider cultural and social influences on pain and pain management   Administer analgesics based on type and severity of pain and evaluate response   Notify Licensed Independent Practitioner if interventions unsuccessful or patient reports new pain   Implement non-pharmacological measures as appropriate and evaluate response   Assess pain using appropriate  pain scale     Problem: Safety - Adult  Goal: Free from fall injury  Outcome: Progressing  Flowsheets (Taken 7/9/2024 1722)  Free From Fall Injury: Instruct family/caregiver on patient safety     Problem: Neurosensory - Adult  Goal: Achieves maximal functionality and self care  Outcome: Progressing  Flowsheets (Taken 7/9/2024 1722)  Achieves maximal functionality and self care:   Monitor swallowing and airway patency with patient fatigue and changes in neurological status   Encourage and assist patient to increase activity and self care with guidance from physical therapy/occupational therapy   Encourage visually impaired, hearing impaired and aphasic patients to use assistive/communication devices     Problem: Respiratory - Adult  Goal: Achieves optimal ventilation and oxygenation  Outcome: Progressing  Flowsheets (Taken 7/9/2024 1722)  Achieves optimal ventilation and oxygenation:   Assess for changes in respiratory status   Position to facilitate oxygenation and minimize respiratory effort   Initiate smoking cessation protocol as indicated   Assess the need for suctioning and aspirate as needed   Respiratory therapy support as indicated   Assess and instruct to report shortness of breath or any respiratory difficulty   Encourage broncho-pulmonary hygiene including cough, deep breathe, incentive spirometry   Oxygen supplementation based on oxygen saturation or arterial blood gases   Assess for changes in mentation and behavior     Problem: Skin/Tissue Integrity - Adult  Goal: Incisions, wounds, or drain sites healing without S/S of infection  Outcome: Progressing  Flowsheets (Taken 7/9/2024 1722)  Incisions, Wounds, or Drain Sites Healing Without Sign and Symptoms of Infection:   ADMISSION and DAILY: Assess and document risk factors for pressure ulcer development   TWICE DAILY: Assess and document skin integrity   TWICE DAILY: Assess and document dressing/incision, wound bed, drain sites and surrounding tissue

## 2024-07-09 NOTE — ANESTHESIA POSTPROCEDURE EVALUATION
Department of Anesthesiology  Postprocedure Note    Patient: Conner Angel  MRN: [id number]  YOB: 1945  Date of evaluation: 7/8/2024    Procedure Summary       Date: 07/08/24 Room / Location: [facility] / [facility]    Anesthesia Start: 1247 Anesthesia Stop: 1847    Procedure: Left Femoral Endarterectomy with Patch Angioplasty, Aortogram and Left Iliac Artery Intervention Left Lower Extremity Angiogram and Left SFA and Popliteal Intervention (Leg Upper) Diagnosis:       Atherosclerosis of native arteries of extremities with intermittent claudication, bilateral legs (HCC)      (Atherosclerosis of native arteries of extremities with intermittent claudication, bilateral legs (HCC) [I70.213])    Surgeons: Eric Mari MD Responsible Provider: Romero Muller DO    Anesthesia Type: general ASA Status: 4            Anesthesia Type: No value filed.    Adama Phase I: Adama Score: 10    Adama Phase II:      Anesthesia Post Evaluation    Comments: [facility]  POST-ANESTHESIA NOTE       Name:  Conner Angel                                         Age:  78 y.o.  MRN:  [id number]      Last Vitals:  BP (!) 157/67   Pulse 82   Temp 97.8 °F (36.6 °C) (Oral)   Resp 21   Ht 1.778 m (5' 10\")   Wt 83 kg (183 lb)   SpO2 94%   BMI 26.26 kg/m²   Patient Vitals in the past 4 hrs:  07/08/24 2009, BP:(!) 157/67, Temp:97.8 °F (36.6 °C), Temp src:Oral, Pulse:82, Resp:21, SpO2:94 %  07/08/24 1945, BP:(!) 149/73, Pulse:76, Resp:18, SpO2:100 %  07/08/24 1940, BP:(!) 147/67, Pulse:76, Resp:22, SpO2:99 %  07/08/24 1935, BP:131/61, Pulse:75, Resp:22, SpO2:100 %  07/08/24 1930, BP:130/65, Pulse:72, Resp:18, SpO2:99 %  07/08/24 1925, BP:(!) 122/58, Pulse:72, Resp:16, SpO2:99 %  07/08/24 1920, BP:137/63, Pulse:74, Resp:14, SpO2:91 %  07/08/24 1918, BP:137/63, Pulse:72, Resp:16, SpO2:93 %  07/08/24 1915, BP:(!) 141/65, Pulse:73, Resp:29, SpO2:94 %  07/08/24 1910, BP:(!) 144/66, Pulse:74, Resp:16,

## 2024-07-09 NOTE — PLAN OF CARE
Problem: Discharge Planning  Goal: Discharge to home or other facility with appropriate resources  Outcome: Progressing  Flowsheets (Taken 7/9/2024 0304)  Discharge to home or other facility with appropriate resources:   Identify barriers to discharge with patient and caregiver   Arrange for needed discharge resources and transportation as appropriate   Identify discharge learning needs (meds, wound care, etc)   Refer to discharge planning if patient needs post-hospital services based on physician order or complex needs related to functional status, cognitive ability or social support system  Note: Feedback readiness for discharge.  Promoting inclusion for discharge planning.       Problem: Chronic Conditions and Co-morbidities  Goal: Patient's chronic conditions and co-morbidity symptoms are monitored and maintained or improved  Outcome: Progressing  Flowsheets (Taken 7/9/2024 0304)  Care Plan - Patient's Chronic Conditions and Co-Morbidity Symptoms are Monitored and Maintained or Improved:   Monitor and assess patient's chronic conditions and comorbid symptoms for stability, deterioration, or improvement   Collaborate with multidisciplinary team to address chronic and comorbid conditions and prevent exacerbation or deterioration   Update acute care plan with appropriate goals if chronic or comorbid symptoms are exacerbated and prevent overall improvement and discharge     Problem: Pain  Goal: Verbalizes/displays adequate comfort level or baseline comfort level  Outcome: Progressing  Flowsheets (Taken 7/9/2024 0304)  Verbalizes/displays adequate comfort level or baseline comfort level:   Encourage patient to monitor pain and request assistance   Assess pain using appropriate pain scale   Administer analgesics based on type and severity of pain and evaluate response   Implement non-pharmacological measures as appropriate and evaluate response   Consider cultural and social influences on pain and pain management

## 2024-07-09 NOTE — PROGRESS NOTES
CT/CV Surgery Progress Note    2024 7:53 AM  Surgeon:  Dr. Mari    Procedure: 24  Left Common Femoral Endarterectomy  Left Superficial Femoral Endarterectomy  Third order cannulation, Left Peroneal artery  2.1 mm Jetstream Atherectomy to Left SFA and Popliteal arteries  Balloon angioplasty Left Popliteal artery (BK) with 5 mm x 60 Medtronic DCB balloon  Balloon angioplasty Left SFA artery with 6 mm x 200 Balloon  Stents to Left SFA and Popliteal arteries with 6 mm x 150, 6 mm x 150, and 6 mm x 120 Percival Tonia JORGE    Subjective:  Mr. Angel is resting comfortably in chair on RA, alert, and in no acute distress.  On ASA and Plavix.       Intake/Output Summary (Last 24 hours) at 2024 0753  Last data filed at 2024 0659  Gross per 24 hour   Intake 4301.16 ml   Output 2350 ml   Net 1951.16 ml     Vital Signs: BP (!) 104/56   Pulse 65   Temp 98.8 °F (37.1 °C)   Resp 16   Ht 1.778 m (5' 10\")   Wt 85 kg (187 lb 6.3 oz)   SpO2 96%   BMI 26.89 kg/m²    Temp (24hrs), Av.8 °F (36.6 °C), Min:97.2 °F (36.2 °C), Max:98.8 °F (37.1 °C)    Labs:   CBC:  Recent Labs     24  0435   WBC 10.8   HGB 10.8*   HCT 30.9*   MCV 98.7*        BMP:   Recent Labs     24  0435      K 4.4      CO2 24   BUN 10   CREATININE 0.8   MG 1.5*     Last HgA1C:   Lab Results   Component Value Date    LABA1C 4.7 2018         Scheduled Meds:    sodium chloride flush  5-40 mL IntraVENous 2 times per day    allopurinol  300 mg Oral Daily    aspirin  81 mg Oral Daily    cilostazol  100 mg Oral BID    clopidogrel  75 mg Oral Daily    ferrous sulfate  325 mg Oral Daily    isosorbide mononitrate  30 mg Oral Daily    metoprolol tartrate  25 mg Oral Daily    multivitamin  1 tablet Oral Daily    pantoprazole  40 mg Oral QAM AC    atorvastatin  10 mg Oral Nightly    tiotropium  2 puff Inhalation Daily RT     ROS: All neg unless specifically mentioned in subjective section.     Exam:  General

## 2024-07-10 ENCOUNTER — TELEPHONE (OUTPATIENT)
Dept: FAMILY MEDICINE CLINIC | Age: 79
End: 2024-07-10

## 2024-07-10 VITALS
HEART RATE: 97 BPM | DIASTOLIC BLOOD PRESSURE: 56 MMHG | HEIGHT: 70 IN | WEIGHT: 187.39 LBS | BODY MASS INDEX: 26.83 KG/M2 | TEMPERATURE: 97.9 F | SYSTOLIC BLOOD PRESSURE: 119 MMHG | RESPIRATION RATE: 16 BRPM | OXYGEN SATURATION: 98 %

## 2024-07-10 PROCEDURE — 6370000000 HC RX 637 (ALT 250 FOR IP): Performed by: THORACIC SURGERY (CARDIOTHORACIC VASCULAR SURGERY)

## 2024-07-10 PROCEDURE — 2580000003 HC RX 258: Performed by: THORACIC SURGERY (CARDIOTHORACIC VASCULAR SURGERY)

## 2024-07-10 PROCEDURE — APPSS60 APP SPLIT SHARED TIME 46-60 MINUTES: Performed by: PHYSICIAN ASSISTANT

## 2024-07-10 PROCEDURE — 94640 AIRWAY INHALATION TREATMENT: CPT

## 2024-07-10 PROCEDURE — 94761 N-INVAS EAR/PLS OXIMETRY MLT: CPT

## 2024-07-10 RX ORDER — OXYCODONE HYDROCHLORIDE 5 MG/1
5 TABLET ORAL EVERY 8 HOURS PRN
Qty: 15 TABLET | Refills: 0 | Status: SHIPPED | OUTPATIENT
Start: 2024-07-10 | End: 2024-07-15

## 2024-07-10 RX ADMIN — ACETAMINOPHEN 650 MG: 325 TABLET ORAL at 04:02

## 2024-07-10 RX ADMIN — ALLOPURINOL 300 MG: 300 TABLET ORAL at 08:32

## 2024-07-10 RX ADMIN — CLOPIDOGREL BISULFATE 75 MG: 75 TABLET, FILM COATED ORAL at 08:32

## 2024-07-10 RX ADMIN — PANTOPRAZOLE SODIUM 40 MG: 40 TABLET, DELAYED RELEASE ORAL at 05:03

## 2024-07-10 RX ADMIN — CILOSTAZOL 100 MG: 100 TABLET ORAL at 08:34

## 2024-07-10 RX ADMIN — ACETAMINOPHEN 650 MG: 325 TABLET ORAL at 11:15

## 2024-07-10 RX ADMIN — TIOTROPIUM BROMIDE INHALATION SPRAY 2 PUFF: 3.12 SPRAY, METERED RESPIRATORY (INHALATION) at 09:37

## 2024-07-10 RX ADMIN — SODIUM CHLORIDE, PRESERVATIVE FREE 10 ML: 5 INJECTION INTRAVENOUS at 08:33

## 2024-07-10 ASSESSMENT — PAIN DESCRIPTION - FREQUENCY
FREQUENCY: CONTINUOUS
FREQUENCY: CONTINUOUS
FREQUENCY: INTERMITTENT
FREQUENCY: CONTINUOUS

## 2024-07-10 ASSESSMENT — PAIN DESCRIPTION - LOCATION
LOCATION: GROIN

## 2024-07-10 ASSESSMENT — PAIN DESCRIPTION - ORIENTATION
ORIENTATION: LEFT

## 2024-07-10 ASSESSMENT — PAIN DESCRIPTION - ONSET
ONSET: ON-GOING

## 2024-07-10 ASSESSMENT — PAIN DESCRIPTION - PAIN TYPE
TYPE: SURGICAL PAIN

## 2024-07-10 ASSESSMENT — PAIN - FUNCTIONAL ASSESSMENT
PAIN_FUNCTIONAL_ASSESSMENT: PREVENTS OR INTERFERES SOME ACTIVE ACTIVITIES AND ADLS

## 2024-07-10 ASSESSMENT — PAIN SCALES - GENERAL
PAINLEVEL_OUTOF10: 2
PAINLEVEL_OUTOF10: 4
PAINLEVEL_OUTOF10: 3
PAINLEVEL_OUTOF10: 7
PAINLEVEL_OUTOF10: 3
PAINLEVEL_OUTOF10: 4

## 2024-07-10 ASSESSMENT — PAIN DESCRIPTION - DESCRIPTORS
DESCRIPTORS: ACHING;BURNING
DESCRIPTORS: SORE
DESCRIPTORS: BURNING;DISCOMFORT
DESCRIPTORS: ACHING;BURNING;DISCOMFORT

## 2024-07-10 NOTE — DISCHARGE INSTRUCTIONS
Discharge Instructions Following Vascular Surgery for  [facility] Cardioth[facility]acic and Vascular Surgery  Pt Name: Conner Angel  Medical Record Number: [id number]  Today's Date: 7/10/2024    ACTIVITY/EXERCISE   ? Slowly increase your activity after you go home.  Pace yourself and listen to your body.   ? It will take 2 to 3 weeks to feel like you did before surgery in terms of energy and strength.   ?        Do not use arms to get up from a seated position. Instead, rock in your chair to give yourself momentum to sit up.    APPETITE   ? Your appetite might be decreased at first.  It should slowly improve.   ? Small, frequent meals may help.   ? The dietitian will assist you with a low fat, low cholesterol, low salt diet.    PAIN   ? You may have pain at the incision.  Take your pain medications as ordered.    INCISIONS  ? Warning signs of infection: redness, warmth to touch, green colored pus, tender to touch.  Notify surgeon office right away if any warnings occur.  ? Clean your incisions twice daily with soap.  Ok to shower daily and do not bathe for the first month following procedure.    SMOKING   If you smoke, STOP.  Smoking will cause early graft closure, other blockages, new heart attacks, and possibly even death.          CALL YOUR SURGEON IF YOU HAVE:   ? Fever over 101° F.   ? Persistent nausea or vomiting        ? Increasing shortness of breath   ? Pain unrelieved by prescribed medication    Discharge Medications for Carotid STS (performed or attempted):   ASA:                            YES                      Statin:                          YES  P2Y12 inhibitor            YES    OFFICE VISIT   ?    You should have a follow up appointment in the office in about 1-2 weeks after discharge from the hospital.   ?   You may call the office to make an appointment, if you don't have an appointment. ([phone]).   ?   Bring any questions you have so they may be addressed.   ?   BRING YOUR MEDICATION

## 2024-07-10 NOTE — DISCHARGE SUMMARY
CT/CV Surgery Discharge Summary     Pt Name: Conner Angel  MRN: [id number]  YOB: 1945  Primary Care Physician: Carmen Felix APRN - CNP    Admit date:  7/8/2024 10:00 AM     Discharge date:  07/10/24     Disposition: Home    Admitting Diagnosis:   * Atherosclerosis of native arteries of extremities with intermittent claudication, bilateral legs (HCC) [I70.213] L>R, lifestyle limiting and severe unresponsive to conservative management  Tobacco use disorder  CAD, s/p CABG 2005  HTN  HLD  GERD  AAA, 3.1 cm infrarenal without rupture  Chronic back pain  COPD  ED  Gout  OA    Discharge Diagnosis:   * Atherosclerosis of native arteries of extremities with intermittent claudication, bilateral legs (HCC) [I70.213] L>R, lifestyle limiting and severe unresponsive to conservative management  Tobacco use disorder  CAD, s/p CABG 2005  HTN  HLD  GERD  AAA, 3.1 cm infrarenal without rupture  Chronic back pain  COPD  ED  Gout  OA    Condition: Stable    Problem List:   Patient Active Problem List   Diagnosis Code    Coronary artery disease involving native coronary artery of native heart without angina pectoris I25.10    Erectile dysfunction N52.9    Hepatic steatosis K76.0    Low HDL (under 40) E78.6    Essential hypertension I10    Gastroesophageal reflux disease K21.9    Hyperlipidemia E78.5    Primary osteoarthritis of left knee M17.12    Coronary artery disease involving coronary bypass graft of native heart without angina pectoris- Dr. Encinas I25.810    COPD without exacerbation (HCC) J44.9    Gout of left foot M10.9    Abdominal aortic aneurysm (AAA) without rupture (HCC) I71.40    Angina pectoris, variant (HCC) I20.1    Abnormal nuclear stress test R94.39    PVD (peripheral vascular disease) with claudication (HCC) I73.9    Iron deficiency anemia D50.9    Pneumonia J18.9    Closed nondisplaced fracture of third cervical vertebra (HCC) S12.201A    Closed head injury S09.90XA    Syncope and collapse R55  TYLENOL     albuterol sulfate  (90 Base) MCG/ACT inhaler  Commonly known as: PROVENTIL;VENTOLIN;PROAIR  Inhale 2 puffs into the lungs every 6 hours as needed for Wheezing     allopurinol 300 MG tablet  Commonly known as: ZYLOPRIM  Take 1 tablet by mouth daily     aspirin 325 MG EC tablet     betamethasone dipropionate 0.05 % lotion     cilostazol 100 MG tablet  Commonly known as: PLETAL  Take 1 tablet by mouth 2 times daily     clopidogrel 75 MG tablet  Commonly known as: PLAVIX  TAKE 1 TABLET EVERY DAY     ferrous sulfate 325 (65 Fe) MG tablet  Commonly known as: IRON 325     Handicap Placard Misc  by Does not apply route Duration: 5 years  Dx: OA, CAD, COPD     isosorbide mononitrate 30 MG extended release tablet  Commonly known as: IMDUR  Take 1 tablet by mouth daily     ketoconazole 2 % shampoo  Commonly known as: Nizoral  Apply shampoo 2x/wk for 8 weeks then as needed     metoprolol tartrate 50 MG tablet  Commonly known as: LOPRESSOR  Take 0.5 tablets by mouth daily     MULTIVITAMIN PO     nitroGLYCERIN 0.4 MG SL tablet  Commonly known as: NITROSTAT  Place 1 tablet under the tongue every 5 minutes as needed for Chest pain     pantoprazole 40 MG tablet  Commonly known as: PROTONIX  TAKE 1 TABLET EVERY MORNING (BEFORE BREAKFAST)     simvastatin 20 MG tablet  Commonly known as: ZOCOR  Take 1 tablet by mouth nightly     tiotropium 2.5 MCG/ACT Aers inhaler  Commonly known as: Spiriva Respimat  INHALE TWO (2) PUFFS INTO THE LUNGS DAILY               Where to Get Your Medications        These medications were sent to [facility] [address] [facility] [phone] - F [phone]  [address] [facility] [address] [place] [address]      Phone: [phone]   oxyCODONE 5 MG immediate release tablet         EMERGENCIES   ? You should either call 911 or go to the Emergency Room to be evaluated if you need seen immediately   ?         Sudden, severe shortness of breath go to the Emergency Room    If you have

## 2024-07-10 NOTE — TELEPHONE ENCOUNTER
Care Transitions Initial Follow Up Call    Call within 2 business days of discharge: Yes     Patient: Conner Angel Patient : 1945 MRN: [id number]    [unfilled]    RARS: Readmission Risk Score: 11.5       Spoke with: patient's wife Viridiana henson per HIPAA    Discharge department/facility: [facility]    Non-face-to-face services provided:  Scheduled appointment with Specialist-24 and 24. Pt declined follow-up with or office as he is scheduled with his specialist at this time.     Follow Up  Future Appointments   Date Time Provider Department Center   2024 11:30 AM [facility] [place]   2024  9:00 AM Eric [facility] MD [facility] [place]   8/15/2024 11:00 AM Emeka Sanon MD [facility] [place]   2024 11:00 AM Carmen Felix, APRN - CNP [facility] Medicine [facility] [place]       Taylor Kocher, CMA ([facility])

## 2024-07-10 NOTE — PLAN OF CARE
Problem: Discharge Planning  Goal: Discharge to home or other facility with appropriate resources  7/9/2024 2039 by Xavi Jerome, RN  Outcome: Progressing  Flowsheets (Taken 7/9/2024 2039)  Discharge to home or other facility with appropriate resources:   Identify barriers to discharge with patient and caregiver   Arrange for needed discharge resources and transportation as appropriate   Identify discharge learning needs (meds, wound care, etc)   Refer to discharge planning if patient needs post-hospital services based on physician order or complex needs related to functional status, cognitive ability or social support system     Problem: Chronic Conditions and Co-morbidities  Goal: Patient's chronic conditions and co-morbidity symptoms are monitored and maintained or improved  7/9/2024 2039 by Xavi Jerome, RN  Outcome: Progressing  Flowsheets (Taken 7/9/2024 2039)  Care Plan - Patient's Chronic Conditions and Co-Morbidity Symptoms are Monitored and Maintained or Improved:   Monitor and assess patient's chronic conditions and comorbid symptoms for stability, deterioration, or improvement   Collaborate with multidisciplinary team to address chronic and comorbid conditions and prevent exacerbation or deterioration   Update acute care plan with appropriate goals if chronic or comorbid symptoms are exacerbated and prevent overall improvement and discharge     Problem: Pain  Goal: Verbalizes/displays adequate comfort level or baseline comfort level  7/9/2024 2039 by Xavi Jerome, RN  Outcome: Progressing  Flowsheets (Taken 7/9/2024 2039)  Verbalizes/displays adequate comfort level or baseline comfort level:   Encourage patient to monitor pain and request assistance   Assess pain using appropriate pain scale   Administer analgesics based on type and severity of pain and evaluate response   Implement non-pharmacological measures as appropriate and evaluate response   Notify Licensed Independent Practitioner

## 2024-07-10 NOTE — PROGRESS NOTES
07/10/24 0937   Encounter Summary   Encounter Overview/Reason Spiritual/Emotional Needs   Service Provided For Patient   Referral/Consult From Rounding   Support System Spouse;Children   Last Encounter  07/10/24   Complexity of Encounter Moderate   Begin Time 0930   End Time  0937   Total Time Calculated 7 min   Spiritual/Emotional needs   Type Spiritual Support   Assessment/Intervention/Outcome   Assessment Coping   Intervention Active listening;Nurtured Hope;Prayer (assurance of)/[place];Sustaining Presence/Ministry of presence   Outcome Comfort;Coping     Assessment:  In my encounter with the 78 yr old patient, while rounding  the unit 4K,  I provided spiritual care to patient through conversation, I also came to assess the patient's spiritual needs present. The pt was admitted due to atherosclerosis of native arteries of extremities with intermittent claudication bilateral legs.     Interventions:  I provided prayer, emotional support and words of comfort.  provided a listening presence and encouraged pt to share their beliefs and how I can support them during their hospitalization.     Outcomes:  The patient was encouraged and didn't share any further spiritual needs at this time.     Plan:  Chaplains will follow-up at a later time for assessment of any spiritual care needs present.

## 2024-07-10 NOTE — PROGRESS NOTES
Patient and wife reviewed discharge orders and follow up. No questions or concerns. PCP was left a message to call patient for 1 week follow up. IV and telemetry removed. Patient leaving with all belongings and home medications.

## 2024-07-10 NOTE — DISCHARGE INSTR - DIET

## 2024-07-11 ENCOUNTER — CARE COORDINATION (OUTPATIENT)
Dept: CASE MANAGEMENT | Age: 79
End: 2024-07-11

## 2024-07-11 DIAGNOSIS — I70.213 ATHEROSCLEROSIS OF NATIVE ARTERY OF BOTH LOWER EXTREMITIES WITH INTERMITTENT CLAUDICATION (HCC): Primary | ICD-10-CM

## 2024-07-11 PROCEDURE — 1111F DSCHRG MED/CURRENT MED MERGE: CPT | Performed by: NURSE PRACTITIONER

## 2024-07-11 RX ORDER — ALBUTEROL SULFATE 90 UG/1
2 AEROSOL, METERED RESPIRATORY (INHALATION) EVERY 6 HOURS PRN
Qty: 1 EACH | Refills: 11 | Status: SHIPPED | OUTPATIENT
Start: 2024-07-11

## 2024-07-11 NOTE — TELEPHONE ENCOUNTER
This medication refill is regarding a telephone request. Refill requested by wife Viridiana.    Requested Prescriptions     Pending Prescriptions Disp Refills    albuterol sulfate HFA (PROVENTIL;VENTOLIN;PROAIR) 108 (90 Base) MCG/ACT inhaler 1 each 11     Sig: Inhale 2 puffs into the lungs every 6 hours as needed for Wheezing       Date of last visit: 3/22/2024   Date of next visit: 9/9/2024  Date of last refill: 7/14/21 for 1/11 by Dr. Elias Cantu  Pharmacy Name: [facility]    Rx verified, ordered and set to EP.     WCP    Of note, Viridiana left a VM on the nurse line requesting this medication. I called her back to see if he was having any current issues with SOB since it hasn't been written since 2021 but she didn't answer.

## 2024-07-11 NOTE — CARE COORDINATION
Future Appointments         Provider Specialty Dept Phone    7/17/2024 11:30 AM Sergo Perry PA-C Vascular Surgery [phone]    8/12/2024 9:00 AM Eric Mari MD Vascular Surgery [phone]    8/15/2024 11:00 AM Emeka Sanon MD Cardiology [phone]    9/9/2024 11:00 AM Carmen Felix, APRN - CNP Family Medicine [phone]            Care Transition Nurse provided contact information.  Plan for follow-up call in 6-10 days based on severity of symptoms and risk factors.  Plan for next call: symptom management-new or worsening symptoms, dizzy, L fem endart  follow-up appointment-review f/u 7/17      Airam Nunez RN

## 2024-07-17 ENCOUNTER — CARE COORDINATION (OUTPATIENT)
Dept: CARE COORDINATION | Age: 79
End: 2024-07-17

## 2024-07-18 ENCOUNTER — CARE COORDINATION (OUTPATIENT)
Dept: CASE MANAGEMENT | Age: 79
End: 2024-07-18

## 2024-07-18 NOTE — CARE COORDINATION
Could we please contact patient and get him scheduled for appt. May need UC evaluation as I believe office is full tomorrow and I am out. TS

## 2024-07-18 NOTE — CARE COORDINATION
Called [phone], spoke with Conner JEREMY Angel wife, Viridiana about calling Carmen Felix APRN - CNP office and scheduling a Acute or HFU appointment for tomorrow. Viridiana will contact their office.

## 2024-07-18 NOTE — CARE COORDINATION
Recommend appt or evaluation in UC if not feeling well and concerned he needs antibiotics or steroids. I know these have not helped in the past though. Would recommend he use flonase and spiriva daily. Can order CXR if needed. TS

## 2024-07-18 NOTE — CARE COORDINATION
Care Transitions Note    Follow Up Call     Patient Current Location:  Home: [address] [place] [id number]    [facility] Care Coordinator contacted the patient, spouse/partner  by telephone. Verified name and  as identifiers.    Additional needs identified to be addressed with provider   High priority: Conner Angel complains of shortness of breath when laying down, he has used 2 of his inhalers, history of bronchitis, in the past PCP gave antibiotic and prednisone and those never worked, then pt went to the urgent care, was given the same medication and still did not work, since 2024 patient feels ears and head are plugged, pt has tried allergy medication and nose spray and trying OTC medication. Currently has taken both taken inhalers, and taking OTC Advil and allergy medication, denies fever. SOB gets worse at night and patient is gasping for air when laying down. Patient has history of COPD, does not smoke, not on oxygen, no cough, pt is using hospital spirometer,  pulse ox range has been 98-99%.message sent to PCP , Carmen Felix APRN - CNP.        Method of communication with provider: chart routing.    Care Summary Note:   called [phone] spoke to Conner Angel's wife, Viridiana, Conner is doing good and taking his tylenol when he has pain and stop controlled substances since it caused the patient to have constipation, and Conner is eating much better!  Since the last CTN call patient was suppose to have an appointment with vas , it was rescheduled to . Has an upcoming appointment with vasc .   Conner Angel complains of shortness of breath when laying down, he has used 2 of his inhalers, history of bronchitis, in the past PCP gave antibiotic and prednisone and those never worked, then pt went to the urgent care, was given the same medication and still did not work, since 2024 patient feels ears and head are plugged, pt has tried allergy medication and nose spray and

## 2024-07-18 NOTE — CARE COORDINATION
Carmen Felix APRN - CNP routed conversation to You21 minutes ago (1:13 PM)     Carmen Felix APRN - CNP21 minutes ago (1:13 PM)     Recommend appt or evaluation in UC if not feeling well and concerned he needs antibiotics or steroids. I know these have not helped in the past though. Would recommend he use flonase and spiriva daily. Can order CXR if needed. TS          Called [phone], spoke with Conner Angel wife, Viridiana and she would like acute care visit. Please have your office contact patient since I see your next availability is 07/22 as acute visit and 07/25 for a HFU.     Message sent to PCP.

## 2024-07-22 ENCOUNTER — OFFICE VISIT (OUTPATIENT)
Dept: FAMILY MEDICINE CLINIC | Age: 79
End: 2024-07-22
Payer: MEDICARE

## 2024-07-22 ENCOUNTER — OFFICE VISIT (OUTPATIENT)
Age: 79
End: 2024-07-22

## 2024-07-22 VITALS
HEART RATE: 64 BPM | TEMPERATURE: 97.8 F | OXYGEN SATURATION: 100 % | RESPIRATION RATE: 16 BRPM | DIASTOLIC BLOOD PRESSURE: 72 MMHG | SYSTOLIC BLOOD PRESSURE: 128 MMHG | WEIGHT: 182.2 LBS | BODY MASS INDEX: 26.08 KG/M2 | HEIGHT: 70 IN

## 2024-07-22 VITALS
HEART RATE: 72 BPM | DIASTOLIC BLOOD PRESSURE: 79 MMHG | HEIGHT: 70 IN | BODY MASS INDEX: 26.23 KG/M2 | WEIGHT: 183.2 LBS | SYSTOLIC BLOOD PRESSURE: 144 MMHG

## 2024-07-22 DIAGNOSIS — H69.92 CHRONIC DYSFUNCTION OF LEFT EUSTACHIAN TUBE: ICD-10-CM

## 2024-07-22 DIAGNOSIS — R09.81 SINUS CONGESTION: ICD-10-CM

## 2024-07-22 DIAGNOSIS — I73.9 CLAUDICATION (HCC): ICD-10-CM

## 2024-07-22 DIAGNOSIS — I65.23 BILATERAL CAROTID ARTERY STENOSIS: ICD-10-CM

## 2024-07-22 DIAGNOSIS — I73.9 PVD (PERIPHERAL VASCULAR DISEASE) WITH CLAUDICATION (HCC): Primary | ICD-10-CM

## 2024-07-22 DIAGNOSIS — I70.213 ATHEROSCLEROSIS OF NATIVE ARTERY OF BOTH LOWER EXTREMITIES WITH INTERMITTENT CLAUDICATION (HCC): ICD-10-CM

## 2024-07-22 DIAGNOSIS — R42 DIZZINESS: Primary | ICD-10-CM

## 2024-07-22 PROCEDURE — 1111F DSCHRG MED/CURRENT MED MERGE: CPT | Performed by: NURSE PRACTITIONER

## 2024-07-22 PROCEDURE — 1036F TOBACCO NON-USER: CPT | Performed by: NURSE PRACTITIONER

## 2024-07-22 PROCEDURE — G8417 CALC BMI ABV UP PARAM F/U: HCPCS | Performed by: NURSE PRACTITIONER

## 2024-07-22 PROCEDURE — 3074F SYST BP LT 130 MM HG: CPT | Performed by: NURSE PRACTITIONER

## 2024-07-22 PROCEDURE — 1123F ACP DISCUSS/DSCN MKR DOCD: CPT | Performed by: NURSE PRACTITIONER

## 2024-07-22 PROCEDURE — 99024 POSTOP FOLLOW-UP VISIT: CPT | Performed by: THORACIC SURGERY (CARDIOTHORACIC VASCULAR SURGERY)

## 2024-07-22 PROCEDURE — 3078F DIAST BP <80 MM HG: CPT | Performed by: NURSE PRACTITIONER

## 2024-07-22 PROCEDURE — 99213 OFFICE O/P EST LOW 20 MIN: CPT | Performed by: NURSE PRACTITIONER

## 2024-07-22 PROCEDURE — G8427 DOCREV CUR MEDS BY ELIG CLIN: HCPCS | Performed by: NURSE PRACTITIONER

## 2024-07-22 ASSESSMENT — ENCOUNTER SYMPTOMS
CHEST TIGHTNESS: 0
SORE THROAT: 1
BLOOD IN STOOL: 0
SINUS PAIN: 1
SHORTNESS OF BREATH: 0
EYE DISCHARGE: 1
ABDOMINAL PAIN: 0

## 2024-07-22 NOTE — PROGRESS NOTES
Chief Complaint   Patient presents with    Congestion     Patient has been experiencing ongoing congestion and ear issues for the past couple of months.     SUBJECTIVE     Conneredson Angel is a 78 y.o.male      Pt complains of PND, sinus congestion, pounding at the back head, can hear his heartbeat in his ears. No runny nose. Eyes will burn and are watery when he goes outside. When he sleeps at night he feels like his throat tightens up  Allergy sprays and nasal sprays have only been used very short term but are not helping. Pt states symptoms have persisted since march without resolution despite steroids and antibiotics. CT facial bones in 2021 did show right maxillary sinus disease and bilat mastoiditis with middle ear involvement.    Pt also notes dizziness and had carotid doppler in 2021 that showed mild stenosis of the bilat carotid arteries.       Review of Systems   Constitutional:  Negative for chills, fatigue, fever and unexpected weight change.   HENT:  Positive for postnasal drip, sinus pain and sore throat (tightness at night).         Ear fullness   Eyes:  Positive for discharge.   Respiratory:  Negative for chest tightness and shortness of breath.    Cardiovascular:  Negative for chest pain, palpitations and leg swelling.   Gastrointestinal:  Negative for abdominal pain and blood in stool.   Genitourinary:  Negative for dysuria.   Musculoskeletal:  Negative for joint swelling.   Skin:  Negative for rash.   Neurological:  Positive for dizziness and headaches (back of head).   Psychiatric/Behavioral: Negative.     All other systems reviewed and are negative.        OBJECTIVE     /72 (Site: Left Upper Arm, Position: Sitting, Cuff Size: Medium Adult)   Pulse 64   Temp 97.8 °F (36.6 °C) (Oral)   Resp 16   Ht 1.778 m (5' 10\")   Wt 82.6 kg (182 lb 3.2 oz)   SpO2 100% Comment: Room Air  BMI 26.14 kg/m²     Physical Exam  Vitals and nursing note reviewed.   Constitutional:       Appearance: He is

## 2024-07-22 NOTE — PROGRESS NOTES
level of the ankle.    RIGHT LEG:    COMMON FEMORAL ARTERY/PROFUNDA:  Ectatic right common femoral artery, 12 mm. Vascular clips in the right inguinal region from prior surgery. The common femoral artery and profunda are widely patent.    SFA/POPLITEAL ARTERY:  Extensive calcific plaque throughout the SFA and popliteal artery with multifocal stenosis, upwards of 70%    TRIFURCATION VESSELS: Total occlusion of the posterior tibial artery. Extensive calcific plaque in the peroneal and anterior tibial artery with multifocal stenosis. There is collateral reconstitution of the posterior tibial artery distally. Both anterior   tibial and posterior tibial arteries are patent at the level of the foot.        Impression    1. Moderate multifocal trifurcation disease bilaterally.  2. Extensive calcific plaque both SFAs and both popliteal arteries. Multifocal stenosis, however. 70%.  3. 60% stenosis left common femoral artery.  4. Multifocal calcific plaque in both common adnexal iliac arteries were multifocal mild stenosis. Cannot be adequately quantitated.  5. 3.1 cm fusiform aneurysm distal abdominal aorta.        **This report has been created using voice recognition software.  It may contain minor errors which are inherent in voice recognition technology.**    Final report electronically signed by Dr. Ronnie Caldera on 4/25/2024 11:43 AM       CT Angiogram Chest: No results found for this or any previous visit.      Assessment/Plan     1. PVD (peripheral vascular disease) with claudication (HCC)    2. Atherosclerosis of native artery of both lower extremities with intermittent claudication (HCC)    3. Claudication (HCC)      Orders Placed This Encounter   Procedures    Vascular duplex lower extremity arteries bilateral     Thus the patient is doing well following the left femoral endarterectomy and left SFA and popliteal atherectomy and stents for severe claudication.  There currently no postoperative issues of note.

## 2024-07-24 ENCOUNTER — CARE COORDINATION (OUTPATIENT)
Dept: CASE MANAGEMENT | Age: 79
End: 2024-07-24

## 2024-07-24 NOTE — CARE COORDINATION
Care Transitions Note    Follow Up Call     Patient Current Location:  Home: [address] [facility] [address] [facility] [address]    Care Transition Nurse contacted the patient, spouse/partner  by telephone. Verified name and  as identifiers.    Additional needs identified to be addressed with provider   No needs identified                 Method of communication with provider: none.    Care Summary Note: CTN call to Leopoldo (wife on speaker) today and he says he is feeling pretty good.  Pain level 0/10 Circ checks are great.  Surgical site healed.  CV surgery 24-> walk 30 min/day 3x/week, decrease asa to 81 mg/day VL dup LE scheduled at [facility] 24 w/ f/u 24.  Walking about 5 mins/day now, confirmed asa ot 81 mg/day  XX=819/66  PCP f/u 24 for dizziness-> carotid US 24, ref ENT 24, allergy tabs flonase daily.  C/o dizziness yesterday when standing. Lasted a few seconds then went away on it's own. Did sit down immediately. Discussed fall prevention during transitioning. He expressed understanding and said he's had this before and knows to sit down immediately.  Discussed seeking ER medical advice for new or worsening sxs. They expressed understanding.  Denies sob, chest pain, cough, wheezing, fever, chills, swelling, n/v/d, malaise.  Eating, drinking & sleeping ok. Denies problems w/ urination/bowels.  Cardio f/u 8/15/24  No other concerns voiced at this time. Will continue to follow.    Plan of care updates since last contact:  PVD BLE w/ int claudication L fem endarterectomy w/ angio & stenting CAD, HTN, GERD, AAA, COPD, gout       Advance Care Planning:   Does patient have an Advance Directive: reviewed during previous call, see note. .    Medication Review:  Medications changed since last call, reviewed today.     Remote Patient Monitoring:  Offered patient enrollment in the Remote Patient Monitoring (RPM) program for in-home monitoring: Yes, but did not enroll at this time: declined to enroll

## 2024-07-31 ENCOUNTER — CARE COORDINATION (OUTPATIENT)
Dept: CASE MANAGEMENT | Age: 79
End: 2024-07-31

## 2024-07-31 NOTE — CARE COORDINATION
Care Transitions Note    Follow Up Call     Patient Current Location:  Home: [address] [facility] [address]    [facility] Care Coordinator contacted the patient by telephone. Verified name and  as identifiers.    Additional needs identified to be addressed with provider   No needs identified                 Method of communication with provider: none.    Care Summary Note:   called [phone] spoke to Conner Angel and his wife Viridiana.   Patient has an upcoming appointment with testing on 2024, 2024 with [facility] Card[facility] on 08/15/2024.   Conner Angel denies of falls or balance issues, pain, chest pain, dizziness, cough, wheezing, fever, chills, sob, n/v/d, swelling, bowel or urination issues, uti s/s, appetite or fluid intake issues.   Conner Angel complains of feeling sob when sleeping, history COPD, when patient is sleeping heels like his throat tightens up, PCP is aware on , PCP referred patient to [facility], that office could not schedule patient until 2024, so wife called [facility] and their ENT can see patient 2024, patient is taking Flonase and OTC medication to help the fluid in his ears.   Conner Angel's wife, Viridiana states BP or Pulse OX not completed for a couple days.   Conner Angel educated on walk 30 min/day 3x/week, is not going well, because of right leg that needs surgery, and surgeon wants to wait for the left leg is fully healed to operate on the right leg, when walking patient gets lightheadedness and pain, patient and wife are aware of preventing falls.  ACP forms SW mailed on 2024, forms were received, when they [facility] facility like their PCP office.   Discussed seeking emergency medical attention for new or worsening symptoms and patient expressed understanding.   Patient states no other questions or concerns at this time.    Plan of care updates since last contact:  Review of patient management of conditions/medications: COPD, HTN and

## 2024-08-02 ENCOUNTER — HOSPITAL ENCOUNTER (EMERGENCY)
Age: 79
Discharge: HOME | End: 2024-08-02
Payer: MEDICARE

## 2024-08-02 VITALS
DIASTOLIC BLOOD PRESSURE: 78 MMHG | WEIGHT: 183 LBS | SYSTOLIC BLOOD PRESSURE: 145 MMHG | BODY MASS INDEX: 26.2 KG/M2 | HEIGHT: 70 IN | OXYGEN SATURATION: 98 % | HEART RATE: 71 BPM | RESPIRATION RATE: 22 BRPM | TEMPERATURE: 98 F

## 2024-08-02 DIAGNOSIS — J01.40 ACUTE PANSINUSITIS, RECURRENCE NOT SPECIFIED: Primary | ICD-10-CM

## 2024-08-02 PROCEDURE — 99213 OFFICE O/P EST LOW 20 MIN: CPT | Performed by: NURSE PRACTITIONER

## 2024-08-02 PROCEDURE — 99213 OFFICE O/P EST LOW 20 MIN: CPT

## 2024-08-02 RX ORDER — PREDNISONE 20 MG/1
40 TABLET ORAL DAILY
Qty: 14 TABLET | Refills: 0 | Status: SHIPPED | OUTPATIENT
Start: 2024-08-02 | End: 2024-08-09

## 2024-08-02 RX ORDER — LEVOFLOXACIN 500 MG/1
500 TABLET, FILM COATED ORAL DAILY
Qty: 10 TABLET | Refills: 0 | Status: SHIPPED | OUTPATIENT
Start: 2024-08-02 | End: 2024-08-12

## 2024-08-02 ASSESSMENT — ENCOUNTER SYMPTOMS
DIARRHEA: 0
RHINORRHEA: 1
SORE THROAT: 0
COUGH: 0
VOMITING: 0
CHEST TIGHTNESS: 0
SINUS PRESSURE: 1
SHORTNESS OF BREATH: 0
NAUSEA: 0

## 2024-08-02 ASSESSMENT — PAIN - FUNCTIONAL ASSESSMENT: PAIN_FUNCTIONAL_ASSESSMENT: NONE - DENIES PAIN

## 2024-08-02 NOTE — ED PROVIDER NOTES
[facility] Encounter       CHIEF COMPLAINT       Chief Complaint   Patient presents with    Sinusitis    Headache     Increased sinus congestion over the last several months       Nurses Notes reviewed and I agree except as noted in the HPI.  HISTORY OF PRESENT ILLNESS   Conner Angel is a 78 y.o. male who presents to the [facility] for evaluation of sinus pressure.  He reports that the sinus symptoms started roughly 6 months ago.  Reports that he has been treated twice with antibiotics and twice with steroids with only minimal relief.  They report that they have seen her PCP for these symptoms.  Reports that there has been an ENT consult placed.  Reports that this consult is at the end of this month.  Denies fever or chills.    The history is provided by the patient. No  was used.       REVIEW OF SYSTEMS     Review of Systems   Constitutional:  Negative for activity change, appetite change, chills, fatigue and fever.   HENT:  Positive for congestion, postnasal drip, rhinorrhea, sinus pressure and sneezing. Negative for ear discharge, ear pain and sore throat.    Respiratory:  Negative for cough, chest tightness and shortness of breath.    Cardiovascular:  Negative for chest pain.   Gastrointestinal:  Negative for diarrhea, nausea and vomiting.   Genitourinary:  Negative for dysuria.   Skin:  Negative for rash.   Allergic/Immunologic: Negative for environmental allergies and food allergies.   Neurological:  Positive for headaches. Negative for dizziness.       PAST MEDICAL HISTORY         Diagnosis Date    AAA (abdominal aortic aneurysm) ([facility])     watching for now    Aneurysm ([facility])     Back pain     Cervical spine fracture ([facility])     Cholelithiasis     asymptomatic    Chronic back pain     COPD (chronic obstructive pulmonary disease) ([facility])     Coronary artery disease     Emphysema of lung ([facility])     Erectile dysfunction     GERD  (BEFORE BREAKFAST), Disp-90 tablet, R-3Normal      Handicap Placard [facility] Starting u 1/25/2024, Disp-1 each, R-0, PrintDuration: 5 years  Dx: OA, CAD, COPD      isosorbide mononitrate (IMDUR) 30 MG extended release tablet Take 1 tablet by mouth daily, Disp-90 tablet, R-3Normal      clopidogrel (PLAVIX) 75 MG tablet TAKE 1 TABLET EVERY DAY, Disp-90 tablet, R-3Normal      Multiple Vitamin (MULTIVITAMIN PO) Take by mouth daily Historical Med      nitroGLYCERIN (NITROSTAT) 0.4 MG SL tablet Place 1 tablet under the tongue every 5 minutes as needed for Chest pain, Disp-25 tablet, R-3Normal      ferrous sulfate 325 (65 FE) MG tablet Take 1 tablet by mouth dailyHistorical Med      acetaminophen (TYLENOL) 500 MG tablet Take 2 tablets by mouth as needed for PainHistorical Med      aspirin 325 MG EC tablet Take 81 mg by mouth dailyHistorical Med             ALLERGIES     Patient is is allergic to crestor [rosuvastatin calcium], lipitor, tramadol, codeine, percocet [oxycodone-acetaminophen], and vicodin [hydrocodone-acetaminophen].    Patients   Immunization History   Administered Date(s) Administered    COVID-19, MODERNA BLUE border, Primary or Immunocompromised, (age 12y+), IM, 100 mcg/0.5mL 05/29/2021, 06/26/2021    Influenza Virus Vaccine 11/01/2011, 10/24/2012, 10/01/2013, 10/06/2014, 09/28/2015    Influenza, FLUAD, (age 65 y+), Adjuvanted, 0.5mL 12/03/2021    Influenza, High Dose (Fluzone 65 yrs and older) 09/30/2018, 11/09/2019    Pneumococcal, PCV-13, PREVNAR 13, (age 6w+), IM, 0.5mL 04/13/2015    Pneumococcal, PPSV23, PNEUMOVAX 23, (age 2y+), SC/IM, 0.5mL 08/01/2005, 04/14/2014    TDaP, ADACEL (age 10y-64y), BOOSTRIX (age 10y+), IM, 0.5mL 12/04/2013       FAMILY HISTORY     Patient's family history includes Asthma in his sister and sister; Cancer in his sister; Diabetes in his mother; Heart Attack in his brother, brother, sister, sister, and sister; Heart Disease in his brother, brother, father, mother, sister,

## 2024-08-02 NOTE — ED NOTES
Pt and spouse given discharge instructions per JULIA Arias cnp.      Chu Urbano, MICK  08/02/24 [id number]

## 2024-08-05 ENCOUNTER — TELEPHONE (OUTPATIENT)
Dept: FAMILY MEDICINE CLINIC | Age: 79
End: 2024-08-05

## 2024-08-05 ENCOUNTER — HOSPITAL ENCOUNTER (OUTPATIENT)
Dept: INTERVENTIONAL RADIOLOGY/VASCULAR | Age: 79
Discharge: HOME | End: 2024-08-07
Attending: THORACIC SURGERY (CARDIOTHORACIC VASCULAR SURGERY)
Payer: MEDICARE

## 2024-08-05 DIAGNOSIS — I73.9 PVD (PERIPHERAL VASCULAR DISEASE) WITH CLAUDICATION (HCC): ICD-10-CM

## 2024-08-05 DIAGNOSIS — J32.9 RECURRENT SINUSITIS: Primary | ICD-10-CM

## 2024-08-05 DIAGNOSIS — R42 DIZZINESS: ICD-10-CM

## 2024-08-05 DIAGNOSIS — R51.9 FREQUENT HEADACHES: ICD-10-CM

## 2024-08-05 DIAGNOSIS — I65.23 BILATERAL CAROTID ARTERY STENOSIS: ICD-10-CM

## 2024-08-05 DIAGNOSIS — I73.9 CLAUDICATION (HCC): ICD-10-CM

## 2024-08-05 DIAGNOSIS — R09.81 SINUS CONGESTION: ICD-10-CM

## 2024-08-05 DIAGNOSIS — I70.213 ATHEROSCLEROSIS OF NATIVE ARTERY OF BOTH LOWER EXTREMITIES WITH INTERMITTENT CLAUDICATION (HCC): ICD-10-CM

## 2024-08-05 PROCEDURE — 93925 LOWER EXTREMITY STUDY: CPT

## 2024-08-05 PROCEDURE — 93880 EXTRACRANIAL BILAT STUDY: CPT

## 2024-08-05 NOTE — TELEPHONE ENCOUNTER
Seen at [facility] on 8/2/24 and was diagnosed with acute pansinusitis and was given an Rx for Levaquin and Prednisone. He took 2 doses of the Levaquin and it caused him shakiness so he stopped taking it. Is scheduled to see Dr. Soto on 8/30/24 but wants to know if TS can order a CT scan of his sinuses to have done prior to the appt to get to the bottom of what is going on since he keeps having recurrent symptoms. Also c/o pounding HA's and wants to know if his entire head can be scanned. Uses [facility] for radiology exams. Please advise.

## 2024-08-06 NOTE — TELEPHONE ENCOUNTER
Viridiana notified.   Order signed in [facility]. Central Scheduling will call pt to set up. Auth dept will work on precert.

## 2024-08-07 ENCOUNTER — CARE COORDINATION (OUTPATIENT)
Dept: CASE MANAGEMENT | Age: 79
End: 2024-08-07

## 2024-08-07 NOTE — CARE COORDINATION
Care Transitions Note    Final Call     Patient Current Location:  Home: [address] [place] [id number]    Care Transition Nurse contacted the patient, spouse/partner  by telephone. Verified name and  as identifiers.    Patient graduated from the Care Transitions program on 2024.  Patient/family verbalizes confidence in the ability to self-manage at this time. has the ability to self manage at this time..      Advance Care Planning:   Does patient have an Advance Directive: Not on file; patient encouraged to bring existing ACP documents to a [facility] facility..    Handoff:   Patient was not referred to the ACM team due to patient declined services.      Care Summary Note:     called [phone] spoke to Conner Angel , and spoke wife, Viridiana.     Since the last CTN call patient had an appointment with ED  Acute pansinusitis, recurrence not specified.     Carmen Felix, APRN - CNP order a CT for patient, wife wanted to get CT schedule, [phone] central scheduling phone number given to wife.     Conner Angel denies of falls or balance issues, pain, chest pain, constipation, cough, diaphoresis, fatigue, fever, chills, rash, nausea, vomiting or diarrhea,  weakness, swelling, bowel or urination issues, uti s/s, appetite or fluid intake issues, weight gain, weight loss, or any other new symptoms.    Conner Angel complains of headaches, congestion, sob, sleep issues continues that is why the CT was order.     Conner Angel's wife, Viridiana, states pulse ox 99%, /82, hr 80, today.  Activity is limited since right leg.     ACP forms mailed by , not completed yet, wife will take to a [facility] facility like PCP office once completed.     Discussed seeking emergency medical attention for new or worsening symptoms and patient expressed understanding.     Patient states no other questions or concerns at this time.    CTN FINAL CALL, ACM services declined.     Assessments:  Care Transitions

## 2024-08-09 ENCOUNTER — CARE COORDINATION (OUTPATIENT)
Dept: CARE COORDINATION | Age: 79
End: 2024-08-09

## 2024-08-09 NOTE — CARE COORDINATION
SW attempted to call pt and spouse to follow up with ACP documents that were sent to them. Left vm introducing self and my role requesting a call back to [phone]. WILLAM will attempt again next week.

## 2024-08-12 ENCOUNTER — OFFICE VISIT (OUTPATIENT)
Age: 79
End: 2024-08-12

## 2024-08-12 VITALS
SYSTOLIC BLOOD PRESSURE: 162 MMHG | WEIGHT: 186.6 LBS | HEIGHT: 70 IN | BODY MASS INDEX: 26.71 KG/M2 | DIASTOLIC BLOOD PRESSURE: 82 MMHG | HEART RATE: 68 BPM

## 2024-08-12 DIAGNOSIS — I73.9 CLAUDICATION (HCC): ICD-10-CM

## 2024-08-12 DIAGNOSIS — I70.213 ATHEROSCLEROSIS OF NATIVE ARTERY OF BOTH LOWER EXTREMITIES WITH INTERMITTENT CLAUDICATION (HCC): Primary | ICD-10-CM

## 2024-08-12 PROCEDURE — 99024 POSTOP FOLLOW-UP VISIT: CPT | Performed by: THORACIC SURGERY (CARDIOTHORACIC VASCULAR SURGERY)

## 2024-08-12 RX ORDER — CILOSTAZOL 100 MG/1
100 TABLET ORAL 2 TIMES DAILY
Qty: 60 TABLET | Refills: 3 | Status: SHIPPED | OUTPATIENT
Start: 2024-08-12

## 2024-08-12 NOTE — PROGRESS NOTES
created using voice recognition software.  It may contain minor errors which are inherent in voice recognition technology.**    Final report electronically signed by Dr. Ronnie Caldera on 4/25/2024 11:43 AM       CT Angiogram Chest: No results found for this or any previous visit.      Assessment/Plan     1. 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 Claudication (HCC)      Orders Placed This Encounter   Procedures    Vascular duplex lower extremity arteries bilateral     Orders Placed This Encounter   Medications    cilostazol (PLETAL) 100 MG tablet     Sig: Take 1 tablet by mouth 2 times daily     Dispense:  60 tablet     Refill:  3     So left lower extremity claudication has resolved, but he has increasing right leg claudication.    Plan:  Increase walking to 30 min, 3x/week.  Refill Pletal and ASA/Plavix.  F/U ADS in 3 months for re-evaluation.  F/U with ENT  Continue all medications.      Return in about 3 months (around 11/12/2024).      Electronically signed by Eric Mari MD   8/12/2024 at 1:57 PM EDT

## 2024-08-13 ENCOUNTER — CARE COORDINATION (OUTPATIENT)
Dept: CARE COORDINATION | Age: 79
End: 2024-08-13

## 2024-08-13 NOTE — CARE COORDINATION
WILLAM called to follow up with ACP documents. Spoke with Viridiana pt spouse. Inquired if they have any questions. Provided Viridiana with answers to questions. She will have her sister \"who is a nurse\" help with this. Advised Viridiana to call WILLAM back with any additional questions and requested she take to PCP office once completed. Understanding voiced. Will follow up in a month to address completion.

## 2024-08-14 NOTE — PATIENT INSTRUCTIONS
Your Provider for Today: Dr. Sanon  Your nurses for today: Ivy    You may receive a survey regarding the care you received during your visit.  Your input is valuable to us.  We encourage you to complete and return your survey.  We hope you will choose us in the future for your healthcare needs.

## 2024-08-15 ENCOUNTER — OFFICE VISIT (OUTPATIENT)
Dept: CARDIOLOGY CLINIC | Age: 79
End: 2024-08-15
Payer: MEDICARE

## 2024-08-15 VITALS
WEIGHT: 184.4 LBS | HEART RATE: 68 BPM | SYSTOLIC BLOOD PRESSURE: 130 MMHG | HEIGHT: 69 IN | BODY MASS INDEX: 27.31 KG/M2 | DIASTOLIC BLOOD PRESSURE: 60 MMHG

## 2024-08-15 DIAGNOSIS — R06.02 SHORTNESS OF BREATH: ICD-10-CM

## 2024-08-15 DIAGNOSIS — Z95.1 HX OF CABG: Primary | ICD-10-CM

## 2024-08-15 DIAGNOSIS — R06.00 DYSPNEA, UNSPECIFIED TYPE: ICD-10-CM

## 2024-08-15 PROCEDURE — 1036F TOBACCO NON-USER: CPT | Performed by: INTERNAL MEDICINE

## 2024-08-15 PROCEDURE — 3075F SYST BP GE 130 - 139MM HG: CPT | Performed by: INTERNAL MEDICINE

## 2024-08-15 PROCEDURE — G8427 DOCREV CUR MEDS BY ELIG CLIN: HCPCS | Performed by: INTERNAL MEDICINE

## 2024-08-15 PROCEDURE — 3078F DIAST BP <80 MM HG: CPT | Performed by: INTERNAL MEDICINE

## 2024-08-15 PROCEDURE — 99214 OFFICE O/P EST MOD 30 MIN: CPT | Performed by: INTERNAL MEDICINE

## 2024-08-15 PROCEDURE — 1123F ACP DISCUSS/DSCN MKR DOCD: CPT | Performed by: INTERNAL MEDICINE

## 2024-08-15 PROCEDURE — G8417 CALC BMI ABV UP PARAM F/U: HCPCS | Performed by: INTERNAL MEDICINE

## 2024-08-15 NOTE — PROGRESS NOTES
[facility] [place] [facility]  [facility] CARDIOLOGY  [address]  [address]  [facility] [address]  Dept: [phone]  Dept Fax: [phone]  Loc: [phone]    Visit Date: 8/15/2024    Mr. Angel is a 78 y.o. male  who presented for:  Chief Complaint   Patient presents with    6 Month Follow-Up    Shortness of Breath       HPI:   79 yo M c hx of CAD s/p CABG x 4 (2006), HTN, HLD, COPD, DM, PAD s/p Rt PTA is here for a follow up.  Has been having more shortness of breath.  He does report bilateral leg pains.  He had endarectomy of CFA on right.     Underwent PTA/stenting by vascular surgery.        Current Outpatient Medications:     cilostazol (PLETAL) 100 MG tablet, Take 1 tablet by mouth 2 times daily, Disp: 60 tablet, Rfl: 3    albuterol sulfate HFA (PROVENTIL;VENTOLIN;PROAIR) 108 (90 Base) MCG/ACT inhaler, Inhale 2 puffs into the lungs every 6 hours as needed for Wheezing, Disp: 1 each, Rfl: 11    betamethasone dipropionate 0.05 % lotion, Apply 1 Application topically at bedtime, Disp: , Rfl:     cilostazol (PLETAL) 100 MG tablet, Take 1 tablet by mouth 2 times daily, Disp: 60 tablet, Rfl: 3    allopurinol (ZYLOPRIM) 300 MG tablet, Take 1 tablet by mouth daily, Disp: 90 tablet, Rfl: 3    metoprolol tartrate (LOPRESSOR) 50 MG tablet, Take 0.5 tablets by mouth daily, Disp: 45 tablet, Rfl: 3    simvastatin (ZOCOR) 20 MG tablet, Take 1 tablet by mouth nightly, Disp: 90 tablet, Rfl: 3    tiotropium (SPIRIVA RESPIMAT) 2.5 MCG/ACT AERS inhaler, INHALE TWO (2) PUFFS INTO THE LUNGS DAILY, Disp: 4 g, Rfl: 11    ketoconazole (NIZORAL) 2 % shampoo, Apply shampoo 2x/wk for 8 weeks then as needed, Disp: 120 mL, Rfl: 1    pantoprazole (PROTONIX) 40 MG tablet, TAKE 1 TABLET EVERY MORNING (BEFORE BREAKFAST), Disp: 90 tablet, Rfl: 3    Handicap Placard MISC, by Does not apply route Duration: 5 years  Dx: OA, CAD, COPD, Disp: 1 each, Rfl: 0    isosorbide mononitrate (IMDUR) 30 MG extended release

## 2024-08-16 ENCOUNTER — HOSPITAL ENCOUNTER (OUTPATIENT)
Age: 79
End: 2024-08-16
Attending: INTERNAL MEDICINE
Payer: MEDICARE

## 2024-08-16 VITALS
WEIGHT: 184 LBS | BODY MASS INDEX: 27.25 KG/M2 | DIASTOLIC BLOOD PRESSURE: 60 MMHG | HEIGHT: 69 IN | SYSTOLIC BLOOD PRESSURE: 130 MMHG

## 2024-08-16 DIAGNOSIS — R06.02 SHORTNESS OF BREATH: ICD-10-CM

## 2024-08-16 LAB
ECHO AO ASC DIAM: 3.5 CM
ECHO AO ASCENDING AORTA INDEX: 1.76 CM/M2
ECHO AR MAX VEL PISA: 4 M/S
ECHO AV CUSP MM: 2.2 CM
ECHO AV PEAK GRADIENT: 8 MMHG
ECHO AV PEAK VELOCITY: 1.5 M/S
ECHO AV REGURGITANT PHT: 673 MS
ECHO AV VELOCITY RATIO: 0.6
ECHO BSA: 2.02 M2
ECHO LA AREA 2C: 16.2 CM2
ECHO LA AREA 4C: 13.4 CM2
ECHO LA DIAMETER INDEX: 1.76 CM/M2
ECHO LA DIAMETER: 3.5 CM
ECHO LA MAJOR AXIS: 4.6 CM
ECHO LA MINOR AXIS: 5.3 CM
ECHO LA VOL BP: 39 ML (ref 18–58)
ECHO LA VOL MOD A2C: 41 ML (ref 18–58)
ECHO LA VOL MOD A4C: 32 ML (ref 18–58)
ECHO LA VOL/BSA BIPLANE: 20 ML/M2 (ref 16–34)
ECHO LA VOLUME INDEX MOD A2C: 21 ML/M2 (ref 16–34)
ECHO LA VOLUME INDEX MOD A4C: 16 ML/M2 (ref 16–34)
ECHO LV E' LATERAL VELOCITY: 13 CM/S
ECHO LV E' SEPTAL VELOCITY: 6 CM/S
ECHO LV FRACTIONAL SHORTENING: 30 % (ref 28–44)
ECHO LV INTERNAL DIMENSION DIASTOLE INDEX: 2.71 CM/M2
ECHO LV INTERNAL DIMENSION DIASTOLIC: 5.4 CM (ref 4.2–5.9)
ECHO LV INTERNAL DIMENSION SYSTOLIC INDEX: 1.91 CM/M2
ECHO LV INTERNAL DIMENSION SYSTOLIC: 3.8 CM
ECHO LV ISOVOLUMETRIC RELAXATION TIME (IVRT): 102 MS
ECHO LV IVSD: 1 CM (ref 0.6–1)
ECHO LV MASS 2D: 206.7 G (ref 88–224)
ECHO LV MASS INDEX 2D: 103.9 G/M2 (ref 49–115)
ECHO LV POSTERIOR WALL DIASTOLIC: 1 CM (ref 0.6–1)
ECHO LV RELATIVE WALL THICKNESS RATIO: 0.37
ECHO LVOT PEAK GRADIENT: 3 MMHG
ECHO LVOT PEAK VELOCITY: 0.9 M/S
ECHO MV A VELOCITY: 0.86 M/S
ECHO MV E DECELERATION TIME (DT): 285 MS
ECHO MV E VELOCITY: 0.51 M/S
ECHO MV E/A RATIO: 0.59
ECHO MV E/E' LATERAL: 3.92
ECHO MV E/E' RATIO (AVERAGED): 6.21
ECHO MV E/E' SEPTAL: 8.5
ECHO MV REGURGITANT PEAK GRADIENT: 71 MMHG
ECHO MV REGURGITANT PEAK VELOCITY: 4.2 M/S
ECHO PULMONARY ARTERY END DIASTOLIC PRESSURE: 7 MMHG
ECHO PV MAX VELOCITY: 0.8 M/S
ECHO PV PEAK GRADIENT: 2 MMHG
ECHO PV REGURGITANT MAX VELOCITY: 1.3 M/S
ECHO RV INTERNAL DIMENSION: 2.8 CM
ECHO RV TAPSE: 0.9 CM (ref 1.7–?)
ECHO TV E WAVE: 0.7 M/S
ECHO TV REGURGITANT MAX VELOCITY: 2.41 M/S
ECHO TV REGURGITANT PEAK GRADIENT: 23 MMHG

## 2024-08-16 PROCEDURE — 93306 TTE W/DOPPLER COMPLETE: CPT | Performed by: NUCLEAR MEDICINE

## 2024-08-16 PROCEDURE — 93306 TTE W/DOPPLER COMPLETE: CPT

## 2024-08-21 ENCOUNTER — TELEPHONE (OUTPATIENT)
Dept: FAMILY MEDICINE CLINIC | Age: 79
End: 2024-08-21

## 2024-08-21 ENCOUNTER — HOSPITAL ENCOUNTER (OUTPATIENT)
Dept: CT IMAGING | Age: 79
Discharge: HOME | End: 2024-08-21
Payer: MEDICARE

## 2024-08-21 DIAGNOSIS — J32.9 RECURRENT SINUSITIS: ICD-10-CM

## 2024-08-21 DIAGNOSIS — R51.9 FREQUENT HEADACHES: ICD-10-CM

## 2024-08-21 DIAGNOSIS — R09.81 SINUS CONGESTION: ICD-10-CM

## 2024-08-21 PROCEDURE — 70450 CT HEAD/BRAIN W/O DYE: CPT

## 2024-08-21 NOTE — TELEPHONE ENCOUNTER
----- Message from MERE Ramon CNP sent at 8/21/2024  1:17 PM EDT -----  Minimal mucosal thickening in the right maxillary sinus, otherwise negative for sinus concerns.  Shows mild severity chronic small vessel ischemic changes. This could potentially cause headaches but we see it more with some memory issues. TS

## 2024-08-21 NOTE — TELEPHONE ENCOUNTER
Spoke to pts wife and notified her of the CT scan result. He will f/up with [facility]NT as scheduled and TS on 9/11/24.

## 2024-08-29 NOTE — TELEPHONE ENCOUNTER
----- Message from MERE Stoddard CNP sent at 3/6/2023 10:11 AM EST -----  CMP looks good. Total bili continues to be slightly elevated. PSA is normal  CBC continues to show a low RBC, consistent with previous readings. Continue iron supplement.  TS Viviana Galaviz is a 78 year old female.    Chief Complaint   Patient presents with   • Office Visit   • Thumb     RT thumb pain with swelling, pain with trying to bend, from getting lab drawn, denies any injuries        HPI:    Here today for thumb pain.   She had labs done on 8/14.  She is a hard stick.   She had her labs drawn in her right anteubital fossa.    The next morning when she woke up, she couldn't move her thumb and it was swollen.  Her antecubital fossa was bruised down most of her forearm.   She still cannot bend her right thumb.    She has a throbbing pain and sharp pains in her thumb with grabbing objects.   She has some numbness and tingling in her thumb as well.   She tried tylenol.     Review of Systems   Musculoskeletal:         See HPI       Patient Active Problem List   Diagnosis   • Hypercholesterolemia   • Hypothyroidism   • Lipoma of left lower extremity   • Vitamin D deficiency   • Hypertension   • Obstructive sleep apnea syndrome   • Status post surgery: VAG HYST, BSO, Prolapse Repairs, Sling, Cystoscopy   • CAD (coronary artery disease)   • Medicare annual wellness visit, subsequent     Current Outpatient Medications   Medication Sig Dispense Refill   • meloxicam (MOBIC) 7.5 MG tablet Take 1 tablet by mouth daily. 30 tablet 0   • amLODIPine (NORVASC) 5 MG tablet Take 1 tablet by mouth daily. 90 tablet 1   • pravastatin (PRAVACHOL) 80 MG tablet Take 1 tablet by mouth daily. 90 tablet 2   • valsartan-hydroCHLOROthiazide (DIOVAN-HCT) 160-12.5 MG per tablet Take 1 tablet by mouth daily. 90 tablet 3   • levothyroxine 100 MCG tablet TAKE 1 TABLET BY MOUTH DAILY 90 tablet 2   • zolpidem (AMBIEN) 10 MG tablet Take 1 tablet by mouth nightly as needed for Sleep. 30 tablet 3   • aspirin 81 MG EC tablet Take 81 mg by mouth nightly.     • Calcium Carb-Cholecalciferol (CALCIUM 600+D3 PO) Take 1 tablet by mouth daily.     • Multiple Vitamins-Minerals (CENTRUM SILVER 50+WOMEN PO) Take 1 tablet by  mouth daily.     • Probiotic Product (Align) 4 MG Cap Take 1 tablet by mouth daily.     • estradiol (ESTRACE) 0.1 MG/GM vaginal cream Place 1 g vaginally. Weekly at bedtime on Mondays     • Cholecalciferol (Vitamin D) 125 MCG (5000 UT) Cap Take 1 tablet by mouth daily.      • Omega-3 Fatty Acids (Fish Oil) 1200 MG capsule 1 capsule daily.        No current facility-administered medications for this visit.       PMH, PSH, FH, SH reviewed and updated if needed      Visit Vitals  /76 (BP Location: LUE - Left upper extremity, Patient Position: Sitting, Cuff Size: Regular)   Pulse 74   Temp 97.9 °F (36.6 °C) (Temporal)   Resp 16   Ht 5' 1\" (1.549 m)   Wt 80.6 kg (177 lb 9.6 oz)   SpO2 97%   BMI 33.56 kg/m²        Physical Exam  Vitals reviewed.   Constitutional:       General: She is not in acute distress.     Appearance: Normal appearance. She is well-developed.   Cardiovascular:      Rate and Rhythm: Normal rate and regular rhythm.      Heart sounds: No murmur heard.  Pulmonary:      Effort: Pulmonary effort is normal.      Breath sounds: Normal breath sounds.   Musculoskeletal:      Comments: Base right thumb with mild edema.  No erythema.   She cannot flex her distal right thumb actively but can flex it passively slightly but it's painful.  Pain in thumb with supination right wrist.  No pain with adduction of thumb and eversion of wrist.    Neurological:      Mental Status: She is alert.   Psychiatric:         Mood and Affect: Mood normal.     Labs:   Glucose   Date/Time Value Ref Range Status   08/14/2024 08:00  (H) 70 - 99 mg/dL Final   04/12/2024 10:16  (H) 70 - 99 mg/dL Final     Sodium   Date/Time Value Ref Range Status   08/14/2024 08:00  135 - 145 mmol/L Final   04/12/2024 10:16  133 - 144 mmol/L Final     Potassium   Date/Time Value Ref Range Status   08/14/2024 08:00 AM 4.0 3.4 - 5.1 mmol/L Final   04/12/2024 10:16 AM 3.8 3.5 - 5.1 mmol/L Final     Chloride   Date/Time Value Ref  Range Status   08/14/2024 08:00  97 - 110 mmol/L Final   04/12/2024 10:16  98 - 107 mmol/L Final     Carbon Dioxide   Date/Time Value Ref Range Status   08/14/2024 08:00 AM 28 21 - 32 mmol/L Final   04/12/2024 10:16 AM 29.2 21.0 - 31.0 mmol/L Final     BUN   Date/Time Value Ref Range Status   08/14/2024 08:00 AM 19 6 - 20 mg/dL Final   04/12/2024 10:16 AM 16.2 7.0 - 25 mg/dL Final     Creatinine   Date/Time Value Ref Range Status   08/14/2024 08:00 AM 0.63 0.51 - 0.95 mg/dL Final   04/12/2024 10:16 AM 0.63 0.60 - 1.20 mg/dL Final     TOTAL PROTEIN   Date/Time Value Ref Range Status   04/12/2024 10:16 AM 7.3 6.4 - 8.9 g/dL Final     Protein, Total   Date/Time Value Ref Range Status   08/14/2024 08:00 AM 7.1 6.4 - 8.2 g/dL Final     Albumin   Date/Time Value Ref Range Status   08/14/2024 08:00 AM 3.9 3.6 - 5.1 g/dL Final   04/12/2024 10:16 AM 4.52 3.50 - 5.70 g/dL Final     CALCIUM   Date/Time Value Ref Range Status   04/12/2024 10:16 AM 9.8 8.6 - 10.3 mg/dL Final     Calcium   Date/Time Value Ref Range Status   08/14/2024 08:00 AM 9.2 8.4 - 10.2 mg/dL Final     TOTAL BILIRUBIN   Date/Time Value Ref Range Status   04/12/2024 10:16 AM 0.81 0.30 - 1.00 mg/dL Final     Bilirubin, Total   Date/Time Value Ref Range Status   08/14/2024 08:00 AM 0.7 0.2 - 1.0 mg/dL Final     AST/SGOT   Date/Time Value Ref Range Status   04/12/2024 10:16 AM 21 13 - 39 U/L Final     GOT/AST   Date/Time Value Ref Range Status   08/14/2024 08:00 AM 20 <=37 Units/L Final     ALK PHOSPHATASE   Date/Time Value Ref Range Status   04/12/2024 10:16 AM 83 34 - 104 U/L Final     Alkaline Phosphatase   Date/Time Value Ref Range Status   08/14/2024 08:00 AM 84 45 - 117 Units/L Final     ALT/SGPT   Date/Time Value Ref Range Status   04/12/2024 10:16 AM 21 7 - 52 U/L Final     GPT/ALT   Date/Time Value Ref Range Status   08/14/2024 08:00 AM 27 <64 Units/L Final     Anion Gap   Date/Time Value Ref Range Status   08/14/2024 08:00 AM 13 7 - 19  mmol/L Final   04/12/2024 10:16 AM 6 6 - 15 mmol/L Final     BUN/Creatinine Ratio   Date/Time Value Ref Range Status   05/07/2020 08:15 AM 33 (H) 7 - 25 Final     GLOBULIN   Date/Time Value Ref Range Status   05/07/2020 08:15 AM 3.3 2.0 - 4.0 g/dL Final     Globulin   Date/Time Value Ref Range Status   08/14/2024 08:00 AM 3.2 2.0 - 4.0 g/dL Final     A/G Ratio, Serum   Date/Time Value Ref Range Status   05/07/2020 08:15 AM 1.2 1.0 - 2.4 Final     A/G Ratio   Date/Time Value Ref Range Status   08/14/2024 08:00 AM 1.2 1.0 - 2.4 Final     GFR Estimate, Non    Date/Time Value Ref Range Status   05/07/2020 08:15 AM 90  Final     Comment:     eGFR results = or >90 mL/min/1.73m2 = Normal kidney function.     GFR,ESTIMATE   Date/Time Value Ref Range Status   04/12/2024 10:16 AM 92.6 >=60 mL/min/1.73 m2 Final     Comment:     See scan for additional information     GFR Estimate,    Date/Time Value Ref Range Status   05/07/2020 08:15 AM >90  Final     Comment:     eGFR results = or >90 mL/min/1.73m2 = Normal kidney function.         Assessment & Plan:    Trigger finger of right thumb  NSAIDs daily as needed for pain.  If not improving, follow up with ortho (she will be out of town until 9/11).      - SERVICE TO ORTHOPEDICS  - XR FINGER(S) 2 OR MORE VIEWS RIGHT; Future  - meloxicam (MOBIC) 7.5 MG tablet; Take 1 tablet by mouth daily.  Dispense: 30 tablet; Refill: 0    Pain of right thumb    - SERVICE TO ORTHOPEDICS  - XR FINGER(S) 2 OR MORE VIEWS RIGHT; Future  - meloxicam (MOBIC) 7.5 MG tablet; Take 1 tablet by mouth daily.  Dispense: 30 tablet; Refill: 0      Return if symptoms worsen or fail to improve.    Chantale Flores PA-C

## 2024-09-04 ENCOUNTER — CARE COORDINATION (OUTPATIENT)
Dept: CARE COORDINATION | Age: 79
End: 2024-09-04

## 2024-09-04 NOTE — CARE COORDINATION
ACM screening for support.  CTN team worked with Conner and he declined ACM support 8-7-24.  Will not follow up at this time.

## 2024-09-11 ENCOUNTER — OFFICE VISIT (OUTPATIENT)
Dept: FAMILY MEDICINE CLINIC | Age: 79
End: 2024-09-11
Payer: MEDICARE

## 2024-09-11 ENCOUNTER — CARE COORDINATION (OUTPATIENT)
Dept: CARE COORDINATION | Age: 79
End: 2024-09-11

## 2024-09-11 VITALS
BODY MASS INDEX: 27.64 KG/M2 | SYSTOLIC BLOOD PRESSURE: 110 MMHG | RESPIRATION RATE: 18 BRPM | TEMPERATURE: 97.9 F | WEIGHT: 187.2 LBS | HEART RATE: 68 BPM | DIASTOLIC BLOOD PRESSURE: 70 MMHG

## 2024-09-11 DIAGNOSIS — I73.9 PVD (PERIPHERAL VASCULAR DISEASE) WITH CLAUDICATION (HCC): ICD-10-CM

## 2024-09-11 DIAGNOSIS — I71.40 ABDOMINAL AORTIC ANEURYSM (AAA) WITHOUT RUPTURE, UNSPECIFIED PART (HCC): ICD-10-CM

## 2024-09-11 DIAGNOSIS — E78.5 HYPERLIPIDEMIA, UNSPECIFIED HYPERLIPIDEMIA TYPE: ICD-10-CM

## 2024-09-11 DIAGNOSIS — J44.9 COPD WITHOUT EXACERBATION (HCC): ICD-10-CM

## 2024-09-11 DIAGNOSIS — R09.81 SINUS CONGESTION: ICD-10-CM

## 2024-09-11 DIAGNOSIS — I10 ESSENTIAL HYPERTENSION: Primary | ICD-10-CM

## 2024-09-11 DIAGNOSIS — H69.93 DYSFUNCTION OF BOTH EUSTACHIAN TUBES: ICD-10-CM

## 2024-09-11 DIAGNOSIS — Z12.5 SCREENING PSA (PROSTATE SPECIFIC ANTIGEN): ICD-10-CM

## 2024-09-11 DIAGNOSIS — I25.10 CORONARY ARTERY DISEASE INVOLVING NATIVE CORONARY ARTERY OF NATIVE HEART WITHOUT ANGINA PECTORIS: ICD-10-CM

## 2024-09-11 PROCEDURE — 3074F SYST BP LT 130 MM HG: CPT | Performed by: NURSE PRACTITIONER

## 2024-09-11 PROCEDURE — 1123F ACP DISCUSS/DSCN MKR DOCD: CPT | Performed by: NURSE PRACTITIONER

## 2024-09-11 PROCEDURE — 3078F DIAST BP <80 MM HG: CPT | Performed by: NURSE PRACTITIONER

## 2024-09-11 PROCEDURE — G8417 CALC BMI ABV UP PARAM F/U: HCPCS | Performed by: NURSE PRACTITIONER

## 2024-09-11 PROCEDURE — 3023F SPIROM DOC REV: CPT | Performed by: NURSE PRACTITIONER

## 2024-09-11 PROCEDURE — G8427 DOCREV CUR MEDS BY ELIG CLIN: HCPCS | Performed by: NURSE PRACTITIONER

## 2024-09-11 PROCEDURE — 99214 OFFICE O/P EST MOD 30 MIN: CPT | Performed by: NURSE PRACTITIONER

## 2024-09-11 PROCEDURE — 1036F TOBACCO NON-USER: CPT | Performed by: NURSE PRACTITIONER

## 2024-09-11 ASSESSMENT — ENCOUNTER SYMPTOMS
SHORTNESS OF BREATH: 0
ABDOMINAL PAIN: 0
EYES NEGATIVE: 1
CHEST TIGHTNESS: 0
BLOOD IN STOOL: 0

## 2024-11-12 ENCOUNTER — HOSPITAL ENCOUNTER (OUTPATIENT)
Dept: INTERVENTIONAL RADIOLOGY/VASCULAR | Age: 79
Discharge: HOME | End: 2024-11-14
Attending: THORACIC SURGERY (CARDIOTHORACIC VASCULAR SURGERY)
Payer: MEDICARE

## 2024-11-12 DIAGNOSIS — I73.9 CLAUDICATION (HCC): ICD-10-CM

## 2024-11-12 DIAGNOSIS — I70.213 ATHEROSCLEROSIS OF NATIVE ARTERY OF BOTH LOWER EXTREMITIES WITH INTERMITTENT CLAUDICATION (HCC): ICD-10-CM

## 2024-11-12 PROCEDURE — 93925 LOWER EXTREMITY STUDY: CPT

## 2024-11-18 ENCOUNTER — OFFICE VISIT (OUTPATIENT)
Age: 79
End: 2024-11-18
Payer: MEDICARE

## 2024-11-18 VITALS
BODY MASS INDEX: 27.55 KG/M2 | SYSTOLIC BLOOD PRESSURE: 131 MMHG | DIASTOLIC BLOOD PRESSURE: 77 MMHG | HEIGHT: 69 IN | HEART RATE: 56 BPM | WEIGHT: 186 LBS

## 2024-11-18 DIAGNOSIS — I70.213 ATHEROSCLEROSIS OF NATIVE ARTERY OF BOTH LOWER EXTREMITIES WITH INTERMITTENT CLAUDICATION (HCC): Primary | ICD-10-CM

## 2024-11-18 DIAGNOSIS — I70.221 ATHEROSCLEROSIS OF NATIVE ARTERY OF RIGHT LOWER EXTREMITY WITH REST PAIN (HCC): ICD-10-CM

## 2024-11-18 PROCEDURE — G8427 DOCREV CUR MEDS BY ELIG CLIN: HCPCS | Performed by: THORACIC SURGERY (CARDIOTHORACIC VASCULAR SURGERY)

## 2024-11-18 PROCEDURE — 1159F MED LIST DOCD IN RCRD: CPT | Performed by: THORACIC SURGERY (CARDIOTHORACIC VASCULAR SURGERY)

## 2024-11-18 PROCEDURE — 1036F TOBACCO NON-USER: CPT | Performed by: THORACIC SURGERY (CARDIOTHORACIC VASCULAR SURGERY)

## 2024-11-18 PROCEDURE — G8417 CALC BMI ABV UP PARAM F/U: HCPCS | Performed by: THORACIC SURGERY (CARDIOTHORACIC VASCULAR SURGERY)

## 2024-11-18 PROCEDURE — 3078F DIAST BP <80 MM HG: CPT | Performed by: THORACIC SURGERY (CARDIOTHORACIC VASCULAR SURGERY)

## 2024-11-18 PROCEDURE — 1123F ACP DISCUSS/DSCN MKR DOCD: CPT | Performed by: THORACIC SURGERY (CARDIOTHORACIC VASCULAR SURGERY)

## 2024-11-18 PROCEDURE — 3075F SYST BP GE 130 - 139MM HG: CPT | Performed by: THORACIC SURGERY (CARDIOTHORACIC VASCULAR SURGERY)

## 2024-11-18 PROCEDURE — 99213 OFFICE O/P EST LOW 20 MIN: CPT | Performed by: THORACIC SURGERY (CARDIOTHORACIC VASCULAR SURGERY)

## 2024-11-18 PROCEDURE — G8484 FLU IMMUNIZE NO ADMIN: HCPCS | Performed by: THORACIC SURGERY (CARDIOTHORACIC VASCULAR SURGERY)

## 2024-11-18 PROCEDURE — 1160F RVW MEDS BY RX/DR IN RCRD: CPT | Performed by: THORACIC SURGERY (CARDIOTHORACIC VASCULAR SURGERY)

## 2024-11-18 NOTE — PATIENT INSTRUCTIONS
If you receive a survey asking about your care experience, please respond. Your answers will help ensure you receive high-quality care at this office. Thank you!    Your Medical Assistant today: Melissa MARTINEZ  Thank you for coming to our office! It was a pleasure to serve you.

## 2024-11-18 NOTE — PROGRESS NOTES
patent at the level of the foot.        Impression    1. Moderate multifocal trifurcation disease bilaterally.  2. Extensive calcific plaque both SFAs and both popliteal arteries. Multifocal stenosis, however. 70%.  3. 60% stenosis left common femoral artery.  4. Multifocal calcific plaque in both common adnexal iliac arteries were multifocal mild stenosis. Cannot be adequately quantitated.  5. 3.1 cm fusiform aneurysm distal abdominal aorta.        **This report has been created using voice recognition software.  It may contain minor errors which are inherent in voice recognition technology.**    Final report electronically signed by Dr. Ronnie Caldera on 4/25/2024 11:43 AM       CT Angiogram Chest: No results found for this or any previous visit.      Assessment/Plan     1. 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 Atherosclerosis of native artery of right lower extremity with rest pain (HCC)      Orders Placed This Encounter   Procedures    CTA ABDOMINAL AORTA W BILAT RUNOFF W WO CONTRAST     Thus this patient now has rest pain to the right lower extremity and worsening of his symptoms.  Arterial duplex study indicates a decrease in his TRAVON on the right side as well as greater than 70% mid SFA stenosis which correlates to high likelihood of in-stent high-grade stenosis on the right.    The plan will be to continue the aspirin and Plavix and Pletal and increase his walking is much as tolerated but at least 30 minutes, 3 times a week.  Schedule a CT angiogram of the aorta and bilateral runoff to better visualize this lesion on the right SFA.  Return to clinic in 2 weeks to review the findings and to formulate a treatment plan.    Return in about 2 weeks (around 12/2/2024).      Electronically signed by Eric Mari MD   11/18/2024 at 10:47 AM EST

## 2024-12-06 ENCOUNTER — HOSPITAL ENCOUNTER (OUTPATIENT)
Dept: CT IMAGING | Age: 79
Discharge: HOME | End: 2024-12-06
Attending: THORACIC SURGERY (CARDIOTHORACIC VASCULAR SURGERY)
Payer: MEDICARE

## 2024-12-06 DIAGNOSIS — I70.213 ATHEROSCLEROSIS OF NATIVE ARTERY OF BOTH LOWER EXTREMITIES WITH INTERMITTENT CLAUDICATION (HCC): ICD-10-CM

## 2024-12-06 LAB — POC CREATININE WHOLE BLOOD: 1.2 MG/DL (ref 0.5–1.2)

## 2024-12-06 PROCEDURE — 75635 CT ANGIO ABDOMINAL ARTERIES: CPT

## 2024-12-06 PROCEDURE — 6360000004 HC RX CONTRAST MEDICATION: Performed by: THORACIC SURGERY (CARDIOTHORACIC VASCULAR SURGERY)

## 2024-12-06 PROCEDURE — 82565 ASSAY OF CREATININE: CPT

## 2024-12-06 RX ORDER — IOPAMIDOL 755 MG/ML
120 INJECTION, SOLUTION INTRAVASCULAR
Status: COMPLETED | OUTPATIENT
Start: 2024-12-06 | End: 2024-12-06

## 2024-12-06 RX ADMIN — IOPAMIDOL 120 ML: 755 INJECTION, SOLUTION INTRAVENOUS at 09:59

## 2024-12-16 ENCOUNTER — TELEPHONE (OUTPATIENT)
Age: 79
End: 2024-12-16

## 2024-12-16 ENCOUNTER — OFFICE VISIT (OUTPATIENT)
Age: 79
End: 2024-12-16
Payer: MEDICARE

## 2024-12-16 VITALS
WEIGHT: 180 LBS | SYSTOLIC BLOOD PRESSURE: 132 MMHG | DIASTOLIC BLOOD PRESSURE: 71 MMHG | HEART RATE: 64 BPM | BODY MASS INDEX: 26.66 KG/M2 | HEIGHT: 69 IN

## 2024-12-16 DIAGNOSIS — I71.43 INFRARENAL ABDOMINAL AORTIC ANEURYSM (AAA) WITHOUT RUPTURE (HCC): ICD-10-CM

## 2024-12-16 DIAGNOSIS — I73.9 CLAUDICATION (HCC): ICD-10-CM

## 2024-12-16 DIAGNOSIS — I70.221 ATHEROSCLEROSIS OF NATIVE ARTERY OF RIGHT LOWER EXTREMITY WITH REST PAIN (HCC): ICD-10-CM

## 2024-12-16 DIAGNOSIS — I70.213 ATHEROSCLEROSIS OF NATIVE ARTERY OF BOTH LOWER EXTREMITIES WITH INTERMITTENT CLAUDICATION (HCC): Primary | ICD-10-CM

## 2024-12-16 PROCEDURE — 3075F SYST BP GE 130 - 139MM HG: CPT | Performed by: THORACIC SURGERY (CARDIOTHORACIC VASCULAR SURGERY)

## 2024-12-16 PROCEDURE — G8417 CALC BMI ABV UP PARAM F/U: HCPCS | Performed by: THORACIC SURGERY (CARDIOTHORACIC VASCULAR SURGERY)

## 2024-12-16 PROCEDURE — 3078F DIAST BP <80 MM HG: CPT | Performed by: THORACIC SURGERY (CARDIOTHORACIC VASCULAR SURGERY)

## 2024-12-16 PROCEDURE — G8427 DOCREV CUR MEDS BY ELIG CLIN: HCPCS | Performed by: THORACIC SURGERY (CARDIOTHORACIC VASCULAR SURGERY)

## 2024-12-16 PROCEDURE — 1123F ACP DISCUSS/DSCN MKR DOCD: CPT | Performed by: THORACIC SURGERY (CARDIOTHORACIC VASCULAR SURGERY)

## 2024-12-16 PROCEDURE — 1036F TOBACCO NON-USER: CPT | Performed by: THORACIC SURGERY (CARDIOTHORACIC VASCULAR SURGERY)

## 2024-12-16 PROCEDURE — 99213 OFFICE O/P EST LOW 20 MIN: CPT | Performed by: THORACIC SURGERY (CARDIOTHORACIC VASCULAR SURGERY)

## 2024-12-16 PROCEDURE — 1159F MED LIST DOCD IN RCRD: CPT | Performed by: THORACIC SURGERY (CARDIOTHORACIC VASCULAR SURGERY)

## 2024-12-16 PROCEDURE — 1160F RVW MEDS BY RX/DR IN RCRD: CPT | Performed by: THORACIC SURGERY (CARDIOTHORACIC VASCULAR SURGERY)

## 2024-12-16 PROCEDURE — G8484 FLU IMMUNIZE NO ADMIN: HCPCS | Performed by: THORACIC SURGERY (CARDIOTHORACIC VASCULAR SURGERY)

## 2024-12-16 RX ORDER — CLOPIDOGREL BISULFATE 75 MG/1
TABLET ORAL
Qty: 90 TABLET | Refills: 3 | Status: SHIPPED | OUTPATIENT
Start: 2024-12-16

## 2024-12-16 NOTE — TELEPHONE ENCOUNTER
Ed is scheduled for IR Angiogram Right lower extremity with Dr. Mari on 1/6/2025 at 12:00 pm. Pt agreed to date/time.     Surgery instructions are as follows:  - Arrive to Same Day Surgery at [facility] at 10:00 am  - NPO after midnight the night before surgery  - No medication holds per Dr. Mari  - You must have a  day of surgery        All instructions discussed with pt, he verbalized understanding. he  denied questions or concerns at this time.     Primary insurance is [employer]. Will obtain prior authorization as necessary.     Rep- Denver Mitchell. Confirmed via text message 12/16/2024

## 2024-12-16 NOTE — TELEPHONE ENCOUNTER
This medication refill is regarding a electronic request. Refill requested by  Pharmacy .    Requested Prescriptions     Pending Prescriptions Disp Refills    clopidogrel (PLAVIX) 75 MG tablet [Pharmacy Med Name: Clopidogrel Bisulfate Oral Tablet 75 MG] 90 tablet 3     Sig: TAKE 1 TABLET EVERY DAY     Date of last visit: 9/11/2024   Date of next visit: Visit date not found  Date of last refill: 11/24/2023 #90/3  Pharmacy Name: [facility]     Last Lipid Panel:    Lab Results   Component Value Date/Time    CHOL 137 01/15/2024 08:41 AM    TRIG 206 01/15/2024 08:41 AM    HDL 31 01/15/2024 08:41 AM     Last CMP:   Lab Results   Component Value Date     07/09/2024    K 4.4 07/09/2024     07/09/2024    CO2 24 07/09/2024    BUN 10 07/09/2024    CREATININE 0.8 07/09/2024    GLUCOSE 128 (H) 07/09/2024    CALCIUM 7.5 (L) 07/09/2024    BILITOT 1.7 (H) 01/15/2024    ALKPHOS 72 01/15/2024    AST 22 01/15/2024    ALT 24 01/15/2024    LABGLOM > 90 07/09/2024       Last Thyroid:    Lab Results   Component Value Date    TSH 3.370 05/31/2021    T4FREE 1.22 09/12/2011     Last Hemoglobin A1C:    Lab Results   Component Value Date/Time    LABA1C 4.7 03/16/2018 09:25 AM       Rx verified, ordered and set to EP.

## 2024-12-16 NOTE — PROGRESS NOTES
The trifurcation vessels are suboptimally opacified and  there is extensive calcification in these vessels. The posterior tibial artery  appears totally occluded. The anterior tibial and peroneal arteries are patent  but demonstrate multifocal disease.    RIGHT LEG:    COMMON FEMORAL ARTERY/PROFUNDA:  Widely patent    SFA/POPLITEAL ARTERY: Extensive calcific plaque in the right SFA with multifocal  stenosis upwards of 70%. Multifocal calcific plaque is also present in the right  popliteal artery with multifocal stenosis, upwards of 90%.    TRIFURCATION VESSELS: There is total occlusion of the posterior tibial artery.  The anterior tibial and peroneal arteries are patent with multifocal calcific  stenosis that is difficult to quantitate. The anterior tibial artery is patent  to the level of the ankle.        Impression    1. Hepatic steatosis. Solitary gallstone. Constipation. Diverticulosis coli. 3.1  cm stable appearing fusiform aneurysm of the distal abdominal aorta.  2. 70% stenosis of the left common femoral artery. Multiple stents have been  inserted along the entire length of the left SFA and the proximal popliteal  artery. Short segment 70% stenosis of the SFA immediately proximal to the upper  end of the stented segment. 50% stenosis left popliteal artery. Moderate  trifurcation disease left side with two-vessel runoff.  3. Multifocal calcific stenosis right SFA, upwards of 70%. Multifocal stenosis  right popliteal artery, upwards of 90%. Moderate trifurcation disease right side  with two-vessel runoff.        **This report has been created using voice recognition software.  It may contain  minor errors which are inherent in voice recognition technology.**      Electronically signed by Dr. Ronnie Caldera       CT Angiogram Chest: No results found for this or any previous visit.      Assessment/Plan     1. 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

## 2024-12-16 NOTE — TELEPHONE ENCOUNTER
Ed is scheduled for IR Angiogram Left lower extremity with Dr. Mari on 1/20/2025 at 12:00 pm. Pt agreed to date/time.     Surgery instructions are as follows:  - Arrive to Same Day Surgery at [facility] at 10:00 am  - NPO after midnight the night before surgery  - No medication holds per Dr. Mari  - You must have a  day of surgery        All instructions discussed with pt, he verbalized understanding. he  denied questions or concerns at this time.     Primary insurance is [employer]. Will obtain prior authorization as necessary.     Rep- Denver Mitchell. Confirmed via text message 12/16/2024

## 2024-12-20 ENCOUNTER — PREP FOR PROCEDURE (OUTPATIENT)
Dept: CARDIOTHORACIC SURGERY | Age: 79
End: 2024-12-20

## 2024-12-20 RX ORDER — SODIUM CHLORIDE 9 MG/ML
INJECTION, SOLUTION INTRAVENOUS PRN
Status: CANCELLED | OUTPATIENT
Start: 2024-12-20

## 2024-12-20 RX ORDER — SODIUM CHLORIDE 0.9 % (FLUSH) 0.9 %
5-40 SYRINGE (ML) INJECTION PRN
Status: CANCELLED | OUTPATIENT
Start: 2024-12-20

## 2024-12-20 RX ORDER — SODIUM CHLORIDE 9 MG/ML
INJECTION, SOLUTION INTRAVENOUS CONTINUOUS
Status: CANCELLED | OUTPATIENT
Start: 2024-12-20

## 2024-12-20 RX ORDER — SODIUM CHLORIDE 0.9 % (FLUSH) 0.9 %
5-40 SYRINGE (ML) INJECTION EVERY 12 HOURS SCHEDULED
Status: CANCELLED | OUTPATIENT
Start: 2024-12-20

## 2025-01-20 ENCOUNTER — APPOINTMENT (OUTPATIENT)
Dept: CT IMAGING | Age: 80
End: 2025-01-20
Attending: THORACIC SURGERY (CARDIOTHORACIC VASCULAR SURGERY)
Payer: MEDICARE

## 2025-01-20 ENCOUNTER — HOSPITAL ENCOUNTER (OUTPATIENT)
Dept: INTERVENTIONAL RADIOLOGY/VASCULAR | Age: 80
Setting detail: OBSERVATION
Discharge: HOME | End: 2025-01-21
Attending: THORACIC SURGERY (CARDIOTHORACIC VASCULAR SURGERY) | Admitting: THORACIC SURGERY (CARDIOTHORACIC VASCULAR SURGERY)
Payer: MEDICARE

## 2025-01-20 DIAGNOSIS — I70.221 ATHEROSCLEROSIS OF NATIVE ARTERY OF RIGHT LOWER EXTREMITY WITH REST PAIN (HCC): ICD-10-CM

## 2025-01-20 DIAGNOSIS — I73.9 PAD (PERIPHERAL ARTERY DISEASE) (HCC): Primary | ICD-10-CM

## 2025-01-20 DIAGNOSIS — I70.213 ATHEROSCLEROSIS OF NATIVE ARTERY OF BOTH LOWER EXTREMITIES WITH INTERMITTENT CLAUDICATION (HCC): ICD-10-CM

## 2025-01-20 LAB
ABO: NORMAL
ANION GAP SERPL CALC-SCNC: 10 MEQ/L (ref 8–16)
ANION GAP SERPL CALC-SCNC: 12 MEQ/L (ref 8–16)
ANTIBODY SCREEN: NORMAL
BUN SERPL-MCNC: 12 MG/DL (ref 7–22)
BUN SERPL-MCNC: 12 MG/DL (ref 7–22)
CALCIUM SERPL-MCNC: 8.2 MG/DL (ref 8.5–10.5)
CALCIUM SERPL-MCNC: 9.1 MG/DL (ref 8.5–10.5)
CHLORIDE SERPL-SCNC: 100 MEQ/L (ref 98–111)
CHLORIDE SERPL-SCNC: 104 MEQ/L (ref 98–111)
CO2 SERPL-SCNC: 25 MEQ/L (ref 23–33)
CO2 SERPL-SCNC: 27 MEQ/L (ref 23–33)
CREAT SERPL-MCNC: 0.9 MG/DL (ref 0.4–1.2)
CREAT SERPL-MCNC: 0.9 MG/DL (ref 0.4–1.2)
DEPRECATED RDW RBC AUTO: 46 FL (ref 35–45)
DEPRECATED RDW RBC AUTO: 46.6 FL (ref 35–45)
ERYTHROCYTE [DISTWIDTH] IN BLOOD BY AUTOMATED COUNT: 13.4 % (ref 11.5–14.5)
ERYTHROCYTE [DISTWIDTH] IN BLOOD BY AUTOMATED COUNT: 13.4 % (ref 11.5–14.5)
GFR SERPL CREATININE-BSD FRML MDRD: 88 ML/MIN/1.73M2
GFR SERPL CREATININE-BSD FRML MDRD: 88 ML/MIN/1.73M2
GLUCOSE SERPL-MCNC: 128 MG/DL (ref 70–108)
GLUCOSE SERPL-MCNC: 96 MG/DL (ref 70–108)
HCT VFR BLD AUTO: 28.4 % (ref 42–52)
HCT VFR BLD AUTO: 32.6 % (ref 42–52)
HCT VFR BLD AUTO: 39.1 % (ref 42–52)
HGB BLD-MCNC: 11.4 GM/DL (ref 14–18)
HGB BLD-MCNC: 13.8 GM/DL (ref 14–18)
HGB BLD-MCNC: 9.7 GM/DL (ref 14–18)
INR PPP: 1.19 (ref 0.85–1.13)
KCT BLD-ACNC: 193 SECONDS (ref 1–150)
KCT BLD-ACNC: 210 SECONDS (ref 1–150)
KCT BLD-ACNC: 216 SECONDS (ref 1–150)
KCT BLD-ACNC: 222 SECONDS (ref 1–150)
KCT BLD-ACNC: 227 SECONDS (ref 1–150)
KCT BLD-ACNC: 233 SECONDS (ref 1–150)
MCH RBC QN AUTO: 33.4 PG (ref 26–33)
MCH RBC QN AUTO: 33.5 PG (ref 26–33)
MCHC RBC AUTO-ENTMCNC: 35 GM/DL (ref 32.2–35.5)
MCHC RBC AUTO-ENTMCNC: 35.3 GM/DL (ref 32.2–35.5)
MCV RBC AUTO: 94.9 FL (ref 80–94)
MCV RBC AUTO: 95.6 FL (ref 80–94)
PLATELET # BLD AUTO: 166 THOU/MM3 (ref 130–400)
PLATELET # BLD AUTO: 175 THOU/MM3 (ref 130–400)
PMV BLD AUTO: 10.3 FL (ref 9.4–12.4)
PMV BLD AUTO: 9.8 FL (ref 9.4–12.4)
POTASSIUM SERPL-SCNC: 4.3 MEQ/L (ref 3.5–5.2)
POTASSIUM SERPL-SCNC: 4.4 MEQ/L (ref 3.5–5.2)
RBC # BLD AUTO: 3.41 MILL/MM3 (ref 4.7–6.1)
RBC # BLD AUTO: 4.12 MILL/MM3 (ref 4.7–6.1)
RH FACTOR: NORMAL
SODIUM SERPL-SCNC: 139 MEQ/L (ref 135–145)
SODIUM SERPL-SCNC: 139 MEQ/L (ref 135–145)
WBC # BLD AUTO: 12.5 THOU/MM3 (ref 4.8–10.8)
WBC # BLD AUTO: 6.6 THOU/MM3 (ref 4.8–10.8)

## 2025-01-20 PROCEDURE — 75716 ARTERY X-RAYS ARMS/LEGS: CPT

## 2025-01-20 PROCEDURE — 86901 BLOOD TYPING SEROLOGIC RH(D): CPT

## 2025-01-20 PROCEDURE — 96374 THER/PROPH/DIAG INJ IV PUSH: CPT

## 2025-01-20 PROCEDURE — 6360000004 HC RX CONTRAST MEDICATION: Performed by: THORACIC SURGERY (CARDIOTHORACIC VASCULAR SURGERY)

## 2025-01-20 PROCEDURE — 80048 BASIC METABOLIC PNL TOTAL CA: CPT

## 2025-01-20 PROCEDURE — 2580000003 HC RX 258: Performed by: PHYSICIAN ASSISTANT

## 2025-01-20 PROCEDURE — G0378 HOSPITAL OBSERVATION PER HR: HCPCS

## 2025-01-20 PROCEDURE — 86850 RBC ANTIBODY SCREEN: CPT

## 2025-01-20 PROCEDURE — 85610 PROTHROMBIN TIME: CPT

## 2025-01-20 PROCEDURE — 6370000000 HC RX 637 (ALT 250 FOR IP): Performed by: THORACIC SURGERY (CARDIOTHORACIC VASCULAR SURGERY)

## 2025-01-20 PROCEDURE — 2709999900 IR ANGIOGRAM EXTREMITY RIGHT

## 2025-01-20 PROCEDURE — 6360000002 HC RX W HCPCS: Performed by: THORACIC SURGERY (CARDIOTHORACIC VASCULAR SURGERY)

## 2025-01-20 PROCEDURE — 2500000003 HC RX 250 WO HCPCS: Performed by: THORACIC SURGERY (CARDIOTHORACIC VASCULAR SURGERY)

## 2025-01-20 PROCEDURE — 2500000003 HC RX 250 WO HCPCS: Performed by: PHYSICIAN ASSISTANT

## 2025-01-20 PROCEDURE — 85027 COMPLETE CBC AUTOMATED: CPT

## 2025-01-20 PROCEDURE — 75625 CONTRAST EXAM ABDOMINL AORTA: CPT

## 2025-01-20 PROCEDURE — 96376 TX/PRO/DX INJ SAME DRUG ADON: CPT

## 2025-01-20 PROCEDURE — 2580000003 HC RX 258: Performed by: THORACIC SURGERY (CARDIOTHORACIC VASCULAR SURGERY)

## 2025-01-20 PROCEDURE — 85018 HEMOGLOBIN: CPT

## 2025-01-20 PROCEDURE — 36415 COLL VENOUS BLD VENIPUNCTURE: CPT

## 2025-01-20 PROCEDURE — 86900 BLOOD TYPING SEROLOGIC ABO: CPT

## 2025-01-20 PROCEDURE — 37226 HC FEM POP TERR PLASTY AND STENT: CPT

## 2025-01-20 PROCEDURE — 85014 HEMATOCRIT: CPT

## 2025-01-20 PROCEDURE — 85347 COAGULATION TIME ACTIVATED: CPT

## 2025-01-20 PROCEDURE — 6360000002 HC RX W HCPCS: Performed by: PHYSICIAN ASSISTANT

## 2025-01-20 PROCEDURE — 74174 CTA ABD&PLVS W/CONTRAST: CPT

## 2025-01-20 PROCEDURE — 96375 TX/PRO/DX INJ NEW DRUG ADDON: CPT

## 2025-01-20 PROCEDURE — 76937 US GUIDE VASCULAR ACCESS: CPT

## 2025-01-20 RX ORDER — SODIUM CHLORIDE 0.9 % (FLUSH) 0.9 %
5-40 SYRINGE (ML) INJECTION PRN
Status: DISCONTINUED | OUTPATIENT
Start: 2025-01-20 | End: 2025-01-21 | Stop reason: HOSPADM

## 2025-01-20 RX ORDER — CLOPIDOGREL BISULFATE 75 MG/1
75 TABLET ORAL DAILY
Status: DISCONTINUED | OUTPATIENT
Start: 2025-01-21 | End: 2025-01-21 | Stop reason: HOSPADM

## 2025-01-20 RX ORDER — ASPIRIN 81 MG/1
81 TABLET ORAL DAILY
Status: DISCONTINUED | OUTPATIENT
Start: 2025-01-20 | End: 2025-01-21 | Stop reason: HOSPADM

## 2025-01-20 RX ORDER — IOPAMIDOL 755 MG/ML
80 INJECTION, SOLUTION INTRAVASCULAR
Status: COMPLETED | OUTPATIENT
Start: 2025-01-20 | End: 2025-01-20

## 2025-01-20 RX ORDER — CLOPIDOGREL BISULFATE 75 MG/1
300 TABLET ORAL ONCE
Status: COMPLETED | OUTPATIENT
Start: 2025-01-20 | End: 2025-01-20

## 2025-01-20 RX ORDER — 0.9 % SODIUM CHLORIDE 0.9 %
500 INTRAVENOUS SOLUTION INTRAVENOUS ONCE
Status: COMPLETED | OUTPATIENT
Start: 2025-01-20 | End: 2025-01-20

## 2025-01-20 RX ORDER — NITROGLYCERIN 0.4 MG/1
0.4 TABLET SUBLINGUAL EVERY 5 MIN PRN
Status: DISCONTINUED | OUTPATIENT
Start: 2025-01-20 | End: 2025-01-21 | Stop reason: HOSPADM

## 2025-01-20 RX ORDER — PANTOPRAZOLE SODIUM 40 MG/1
40 TABLET, DELAYED RELEASE ORAL
Status: DISCONTINUED | OUTPATIENT
Start: 2025-01-21 | End: 2025-01-21 | Stop reason: HOSPADM

## 2025-01-20 RX ORDER — FERROUS SULFATE 325(65) MG
325 TABLET ORAL DAILY
Status: DISCONTINUED | OUTPATIENT
Start: 2025-01-20 | End: 2025-01-21 | Stop reason: HOSPADM

## 2025-01-20 RX ORDER — MIDAZOLAM HYDROCHLORIDE 1 MG/ML
INJECTION, SOLUTION INTRAMUSCULAR; INTRAVENOUS PRN
Status: COMPLETED | OUTPATIENT
Start: 2025-01-20 | End: 2025-01-20

## 2025-01-20 RX ORDER — ONDANSETRON 2 MG/ML
4 INJECTION INTRAMUSCULAR; INTRAVENOUS EVERY 6 HOURS PRN
Status: DISCONTINUED | OUTPATIENT
Start: 2025-01-20 | End: 2025-01-21 | Stop reason: HOSPADM

## 2025-01-20 RX ORDER — ONDANSETRON 2 MG/ML
INJECTION INTRAMUSCULAR; INTRAVENOUS PRN
Status: COMPLETED | OUTPATIENT
Start: 2025-01-20 | End: 2025-01-20

## 2025-01-20 RX ORDER — ACETAMINOPHEN 325 MG/1
650 TABLET ORAL EVERY 4 HOURS PRN
Status: DISCONTINUED | OUTPATIENT
Start: 2025-01-20 | End: 2025-01-21 | Stop reason: HOSPADM

## 2025-01-20 RX ORDER — SODIUM CHLORIDE 0.9 % (FLUSH) 0.9 %
5-40 SYRINGE (ML) INJECTION EVERY 12 HOURS SCHEDULED
Status: DISCONTINUED | OUTPATIENT
Start: 2025-01-20 | End: 2025-01-21 | Stop reason: HOSPADM

## 2025-01-20 RX ORDER — METOPROLOL TARTRATE 25 MG/1
25 TABLET, FILM COATED ORAL DAILY
Status: DISCONTINUED | OUTPATIENT
Start: 2025-01-20 | End: 2025-01-21 | Stop reason: HOSPADM

## 2025-01-20 RX ORDER — IOPAMIDOL 612 MG/ML
INJECTION, SOLUTION INTRAVASCULAR PRN
Status: COMPLETED | OUTPATIENT
Start: 2025-01-20 | End: 2025-01-20

## 2025-01-20 RX ORDER — LIDOCAINE HYDROCHLORIDE 20 MG/ML
INJECTION, SOLUTION EPIDURAL; INFILTRATION; INTRACAUDAL; PERINEURAL PRN
Status: COMPLETED | OUTPATIENT
Start: 2025-01-20 | End: 2025-01-20

## 2025-01-20 RX ORDER — ONDANSETRON 4 MG/1
4 TABLET, ORALLY DISINTEGRATING ORAL EVERY 8 HOURS PRN
Status: DISCONTINUED | OUTPATIENT
Start: 2025-01-20 | End: 2025-01-21 | Stop reason: HOSPADM

## 2025-01-20 RX ORDER — BISACODYL 5 MG/1
5 TABLET, DELAYED RELEASE ORAL DAILY PRN
Status: DISCONTINUED | OUTPATIENT
Start: 2025-01-20 | End: 2025-01-21 | Stop reason: HOSPADM

## 2025-01-20 RX ORDER — OXYCODONE HYDROCHLORIDE 5 MG/1
5 TABLET ORAL EVERY 4 HOURS PRN
Status: DISCONTINUED | OUTPATIENT
Start: 2025-01-20 | End: 2025-01-21 | Stop reason: HOSPADM

## 2025-01-20 RX ORDER — SIMVASTATIN 20 MG
20 TABLET ORAL NIGHTLY
Status: DISCONTINUED | OUTPATIENT
Start: 2025-01-20 | End: 2025-01-21 | Stop reason: HOSPADM

## 2025-01-20 RX ORDER — FENTANYL CITRATE 50 UG/ML
INJECTION, SOLUTION INTRAMUSCULAR; INTRAVENOUS PRN
Status: COMPLETED | OUTPATIENT
Start: 2025-01-20 | End: 2025-01-20

## 2025-01-20 RX ORDER — ATORVASTATIN CALCIUM 40 MG/1
40 TABLET, FILM COATED ORAL DAILY
Status: DISCONTINUED | OUTPATIENT
Start: 2025-01-20 | End: 2025-01-20 | Stop reason: CLARIF

## 2025-01-20 RX ORDER — SODIUM CHLORIDE 9 MG/ML
INJECTION, SOLUTION INTRAVENOUS PRN
Status: DISCONTINUED | OUTPATIENT
Start: 2025-01-20 | End: 2025-01-21 | Stop reason: HOSPADM

## 2025-01-20 RX ORDER — ALBUTEROL SULFATE 90 UG/1
2 INHALANT RESPIRATORY (INHALATION) EVERY 6 HOURS PRN
Status: DISCONTINUED | OUTPATIENT
Start: 2025-01-20 | End: 2025-01-21 | Stop reason: HOSPADM

## 2025-01-20 RX ORDER — NALOXONE HYDROCHLORIDE 0.4 MG/ML
INJECTION, SOLUTION INTRAMUSCULAR; INTRAVENOUS; SUBCUTANEOUS PRN
Status: COMPLETED | OUTPATIENT
Start: 2025-01-20 | End: 2025-01-20

## 2025-01-20 RX ORDER — FLUMAZENIL 0.1 MG/ML
INJECTION INTRAVENOUS PRN
Status: COMPLETED | OUTPATIENT
Start: 2025-01-20 | End: 2025-01-20

## 2025-01-20 RX ORDER — ALLOPURINOL 300 MG/1
300 TABLET ORAL DAILY
Status: DISCONTINUED | OUTPATIENT
Start: 2025-01-20 | End: 2025-01-21 | Stop reason: HOSPADM

## 2025-01-20 RX ORDER — SODIUM CHLORIDE 9 MG/ML
INJECTION, SOLUTION INTRAVENOUS CONTINUOUS
Status: DISCONTINUED | OUTPATIENT
Start: 2025-01-20 | End: 2025-01-20

## 2025-01-20 RX ORDER — SODIUM CHLORIDE 9 MG/ML
INJECTION, SOLUTION INTRAVENOUS CONTINUOUS
Status: ACTIVE | OUTPATIENT
Start: 2025-01-20 | End: 2025-01-20

## 2025-01-20 RX ORDER — HEPARIN SODIUM 1000 [USP'U]/ML
INJECTION, SOLUTION INTRAVENOUS; SUBCUTANEOUS PRN
Status: COMPLETED | OUTPATIENT
Start: 2025-01-20 | End: 2025-01-20

## 2025-01-20 RX ORDER — ISOSORBIDE MONONITRATE 30 MG/1
30 TABLET, EXTENDED RELEASE ORAL DAILY
Status: DISCONTINUED | OUTPATIENT
Start: 2025-01-20 | End: 2025-01-21 | Stop reason: HOSPADM

## 2025-01-20 RX ORDER — PROTAMINE SULFATE 10 MG/ML
INJECTION, SOLUTION INTRAVENOUS PRN
Status: COMPLETED | OUTPATIENT
Start: 2025-01-20 | End: 2025-01-20

## 2025-01-20 RX ADMIN — FERROUS SULFATE TAB 325 MG (65 MG ELEMENTAL FE) 325 MG: 325 (65 FE) TAB at 19:38

## 2025-01-20 RX ADMIN — MIDAZOLAM 1 MG: 1 INJECTION INTRAMUSCULAR; INTRAVENOUS at 13:12

## 2025-01-20 RX ADMIN — SODIUM CHLORIDE: 9 INJECTION, SOLUTION INTRAVENOUS at 19:05

## 2025-01-20 RX ADMIN — CEFAZOLIN 2000 MG: 2 INJECTION, POWDER, FOR SOLUTION INTRAVENOUS at 12:25

## 2025-01-20 RX ADMIN — FENTANYL CITRATE 50 MCG: 50 INJECTION, SOLUTION INTRAMUSCULAR; INTRAVENOUS at 13:04

## 2025-01-20 RX ADMIN — FLUMAZENIL 0.5 MG: 0.1 INJECTION INTRAVENOUS at 15:01

## 2025-01-20 RX ADMIN — ONDANSETRON 4 MG: 2 INJECTION INTRAMUSCULAR; INTRAVENOUS at 20:32

## 2025-01-20 RX ADMIN — ISOSORBIDE MONONITRATE 30 MG: 30 TABLET, EXTENDED RELEASE ORAL at 19:37

## 2025-01-20 RX ADMIN — HEPARIN SODIUM 2500 UNITS: 1000 INJECTION INTRAVENOUS; SUBCUTANEOUS at 13:55

## 2025-01-20 RX ADMIN — METOPROLOL TARTRATE 25 MG: 25 TABLET, FILM COATED ORAL at 19:37

## 2025-01-20 RX ADMIN — HEPARIN SODIUM 5000 UNITS: 1000 INJECTION INTRAVENOUS; SUBCUTANEOUS at 15:04

## 2025-01-20 RX ADMIN — PROTAMINE SULFATE 10 MG: 10 INJECTION, SOLUTION INTRAVENOUS at 16:27

## 2025-01-20 RX ADMIN — ONDANSETRON 8 MG: 2 INJECTION INTRAMUSCULAR; INTRAVENOUS at 15:01

## 2025-01-20 RX ADMIN — SODIUM CHLORIDE: 9 INJECTION, SOLUTION INTRAVENOUS at 10:36

## 2025-01-20 RX ADMIN — LIDOCAINE HYDROCHLORIDE 10 ML: 20 INJECTION, SOLUTION EPIDURAL; INFILTRATION; INTRACAUDAL; PERINEURAL at 16:53

## 2025-01-20 RX ADMIN — CLOPIDOGREL BISULFATE 300 MG: 75 TABLET ORAL at 18:57

## 2025-01-20 RX ADMIN — FLUMAZENIL 0.5 MG: 0.1 INJECTION INTRAVENOUS at 16:47

## 2025-01-20 RX ADMIN — ALLOPURINOL 300 MG: 300 TABLET ORAL at 19:38

## 2025-01-20 RX ADMIN — HEPARIN SODIUM 8000 UNITS: 1000 INJECTION INTRAVENOUS; SUBCUTANEOUS at 13:19

## 2025-01-20 RX ADMIN — ASPIRIN 81 MG: 81 TABLET, COATED ORAL at 19:37

## 2025-01-20 RX ADMIN — SODIUM CHLORIDE: 9 INJECTION, SOLUTION INTRAVENOUS at 23:33

## 2025-01-20 RX ADMIN — MIDAZOLAM 1 MG: 1 INJECTION INTRAMUSCULAR; INTRAVENOUS at 14:07

## 2025-01-20 RX ADMIN — FENTANYL CITRATE 25 MCG: 50 INJECTION, SOLUTION INTRAMUSCULAR; INTRAVENOUS at 13:25

## 2025-01-20 RX ADMIN — OXYCODONE 5 MG: 5 TABLET ORAL at 18:30

## 2025-01-20 RX ADMIN — IOPAMIDOL 80 ML: 755 INJECTION, SOLUTION INTRAVENOUS at 21:05

## 2025-01-20 RX ADMIN — FENTANYL CITRATE 25 MCG: 50 INJECTION, SOLUTION INTRAMUSCULAR; INTRAVENOUS at 13:39

## 2025-01-20 RX ADMIN — MIDAZOLAM 1 MG: 1 INJECTION INTRAMUSCULAR; INTRAVENOUS at 13:04

## 2025-01-20 RX ADMIN — MIDAZOLAM 0.5 MG: 1 INJECTION INTRAMUSCULAR; INTRAVENOUS at 13:39

## 2025-01-20 RX ADMIN — FENTANYL CITRATE 50 MCG: 50 INJECTION, SOLUTION INTRAMUSCULAR; INTRAVENOUS at 14:00

## 2025-01-20 RX ADMIN — PROTAMINE SULFATE 15 MG: 10 INJECTION, SOLUTION INTRAVENOUS at 16:16

## 2025-01-20 RX ADMIN — FENTANYL CITRATE 50 MCG: 50 INJECTION, SOLUTION INTRAMUSCULAR; INTRAVENOUS at 13:12

## 2025-01-20 RX ADMIN — NALOXONE HYDROCHLORIDE 0.4 MG: 0.4 INJECTION, SOLUTION INTRAMUSCULAR; INTRAVENOUS; SUBCUTANEOUS at 15:02

## 2025-01-20 RX ADMIN — NALOXONE HYDROCHLORIDE 0.4 MG: 0.4 INJECTION, SOLUTION INTRAMUSCULAR; INTRAVENOUS; SUBCUTANEOUS at 16:48

## 2025-01-20 RX ADMIN — IOPAMIDOL 125 ML: 612 INJECTION, SOLUTION INTRAVENOUS at 16:53

## 2025-01-20 RX ADMIN — FENTANYL CITRATE 50 MCG: 50 INJECTION, SOLUTION INTRAMUSCULAR; INTRAVENOUS at 14:07

## 2025-01-20 RX ADMIN — MIDAZOLAM 0.5 MG: 1 INJECTION INTRAMUSCULAR; INTRAVENOUS at 13:25

## 2025-01-20 RX ADMIN — SODIUM CHLORIDE 500 ML: 9 INJECTION, SOLUTION INTRAVENOUS at 20:46

## 2025-01-20 RX ADMIN — MIDAZOLAM 1 MG: 1 INJECTION INTRAMUSCULAR; INTRAVENOUS at 14:00

## 2025-01-20 RX ADMIN — BISACODYL 5 MG: 5 TABLET, COATED ORAL at 19:37

## 2025-01-20 ASSESSMENT — PAIN SCALES - GENERAL
PAINLEVEL_OUTOF10: 5
PAINLEVEL_OUTOF10: 9
PAINLEVEL_OUTOF10: 0

## 2025-01-20 ASSESSMENT — PAIN DESCRIPTION - DESCRIPTORS: DESCRIPTORS: ACHING;BURNING

## 2025-01-20 ASSESSMENT — PAIN DESCRIPTION - ORIENTATION: ORIENTATION: LEFT;RIGHT

## 2025-01-20 ASSESSMENT — PAIN DESCRIPTION - LOCATION: LOCATION: BACK;LEG

## 2025-01-20 NOTE — OP NOTE
Right mid to distal SFA occlusion with weak reconstitution at Above Knee Popliteal  Right Popliteal below knee Occlusion and dissection  Right Anterior Tibial ostial/proximal occlusion  Right Posterior Tibial occlusion  Right Peroneal proximal high grade stenosis and dissection      Detailed Description of Procedure:     The patient was seen and examined in the holding area, and the case was reviewed and the operative plan was reviewed. Full operative consent was reviewed and all questions and concerns addressed and patient understood all risks and possible complications and agreed to proceed.    The patient was taken to the IR suite and placed supine on the table. IV sedation and moderate sedation anesthesia was started. The bilateral femoral areas  and right foot were prepped and draped in the usual sterile fashion.Time out was called and verified.     The patient previously had a left femoral endarterectomy and has a thickened incisional scar at that site so the planned procedure was to perform the right leg angiogram and intervention via the pedal route.  Thus using ultrasound guidance the right dorsalis pedis was noted to be occluded.  The right posterior tibial artery was patent.  Using ultrasound guidance the area around the right posterior tibial was widely infiltrated with 1% lidocaine and then with the ultrasound guidance the right posterior tibial artery was cannulated with a microneedle and a microwire was placed and then a slender 6 Setswana sheath was placed right lower extremity angiogram via the posterior tibial artery revealed that the posterior tibial artery immediately became the peroneal artery and it was widely patent in its distal mid and proximal portions but then noted to have an ostial occlusion and occlusion of the below-knee popliteal with some dissection noted laterally.  The patient was systemically heparinized.  Patient's ACT's were checked every 30 minutes and the ACT is kept well above

## 2025-01-20 NOTE — PROGRESS NOTES
1235 Patient received in IR for arteriogram procedure with family taken to main radiology.  1235 This procedure has been fully reviewed with the patient and written informed consent has been obtained prior to arrival.  1242 Patient prepped for procedure.  1310 Procedure started with Dr. Mari.  1316 Access to right PT artery with use of sonosite.   1335 Room flipped for Dr. Mari.  1340 Access to left femoral artery with use of sonosite.  1354 , Dr. Mari aware.  1432  Dr. Mari aware.  1433 Angioplasty of right popliteal artery using [id number] 5 x 80 balloon.  1436 Angioplasty of right popliteal artery using [id number] 5 x 80 balloon.  1447 Angioplasty of the right distal SFA/popliteal artery with an 0.18 Hazel 6 x 120 balloon.   1451 Additional angioplasty of the right SFA with an 0.18 Hazel 6 x 120 balloon.  1453 Additional angioplasty of the right SFA with an 0.18 Hazel 6 x 120 balloon.  1455 Additional angioplasty of the right SFA with an 0.18 Hazel 6 x 120 balloon.  1503 ACT is 193 Dr Mari aware.   1506 Stent to right proximal SFA with EverFlex 7 x 40  1507 Stent to right proximal SFA with EverFlex 6 x 200  1510 Stent to right popliteal with EverFlex 6 x 150  1513 Stent to right popliteal with EverFlex 6 x 120  1515 Post stent dilatation with Hazel 35 6 x 200  1537  , Dr. Mari aware.  1543 Stent dilatation in the area of the right SFA with Hazel 7 x 40.  1604 Stent deployed to right popliteal with VIABAHN 6 x 15.  1605 Post stent dilatation with Hazel 6 x 200 balloon.  1606 , Dr. Mari aware.  1626 , Dr. Mari aware. Procedure complete, patient tolerated fair.  1641 Patient feels nauseated, NS bolus given, HR down to 46, BP obtained and charted. Patient responsiveness decreased, diaphoretic.  1643 Right PT sheath pulled, left femoral artery sheath pulled, manual pressure held.  1650 Manual pressure released from right foot.  1652  Dressing to

## 2025-01-20 NOTE — PRE SEDATION
Sedation Pre-Procedure Note    Patient Name: Conner Angel   YOB: 1945  Room/Bed: 2E-15/015-A  Medical Record Number: [id number]  Date: 1/20/2025   Time: 1:02 PM       Indication:  right leg severe claudication    Consent: I have discussed with the patient and/or the patient representative the indication, alternatives, and the possible risks and/or complications of the planned procedure and the anesthesia methods. The patient and/or patient representative appear to understand and agree to proceed.    Vital Signs:   Vitals:    01/20/25 1255   BP: (!) 155/79   Pulse: 71   Resp: 18   Temp:    SpO2: 97%       Past Medical History:   has a past medical history of AAA (abdominal aortic aneurysm) (HCC), Aneurysm (HCC), Back pain, Cervical spine fracture (HCC), Cholelithiasis, Chronic back pain, COPD (chronic obstructive pulmonary disease) (HCC), Coronary artery disease, Diabetes mellitus (HCC), Emphysema of lung (HCC), Erectile dysfunction, GERD (gastroesophageal reflux disease), Gout, History of blood transfusion, Hyperlipidemia, Hypertension, Liver disease, MI (myocardial infarction) (HCC), Osteoarthritis, Peripheral vascular disease (HCC), and Syncope and collapse.    Past Surgical History:   has a past surgical history that includes Foot surgery (2011); Coronary artery bypass graft (2006); Cardiac surgery (2006); Colonoscopy (12/18/2013); Appendectomy (1955); Cardiac catheterization; Inguinal hernia repair (Right, 12/01/2014); Upper gastrointestinal endoscopy; Endoscopy, colon, diagnostic; thromboendarterectomy (Right, 12/22/2016); Hemorrhoid surgery (04/2019); Diagnostic Cardiac Cath Lab Procedure; and Femoral Endarterectomy (N/A, 7/8/2024).    Medications:   Scheduled Meds:    sodium chloride flush  5-40 mL IntraVENous 2 times per day     Continuous Infusions:    sodium chloride      sodium chloride 125 mL/hr at 01/20/25 1036     PRN Meds: sodium chloride flush, sodium chloride  Home Meds:   Prior to

## 2025-01-20 NOTE — PROGRESS NOTES
1001 Pt arrived ambulatory with family to 2E 15. Admission in progress.   1234 Pt to IR via bed with transport.   1734 Report called to MICK Chacon  1751 Dr. Mari in to updated pt's wife and friend.  1804 Pt's wife directed to 8A 06 with all pt's documented belongings.

## 2025-01-20 NOTE — H&P
CT/CV Surgery H&P    1/20/2025 8:43 AM  Surgeon:  Dr. Mari    HPI:    Mr. Angel is scheduled for right lower extremity angiogram and possible intervention today.       Mr. Angel presents for follow-up of his peripheral arterial disease.  To review he is status post left femoral endarterectomy and left SFA and popliteal atherectomy and stent on 7/8/2024.  He was placed on Pletal, Plavix and aspirin.  He is also maintained on statin.  He has been followed up serially since that time.  On his last visit he had been complaining of recent onset right lower extremity severe claudication.  He was no longer smoking.  He was having severe claudication right greater than left at 100 feet along with cramps in the calf and feet as well as coolness.  He denied any numbness.  At that time he denied rest pain.  Nothing seems to help.  He stated that immediately after the left-sided intervention his left leg felt better but now it to had some recurrent claudication.     On 11/12/2024 he had an arterial duplex study which showed that the right DPA had an TRAVON of 0.58 in the right PT and an TRAVON of 0.71 this was decreased from the previous study.  It also showed 70% right proximal SFA in-stent stenosis.  Thus because of the study a CT angiogram was ordered and it was performed on 12/6/2024 and it revealed a 3.1 cm infrarenal abdominal aortic aneurysm without rupture.  It showed on the right side 70% SFA proximal stenosis and 90% right popliteal stenosis.  On the left lower extremity it showed short focal left distal external iliac artery stenosis and short focal 70% stenosis at the ostial SFA at the upper and of the proximal SFA stent.  The profunda femoris was patent.     He states now the right lower extremity claudication has changed to rest pain and he is having calf pain and soreness all the time at rest.  He also has had nocturnal pain every night which wakes him up with cramping in the calf and foot and his coolness

## 2025-01-20 NOTE — PROGRESS NOTES
1708 Manual pressure released from groin. QuickClot and opsite in place and Safeguard device placed.   1713 Patient on bed; comfort ensured. Left  femoral dressing remains dry and intact with area soft. Right pedal dressing remains dry and intact with area soft.   1715 Patient taken to 8A06 via bed. Left femoral dressing remains dry and intact with area soft. Right pedal dressing remains dry and intact with area soft. Pt alert and oriented x3; follows commands. Skin pink, warm, and dry. Respirations easy, regular, and nonlabored.

## 2025-01-21 VITALS
RESPIRATION RATE: 16 BRPM | HEART RATE: 67 BPM | BODY MASS INDEX: 26.68 KG/M2 | SYSTOLIC BLOOD PRESSURE: 152 MMHG | HEIGHT: 69 IN | WEIGHT: 180.12 LBS | OXYGEN SATURATION: 99 % | TEMPERATURE: 98.1 F | DIASTOLIC BLOOD PRESSURE: 70 MMHG

## 2025-01-21 LAB
ANION GAP SERPL CALC-SCNC: 11 MEQ/L (ref 8–16)
BUN SERPL-MCNC: 12 MG/DL (ref 7–22)
CALCIUM SERPL-MCNC: 8.1 MG/DL (ref 8.5–10.5)
CHLORIDE SERPL-SCNC: 104 MEQ/L (ref 98–111)
CO2 SERPL-SCNC: 23 MEQ/L (ref 23–33)
CREAT SERPL-MCNC: 1 MG/DL (ref 0.4–1.2)
DEPRECATED RDW RBC AUTO: 49 FL (ref 35–45)
ERYTHROCYTE [DISTWIDTH] IN BLOOD BY AUTOMATED COUNT: 13.8 % (ref 11.5–14.5)
GFR SERPL CREATININE-BSD FRML MDRD: 77 ML/MIN/1.73M2
GLUCOSE SERPL-MCNC: 114 MG/DL (ref 70–108)
HCT VFR BLD AUTO: 29 % (ref 42–52)
HCT VFR BLD AUTO: 29.4 % (ref 42–52)
HGB BLD-MCNC: 10.2 GM/DL (ref 14–18)
HGB BLD-MCNC: 9.8 GM/DL (ref 14–18)
MCH RBC QN AUTO: 32.9 PG (ref 26–33)
MCHC RBC AUTO-ENTMCNC: 33.8 GM/DL (ref 32.2–35.5)
MCV RBC AUTO: 97.3 FL (ref 80–94)
PLATELET # BLD AUTO: 169 THOU/MM3 (ref 130–400)
PMV BLD AUTO: 10.3 FL (ref 9.4–12.4)
POTASSIUM SERPL-SCNC: 4.8 MEQ/L (ref 3.5–5.2)
RBC # BLD AUTO: 2.98 MILL/MM3 (ref 4.7–6.1)
SODIUM SERPL-SCNC: 138 MEQ/L (ref 135–145)
WBC # BLD AUTO: 10 THOU/MM3 (ref 4.8–10.8)

## 2025-01-21 PROCEDURE — 85014 HEMATOCRIT: CPT

## 2025-01-21 PROCEDURE — 94761 N-INVAS EAR/PLS OXIMETRY MLT: CPT

## 2025-01-21 PROCEDURE — 85018 HEMOGLOBIN: CPT

## 2025-01-21 PROCEDURE — 80048 BASIC METABOLIC PNL TOTAL CA: CPT

## 2025-01-21 PROCEDURE — G0378 HOSPITAL OBSERVATION PER HR: HCPCS

## 2025-01-21 PROCEDURE — 36415 COLL VENOUS BLD VENIPUNCTURE: CPT

## 2025-01-21 PROCEDURE — 6370000000 HC RX 637 (ALT 250 FOR IP): Performed by: THORACIC SURGERY (CARDIOTHORACIC VASCULAR SURGERY)

## 2025-01-21 PROCEDURE — 85027 COMPLETE CBC AUTOMATED: CPT

## 2025-01-21 PROCEDURE — 94640 AIRWAY INHALATION TREATMENT: CPT

## 2025-01-21 PROCEDURE — 2500000003 HC RX 250 WO HCPCS: Performed by: THORACIC SURGERY (CARDIOTHORACIC VASCULAR SURGERY)

## 2025-01-21 PROCEDURE — 2500000003 HC RX 250 WO HCPCS: Performed by: PHYSICIAN ASSISTANT

## 2025-01-21 PROCEDURE — 99232 SBSQ HOSP IP/OBS MODERATE 35: CPT | Performed by: SURGERY

## 2025-01-21 RX ORDER — TRAMADOL HYDROCHLORIDE 50 MG/1
50 TABLET ORAL EVERY 8 HOURS PRN
Qty: 9 TABLET | Refills: 0 | Status: SHIPPED | OUTPATIENT
Start: 2025-01-21 | End: 2025-01-24

## 2025-01-21 RX ADMIN — ISOSORBIDE MONONITRATE 30 MG: 30 TABLET, EXTENDED RELEASE ORAL at 08:33

## 2025-01-21 RX ADMIN — FERROUS SULFATE TAB 325 MG (65 MG ELEMENTAL FE) 325 MG: 325 (65 FE) TAB at 08:33

## 2025-01-21 RX ADMIN — CLOPIDOGREL BISULFATE 75 MG: 75 TABLET ORAL at 08:33

## 2025-01-21 RX ADMIN — TIOTROPIUM BROMIDE INHALATION SPRAY 2 PUFF: 3.12 SPRAY, METERED RESPIRATORY (INHALATION) at 07:19

## 2025-01-21 RX ADMIN — METOPROLOL TARTRATE 25 MG: 25 TABLET, FILM COATED ORAL at 08:33

## 2025-01-21 RX ADMIN — ALLOPURINOL 300 MG: 300 TABLET ORAL at 08:33

## 2025-01-21 RX ADMIN — SODIUM CHLORIDE, PRESERVATIVE FREE 10 ML: 5 INJECTION INTRAVENOUS at 08:34

## 2025-01-21 RX ADMIN — PANTOPRAZOLE SODIUM 40 MG: 40 TABLET, DELAYED RELEASE ORAL at 05:31

## 2025-01-21 RX ADMIN — OXYCODONE 5 MG: 5 TABLET ORAL at 11:11

## 2025-01-21 ASSESSMENT — PAIN DESCRIPTION - LOCATION: LOCATION: LEG

## 2025-01-21 ASSESSMENT — PAIN DESCRIPTION - DESCRIPTORS: DESCRIPTORS: ACHING

## 2025-01-21 ASSESSMENT — PAIN DESCRIPTION - PAIN TYPE: TYPE: ACUTE PAIN;CHRONIC PAIN;SURGICAL PAIN

## 2025-01-21 ASSESSMENT — PAIN SCALES - WONG BAKER: WONGBAKER_NUMERICALRESPONSE: NO HURT

## 2025-01-21 ASSESSMENT — PAIN DESCRIPTION - FREQUENCY: FREQUENCY: INTERMITTENT

## 2025-01-21 ASSESSMENT — PAIN DESCRIPTION - ORIENTATION: ORIENTATION: RIGHT

## 2025-01-21 ASSESSMENT — PAIN SCALES - GENERAL
PAINLEVEL_OUTOF10: 0
PAINLEVEL_OUTOF10: 7
PAINLEVEL_OUTOF10: 2

## 2025-01-21 ASSESSMENT — PAIN DESCRIPTION - ONSET: ONSET: GRADUAL

## 2025-01-21 ASSESSMENT — PAIN - FUNCTIONAL ASSESSMENT: PAIN_FUNCTIONAL_ASSESSMENT: ACTIVITIES ARE NOT PREVENTED

## 2025-01-21 NOTE — PLAN OF CARE
Problem: Chronic Conditions and Co-morbidities  Goal: Patient's chronic conditions and co-morbidity symptoms are monitored and maintained or improved  Outcome: Progressing  Flowsheets (Taken 1/20/2025 1930)  Care Plan - Patient's Chronic Conditions and Co-Morbidity Symptoms are Monitored and Maintained or Improved: Monitor and assess patient's chronic conditions and comorbid symptoms for stability, deterioration, or improvement     Problem: Discharge Planning  Goal: Discharge to home or other facility with appropriate resources  Outcome: Progressing  Flowsheets (Taken 1/20/2025 1930)  Discharge to home or other facility with appropriate resources: Identify barriers to discharge with patient and caregiver     Problem: Pain  Goal: Verbalizes/displays adequate comfort level or baseline comfort level  Outcome: Progressing     Problem: Safety - Adult  Goal: Free from fall injury  Outcome: Progressing

## 2025-01-21 NOTE — PROGRESS NOTES
Patient's wife, Viridiana, called in for an update regarding patient. Update given and states no further questions at this time.

## 2025-01-21 NOTE — DISCHARGE SUMMARY
CT/CV Surgery Discharge Summary     Pt Name: Conner Angel  MRN: [id number]  YOB: 1945  Primary Care Physician: Carmen Felix APRN - CNP    Admit date:  1/20/2025  9:54 AM     Discharge date:  01/21/25     Disposition: Home    Admitting Diagnosis:   Right lower extremity rest pain  Bilateral lower extremity peripheral arterial disease, s/p left femoral endarterectomy and Left SFA/Pop stents 7/8/2024  Tobacco use disorder  CAD  Infrarenal AAA  Chronic back pain, COPD  T2DM  GERD  HLD  HTN  OA    Discharge Diagnosis:   Right lower extremity rest pain  Bilateral lower extremity peripheral arterial disease, s/p left femoral endarterectomy and Left SFA/Pop stents 7/8/2024  Tobacco use disorder  CAD  Infrarenal AAA  Chronic back pain, COPD  T2DM  GERD  HLD  HTN  OA    Condition: Stable    Problem List:   Patient Active Problem List   Diagnosis Code    Coronary artery disease involving native coronary artery of native heart without angina pectoris I25.10    Erectile dysfunction N52.9    Hepatic steatosis K76.0    Low HDL (under 40) E78.6    Essential hypertension I10    Gastroesophageal reflux disease K21.9    Hyperlipidemia E78.5    Primary osteoarthritis of left knee M17.12    Coronary artery disease involving coronary bypass graft of native heart without angina pectoris- Dr. Encinas I25.810    COPD without exacerbation ([facility]) J44.9    Gout of left foot M10.9    Abdominal aortic aneurysm (AAA) without rupture ([facility]) I71.40    Angina pectoris, variant ([facility]) I20.1    Abnormal nuclear stress test R94.39    PVD (peripheral vascular disease) with claudication ([facility]) I73.9    Iron deficiency anemia D50.9    Pneumonia J18.9    Closed nondisplaced fracture of third cervical vertebra ([facility]) S12.201A    Closed head injury S09.90XA    Syncope and collapse R55    Forehead contusion, initial encounter S00.83XA    Hypomagnesemia E83.42    Abnormal EKG R94.31    Hx of CABG Z95.1    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

## 2025-01-21 NOTE — PROGRESS NOTES
This RN enter's patient's room to collect vitals. Patient states he is very nauseous. Zofran IV is given. BP 72/31 - via vitals machine. Charge RN Elizabeth collects manual BP 90/58 HR 63. This RN calls provider Dr. Xavi Milan to update on situation. CTA abdomen/pelvis, labs, and 500 ml bolus ordered. Bolus started and CTA completed. Dr. Xavi Milan states to call back with concern and results. Patient resting in bed. Call light in reach.

## 2025-01-21 NOTE — DISCHARGE INSTRUCTIONS
[facility] Instructions [facility]  Pt Name: Conner Angel  Medical Record Number: [id number]  Today's Date: 1/21/2025    ACTIVITY/EXERCISE   ? Slowly increase your activity after you go home.  Pace yourself and listen to your body.   ? It will take 2 to 3 weeks to feel like you did before surgery in terms of energy and strength.   ?        Do not use arms to get up from a seated position. Instead, rock in your chair to give yourself momentum to sit up.    APPETITE   ? Your appetite might be decreased at first.  It should slowly improve.   ? Small, frequent meals may help.   ? The dietitian will assist you with a low fat, low cholesterol, low salt diet.    PAIN   ? You may have pain at the incision.  Take your pain medications as ordered.    INCISIONS  ? Warning signs of infection: redness, warmth to touch, green colored pus, tender to touch.  Notify surgeon office right away if any warnings occur.  ? Clean your incisions twice daily with soap.  Ok to shower daily and do not bathe for the first month following procedure.    SMOKING   If you smoke, STOP.  Smoking will cause early graft closure, other blockages, new heart attacks, and possibly even death.          CALL YOUR SURGEON IF YOU HAVE:   ? Fever over 101° F.   ? Persistent nausea or vomiting        ? Increasing shortness of breath   ? Pain unrelieved by prescribed medication        OFFICE VISIT   ?    You should have a follow up appointment in the office in about 4 weeks after discharge from the hospital.   ?   You may call the office to make an appointment, if you don't have an appointment. ([phone]).   ?   Bring any questions you have so they may be addressed.   ?   BRING YOUR MEDICATION LIST and medications even over the counter medications to all your follow up apointments.   ?   Office Location:   [address] [place] [address]

## 2025-01-21 NOTE — CARE COORDINATION
DISCHARGE PLANNING EVALUATION: OP/OBSERVATION        1/21/25, 7:23 AM EST    Conner Angel       Admitted from: IR 1/20/2025/ 0954   Location: [facility]06/006-A Reason for admit: Atherosclerosis of native artery of both lower extremities with intermittent claudication (HCC) [I70.213]  Atherosclerosis of native artery of right lower extremity with rest pain (HCC) [I70.221]  PAD (peripheral artery disease) (HCC) [I73.9]     Procedure:   1/20 USG guidance, Left Common Femoral artery  USG guidance, Right Posterior Tibial artery  Bilateral lower extremity angiograms  Nonselective Aortogram  Selective Right Peroneal catheter placement  Stent to Right SFA and Above knee Popliteal arteries with 7 mm x 40, 6 mm x 200, 6 mm x 150, and 6 mm x 120 Medtronic stents  Stent to Below Knee Popliteal and proximal Peroneal artery with 6 mm x 15 cm [place] Viabahn stent       Pertinent Info/Orders/Treatment Plan:  pain and nausea control, Asa, Plavix, Protonix.     PCP: Carmen Felix APRN - CNP    Patient Goals/Plan/Treatment Preferences: Met with Conner. He currently lives at home with his wife. Plan is to return at discharge. He denies need for DME and declines HH.  Transportation/Food Security/Housekeeping Addressed:  No issues identified.     If patient is discharged prior to next notation, then this note serves as note for discharge by case management.

## 2025-01-21 NOTE — PROGRESS NOTES
Dr. Xavi Milan called and updated on previous situation. Labs and CTA results provided during call. Dr. Xavi Milan states he will review results himself and call back with any further orders. Patient's vitals stable and he is resting in bed, call light in reach.

## 2025-01-21 NOTE — PROGRESS NOTES
Progress Note  Date:2025       Room:[address] [place] [address]  Patient Name:Conner Angel     YOB: 1945     Age:79 y.o.        Subjective    Subjective the patient had nausea and hypotension yesterday and in the late evening hours.  He had arteriography via the left groin and I was concerned that he may have a retroperitoneal hematoma for which I ordered CTA.  This revealed no retroperitoneal hematoma and no significant groin hematoma.  It did reveal that there was a hematoma in the right lower extremity which was the intervened upon lower extremity consistent probably with a wire perforation during his intervention.  His hematocrit is 29 from 39 preoperatively.  He is currently normotensive.  He is feeling better.  Review of Systems  Objective         Vitals Last 24 Hours:  TEMPERATURE:  Temp  Av.2 °F (36.8 °C)  Min: 97.8 °F (36.6 °C)  Max: 98.8 °F (37.1 °C)  RESPIRATIONS RANGE: Resp  Avg: 15  Min: 8  Max: 27  PULSE OXIMETRY RANGE: SpO2  Av.4 %  Min: 85 %  Max: 100 %  PULSE RANGE: Pulse  Av.2  Min: 48  Max: 80  BLOOD PRESSURE RANGE: Systolic (24hrs), Av , Min:72 , Max:157   ; Diastolic (24hrs), Av, Min:31, Max:95    I/O (24Hr):    Intake/Output Summary (Last 24 hours) at 2025 0757  Last data filed at 2025 0251  Gross per 24 hour   Intake 1938 ml   Output 900 ml   Net 1038 ml     Objective on examination his right thigh is slightly tight proximally.  It is not tense and does not need evacuation.  He does not complain of tenderness in that area.  I cannot palpate a distal pulse on the right.  His foot is very warm and well-perfused with excellent capillary refill.  The left groin has no hematoma.  Labs/Imaging/Diagnostics    Labs:  CBC:  Recent Labs     25  1020 25  1849 25  2122 25  0106 25  0532   WBC 6.6 12.5*  --   --  10.0   RBC 4.12* 3.41*  --   --  2.98*   HGB 13.8* 11.4* 9.7* 10.2* 9.8*   HCT 39.1* 32.6* 28.4* 29.4* 29.0*   MCV 94.9*

## 2025-01-21 NOTE — PROGRESS NOTES
Resource RN Yamila updated on situation, reviews  chart, and comes to bedside to assess patient. Yamila states to measure patient's right leg every 4 hours to check for swelling. MICK Driscoll also helps removed safeguard on patient's left groin site and apply's a quick clot  dressing to site. Vitals stable. Patient resting in bed with call light in reach.

## 2025-01-21 NOTE — RT PROTOCOL NOTE
for wheezing or increased work of breathing using Per Protocol order mode.        4-6 - enter or revise RT Bronchodilator order(s) to two equivalent RT bronchodilator orders with one order with BID Frequency and one order with Frequency of every 4 hours PRN wheezing or increased work of breathing using Per Protocol order mode.        7-10 - enter or revise RT Bronchodilator order(s) to two equivalent RT bronchodilator orders with one order with TID Frequency and one order with Frequency of every 4 hours PRN wheezing or increased work of breathing using Per Protocol order mode.       11-13 - enter or revise RT Bronchodilator order(s) to one equivalent RT bronchodilator order with QID Frequency and an Albuterol order with Frequency of every 4 hours PRN wheezing or increased work of breathing using Per Protocol order mode.      Greater than 13 - enter or revise RT Bronchodilator order(s) to one equivalent RT bronchodilator order with every 4 hours Frequency and an Albuterol order with Frequency of every 2 hours PRN wheezing or increased work of breathing using Per Protocol order mode.     RT to enter RT Home Evaluation for COPD & MDI Assessment order using Per Protocol order mode.    Electronically signed by Sangeetha Rothman RCP on 1/21/2025 at 7:23 AM

## 2025-01-21 NOTE — PROGRESS NOTES
Called patient's spouse, Viridiana, and provided an update. No further questions at this time. Viridiana states she has an appointment at 10AM and will come here after.

## 2025-01-22 ENCOUNTER — CARE COORDINATION (OUTPATIENT)
Dept: CASE MANAGEMENT | Age: 80
End: 2025-01-22

## 2025-01-22 DIAGNOSIS — I73.9 PAD (PERIPHERAL ARTERY DISEASE) (HCC): Primary | ICD-10-CM

## 2025-01-22 PROCEDURE — 1111F DSCHRG MED/CURRENT MED MERGE: CPT | Performed by: NURSE PRACTITIONER

## 2025-01-22 NOTE — CARE COORDINATION
Care Transitions Note    Initial Call - Call within 2 business days of discharge: Yes    Patient Current Location:  Home: [facility] [address] [id number]    Care Transition Nurse contacted the patient by telephone to perform post hospital discharge assessment, verified name and  as identifiers. Provided introduction to self, and explanation of the Care Transition Nurse role.     Patient: Conner Angel    Patient : 1945   MRN: [id number]    Reason for Admission: PAD, s/p R femoral endarterectomy   Discharge Date: 25  RURS: Readmission Risk Score: 11.5      Last Discharge Facility       Date Complaint Diagnosis Description Type Department Provider    25  PAD (peripheral artery disease) ([facility]) ... Admission (Discharged) from [facility] IR ANGIOGRAM EXTREMITY RIGHT W 2E NURSING STRZ 8AB Eric Mari MD            Was this an external facility discharge? No    Additional needs identified to be addressed with provider   Ed was not aware of angiogram that is scheduled on 2/3.  Wants to know if he still needs to do this?  FYI-said he is doing ok, just has some pain when bending his knee.  Thank you!             Method of communication with provider: chart routing.    Patients top risk factors for readmission: lack of knowledge about disease, medical condition-s/p endarterectomy, HTN COPD, PAD, and polypharmacy    Interventions to address risk factors:   Education: reviewed AVS post op precautions  Review of patient management of conditions/medications:    Communication with providers: HFU scheduled    Care Summary Note:  Spoke with Ed, said he is doing pretty good.  Has some pain when he bends his knee.  L femoral site looks good, no s/sx of infection.  His leg is not red or swollen.  Denies fever, chills, chest pain, dyspnea, dizziness.  Is taking Ultram for pain which helps.  Reviewed medications, taking as directed.  Reviewed post op instructions on AVS, voiced understanding.  He was told to

## 2025-01-27 ENCOUNTER — OFFICE VISIT (OUTPATIENT)
Dept: FAMILY MEDICINE CLINIC | Age: 80
End: 2025-01-27

## 2025-01-27 ENCOUNTER — TELEPHONE (OUTPATIENT)
Dept: FAMILY MEDICINE CLINIC | Age: 80
End: 2025-01-27

## 2025-01-27 VITALS
SYSTOLIC BLOOD PRESSURE: 122 MMHG | DIASTOLIC BLOOD PRESSURE: 72 MMHG | WEIGHT: 181.4 LBS | HEART RATE: 80 BPM | BODY MASS INDEX: 26.87 KG/M2 | TEMPERATURE: 98 F | RESPIRATION RATE: 16 BRPM | HEIGHT: 69 IN

## 2025-01-27 DIAGNOSIS — I73.9 PAD (PERIPHERAL ARTERY DISEASE) (HCC): ICD-10-CM

## 2025-01-27 DIAGNOSIS — M05.20 RHEUMATOID ARTERITIS (HCC): ICD-10-CM

## 2025-01-27 DIAGNOSIS — J44.9 COPD WITHOUT EXACERBATION (HCC): ICD-10-CM

## 2025-01-27 RX ORDER — TRAMADOL HYDROCHLORIDE 50 MG/1
50 TABLET ORAL EVERY 8 HOURS PRN
Qty: 9 TABLET | Refills: 0 | Status: SHIPPED | OUTPATIENT
Start: 2025-01-27 | End: 2025-01-30

## 2025-01-27 ASSESSMENT — PATIENT HEALTH QUESTIONNAIRE - PHQ9
SUM OF ALL RESPONSES TO PHQ QUESTIONS 1-9: 2
1. LITTLE INTEREST OR PLEASURE IN DOING THINGS: SEVERAL DAYS
SUM OF ALL RESPONSES TO PHQ9 QUESTIONS 1 & 2: 2
SUM OF ALL RESPONSES TO PHQ QUESTIONS 1-9: 2
2. FEELING DOWN, DEPRESSED OR HOPELESS: SEVERAL DAYS

## 2025-01-27 NOTE — PROGRESS NOTES
[facility]  [address]  [place] [address]  Dept: [phone]  Dept Fax: [phone]      POST-DISCHARGE TRANSITIONAL CARE MANAGEMENT SERVICES       Chief Complaint   Patient presents with    Follow-Up from Hospital     R leg is purple. C/O of feet, toes swollen, red since discharge         Conneredson Saundersy   YOB: 1945      Allergies   Allergen Reactions    Crestor [Rosuvastatin Calcium]      Muscle aches.    Lipitor      Muscle aches.    Tramadol Nausea Only     Dizziness    Codeine Nausea And Vomiting     Dizziness    Percocet [Oxycodone-Acetaminophen] Nausea And Vomiting     Dizziness. Does not like the way it makes him feel    Vicodin [Hydrocodone-Acetaminophen]      Dizziness. Does not like the way it makes him feel     Outpatient Medications Marked as Taking for the 25 encounter (Office Visit) with Carmen Felix APRN - CNP   Medication Sig Dispense Refill    [] traMADol (ULTRAM) 50 MG tablet Take 1 tablet by mouth every 8 hours as needed for Pain for up to 3 days. Intended supply: 3 days. Take lowest dose possible to manage pain Max Daily Amount: 150 mg 9 tablet 0    clopidogrel (PLAVIX) 75 MG tablet TAKE 1 TABLET EVERY DAY 90 tablet 3    albuterol sulfate HFA (PROVENTIL;VENTOLIN;PROAIR) 108 (90 Base) MCG/ACT inhaler Inhale 2 puffs into the lungs every 6 hours as needed for Wheezing 1 each 11    betamethasone dipropionate 0.05 % lotion Apply 1 Application topically at bedtime      allopurinol (ZYLOPRIM) 300 MG tablet Take 1 tablet by mouth daily 90 tablet 3    metoprolol tartrate (LOPRESSOR) 50 MG tablet Take 0.5 tablets by mouth daily 45 tablet 3    simvastatin (ZOCOR) 20 MG tablet Take 1 tablet by mouth nightly 90 tablet 3    tiotropium (SPIRIVA RESPIMAT) 2.5 MCG/ACT AERS inhaler INHALE TWO (2) PUFFS INTO THE LUNGS DAILY 4 g 11    ketoconazole (NIZORAL) 2 % shampoo Apply shampoo 2x/wk for 8 weeks then as needed 120 mL 1    pantoprazole (PROTONIX) 40 MG

## 2025-01-27 NOTE — TELEPHONE ENCOUNTER
Pt currently in PCP office for hospital follow-up, PCP wondering if pt needs to be seen sooner for follow-up or push back the other procedure due to the right leg pain and bruising. Pictures under media tab. Can you please reach out to pt directly to let them know how to proceed with appts.

## 2025-01-27 NOTE — TELEPHONE ENCOUNTER
Viridiana called back and agreed to the office appointment with Dr. Milan 2/3/2025 at 2:00 pm. I informed her of surgery being cancelled at this time and she voiced understanding and agreed with this decision.

## 2025-01-27 NOTE — TELEPHONE ENCOUNTER
I spoke with Dr. Milan, he stated he would like to see Ed in office 2/3/2025 at 12:00 pm and he will then decide if it is appropriate to go forward with the IR Angiogram on the LLE.    I left a voicemail message for Ed to call the office.

## 2025-01-28 ENCOUNTER — CARE COORDINATION (OUTPATIENT)
Dept: CASE MANAGEMENT | Age: 80
End: 2025-01-28

## 2025-01-28 NOTE — CARE COORDINATION
Care Transitions Note    Follow Up Call     Patient Current Location:  Home: [address] [place] [id number]    Care Transition Nurse contacted the patient by telephone. Verified name and  as identifiers.    Additional needs identified to be addressed with provider   No needs identified                 Method of communication with provider: none.    Care Summary Note:  Spoke with Ed, said he is doing ok but still has soreness of leg/foot.  Saw PCP yesterday and took pics, very bruised, sent to vascular office.  Will see next Monday to discuss next steps.  Denies fever, chills, chest pain, dizziness, dyspnea.  Eating, drinking, sleeping good.  No issues with B&B.  No other issues to report.  Denies any other needs.  No other questions or concerns at this time.  Will continue to follow, agreeable to calls.    Plan of care updates since last contact:  No changes       Advance Care Planning:   Does patient have an Advance Directive: reviewed during previous call, see note. .    Medication Review:  Full medication reconciliation completed during previous call. and No changes since last call.     Remote Patient Monitoring:  Offered patient enrollment in the Remote Patient Monitoring (RPM) program for in-home monitoring: Yes, but did not enroll at this time: controlled chronic disease management.    Assessments:  No changes since last call    Follow Up Appointment:   Reviewed upcoming appointment(s). and TYRONE appointment attended as scheduled   Future Appointments         Provider Specialty Dept Phone    2/3/2025 2:00 PM Xavi Milan MD Vascular Surgery [phone]    2025 11:30 AM Emeka Sanon MD Cardiology [phone]            Care Transition Nurse provided contact information.  Plan for follow-up call in 6-10 days based on severity of symptoms and risk factors.  Plan for next call: symptom management-new or worsening symptoms, PAD  self management-leg/foot pain  follow-up appointment-review

## 2025-01-29 ASSESSMENT — ENCOUNTER SYMPTOMS
ABDOMINAL PAIN: 0
NAUSEA: 1
SHORTNESS OF BREATH: 0
CHEST TIGHTNESS: 0
COLOR CHANGE: 1
BLOOD IN STOOL: 0

## 2025-02-03 ENCOUNTER — OFFICE VISIT (OUTPATIENT)
Age: 80
End: 2025-02-03
Payer: MEDICARE

## 2025-02-03 VITALS
HEIGHT: 69 IN | DIASTOLIC BLOOD PRESSURE: 79 MMHG | BODY MASS INDEX: 26.69 KG/M2 | WEIGHT: 180.2 LBS | HEART RATE: 54 BPM | SYSTOLIC BLOOD PRESSURE: 144 MMHG

## 2025-02-03 DIAGNOSIS — I70.223 ATHEROSCLEROSIS OF NATIVE ARTERY OF BOTH LOWER EXTREMITIES WITH REST PAIN (HCC): ICD-10-CM

## 2025-02-03 DIAGNOSIS — I70.213 ATHEROSCLER OF NATIVE ARTERY OF BOTH LEGS WITH INTERMIT CLAUDICATION (HCC): Primary | ICD-10-CM

## 2025-02-03 PROCEDURE — 1159F MED LIST DOCD IN RCRD: CPT | Performed by: SURGERY

## 2025-02-03 PROCEDURE — G8417 CALC BMI ABV UP PARAM F/U: HCPCS | Performed by: SURGERY

## 2025-02-03 PROCEDURE — G8427 DOCREV CUR MEDS BY ELIG CLIN: HCPCS | Performed by: SURGERY

## 2025-02-03 PROCEDURE — 1123F ACP DISCUSS/DSCN MKR DOCD: CPT | Performed by: SURGERY

## 2025-02-03 PROCEDURE — 3078F DIAST BP <80 MM HG: CPT | Performed by: SURGERY

## 2025-02-03 PROCEDURE — 99213 OFFICE O/P EST LOW 20 MIN: CPT | Performed by: SURGERY

## 2025-02-03 PROCEDURE — 3077F SYST BP >= 140 MM HG: CPT | Performed by: SURGERY

## 2025-02-03 PROCEDURE — 1036F TOBACCO NON-USER: CPT | Performed by: SURGERY

## 2025-02-03 NOTE — PROGRESS NOTES
[facility] [place] [facility]  [facility] VASCULAR SURGERY  [address], [address]  [facility] [facility] [address]  Dept: [phone]  Loc: [phone]     Patient: Conner Angel  : 1945  MRN: [id number]  DOS: 2/3/2025            HPI:  Conner Angel is a 79 y.o. male who returns to the office regarding his lower extremity intervention.  Recall that another surgeon had performed his right lower extremity angioplasty and stenting of the SFA and popliteal.  Unfortunately the stent was placed all the way to the level of the tibioperoneal trunk with single-vessel outflow through the peroneal artery.  The distalmost stent I think is a covered stent in the popliteal artery.  He has difficulty conveying to me whether or not his symptoms have improved.  He is very noncommittal with all of his answers.  From what I understand I think he may still have claudication.  When asked whether or not he has rest pain he explains to me that his feet do hurt.  When I ask how often he is unable to answer.  He has not had noninvasive studies since his operation.  Recall also that he had a hematoma in the right thigh likely due to wire perforation or perforated SFA during his operation.    Review of Systems    Vitals:    25 1402 25 1405   BP: (!) 151/71 (!) 144/79   Site: Left Upper Arm Right Upper Arm   Position: Sitting Sitting   Cuff Size: Medium Adult Medium Adult   Pulse: 55 54   Weight: 81.7 kg (180 lb 3.2 oz)    Height: 1.753 m (5' 9\")           Physical Exam  On examination I cannot palpate distal pulses in either lower extremity.  There is a popliteal pulse on the left but not the right.  He has femoral pulses bilaterally.  His feet seem to be adequately perfused.  He has no dependent rubor.  He has no ulceration or gangrene.    Assessment:  1. Atheroscler of native artery of both legs with intermit claudication (HCC)    2. Atherosclerosis of native artery of both lower

## 2025-02-04 ENCOUNTER — CARE COORDINATION (OUTPATIENT)
Dept: CASE MANAGEMENT | Age: 80
End: 2025-02-04

## 2025-02-04 NOTE — CARE COORDINATION
Care Transitions Note    Follow Up Call     Attempted to reach patient for transitions of care follow up.  Unable to reach patient.      Outreach Attempts:   HIPAA compliant voicemail left for patient.   [employer] message sent.     Care Summary Note: day #1 no response to CTN outreach.    Follow Up Appointment:   Future Appointments         Provider Specialty Dept Phone    2/21/2025 11:30 AM Emeka Sanon MD Cardiology [phone]    2/24/2025 1:00 PM (Arrive by 12:30 PM) [facility] VASCULAR LAB ROOM 1 Vascular Surgery [phone]    2/24/2025 2:00 PM (Arrive by 1:30 PM) [facility] VASCULAR LAB ROOM 1 Vascular Surgery [phone]    3/4/2025 2:15 PM Xavi Milan MD Vascular Surgery [phone]            Plan for follow-up on next business day.  based on severity of symptoms and risk factors. Plan for next call: symptom management-R LE pain, circ checks, swelling  self management-BP   follow-up appointment-CV surgery 2/3/25->review  medication management-Tramadol helping?    Emilie Peterson, RN

## 2025-02-05 ENCOUNTER — CARE COORDINATION (OUTPATIENT)
Dept: CASE MANAGEMENT | Age: 80
End: 2025-02-05

## 2025-02-05 NOTE — CARE COORDINATION
.Care Transitions Note    Follow Up Call     Attempted to reach patient for transitions of care follow up.  Unable to reach patient.      Outreach Attempts:   HIPAA compliant voicemail left for patient.   [employer] message sent.     Care Summary Note: day #2 no response to CTN outreach. Program closed per protocol.    Follow Up Appointment:   Future Appointments         Provider Specialty Dept Phone    2/21/2025 11:30 AM Emeka Sanon MD Cardiology [phone]    2/24/2025 1:00 PM (Arrive by 12:30 PM) [facility] VASCULAR LAB ROOM 1 Vascular Surgery [phone]    2/24/2025 2:00 PM (Arrive by 1:30 PM) [facility] VASCULAR LAB ROOM 1 Vascular Surgery [phone]    3/4/2025 2:15 PM Xavi Milan MD Vascular Surgery [phone]            No further follow-up call indicated based on severity of symptoms and risk factors. Plan for next call:  none    Emilie Peterson RN

## 2025-02-10 RX ORDER — ISOSORBIDE MONONITRATE 30 MG/1
30 TABLET, EXTENDED RELEASE ORAL DAILY
Qty: 90 TABLET | Refills: 0 | Status: SHIPPED | OUTPATIENT
Start: 2025-02-10

## 2025-02-10 RX ORDER — PANTOPRAZOLE SODIUM 40 MG/1
TABLET, DELAYED RELEASE ORAL
Qty: 90 TABLET | Refills: 3 | Status: SHIPPED | OUTPATIENT
Start: 2025-02-10

## 2025-02-10 NOTE — TELEPHONE ENCOUNTER
Request sent from [facility] pharmacy for refill of pantoprazole 40 mg qd.   Last seen 1/27/25, no future appt scheduled.  Med verified.  Order pended.

## 2025-02-21 ENCOUNTER — OFFICE VISIT (OUTPATIENT)
Dept: CARDIOLOGY CLINIC | Age: 80
End: 2025-02-21
Payer: MEDICARE

## 2025-02-21 VITALS
DIASTOLIC BLOOD PRESSURE: 70 MMHG | BODY MASS INDEX: 27.11 KG/M2 | SYSTOLIC BLOOD PRESSURE: 128 MMHG | HEART RATE: 60 BPM | WEIGHT: 183 LBS | HEIGHT: 69 IN

## 2025-02-21 DIAGNOSIS — Z95.1 HX OF CABG: Primary | ICD-10-CM

## 2025-02-21 PROCEDURE — 1123F ACP DISCUSS/DSCN MKR DOCD: CPT | Performed by: INTERNAL MEDICINE

## 2025-02-21 PROCEDURE — 3078F DIAST BP <80 MM HG: CPT | Performed by: INTERNAL MEDICINE

## 2025-02-21 PROCEDURE — 3074F SYST BP LT 130 MM HG: CPT | Performed by: INTERNAL MEDICINE

## 2025-02-21 PROCEDURE — 99214 OFFICE O/P EST MOD 30 MIN: CPT | Performed by: INTERNAL MEDICINE

## 2025-02-21 PROCEDURE — 1036F TOBACCO NON-USER: CPT | Performed by: INTERNAL MEDICINE

## 2025-02-21 PROCEDURE — G8428 CUR MEDS NOT DOCUMENT: HCPCS | Performed by: INTERNAL MEDICINE

## 2025-02-21 PROCEDURE — G8417 CALC BMI ABV UP PARAM F/U: HCPCS | Performed by: INTERNAL MEDICINE

## 2025-02-21 NOTE — PROGRESS NOTES
[facility] [place] [facility]  [facility] CARDIOLOGY  [address]  [address]  [facility] [address]  Dept: [phone]  Dept Fax: [phone]  Loc: [phone]    Visit Date: 2/21/2025    Mr. Angel is a 79 y.o. male  who presented for:  Chief Complaint   Patient presents with    Follow-up       HPI:   79 yo M c hx of CAD s/p CABG x 4 (2006), HTN, HLD, COPD, DM, PAD s/p Rt PTA is here for a follow up.   He does report bilateral leg pains.  He had endarectomy of CFA on right.     Underwent PTA/stenting by vascular surgery.  But Dr. Mari has since left the hospital.  He has seen Dr. Milan and is scheduled to get non-invasive testing.       Current Outpatient Medications:     isosorbide mononitrate (IMDUR) 30 MG extended release tablet, TAKE 1 TABLET EVERY DAY, Disp: 90 tablet, Rfl: 0    pantoprazole (PROTONIX) 40 MG tablet, TAKE 1 TABLET EVERY MORNING (BEFORE BREAKFAST), Disp: 90 tablet, Rfl: 3    clopidogrel (PLAVIX) 75 MG tablet, TAKE 1 TABLET EVERY DAY, Disp: 90 tablet, Rfl: 3    albuterol sulfate HFA (PROVENTIL;VENTOLIN;PROAIR) 108 (90 Base) MCG/ACT inhaler, Inhale 2 puffs into the lungs every 6 hours as needed for Wheezing, Disp: 1 each, Rfl: 11    betamethasone dipropionate 0.05 % lotion, Apply 1 Application topically at bedtime, Disp: , Rfl:     allopurinol (ZYLOPRIM) 300 MG tablet, Take 1 tablet by mouth daily, Disp: 90 tablet, Rfl: 3    metoprolol tartrate (LOPRESSOR) 50 MG tablet, Take 0.5 tablets by mouth daily, Disp: 45 tablet, Rfl: 3    simvastatin (ZOCOR) 20 MG tablet, Take 1 tablet by mouth nightly, Disp: 90 tablet, Rfl: 3    tiotropium (SPIRIVA RESPIMAT) 2.5 MCG/ACT AERS inhaler, INHALE TWO (2) PUFFS INTO THE LUNGS DAILY, Disp: 4 g, Rfl: 11    ketoconazole (NIZORAL) 2 % shampoo, Apply shampoo 2x/wk for 8 weeks then as needed, Disp: 120 mL, Rfl: 1    Handicap Placard MISC, by Does not apply route Duration: 5 years  Dx: OA, CAD, COPD, Disp: 1 each, Rfl: 0    Multiple

## 2025-02-24 ENCOUNTER — HOSPITAL ENCOUNTER (OUTPATIENT)
Dept: INTERVENTIONAL RADIOLOGY/VASCULAR | Age: 80
Discharge: HOME | End: 2025-02-26
Attending: SURGERY
Payer: MEDICARE

## 2025-02-24 DIAGNOSIS — I70.213 ATHEROSCLER OF NATIVE ARTERY OF BOTH LEGS WITH INTERMIT CLAUDICATION: ICD-10-CM

## 2025-02-24 DIAGNOSIS — I70.223 ATHEROSCLEROSIS OF NATIVE ARTERY OF BOTH LOWER EXTREMITIES WITH REST PAIN (HCC): ICD-10-CM

## 2025-02-24 PROCEDURE — 93923 UPR/LXTR ART STDY 3+ LVLS: CPT

## 2025-03-04 ENCOUNTER — OFFICE VISIT (OUTPATIENT)
Age: 80
End: 2025-03-04

## 2025-03-04 VITALS
SYSTOLIC BLOOD PRESSURE: 149 MMHG | WEIGHT: 185 LBS | HEART RATE: 59 BPM | DIASTOLIC BLOOD PRESSURE: 77 MMHG | BODY MASS INDEX: 27.4 KG/M2 | HEIGHT: 69 IN

## 2025-03-04 DIAGNOSIS — I70.213 ATHEROSCLEROSIS OF NATIVE ARTERY OF BOTH LOWER EXTREMITIES WITH INTERMITTENT CLAUDICATION: Primary | ICD-10-CM

## 2025-03-04 NOTE — PROGRESS NOTES
[facility] [place] [facility]  [facility] VASCULAR SURGERY  [address], [address]  [facility] [address]  Dept: [phone]  Loc: [phone]     Patient: Conner Angel  : 1945  MRN: [id number]  DOS: 3/4/2025            HPI:  Conner Angel is a 79 y.o. male who returns to the office regarding his right lower extremity intervention.  Recall that he had basically stents from the proximal SFA all the way down to and including the proximal peroneal artery.  The anterior tibial artery was jailed.  This was done by another vascular surgeon.  Currently he has no rest pain and no claudication.  He had an arterial duplex showing that the repair is open.  The left lower extremity is patent throughout but he does have a proximal common femoral stenosis at the top of the endarterectomy.    Review of Systems    Vitals:    25 1420   BP: (!) 149/77  Comment: left upper arm   Pulse: 59   Weight: 83.9 kg (185 lb)   Height: 1.753 m (5' 9\")          Physical Exam  On examination he has palpable femoral pulses bilaterally and a palpable popliteal pulse on the left only.  I cannot palpate dorsalis pedis or posterior tibial pulses in either lower extremity.  The feet are warm and adequately perfused.  He has minimal dependent rubor at the level of the toes.  There are no ulcerations.  He has no gangrene.    Assessment:  1. Atherosclerosis of native artery of both lower extremities with intermittent claudication          Plan:  At this time we will follow him at 3 months and then likely 6 months thereafter if he remains patent.  I am concerned that a long segment stent such as the right lower extremity on him will not last terribly long and he would need arterial bypass.  His right lower extremity great saphenous vein has been used for coronary bypass.  The left is as yet untouched.  He has arm vein as well.  He is not in renal failure.  We will see him back in 3 months with TRAVON

## 2025-05-05 RX ORDER — SIMVASTATIN 20 MG
20 TABLET ORAL NIGHTLY
Qty: 90 TABLET | Refills: 0 | Status: SHIPPED | OUTPATIENT
Start: 2025-05-05

## 2025-05-05 NOTE — TELEPHONE ENCOUNTER
This medication refill is regarding a electronic request. Refill requested by patient.    Requested Prescriptions     Pending Prescriptions Disp Refills    simvastatin (ZOCOR) 20 MG tablet [Pharmacy Med Name: Simvastatin Oral Tablet 20 MG] 90 tablet 3     Sig: TAKE 1 TABLET EVERY NIGHT       Date of last visit: 1/27/2025   Date of next visit: Visit date not found  Date of last refill: 3-4-24 #90/3       Last Lipid Panel:    Lab Results   Component Value Date/Time    CHOL 137 01/15/2024 08:41 AM    TRIG 206 01/15/2024 08:41 AM    HDL 31 01/15/2024 08:41 AM     Rx verified, ordered and set to EP.

## 2025-05-05 NOTE — TELEPHONE ENCOUNTER
Informed patient that medication sent in and needs to get lipid panel done. He said thank you and will get that done.

## 2025-05-05 NOTE — TELEPHONE ENCOUNTER
Sent. Pt riley for lipid panel. There is already an order in the system. Please see if he could get that completed sometime. TS

## 2025-05-09 ENCOUNTER — HOSPITAL ENCOUNTER (OUTPATIENT)
Age: 80
Discharge: HOME | End: 2025-05-09
Payer: MEDICARE

## 2025-05-09 DIAGNOSIS — I10 ESSENTIAL HYPERTENSION: ICD-10-CM

## 2025-05-09 DIAGNOSIS — I25.10 CORONARY ARTERY DISEASE INVOLVING NATIVE CORONARY ARTERY OF NATIVE HEART WITHOUT ANGINA PECTORIS: ICD-10-CM

## 2025-05-09 DIAGNOSIS — E78.5 HYPERLIPIDEMIA, UNSPECIFIED HYPERLIPIDEMIA TYPE: ICD-10-CM

## 2025-05-09 DIAGNOSIS — Z12.5 SCREENING PSA (PROSTATE SPECIFIC ANTIGEN): ICD-10-CM

## 2025-05-09 DIAGNOSIS — I73.9 PVD (PERIPHERAL VASCULAR DISEASE) WITH CLAUDICATION: ICD-10-CM

## 2025-05-09 LAB
ALBUMIN SERPL BCG-MCNC: 4.4 G/DL (ref 3.4–4.9)
ALP SERPL-CCNC: 75 U/L (ref 40–129)
ALT SERPL W/O P-5'-P-CCNC: 13 U/L (ref 10–50)
ANION GAP SERPL CALC-SCNC: 10 MEQ/L (ref 8–16)
AST SERPL-CCNC: 22 U/L (ref 10–50)
BASOPHILS ABSOLUTE: 0 THOU/MM3 (ref 0–0.1)
BASOPHILS NFR BLD AUTO: 0.3 %
BILIRUB SERPL-MCNC: 1.8 MG/DL (ref 0.3–1.2)
BUN SERPL-MCNC: 17 MG/DL (ref 8–23)
CALCIUM SERPL-MCNC: 9.7 MG/DL (ref 8.8–10.2)
CHLORIDE SERPL-SCNC: 104 MEQ/L (ref 98–111)
CHOLEST SERPL-MCNC: 125 MG/DL (ref 100–199)
CO2 SERPL-SCNC: 28 MEQ/L (ref 22–29)
CREAT SERPL-MCNC: 1.1 MG/DL (ref 0.7–1.2)
DEPRECATED RDW RBC AUTO: 49.2 FL (ref 35–45)
EOSINOPHIL NFR BLD AUTO: 3.2 %
EOSINOPHILS ABSOLUTE: 0.2 THOU/MM3 (ref 0–0.4)
ERYTHROCYTE [DISTWIDTH] IN BLOOD BY AUTOMATED COUNT: 13.9 % (ref 11.5–14.5)
GFR SERPL CREATININE-BSD FRML MDRD: 69 ML/MIN/1.73M2
GLUCOSE SERPL-MCNC: 99 MG/DL (ref 74–109)
HCT VFR BLD AUTO: 42.2 % (ref 42–52)
HDLC SERPL-MCNC: 33 MG/DL
HGB BLD-MCNC: 14.1 GM/DL (ref 14–18)
IMM GRANULOCYTES # BLD AUTO: 0.01 THOU/MM3 (ref 0–0.07)
IMM GRANULOCYTES NFR BLD AUTO: 0.2 %
LDLC SERPL CALC-MCNC: 71 MG/DL
LYMPHOCYTES ABSOLUTE: 1.4 THOU/MM3 (ref 1–4.8)
LYMPHOCYTES NFR BLD AUTO: 23.7 %
MCH RBC QN AUTO: 32.2 PG (ref 26–33)
MCHC RBC AUTO-ENTMCNC: 33.4 GM/DL (ref 32.2–35.5)
MCV RBC AUTO: 96.3 FL (ref 80–94)
MONOCYTES ABSOLUTE: 0.5 THOU/MM3 (ref 0.4–1.3)
MONOCYTES NFR BLD AUTO: 7.7 %
NEUTROPHILS ABSOLUTE: 3.8 THOU/MM3 (ref 1.8–7.7)
NEUTROPHILS NFR BLD AUTO: 64.9 %
NRBC BLD AUTO-RTO: 0 /100 WBC
PLATELET # BLD AUTO: 167 THOU/MM3 (ref 130–400)
PMV BLD AUTO: 10.4 FL (ref 9.4–12.4)
POTASSIUM SERPL-SCNC: 5 MEQ/L (ref 3.5–5.2)
PROT SERPL-MCNC: 6.6 G/DL (ref 6.4–8.3)
PSA SERPL-MCNC: 0.57 NG/ML (ref 0–1)
RBC # BLD AUTO: 4.38 MILL/MM3 (ref 4.7–6.1)
SODIUM SERPL-SCNC: 142 MEQ/L (ref 135–145)
T4 FREE SERPL-MCNC: 1.2 NG/DL (ref 0.92–1.68)
TRIGL SERPL-MCNC: 106 MG/DL (ref 0–199)
TSH SERPL DL<=0.05 MIU/L-ACNC: 3.54 UIU/ML (ref 0.27–4.2)
WBC # BLD AUTO: 5.9 THOU/MM3 (ref 4.8–10.8)

## 2025-05-09 PROCEDURE — 85025 COMPLETE CBC W/AUTO DIFF WBC: CPT

## 2025-05-09 PROCEDURE — 84443 ASSAY THYROID STIM HORMONE: CPT

## 2025-05-09 PROCEDURE — G0103 PSA SCREENING: HCPCS

## 2025-05-09 PROCEDURE — 80053 COMPREHEN METABOLIC PANEL: CPT

## 2025-05-09 PROCEDURE — 36415 COLL VENOUS BLD VENIPUNCTURE: CPT

## 2025-05-09 PROCEDURE — 84439 ASSAY OF FREE THYROXINE: CPT

## 2025-05-09 PROCEDURE — 80061 LIPID PANEL: CPT

## 2025-05-11 ENCOUNTER — RESULTS FOLLOW-UP (OUTPATIENT)
Dept: FAMILY MEDICINE CLINIC | Age: 80
End: 2025-05-11

## 2025-05-12 RX ORDER — ISOSORBIDE MONONITRATE 30 MG/1
30 TABLET, EXTENDED RELEASE ORAL DAILY
Qty: 90 TABLET | Refills: 3 | Status: SHIPPED | OUTPATIENT
Start: 2025-05-12

## 2025-05-21 DIAGNOSIS — I10 ESSENTIAL HYPERTENSION: ICD-10-CM

## 2025-05-21 DIAGNOSIS — Z87.39 HISTORY OF GOUT: ICD-10-CM

## 2025-05-21 DIAGNOSIS — I25.810 CORONARY ARTERY DISEASE INVOLVING CORONARY BYPASS GRAFT OF NATIVE HEART WITHOUT ANGINA PECTORIS: ICD-10-CM

## 2025-05-21 NOTE — TELEPHONE ENCOUNTER
This medication refill is regarding a electronic request. Refill requested by  [facility] .    Requested Prescriptions     Pending Prescriptions Disp Refills    simvastatin (ZOCOR) 20 MG tablet [Pharmacy Med Name: Simvastatin Oral Tablet 20 MG] 90 tablet 3     Sig: TAKE 1 TABLET EVERY NIGHT    allopurinol (ZYLOPRIM) 300 MG tablet [Pharmacy Med Name: Allopurinol Oral Tablet 300 MG] 90 tablet 3     Sig: TAKE 1 TABLET EVERY DAY    metoprolol tartrate (LOPRESSOR) 50 MG tablet [Pharmacy Med Name: Metoprolol Tartrate Oral Tablet 50 MG] 45 tablet 3     Sig: TAKE 1/2 TABLET EVERY DAY     Date of last visit: 1/27/2025   Date of next visit: Visit date not found  Date of last refill:    -Allopurinol 3/4/24 #90/3   -Metoprolol 3/4/24 #45/3   -Simvastatin 5/5/25 #90/0    Last Lipid Panel:    Lab Results   Component Value Date/Time    CHOL 125 05/09/2025 09:58 AM    TRIG 106 05/09/2025 09:58 AM    HDL 33 05/09/2025 09:58 AM     Last CMP:   Lab Results   Component Value Date     05/09/2025    K 5.0 05/09/2025     05/09/2025    CO2 28 05/09/2025    BUN 17 05/09/2025    CREATININE 1.1 05/09/2025    GLUCOSE 99 05/09/2025    CALCIUM 9.7 05/09/2025    BILITOT 1.8 (H) 05/09/2025    ALKPHOS 75 05/09/2025    AST 22 05/09/2025    ALT 13 05/09/2025    LABGLOM 69 05/09/2025     Rx verified, ordered and set to EP.

## 2025-05-22 RX ORDER — ALLOPURINOL 300 MG/1
300 TABLET ORAL DAILY
Qty: 90 TABLET | Refills: 3 | Status: SHIPPED | OUTPATIENT
Start: 2025-05-22

## 2025-05-22 RX ORDER — SIMVASTATIN 20 MG
20 TABLET ORAL NIGHTLY
Qty: 90 TABLET | Refills: 3 | Status: SHIPPED | OUTPATIENT
Start: 2025-05-22

## 2025-05-22 RX ORDER — METOPROLOL TARTRATE 50 MG
25 TABLET ORAL DAILY
Qty: 45 TABLET | Refills: 3 | Status: SHIPPED | OUTPATIENT
Start: 2025-05-22

## 2025-05-30 ENCOUNTER — HOSPITAL ENCOUNTER (OUTPATIENT)
Dept: INTERVENTIONAL RADIOLOGY/VASCULAR | Age: 80
End: 2025-05-30
Attending: SURGERY
Payer: MEDICARE

## 2025-05-30 ENCOUNTER — HOSPITAL ENCOUNTER (OUTPATIENT)
Dept: INTERVENTIONAL RADIOLOGY/VASCULAR | Age: 80
Discharge: HOME | End: 2025-05-30
Attending: SURGERY
Payer: MEDICARE

## 2025-05-30 DIAGNOSIS — I70.213 ATHEROSCLEROSIS OF NATIVE ARTERY OF BOTH LOWER EXTREMITIES WITH INTERMITTENT CLAUDICATION: ICD-10-CM

## 2025-05-30 PROCEDURE — 93923 UPR/LXTR ART STDY 3+ LVLS: CPT

## 2025-06-03 ENCOUNTER — OFFICE VISIT (OUTPATIENT)
Age: 80
End: 2025-06-03
Payer: MEDICARE

## 2025-06-03 VITALS
WEIGHT: 178.4 LBS | BODY MASS INDEX: 26.42 KG/M2 | HEART RATE: 53 BPM | HEIGHT: 69 IN | SYSTOLIC BLOOD PRESSURE: 140 MMHG | DIASTOLIC BLOOD PRESSURE: 69 MMHG

## 2025-06-03 DIAGNOSIS — I70.213 ATHEROSCLER OF NATIVE ARTERY OF BOTH LEGS WITH INTERMIT CLAUDICATION: Primary | ICD-10-CM

## 2025-06-03 PROCEDURE — 3077F SYST BP >= 140 MM HG: CPT | Performed by: SURGERY

## 2025-06-03 PROCEDURE — 1123F ACP DISCUSS/DSCN MKR DOCD: CPT | Performed by: SURGERY

## 2025-06-03 PROCEDURE — G8427 DOCREV CUR MEDS BY ELIG CLIN: HCPCS | Performed by: SURGERY

## 2025-06-03 PROCEDURE — 1036F TOBACCO NON-USER: CPT | Performed by: SURGERY

## 2025-06-03 PROCEDURE — 3078F DIAST BP <80 MM HG: CPT | Performed by: SURGERY

## 2025-06-03 PROCEDURE — G8417 CALC BMI ABV UP PARAM F/U: HCPCS | Performed by: SURGERY

## 2025-06-03 PROCEDURE — 1159F MED LIST DOCD IN RCRD: CPT | Performed by: SURGERY

## 2025-06-03 PROCEDURE — 99213 OFFICE O/P EST LOW 20 MIN: CPT | Performed by: SURGERY

## 2025-06-03 RX ORDER — FLUTICASONE PROPIONATE 50 MCG
1 SPRAY, SUSPENSION (ML) NASAL DAILY PRN
COMMUNITY
Start: 2025-06-02

## 2025-06-03 NOTE — PROGRESS NOTES
[facility] [place] [facility]  [facility] VASCULAR SURGERY  [address], [address]  [facility] [address]  Dept: [phone]  Loc: [phone]     Patient: Conner Angel  : 1945  MRN: [id number]  DOS: 6/3/2025            HPI:  Conner Angel is a 79 y.o. male who returns to the office regarding his bilateral lower extremity arterial disease.  Recall that he has SFA stents on both sides.  He also had bilateral common femoral endarterectomy.  The left has what I think is greater than 70% stenosis at the inflow of the endarterectomy in the very distal external iliac or proximal common femoral artery.  He has claudication in both lower extremities more on the right than the left.  He can walk at least 100 yards without significant issues.  On unlevel ground he has a significant increase in difficulty.  He is not complaining of rest pain.  He has no ulceration or gangrene.    Review of Systems    Vitals:    25 1318   BP: (!) 140/69   BP Site: Left Upper Arm   Patient Position: Sitting   BP Cuff Size: Medium Adult   Pulse: 53   Weight: 80.9 kg (178 lb 6.4 oz)   Height: 1.753 m (5' 9\")          Physical Exam  On examination he has a palpable femoral pulse bilaterally stronger on the right than the left.  He has a bruit on the left corresponding to the inflow stenosis.  He has no popliteal dorsalis pedis or posterior tibial pulses in either lower extremity.  His feet are warm and well-perfused.  He has no ulceration.  He has no gangrene.    Assessment:  1. Atheroscler of native artery of both legs with intermit claudication          Plan:  At this point I am not ready to go ahead with redo common femoral endarterectomy on the left.  The stenosis is still below the level of the inguinal ligament and therefore I think endarterectomy would be his best option.  This would be a redo operation and we are no more than a year from this original left common femoral

## (undated) DEVICE — SUTURE PERMAHAND SZ 3-0 L30IN NONABSORBABLE BLK SH L26MM K832H

## (undated) DEVICE — ADAPTER,CATHETER/SYRINGE/LUER,STERILE: Brand: MEDLINE

## (undated) DEVICE — TUBING, SUCTION, 1/4" X 20', STRAIGHT: Brand: MEDLINE INDUSTRIES, INC.

## (undated) DEVICE — AGENT HEMSTAT 3GM OXIDIZED REGENERATED CELOS ABSRB FOR CONT (ORDER MULTIPLES OF 5EA)

## (undated) DEVICE — SOLUTION IRRIG 1000ML 0.9% SOD CHL USP POUR PLAS BTL

## (undated) DEVICE — SUTURE PROL SZ 6-0 L24IN NONABSORBABLE BLU L9.3MM BV-1 3/8 8805H

## (undated) DEVICE — APPLICATOR MEDICATED 26 CC SOLUTION HI LT ORNG CHLORAPREP

## (undated) DEVICE — SET UP: Brand: MEDLINE INDUSTRIES, INC.

## (undated) DEVICE — SPONGE LAP W18XL18IN WHT COT 4 PLY FLD STRUNG RADPQ DISP ST 2 PER PACK

## (undated) DEVICE — PENCIL SMK EVAC ALL IN 1 DSGN ENH VISIBILITY IMPROVED AIR

## (undated) DEVICE — Device

## (undated) DEVICE — AGENT HEMOSTATIC SURGIFLOW MATRIX KIT W/THROMBIN

## (undated) DEVICE — SUTURE PROL SZ 5-0 L24IN NONABSORBABLE BLU L9.3MM BV-1 3/8 9702H

## (undated) DEVICE — PACK,UNIVERSAL,NO GOWNS: Brand: MEDLINE

## (undated) DEVICE — PAD GEN USE BORDERED ADH 14IN 2IN AND 12IN 4IN GZ UNIV ST

## (undated) DEVICE — LOOP VES W25MM THK1MM MAXI RED SIL FLD REPELLENT 100 PER

## (undated) DEVICE — SET TRNQT STD 12FR TB LEN 7 IN 2 RED 2 BLU 2 CLR 16FR 2 L

## (undated) DEVICE — DISSECTOR SURG US 13 CM CRV JAW CRDLSS STRL SONICISION 7 LF

## (undated) DEVICE — SOLUTION IV 1000ML 0.9% SOD CHL PH 5 INJ USP VIAFLX PLAS

## (undated) DEVICE — 3M™ IOBAN™ 2 ANTIMICROBIAL INCISE DRAPE 6650EZ: Brand: IOBAN™ 2

## (undated) DEVICE — INTENDED FOR TISSUE SEPARATION, AND OTHER PROCEDURES THAT REQUIRE A SHARP SURGICAL BLADE TO PUNCTURE OR CUT.: Brand: BARD-PARKER ® CARBON RIB-BACK BLADES

## (undated) DEVICE — MAJOR VASCULAR PACK: Brand: CARDINAL HEALTH

## (undated) DEVICE — BASIC SINGLE BASIN BTC-LF: Brand: MEDLINE INDUSTRIES, INC.

## (undated) DEVICE — GLOVE ORANGE PI 7 1/2   MSG9075

## (undated) DEVICE — GAUZE,SPONGE,8"X4",12PLY,XRAY,STRL,LF: Brand: MEDLINE

## (undated) DEVICE — LOOP VES W13MM THK09MM MINI RED SIL FLD REPELLENT

## (undated) DEVICE — 1LYRTR 16FR10ML100%SILTMPS SNP: Brand: MEDLINE INDUSTRIES, INC.

## (undated) DEVICE — APPLIER LIG CLP M L11IN TI STR RNG HNDL FOR 20 CLP DISP

## (undated) DEVICE — SUTURE PERMAHAND SZ 2-0 L12X18IN NONABSORBABLE BLK SILK A185H

## (undated) DEVICE — TOWEL,OR,DSP,ST,BLUE,DLX,4/PK,20PK/CS: Brand: MEDLINE

## (undated) DEVICE — SUTURE PROL SZ 6-0 L18IN NONABSORBABLE BLU L9.3MM BV-1 3/8 8806H

## (undated) DEVICE — SYRINGE IRRIG 60ML SFT PLIABLE BLB EZ TO GRP 1 HND USE W/

## (undated) DEVICE — APPLIER CLP L9.375IN APER 2.1MM CLS L3.8MM 20 SM TI CLP

## (undated) DEVICE — TOWEL,OR,DSP,ST,WHITE,DLX,XR,4/PK,20PK/C: Brand: MEDLINE